# Patient Record
Sex: FEMALE | Race: WHITE | NOT HISPANIC OR LATINO | Employment: OTHER | URBAN - METROPOLITAN AREA
[De-identification: names, ages, dates, MRNs, and addresses within clinical notes are randomized per-mention and may not be internally consistent; named-entity substitution may affect disease eponyms.]

---

## 2017-01-06 DIAGNOSIS — F51.01 PRIMARY INSOMNIA: ICD-10-CM

## 2017-01-06 RX ORDER — ZOLPIDEM TARTRATE 5 MG/1
TABLET ORAL
Qty: 60 TABLET | Refills: 0 | Status: SHIPPED | OUTPATIENT
Start: 2017-01-06 | End: 2017-01-09 | Stop reason: SDUPTHER

## 2017-01-09 DIAGNOSIS — F51.01 PRIMARY INSOMNIA: ICD-10-CM

## 2017-01-09 RX ORDER — ZOLPIDEM TARTRATE 5 MG/1
TABLET ORAL
Qty: 60 TABLET | Refills: 0 | Status: SHIPPED | OUTPATIENT
Start: 2017-01-09 | End: 2017-02-08

## 2017-01-09 NOTE — TELEPHONE ENCOUNTER
----- Message from Ryan Jaimes sent at 1/3/2017  9:36 AM CST -----  Contact: self/[phone]  Refill:  zolpidem (AMBIEN) 10 mg Tab    Thank you.

## 2017-02-08 ENCOUNTER — OFFICE VISIT (OUTPATIENT)
Dept: PSYCHIATRY | Facility: CLINIC | Age: 57
End: 2017-02-08
Payer: COMMERCIAL

## 2017-02-08 VITALS
HEART RATE: 87 BPM | DIASTOLIC BLOOD PRESSURE: 73 MMHG | WEIGHT: 191 LBS | BODY MASS INDEX: 36.06 KG/M2 | HEIGHT: 61 IN | SYSTOLIC BLOOD PRESSURE: 136 MMHG

## 2017-02-08 DIAGNOSIS — M25.552 HIP PAIN, LEFT: ICD-10-CM

## 2017-02-08 DIAGNOSIS — M54.42 CHRONIC LEFT-SIDED LOW BACK PAIN WITH LEFT-SIDED SCIATICA: ICD-10-CM

## 2017-02-08 DIAGNOSIS — F41.9 ANXIETY: ICD-10-CM

## 2017-02-08 DIAGNOSIS — G89.29 CHRONIC LEFT-SIDED LOW BACK PAIN WITH LEFT-SIDED SCIATICA: ICD-10-CM

## 2017-02-08 DIAGNOSIS — F32.89 OTHER DEPRESSION: Primary | ICD-10-CM

## 2017-02-08 PROCEDURE — 99999 PR PBB SHADOW E&M-EST. PATIENT-LVL III: CPT | Mod: PBBFAC,,,

## 2017-02-08 PROCEDURE — 90792 PSYCH DIAG EVAL W/MED SRVCS: CPT | Mod: S$GLB,,,

## 2017-02-08 RX ORDER — LORAZEPAM 1 MG/1
TABLET ORAL
COMMUNITY
Start: 2017-01-20 | End: 2017-02-08 | Stop reason: SDUPTHER

## 2017-02-08 RX ORDER — DULOXETIN HYDROCHLORIDE 30 MG/1
30 CAPSULE, DELAYED RELEASE ORAL NIGHTLY
Qty: 30 CAPSULE | Refills: 6 | Status: SHIPPED | OUTPATIENT
Start: 2017-02-08 | End: 2017-05-01

## 2017-02-08 RX ORDER — NALOXEGOL OXALATE 25 MG/1
25 TABLET, FILM COATED ORAL DAILY
COMMUNITY
Start: 2016-12-29 | End: 2018-04-16 | Stop reason: ALTCHOICE

## 2017-02-08 RX ORDER — DULOXETIN HYDROCHLORIDE 60 MG/1
60 CAPSULE, DELAYED RELEASE ORAL DAILY
Qty: 30 CAPSULE | Refills: 6 | Status: SHIPPED | OUTPATIENT
Start: 2017-02-08 | End: 2017-06-19 | Stop reason: SDUPTHER

## 2017-02-08 RX ORDER — LORAZEPAM 1 MG/1
1 TABLET ORAL 2 TIMES DAILY
Qty: 60 TABLET | Refills: 0 | Status: SHIPPED | OUTPATIENT
Start: 2017-02-20 | End: 2017-10-05

## 2017-02-08 RX ORDER — OXYCODONE AND ACETAMINOPHEN 10; 325 MG/1; MG/1
1 TABLET ORAL EVERY 8 HOURS PRN
COMMUNITY
Start: 2017-01-31 | End: 2022-05-19 | Stop reason: DRUGHIGH

## 2017-02-10 ENCOUNTER — TELEPHONE (OUTPATIENT)
Dept: PSYCHIATRY | Facility: CLINIC | Age: 57
End: 2017-02-10

## 2017-02-10 NOTE — TELEPHONE ENCOUNTER
Patient states that she spoke with her boyfriend about couples counseling and that he was open to it and wanted to schedule an appointment, I explained that I do not provide that service at this clinic and encouraged her to call the main number here and make an appointment with one of our therapists.    ----- Message from Manisha Dawn MA sent at 2/10/2017  2:43 PM CST -----  Contact: pt  [phone]    Pt would like to speak with you says she has some information you wanted from her.

## 2017-02-10 NOTE — TELEPHONE ENCOUNTER
----- Message from Fide Blanchard MA sent at 2/10/2017 10:27 AM CST -----  Contact: pt  Pt stated that she need to speak with you regarding some information that you want to talk to you about.

## 2017-02-13 PROBLEM — G56.02 LEFT CARPAL TUNNEL SYNDROME: Status: ACTIVE | Noted: 2017-02-13

## 2017-02-14 ENCOUNTER — OFFICE VISIT (OUTPATIENT)
Dept: FAMILY MEDICINE | Facility: CLINIC | Age: 57
End: 2017-02-14
Payer: COMMERCIAL

## 2017-02-14 VITALS
SYSTOLIC BLOOD PRESSURE: 118 MMHG | TEMPERATURE: 98 F | BODY MASS INDEX: 36.06 KG/M2 | HEIGHT: 61 IN | DIASTOLIC BLOOD PRESSURE: 74 MMHG | HEART RATE: 72 BPM | OXYGEN SATURATION: 98 % | WEIGHT: 191 LBS

## 2017-02-14 DIAGNOSIS — I10 ESSENTIAL HYPERTENSION: ICD-10-CM

## 2017-02-14 DIAGNOSIS — G89.29 CHRONIC LEFT-SIDED LOW BACK PAIN WITH LEFT-SIDED SCIATICA: Chronic | ICD-10-CM

## 2017-02-14 DIAGNOSIS — E78.2 MIXED HYPERLIPIDEMIA: Chronic | ICD-10-CM

## 2017-02-14 DIAGNOSIS — I10 HYPERTENSION, ESSENTIAL: Chronic | ICD-10-CM

## 2017-02-14 DIAGNOSIS — M47.26 OSTEOARTHRITIS OF SPINE WITH RADICULOPATHY, LUMBAR REGION: ICD-10-CM

## 2017-02-14 DIAGNOSIS — Z23 NEED FOR DIPHTHERIA-TETANUS-PERTUSSIS (TDAP) VACCINE, ADULT/ADOLESCENT: ICD-10-CM

## 2017-02-14 DIAGNOSIS — K21.9 GASTROESOPHAGEAL REFLUX DISEASE WITHOUT ESOPHAGITIS: Primary | Chronic | ICD-10-CM

## 2017-02-14 DIAGNOSIS — R39.11 URINARY HESITANCY: ICD-10-CM

## 2017-02-14 DIAGNOSIS — M54.42 CHRONIC LEFT-SIDED LOW BACK PAIN WITH LEFT-SIDED SCIATICA: Chronic | ICD-10-CM

## 2017-02-14 PROCEDURE — 90715 TDAP VACCINE 7 YRS/> IM: CPT | Mod: S$GLB,,, | Performed by: INTERNAL MEDICINE

## 2017-02-14 PROCEDURE — 99999 PR PBB SHADOW E&M-EST. PATIENT-LVL IV: CPT | Mod: PBBFAC,,, | Performed by: INTERNAL MEDICINE

## 2017-02-14 PROCEDURE — 3074F SYST BP LT 130 MM HG: CPT | Mod: S$GLB,,, | Performed by: INTERNAL MEDICINE

## 2017-02-14 PROCEDURE — 3078F DIAST BP <80 MM HG: CPT | Mod: S$GLB,,, | Performed by: INTERNAL MEDICINE

## 2017-02-14 PROCEDURE — 99214 OFFICE O/P EST MOD 30 MIN: CPT | Mod: 25,S$GLB,, | Performed by: INTERNAL MEDICINE

## 2017-02-14 PROCEDURE — 90471 IMMUNIZATION ADMIN: CPT | Mod: S$GLB,,, | Performed by: INTERNAL MEDICINE

## 2017-02-14 RX ORDER — ESOMEPRAZOLE MAGNESIUM 40 MG/1
40 CAPSULE, DELAYED RELEASE ORAL
Qty: 90 CAPSULE | Refills: 3 | Status: SHIPPED | OUTPATIENT
Start: 2017-02-14 | End: 2017-10-05 | Stop reason: SDUPTHER

## 2017-02-14 RX ORDER — LISINOPRIL AND HYDROCHLOROTHIAZIDE 20; 25 MG/1; MG/1
1 TABLET ORAL DAILY
Qty: 90 TABLET | Refills: 3 | Status: SHIPPED | OUTPATIENT
Start: 2017-02-14 | End: 2018-02-17 | Stop reason: SDUPTHER

## 2017-02-14 RX ORDER — PRAVASTATIN SODIUM 20 MG/1
20 TABLET ORAL DAILY
Qty: 90 TABLET | Refills: 3 | Status: SHIPPED | OUTPATIENT
Start: 2017-02-14 | End: 2017-05-18 | Stop reason: SDUPTHER

## 2017-02-14 NOTE — PROGRESS NOTES
Assessment & Plan  Problem List Items Addressed This Visit        Neuro    Chronic left-sided low back pain with left-sided sciatica (Chronic) - managed by ortho    Overview     Hx L3-4 fusion and L5S1 fusion; currently L2 issues            Cardiac    Hypertension, essential (Chronic) - stable, no change, rx refilled.       Fluids/Electrolytes/Nutrition/GI    Hyperlipidemia (Chronic) - not ideal 10/2016.  Exercising - recumbent bike.  No change for now.  Hx of simva with se myalgias.  Has room to increase.  She'll set up fasting lab visit in 3/2017 thereabouts.    Overview     Simvastatin SE myalgias         Relevant Medications    pravastatin (PRAVACHOL) 20 MG tablet    Other Relevant Orders    Lipid panel    Comprehensive metabolic panel    GERD (gastroesophageal reflux disease) - Primary (Chronic) - uncontrolled on protonix.  Hx of nexium with relief - re-trial.  If not well covered may need to try alternative.  Has tried prevacid w/o relief.  I think tried prilosec as well.    Overview     11/19/2012 EGD normal no bx's            Musculoskeletal and Integument    Lumbar spondylosis      Other Visit Diagnoses     Essential hypertension        Relevant Medications    lisinopril-hydrochlorothiazide (PRINZIDE,ZESTORETIC) 20-25 mg Tab    Need for diphtheria-tetanus-pertussis (Tdap) vaccine, adult/adolescent        Relevant Orders    Tdap Vaccine    Urinary hesitancy     - chronic.  UA 5/2016 was normal.  Referral to urology.    Relevant Orders    Ambulatory referral to Urology          There are no discontinued medications.    Follow-up: Return in about 3 months (around 5/14/2017) for fasting lab work. Plan for OV 1 year.  She's good about self monitoring BP.      =================================================================      Chief Complaint   Patient presents with    [facility]     protonix not working       HPI  Kathryn is a 56 y.o. female, last appointment with this clinic was 10/24/2016, who presents  today for an established pt new problem visit.    Former pt of Dr. Colby who is now retired.    No LMP recorded. Patient has had a hysterectomy.     Is interested in seeing urology.  Sometimes has issues with straining to get the urine out. No incontinence.  Chronic - 3 years by her estimate.  But seems to be progressing, more noticeable.  Hx of stress incontinence but that is quiescent.  No burning; no strange color or odor to it.  Notes BMs are not great b/c of oxycodone.  Last UA on record 5/2016 was normal.  Symptoms are 3 years running.    GERD - of late, not controlled.  Had been better in the past - would be better int he day.  Would feel acid sensation in the chest. Not constant; maybe worse after meals.  Or with exercise.  Notes this is not exclusive to exercise. Not squeezing; more like a burning, acid like pain.  No dyspnea with exercise.     Hx of nexium which worked well until no longer covered by insurance.  Hx of prevacid but developed tolerance to that.     BP good. On longstanding medication. Intermittent outside BP checks.      Exercise - few days a week; hx of L 3 L4 fusion and L5-S1.  Followed by Dr. Moreira for L2 degeneration.  Out of work x 8/2016.    Taking cymbalta combinatinon for depression and nerve pain.  Does find it helpful.  Denies obvious SE of medications.    Health Maintenance       Date Due Completion Date    TETANUS VACCINE 2/20/1979 ---    Mammogram 3/28/2018 3/28/2016    Lipid Panel 10/25/2021 10/25/2016    Colonoscopy 11/19/2022 11/19/2012          Patient Active Problem List    Diagnosis Date Noted    Left carpal tunnel syndrome 02/13/2017    GERD (gastroesophageal reflux disease)      11/19/2012 EGD normal no bx's      Chronic left-sided low back pain with left-sided sciatica 10/04/2016     Hx L3-4 fusion and L5S1 fusion; currently L2 issues      Hip pain, left 10/04/2016    Weakness of trunk musculature 10/04/2016    Obesity 03/04/2015    Lumbar spondylosis  2015    Hyperplastic polyp of intestine 2012 colonoscopy hyperplastic polyp      Hypertension, essential 10/10/2012    Hyperlipidemia 10/10/2012     Simvastatin SE myalgias      Depression 10/10/2012     Fluoxetine x 2013  ativan         PAST MEDICAL HISTORY:  Past Medical History   Diagnosis Date    Depression     Encounter for blood transfusion     GERD (gastroesophageal reflux disease)     High cholesterol     Hypertension     Lumbar spondylosis 2015     L3-4 fusion; L5S1 fusion    Obesity     Restless leg syndrome     Thyroid goiter        PAST SURGICAL HISTORY:  Past Surgical History   Procedure Laterality Date     section      Hysterectomy       partial    Breast surgery       breast reduction    Tubal ligation      Belt abdominoplasty      Back surgery Bilateral      2015    Cosmetic surgery         SOCIAL HISTORY:  Social History     Social History    Marital status: Significant Other     Spouse name: N/A    Number of children: N/A    Years of education: N/A     Occupational History    [employer]    formerly       Social History Main Topics    Smoking status: Former Smoker     Years: 7.00    Smokeless tobacco: Former User     Quit date: 2013    Alcohol use 0.0 oz/week      Comment: a coupe of times a week    Drug use: No    Sexual activity: Not on file     Other Topics Concern    Not on file     Social History Narrative    No narrative on file       ALLERGIES AND MEDICATIONS: updated and reviewed.  Review of patient's allergies indicates:   Allergen Reactions    Iodine and iodide containing products Hives and Itching    Latex, natural rubber Rash     Current Outpatient Prescriptions   Medication Sig Dispense Refill    duloxetine (CYMBALTA) 30 MG capsule Take 1 capsule (30 mg total) by mouth every evening. 30 capsule 6    duloxetine (CYMBALTA) 60 MG capsule Take 1 capsule (60 mg total)  "by mouth once daily. 30 capsule 6    lisinopril-hydrochlorothiazide (PRINZIDE,ZESTORETIC) 20-25 mg Tab Take 1 tablet by mouth once daily. 90 tablet 3    [START ON 2/20/2017] lorazepam (ATIVAN) 1 MG tablet Take 1 tablet (1 mg total) by mouth 2 (two) times daily. 60 tablet 0    MOVANTIK tablet Take 25 mg by mouth once daily.       oxycodone-acetaminophen (PERCOCET)  mg per tablet Take 1 tablet by mouth every 8 (eight) hours as needed.       pantoprazole (PROTONIX) 40 MG tablet Take 1 tablet (40 mg total) by mouth once daily. 90 tablet 3    pravastatin (PRAVACHOL) 20 MG tablet Take 1 tablet (20 mg total) by mouth once daily. 90 tablet 3    hydroquinone 4 % Crea Use hs on face for dark spots 28.35 g 3     No current facility-administered medications for this visit.          Review of Systems   Constitutional: Negative for fever, malaise/fatigue and weight loss.   HENT: Negative for congestion.    Eyes: Negative for blurred vision and pain.   Respiratory: Negative for shortness of breath and wheezing.    Cardiovascular: Negative for chest pain, palpitations and leg swelling.   Gastrointestinal: Positive for heartburn. Negative for abdominal pain, blood in stool, constipation and diarrhea.   Genitourinary:        HPI   Musculoskeletal: Negative for joint pain.   Neurological: Negative for tingling, focal weakness, weakness and headaches.   Psychiatric/Behavioral: Negative for depression. The patient is not nervous/anxious.          Physical Exam   Vitals:    02/14/17 1302   BP: 118/74   Pulse: 72   Temp: 98 °F (36.7 °C)   TempSrc: Oral   SpO2: 98%   Weight: 86.6 kg (191 lb)   Height: 5' 1" (1.549 m)    Body mass index is 36.09 kg/(m^2).  Weight: 86.6 kg (191 lb)   Height: 5' 1" (154.9 cm)     Physical Exam   Constitutional: She is oriented to person, place, and time. She appears well-developed and well-nourished. No distress.   Eyes: EOM are normal.   Cardiovascular: Normal rate, regular rhythm and normal " heart sounds.    No murmur heard.  Pulmonary/Chest: Effort normal and breath sounds normal.   Abdominal: Soft. She exhibits no distension and no mass. There is no tenderness.   Musculoskeletal: Normal range of motion.   Neurological: She is alert and oriented to person, place, and time. Coordination normal.   Skin: Skin is warm and dry.   Psychiatric: She has a normal mood and affect. Her behavior is normal. Thought content normal.
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
## 2017-03-14 DIAGNOSIS — F41.9 ANXIETY: ICD-10-CM

## 2017-03-14 RX ORDER — ZOLPIDEM TARTRATE 5 MG/1
5 TABLET ORAL NIGHTLY PRN
Qty: 30 TABLET | Refills: 2 | Status: SHIPPED | OUTPATIENT
Start: 2017-03-14 | End: 2017-05-09 | Stop reason: SDUPTHER

## 2017-03-14 NOTE — TELEPHONE ENCOUNTER
----- Message from Mary Ventura sent at 3/14/2017 11:27 AM CDT -----  Contact: self  Pt is requesting a refill for zolpidem (AMBIEN) 5 MG Tab . [phone]

## 2017-04-19 ENCOUNTER — TELEPHONE (OUTPATIENT)
Dept: PAIN MEDICINE | Facility: CLINIC | Age: 57
End: 2017-04-19

## 2017-04-26 ENCOUNTER — OFFICE VISIT (OUTPATIENT)
Dept: PAIN MEDICINE | Facility: CLINIC | Age: 57
End: 2017-04-26
Payer: COMMERCIAL

## 2017-04-26 VITALS
RESPIRATION RATE: 20 BRPM | DIASTOLIC BLOOD PRESSURE: 69 MMHG | BODY MASS INDEX: 37.61 KG/M2 | HEART RATE: 90 BPM | HEIGHT: 61 IN | WEIGHT: 199.19 LBS | SYSTOLIC BLOOD PRESSURE: 107 MMHG

## 2017-04-26 DIAGNOSIS — Z98.1 HISTORY OF LUMBAR FUSION: ICD-10-CM

## 2017-04-26 DIAGNOSIS — M51.36 DDD (DEGENERATIVE DISC DISEASE), LUMBAR: ICD-10-CM

## 2017-04-26 DIAGNOSIS — M53.3 SACROILIAC JOINT PAIN: Primary | ICD-10-CM

## 2017-04-26 DIAGNOSIS — M96.1 POSTLAMINECTOMY SYNDROME OF LUMBAR REGION: ICD-10-CM

## 2017-04-26 PROCEDURE — 3078F DIAST BP <80 MM HG: CPT | Mod: S$GLB,,, | Performed by: ANESTHESIOLOGY

## 2017-04-26 PROCEDURE — 1160F RVW MEDS BY RX/DR IN RCRD: CPT | Mod: S$GLB,,, | Performed by: ANESTHESIOLOGY

## 2017-04-26 PROCEDURE — 3074F SYST BP LT 130 MM HG: CPT | Mod: S$GLB,,, | Performed by: ANESTHESIOLOGY

## 2017-04-26 PROCEDURE — 99204 OFFICE O/P NEW MOD 45 MIN: CPT | Mod: S$GLB,,, | Performed by: ANESTHESIOLOGY

## 2017-04-26 RX ORDER — SODIUM CHLORIDE 9 MG/ML
500 INJECTION, SOLUTION INTRAVENOUS CONTINUOUS
Status: CANCELLED | OUTPATIENT
Start: 2017-04-26

## 2017-04-26 RX ORDER — MELOXICAM 15 MG/1
15 TABLET ORAL DAILY
COMMUNITY
Start: 2017-04-25 | End: 2019-04-09

## 2017-04-26 NOTE — PROGRESS NOTES
Chronic Pain - New Consult    Referring Physician: Clarence Quintero, *    Chief Complaint:   Chief Complaint   Patient presents with    Low-back Pain        SUBJECTIVE:    Kathryn Wagner is a 56 y/o female with hx of lumbar fusion who presents to the clinic for the evaluation of low back pain. The pain started 3 years ago and symptoms have been worsening. The pain is located in the bilateral low back and buttock area and will radiate down the left leg in L5 distribution. Back: 80% Le%. The pain is described as aching, burning, numbing, throbbing and tight band and is rated as 6/10. The pain is rated with a score of  3/10 on the BEST day and a score of 10/10 on the WORST day.  Symptoms interfere with daily activity and sleeping. The pain is exacerbated by Sitting, Standing, Walking, Lifting, Getting out of bed/chair and standing for a long time, cold weather.  The pain is mitigated by ice, laying down and stretching. The patient reports 3 hours of uninterrupted sleep per night. She was referred by Dr. Qiuntero for SI joint injection.    Patient denies night fever/night sweats, urinary incontinence, bowel incontinence, significant weight loss, significant motor weakness and loss of sensations.    Physical Therapy/Home Exercise: no      Pain Disability Index Review:  Last 3 PDI Scores 2017   Pain Disability Index (PDI) 42       Pain Medications:  Percocet 10/325mg 1 tablet 2-3 times daily  Cymbalta 60mg, 1 tablet daily  Lorazepam 1mg, 1 tablet twice daily as needed  Mobic 15mg, 1 tablet daily as needed  Ambien 5mg, 1 tablet at bedtime as needed       report:  Reviewed     Pain Procedures: RFA - Dr. Bravo 10 years ago,  [facility]     Imaging: None available    Past Medical History:   Diagnosis Date    Depression     Encounter for blood transfusion     GERD (gastroesophageal reflux disease)     High cholesterol     Hypertension     Lumbar spondylosis 2015    L3-4 fusion; L5S1 fusion     Obesity     Restless leg syndrome     Thyroid goiter      Past Surgical History:   Procedure Laterality Date    BACK SURGERY Bilateral     2015    BELT ABDOMINOPLASTY      BREAST SURGERY      breast reduction     SECTION      COSMETIC SURGERY      HYSTERECTOMY      partial    TUBAL LIGATION       Social History     Social History    Marital status: Significant Other     Spouse name: N/A    Number of children: N/A    Years of education: N/A     Occupational History    vera [employer]    formerly       Social History Main Topics    Smoking status: Former Smoker     Years: 7.00    Smokeless tobacco: Former User     Quit date: 2013    Alcohol use 0.0 oz/week      Comment: a coupe of times a week    Drug use: No    Sexual activity: Not on file     Other Topics Concern    Not on file     Social History Narrative     Family History   Problem Relation Age of Onset    Hypertension Father     No Known Problems Mother     No Known Problems Sister     No Known Problems Brother     No Known Problems Maternal Aunt     No Known Problems Maternal Uncle     No Known Problems Paternal Aunt     No Known Problems Paternal Uncle     No Known Problems Maternal Grandmother     No Known Problems Maternal Grandfather     No Known Problems Paternal Grandmother     No Known Problems Paternal Grandfather     Amblyopia Neg Hx     Blindness Neg Hx     Cancer Neg Hx     Cataracts Neg Hx     Diabetes Neg Hx     Glaucoma Neg Hx     Macular degeneration Neg Hx     Retinal detachment Neg Hx     Strabismus Neg Hx     Stroke Neg Hx     Thyroid disease Neg Hx        Review of patient's allergies indicates:   Allergen Reactions    Iodine and iodide containing products Hives and Itching    Latex, natural rubber Rash       Current Outpatient Prescriptions   Medication Sig    duloxetine (CYMBALTA) 60 MG capsule Take 1 capsule (60 mg total) by mouth once  "daily.    esomeprazole (NEXIUM) 40 MG capsule Take 1 capsule (40 mg total) by mouth before breakfast.    hydroquinone 4 % Crea Use hs on face for dark spots    lisinopril-hydrochlorothiazide (PRINZIDE,ZESTORETIC) 20-25 mg Tab Take 1 tablet by mouth once daily.    lorazepam (ATIVAN) 1 MG tablet Take 1 tablet (1 mg total) by mouth 2 (two) times daily.    meloxicam (MOBIC) 15 MG tablet Take 15 mg by mouth daily as needed.    MOVANTIK tablet Take 25 mg by mouth once daily.     oxycodone-acetaminophen (PERCOCET)  mg per tablet Take 1 tablet by mouth every 8 (eight) hours as needed.     pravastatin (PRAVACHOL) 20 MG tablet Take 1 tablet (20 mg total) by mouth once daily.    zolpidem (AMBIEN) 5 MG Tab Take 1 tablet (5 mg total) by mouth nightly as needed.    duloxetine (CYMBALTA) 30 MG capsule Take 1 capsule (30 mg total) by mouth every evening.     No current facility-administered medications for this visit.        REVIEW OF SYSTEMS:  GENERAL: No weight loss, malaise or fevers.  HEENT: Negative for frequent or significant headaches.  NECK: Negative for lumps or significant neck swelling.  RESPIRATORY: Negative for cough, wheezing or shortness of breath.  CARDIOVASCULAR: Negative for chest pain or palpitations.  GI: No blood in stools or black stools or change in bowel habits.  : Negative for kidney stones, urinary tract infections, or incontinence.  MUSCULOSKELETAL: See HPI  SKIN: Negative for lesions, rash, and itching.  PSYCH: + sleep disturbance   HEMATOLOGY/LYMPHOLOGY Negative for prolonged bleeding, bruising easily or swollen nodes.  NEURO:  No history of syncope, seizures or tremors.    OBJECTIVE:    /69 (BP Location: Left arm, Patient Position: Sitting, BP Method: Automatic)  Pulse 90  Resp 20  Ht 5' 1" (1.549 m)  Wt 90.4 kg (199 lb 3.2 oz)  BMI 37.64 kg/m2    PHYSICAL EXAMINATION:  GENERAL: Well appearing, in no acute distress. Overweight.  PSYCH:  Mood and affect is appropriate.  " Awake, alert, and oriented x 3.  SKIN: Skin color, texture, turgor normal, no rashes or lesions  HEENT: Normocephalic, atraumatic.  EOM intact.  CV: Radial pulses are 2+.  RESP:  Respirations are unlabored.  GI: Abdomen soft and non-tender.  MSK:  No atrophy or tone abnormalities are noted.      Neck: No obvious deformity or signs of trauma.  Normal cervical spine range of motion.    Back: Well healed scar noted over midline. Straight leg raising in the sitting and supine positions is negative for  radicular pain. No pain to palpation over the lumbar spine and paraspinous muscles.  Positive for pain with facet loading and back extension/rotation. Decreased range of motion on flexion and extension.    Buttocks:  Pain to palpation over the PSIS. -FADIR -VIRI    Extremities:  Peripheral joint ROM is full and pain free without obvious instability or laxity in all four extremities. No edema or skin discolorations noted.     Gait:  Gait is normal.    NEUR:  Bilateral lower extremity coordination and muscle stretch reflexes are physiologic and symmetric. Strength testing is 5/5 throughout all muscle groups in the lower extremities. No loss of sensation is noted.     ASSESSMENT:     1. Sacroiliac joint pain    2. Postlaminectomy syndrome of lumbar region    3. DDD (degenerative disc disease), lumbar    4. History of lumbar fusion          PLAN:     - I have stressed the importance of physical activity and a home exercise plan to help with pain and improve health.  - Obtain imaging reports from outside physicians.  - Order Flexion-Extension X-rays of the Lumbar spine to rule out any instability.  - Schedule for a Bilateral Diagnostic/Therapeutic SI joint injection to help with pain and progress with a home exercise program.  - RTC after procedure.    The above plan and management options were discussed at length with patient. Patient is in agreement with the above and verbalized understanding. It will be communicated with  the referring physician via electronic record, fax, or mail.    Jam Stewart III  04/26/2017

## 2017-04-26 NOTE — LETTER
April 26, 2017      Clarence Quintero MD  [address] [place] [address]           [facility]  [address] [place] [id number]  Phone: [phone]  Fax: [phone]          Patient: Kathryn Wagner   MR Number: [id number]   YOB: 1960   Date of Visit: 4/26/2017       Dear Dr. Clarence Quintero:    Thank you for referring Kathryn Wagner to me for evaluation. Attached you will find relevant portions of my assessment and plan of care.    If you have questions, please do not hesitate to call me. I look forward to following Kathryn Wagner along with you.    Sincerely,    Jam Stewart III, MD    Enclosure  CC:  No Recipients    If you would like to receive this communication electronically, please contact [email or web address] or [phone] to request more information on [employer] Link access.    For providers and/or their staff who would like to refer a patient to [facility], please contact us through our one-stop-shop provider referral line, [facility], at [phone].    If you feel you have received this communication in error or would no longer like to receive these types of communications, please e-mail [email or web address]

## 2017-05-02 PROBLEM — M51.369 DDD (DEGENERATIVE DISC DISEASE), LUMBAR: Status: ACTIVE | Noted: 2017-05-02

## 2017-05-02 PROBLEM — M96.1 POSTLAMINECTOMY SYNDROME OF LUMBAR REGION: Status: ACTIVE | Noted: 2017-05-02

## 2017-05-02 PROBLEM — M51.36 DDD (DEGENERATIVE DISC DISEASE), LUMBAR: Status: ACTIVE | Noted: 2017-05-02

## 2017-05-02 PROBLEM — M53.3 SACROILIAC JOINT PAIN: Status: ACTIVE | Noted: 2017-05-02

## 2017-05-09 RX ORDER — ZOLPIDEM TARTRATE 5 MG/1
5 TABLET ORAL NIGHTLY PRN
Qty: 30 TABLET | Refills: 2 | Status: SHIPPED | OUTPATIENT
Start: 2017-05-09 | End: 2017-06-14 | Stop reason: SDUPTHER

## 2017-05-09 NOTE — TELEPHONE ENCOUNTER
----- Message from Miranda Mariscal sent at 5/9/2017 11:01 AM CDT -----  Refill: zolpidem (AMBIEN) 5 MG Tab    Please send to [facility]. Thank you!

## 2017-05-10 ENCOUNTER — LAB VISIT (OUTPATIENT)
Dept: LAB | Facility: HOSPITAL | Age: 57
End: 2017-05-10
Attending: INTERNAL MEDICINE
Payer: MEDICAID

## 2017-05-10 DIAGNOSIS — E78.2 MIXED HYPERLIPIDEMIA: Chronic | ICD-10-CM

## 2017-05-10 LAB
ALBUMIN SERPL BCP-MCNC: 4.2 G/DL
ALP SERPL-CCNC: 60 U/L
ALT SERPL W/O P-5'-P-CCNC: 12 U/L
ANION GAP SERPL CALC-SCNC: 17 MMOL/L
AST SERPL-CCNC: 14 U/L
BILIRUB SERPL-MCNC: 0.5 MG/DL
BUN SERPL-MCNC: 20 MG/DL
CALCIUM SERPL-MCNC: 9.6 MG/DL
CHLORIDE SERPL-SCNC: 101 MMOL/L
CHOLEST/HDLC SERPL: 3.9 {RATIO}
CO2 SERPL-SCNC: 19 MMOL/L
CREAT SERPL-MCNC: 1 MG/DL
EST. GFR  (AFRICAN AMERICAN): >60 ML/MIN/1.73 M^2
EST. GFR  (NON AFRICAN AMERICAN): >60 ML/MIN/1.73 M^2
GLUCOSE SERPL-MCNC: 132 MG/DL
HDL/CHOLESTEROL RATIO: 25.8 %
HDLC SERPL-MCNC: 236 MG/DL
HDLC SERPL-MCNC: 61 MG/DL
LDLC SERPL CALC-MCNC: 138.4 MG/DL
NONHDLC SERPL-MCNC: 175 MG/DL
POTASSIUM SERPL-SCNC: 4 MMOL/L
PROT SERPL-MCNC: 7.9 G/DL
SODIUM SERPL-SCNC: 137 MMOL/L
TRIGL SERPL-MCNC: 183 MG/DL

## 2017-05-10 PROCEDURE — 80053 COMPREHEN METABOLIC PANEL: CPT

## 2017-05-10 PROCEDURE — 36415 COLL VENOUS BLD VENIPUNCTURE: CPT | Mod: PO

## 2017-05-10 PROCEDURE — 80061 LIPID PANEL: CPT

## 2017-05-18 ENCOUNTER — TELEPHONE (OUTPATIENT)
Dept: FAMILY MEDICINE | Facility: CLINIC | Age: 57
End: 2017-05-18

## 2017-05-18 DIAGNOSIS — Z12.31 ENCOUNTER FOR SCREENING MAMMOGRAM FOR MALIGNANT NEOPLASM OF BREAST: ICD-10-CM

## 2017-05-18 DIAGNOSIS — R73.09 ABNORMAL GLUCOSE: Primary | ICD-10-CM

## 2017-05-18 DIAGNOSIS — E78.2 MIXED HYPERLIPIDEMIA: Chronic | ICD-10-CM

## 2017-05-18 RX ORDER — PRAVASTATIN SODIUM 40 MG/1
40 TABLET ORAL DAILY
Qty: 90 TABLET | Refills: 3 | Status: SHIPPED | OUTPATIENT
Start: 2017-05-18 | End: 2018-05-29 | Stop reason: SDUPTHER

## 2017-05-18 NOTE — TELEPHONE ENCOUNTER
Please call pt  1. Cholesterol high - increase the pravastatin from 20 mg to 40 mg - new rx sent to pharmacy    2. Blood sugar was a little high - work on diet - watch out for sweets, starches.    i put in labs for 3 months - CMP, lipid, and a1c on higher dose pravastatin and check for diabetes or pre-diabetes.

## 2017-05-18 NOTE — TELEPHONE ENCOUNTER
Spoke w/patient, informed of lab results, Rx sent to and recomendations. Verbalized understanding. Labs scheduled. Patient request yearly mammogram, scheduled.

## 2017-05-18 NOTE — PROGRESS NOTES
Lipid high on pravastatin 20.  Abnormal glucose.  Need to check A1c with next labs.    Would increase pravastatin to 40 mg.  Repeat labs 3 monthsCMP, lipid, A1c

## 2017-05-24 ENCOUNTER — HOSPITAL ENCOUNTER (OUTPATIENT)
Dept: RADIOLOGY | Facility: HOSPITAL | Age: 57
Discharge: HOME | End: 2017-05-24
Attending: INTERNAL MEDICINE
Payer: COMMERCIAL

## 2017-05-24 DIAGNOSIS — Z12.31 ENCOUNTER FOR SCREENING MAMMOGRAM FOR MALIGNANT NEOPLASM OF BREAST: ICD-10-CM

## 2017-05-24 PROCEDURE — 77067 SCR MAMMO BI INCL CAD: CPT | Mod: 26,,, | Performed by: RADIOLOGY

## 2017-05-24 PROCEDURE — 77063 BREAST TOMOSYNTHESIS BI: CPT | Mod: 26,,, | Performed by: RADIOLOGY

## 2017-05-24 PROCEDURE — 77067 SCR MAMMO BI INCL CAD: CPT | Mod: TC

## 2017-06-14 DIAGNOSIS — F40.240 CLAUSTROPHOBIA: Primary | ICD-10-CM

## 2017-06-14 DIAGNOSIS — F51.01 PRIMARY INSOMNIA: ICD-10-CM

## 2017-06-14 RX ORDER — ZOLPIDEM TARTRATE 5 MG/1
5 TABLET ORAL NIGHTLY PRN
Qty: 30 TABLET | Refills: 2 | Status: SHIPPED | OUTPATIENT
Start: 2017-06-14 | End: 2017-10-05

## 2017-06-14 RX ORDER — ALPRAZOLAM 0.5 MG/1
TABLET ORAL
Qty: 3 TABLET | Refills: 0 | Status: SHIPPED | OUTPATIENT
Start: 2017-06-14 | End: 2017-10-05

## 2017-06-14 NOTE — TELEPHONE ENCOUNTER
----- Message from Cris Burt sent at 6/14/2017  3:51 PM CDT -----  Contact: SELF  Refill request for Ambien .  Requesting Xanax to have MRI done .  Checking on the status of medic clearance for surgery .       [phone]     LL

## 2017-06-14 NOTE — TELEPHONE ENCOUNTER
Has been on ambien longstanding.  Will refill.    Will send in rx for xanax to take prior to MRI.    i was not asked to provide medical clearance for surgery.

## 2017-06-14 NOTE — TELEPHONE ENCOUNTER
I called and spoke with pt notified her that her ambien was refill and that he prescribed 3 xanax for her MRI and made an appointment on Monday 06 /19/17 for her pre-op appointment and Dr. Contreras has the form already pt dropped it off last week.Thanks

## 2017-06-19 ENCOUNTER — OFFICE VISIT (OUTPATIENT)
Dept: FAMILY MEDICINE | Facility: CLINIC | Age: 57
End: 2017-06-19
Payer: COMMERCIAL

## 2017-06-19 VITALS
HEART RATE: 72 BPM | HEIGHT: 61 IN | OXYGEN SATURATION: 99 % | TEMPERATURE: 98 F | SYSTOLIC BLOOD PRESSURE: 118 MMHG | DIASTOLIC BLOOD PRESSURE: 80 MMHG | WEIGHT: 201.75 LBS | BODY MASS INDEX: 38.09 KG/M2

## 2017-06-19 DIAGNOSIS — M54.42 CHRONIC LEFT-SIDED LOW BACK PAIN WITH LEFT-SIDED SCIATICA: ICD-10-CM

## 2017-06-19 DIAGNOSIS — F51.01 PRIMARY INSOMNIA: ICD-10-CM

## 2017-06-19 DIAGNOSIS — F32.89 OTHER DEPRESSION: ICD-10-CM

## 2017-06-19 DIAGNOSIS — Z01.818 PREOP EXAMINATION: Primary | ICD-10-CM

## 2017-06-19 DIAGNOSIS — G89.29 CHRONIC LEFT-SIDED LOW BACK PAIN WITH LEFT-SIDED SCIATICA: ICD-10-CM

## 2017-06-19 DIAGNOSIS — M25.552 HIP PAIN, LEFT: ICD-10-CM

## 2017-06-19 PROCEDURE — 99214 OFFICE O/P EST MOD 30 MIN: CPT | Mod: S$GLB,,, | Performed by: INTERNAL MEDICINE

## 2017-06-19 PROCEDURE — 99999 PR PBB SHADOW E&M-EST. PATIENT-LVL III: CPT | Mod: PBBFAC,,, | Performed by: INTERNAL MEDICINE

## 2017-06-19 PROCEDURE — 93010 ELECTROCARDIOGRAM REPORT: CPT | Mod: S$GLB,,, | Performed by: INTERNAL MEDICINE

## 2017-06-19 PROCEDURE — 93005 ELECTROCARDIOGRAM TRACING: CPT | Mod: S$GLB,,, | Performed by: INTERNAL MEDICINE

## 2017-06-19 RX ORDER — DULOXETIN HYDROCHLORIDE 60 MG/1
60 CAPSULE, DELAYED RELEASE ORAL DAILY
Qty: 30 CAPSULE | Refills: 6 | Status: SHIPPED | OUTPATIENT
Start: 2017-06-19 | End: 2017-08-19 | Stop reason: SDUPTHER

## 2017-06-19 RX ORDER — TRAZODONE HYDROCHLORIDE 50 MG/1
50 TABLET ORAL NIGHTLY
Qty: 30 TABLET | Refills: 11 | Status: SHIPPED | OUTPATIENT
Start: 2017-06-19 | End: 2017-12-22 | Stop reason: SDUPTHER

## 2017-06-19 NOTE — PROGRESS NOTES
Assessment & Plan  Preop examination-avg risk surgery, risk factors - HTN.  EKG WNL.  METS level 3 activity.  Given avg cardiac risk for type of surgery and with her risk factors, would not pursue stress testing further.  -     EKG 12-lead    Other depression  Chronic left-sided low back pain with left-sided sciatica  Hip pain, left - refilled cymbalta  -     duloxetine (CYMBALTA) 60 MG capsule; Take 1 capsule (60 mg total) by mouth once daily.  Dispense: 30 capsule; Refill: 6    Primary insomnia - trying to wean off Ambien, trazodone to use in the interim to bridge  -     trazodone (DESYREL) 50 MG tablet; Take 1 tablet (50 mg total) by mouth every evening. Start with 1/4 to 1/2 tablet PRN insomnia  Dispense: 30 tablet; Refill: 11    Abn glc - work on diet and exercise; exercise limited b/c of lumbar pain, hold on a1c at this time but work on TLC after surgery.    Medications Discontinued During This Encounter   Medication Reason    duloxetine (CYMBALTA) 60 MG capsule Reorder       Follow-up: No Follow-up on file. Plan for f/u 6 months.  Recall entered.      =================================================================      Chief Complaint   Patient presents with    Pre-op Exam       HPI  Kathryn is a 57 y.o. female, last appointment with this clinic was 2/14/2017.    No LMP recorded. Patient has had a hysterectomy.    Low back pain and left sided sciatica.  Followed by ortho.  HTN  Hyperlipidemia, hx of simvastatin with SE of myalgias.  GERD, uncontrolled on protonix, re-trial of Nexium.    Since last visit - increased prava from 20 to 40mg  Fasting glc elevated.  Will need A1c with next labs.    upcoming surgery 6/23.  Sacroiliac surgery. With Dr. Quintero. Last EKG 2/2016.  CMP 5/10/2017 WNL.  No recent CBC or UA.    Notes that she's going to get surgery done 6/30 with Eliazar Herrera.  Dr. Quintero. She will have preop testing done at [facility] as well, pt thinks she's going to have EKG done there.  Pt called  the surgery office - pt to have preop labs, EKG, and CXR with anesthesia.      Pt had seen cardiology in the past for HTN.  Denies chest pain, palpitations, dyspnea, and pedal edema.  Physical activity - not as much of late b/c of the back pain.  Prior - walking the trails. It's about a 3 mile walk. She would complete that in about an hour or less. No chest pain with stairs - but lives in mobile home, so 3 stairs entrance.    She's trying to wean off the Ambien - taking 1/2 tablet and times she is not taking it - not as heavy sleep.    Patient Care Team:  Edward Contreras MD as PCP - General (Internal Medicine)    Patient Active Problem List    Diagnosis Date Noted    Primary insomnia 06/14/2017    Sacroiliac joint pain 05/02/2017    Postlaminectomy syndrome of lumbar region 05/02/2017    DDD (degenerative disc disease), lumbar 05/02/2017    Left carpal tunnel syndrome 02/13/2017    GERD (gastroesophageal reflux disease)      11/19/2012 EGD normal no bx's      Chronic left-sided low back pain with left-sided sciatica 10/04/2016     Hx L3-4 fusion and L5S1 fusion; currently L2 issues      Hip pain, left 10/04/2016    Weakness of trunk musculature 10/04/2016    Obesity 03/04/2015    Lumbar spondylosis 03/04/2015    Hyperplastic polyp of intestine 11/19/2012 11/19/2012 colonoscopy hyperplastic polyp      Hypertension, essential 10/10/2012    Hyperlipidemia 10/10/2012     Simvastatin SE myalgias      Depression 10/10/2012     Fluoxetine x 2013  ativan         PAST MEDICAL HISTORY:  Past Medical History:   Diagnosis Date    Depression     Encounter for blood transfusion     GERD (gastroesophageal reflux disease)     High cholesterol     Hypertension     Lumbar spondylosis 03/04/2015    L3-4 fusion; L5S1 fusion    Obesity     Restless leg syndrome        PAST SURGICAL HISTORY:  Past Surgical History:   Procedure Laterality Date    BACK SURGERY Bilateral     June 2015, dec31 2015 fusion    BELT  ABDOMINOPLASTY      BREAST SURGERY      breast reduction     SECTION      x3    COSMETIC SURGERY      HYSTERECTOMY      partial    TUBAL LIGATION         SOCIAL HISTORY:  Social History     Social History    Marital status: Significant Other     Spouse name: N/A    Number of children: N/A    Years of education: N/A     Occupational History    vera Ash    formerly       Social History Main Topics    Smoking status: Former Smoker    Smokeless tobacco: Former User     Quit date: 2013      Comment: quit 7 yrs ago    Alcohol use 0.0 oz/week      Comment: a coupe of times a week    Drug use: No    Sexual activity: Not on file     Other Topics Concern    Not on file     Social History Narrative    No narrative on file       ALLERGIES AND MEDICATIONS: updated and reviewed.  Review of patient's allergies indicates:   Allergen Reactions    Iodine and iodide containing products Hives and Itching    Latex, natural rubber Rash     Current Outpatient Prescriptions   Medication Sig Dispense Refill    alprazolam (XANAX) 0.5 MG tablet 1-2 tablets 15 min prior to procedure; additional 1 in case 3 tablet 0    duloxetine (CYMBALTA) 60 MG capsule Take 1 capsule (60 mg total) by mouth once daily. 30 capsule 6    esomeprazole (NEXIUM) 40 MG capsule Take 1 capsule (40 mg total) by mouth before breakfast. 90 capsule 3    hydroquinone 4 % Crea Use hs on face for dark spots 28.35 g 3    lisinopril-hydrochlorothiazide (PRINZIDE,ZESTORETIC) 20-25 mg Tab Take 1 tablet by mouth once daily. 90 tablet 3    lorazepam (ATIVAN) 1 MG tablet Take 1 tablet (1 mg total) by mouth 2 (two) times daily. 60 tablet 0    meloxicam (MOBIC) 15 MG tablet Take 15 mg by mouth once daily.       MOVANTIK tablet Take 25 mg by mouth once daily.       oxycodone-acetaminophen (PERCOCET)  mg per tablet Take 1 tablet by mouth every 8 (eight) hours as needed.       pravastatin (PRAVACHOL)  "40 MG tablet Take 1 tablet (40 mg total) by mouth once daily. 90 tablet 3    zolpidem (AMBIEN) 5 MG Tab Take 1 tablet (5 mg total) by mouth nightly as needed. 30 tablet 2     No current facility-administered medications for this visit.        Review of Systems   Constitutional: Negative for chills and fever.   Respiratory: Negative for cough, shortness of breath and wheezing.    Cardiovascular: Negative for chest pain, palpitations, orthopnea, leg swelling and PND.       Physical Exam   Vitals:    06/19/17 1309   BP: 118/80   Pulse: 72   Temp: 98 °F (36.7 °C)   SpO2: 99%   Weight: 91.5 kg (201 lb 11.5 oz)   Height: 5' 1" (1.549 m)    Body mass index is 38.11 kg/m².  Weight: 91.5 kg (201 lb 11.5 oz)   Height: 5' 1" (154.9 cm)     Physical Exam   Constitutional: She is oriented to person, place, and time. She appears well-developed and well-nourished. No distress.   Eyes: EOM are normal.   Cardiovascular: Normal rate, regular rhythm and normal heart sounds.    No murmur heard.  Pulmonary/Chest: Effort normal and breath sounds normal.   Musculoskeletal: Normal range of motion.   Neurological: She is alert and oriented to person, place, and time. Coordination normal.   Skin: Skin is warm and dry.   Psychiatric: She has a normal mood and affect. Her behavior is normal. Thought content normal.     EKG tracing personally reviewed, sinus rhythm, no ST/T-wave abnormalities, normal EKG  "

## 2017-06-28 DIAGNOSIS — E78.2 MIXED HYPERLIPIDEMIA: Chronic | ICD-10-CM

## 2017-06-28 RX ORDER — PRAVASTATIN SODIUM 20 MG/1
20 TABLET ORAL DAILY
Qty: 90 TABLET | Refills: 3 | Status: SHIPPED | OUTPATIENT
Start: 2017-06-28 | End: 2017-10-05

## 2017-08-07 ENCOUNTER — TELEPHONE (OUTPATIENT)
Dept: FAMILY MEDICINE | Facility: CLINIC | Age: 57
End: 2017-08-07

## 2017-08-07 NOTE — TELEPHONE ENCOUNTER
----- Message from Matteo Rowe sent at 8/7/2017  9:48 AM CDT -----  Contact: Pt  X_ 1st Request  _ 2nd Request  _ 3rd Request    Who: VERONICA ESTEVEZ [[id number]]    Why: Patient would like to speak with staff in regards to getting orders for another mammogram after receiving a letter stating her breast are too dense.    What Number to Call Back: [phone]    When to Expect a call back: (Before the end of the day)  -- if call after 3:00 call back will be tomorrow.

## 2017-08-07 NOTE — TELEPHONE ENCOUNTER
I notified the patient, and advised the patient of Dr. Contreras's recommendations. The patient verbalized understanding of the provider's recommendations.

## 2017-08-19 DIAGNOSIS — M25.552 HIP PAIN, LEFT: ICD-10-CM

## 2017-08-19 DIAGNOSIS — M54.42 CHRONIC LEFT-SIDED LOW BACK PAIN WITH LEFT-SIDED SCIATICA: ICD-10-CM

## 2017-08-19 DIAGNOSIS — F32.89 OTHER DEPRESSION: ICD-10-CM

## 2017-08-19 DIAGNOSIS — G89.29 CHRONIC LEFT-SIDED LOW BACK PAIN WITH LEFT-SIDED SCIATICA: ICD-10-CM

## 2017-08-21 RX ORDER — DULOXETIN HYDROCHLORIDE 60 MG/1
CAPSULE, DELAYED RELEASE ORAL
Qty: 30 CAPSULE | Refills: 5 | Status: SHIPPED | OUTPATIENT
Start: 2017-08-21 | End: 2018-04-05 | Stop reason: SDUPTHER

## 2017-09-28 ENCOUNTER — LAB VISIT (OUTPATIENT)
Dept: LAB | Facility: HOSPITAL | Age: 57
End: 2017-09-28
Attending: INTERNAL MEDICINE
Payer: MEDICAID

## 2017-09-28 DIAGNOSIS — E78.2 MIXED HYPERLIPIDEMIA: Chronic | ICD-10-CM

## 2017-09-28 DIAGNOSIS — R73.09 ABNORMAL GLUCOSE: ICD-10-CM

## 2017-09-28 LAB
ALBUMIN SERPL BCP-MCNC: 3.9 G/DL
ALP SERPL-CCNC: 67 U/L
ALT SERPL W/O P-5'-P-CCNC: 19 U/L
ANION GAP SERPL CALC-SCNC: 10 MMOL/L
AST SERPL-CCNC: 22 U/L
BILIRUB SERPL-MCNC: 0.3 MG/DL
BUN SERPL-MCNC: 23 MG/DL
CALCIUM SERPL-MCNC: 9.5 MG/DL
CHLORIDE SERPL-SCNC: 99 MMOL/L
CHOLEST SERPL-MCNC: 203 MG/DL
CHOLEST/HDLC SERPL: 4.5 {RATIO}
CO2 SERPL-SCNC: 29 MMOL/L
CREAT SERPL-MCNC: 1.2 MG/DL
EST. GFR  (AFRICAN AMERICAN): 58.4 ML/MIN/1.73 M^2
EST. GFR  (NON AFRICAN AMERICAN): 50.6 ML/MIN/1.73 M^2
ESTIMATED AVG GLUCOSE: 103 MG/DL
GLUCOSE SERPL-MCNC: 101 MG/DL
HBA1C MFR BLD HPLC: 5.2 %
HDLC SERPL-MCNC: 45 MG/DL
HDLC SERPL: 22.2 %
LDLC SERPL CALC-MCNC: 95.6 MG/DL
NONHDLC SERPL-MCNC: 158 MG/DL
POTASSIUM SERPL-SCNC: 4.2 MMOL/L
PROT SERPL-MCNC: 7.4 G/DL
SODIUM SERPL-SCNC: 138 MMOL/L
TRIGL SERPL-MCNC: 312 MG/DL

## 2017-09-28 PROCEDURE — 80053 COMPREHEN METABOLIC PANEL: CPT

## 2017-09-28 PROCEDURE — 83036 HEMOGLOBIN GLYCOSYLATED A1C: CPT

## 2017-09-28 PROCEDURE — 36415 COLL VENOUS BLD VENIPUNCTURE: CPT | Mod: PO

## 2017-09-28 PROCEDURE — 80061 LIPID PANEL: CPT

## 2017-10-05 ENCOUNTER — OFFICE VISIT (OUTPATIENT)
Dept: FAMILY MEDICINE | Facility: CLINIC | Age: 57
End: 2017-10-05
Payer: MEDICAID

## 2017-10-05 VITALS
OXYGEN SATURATION: 97 % | BODY MASS INDEX: 37 KG/M2 | TEMPERATURE: 99 F | WEIGHT: 196 LBS | HEIGHT: 61 IN | DIASTOLIC BLOOD PRESSURE: 75 MMHG | HEART RATE: 89 BPM | SYSTOLIC BLOOD PRESSURE: 119 MMHG

## 2017-10-05 DIAGNOSIS — F11.90 CHRONIC NARCOTIC USE: ICD-10-CM

## 2017-10-05 DIAGNOSIS — K21.9 GASTROESOPHAGEAL REFLUX DISEASE WITHOUT ESOPHAGITIS: Chronic | ICD-10-CM

## 2017-10-05 DIAGNOSIS — R73.09 ABNORMAL GLUCOSE: ICD-10-CM

## 2017-10-05 DIAGNOSIS — I10 HYPERTENSION, ESSENTIAL: Primary | Chronic | ICD-10-CM

## 2017-10-05 DIAGNOSIS — E78.2 MIXED HYPERLIPIDEMIA: Chronic | ICD-10-CM

## 2017-10-05 DIAGNOSIS — R07.89 MUSCULOSKELETAL CHEST PAIN: ICD-10-CM

## 2017-10-05 DIAGNOSIS — M51.36 DDD (DEGENERATIVE DISC DISEASE), LUMBAR: ICD-10-CM

## 2017-10-05 PROCEDURE — 99214 OFFICE O/P EST MOD 30 MIN: CPT | Mod: PBBFAC,PO | Performed by: INTERNAL MEDICINE

## 2017-10-05 PROCEDURE — 99214 OFFICE O/P EST MOD 30 MIN: CPT | Mod: S$PBB,,, | Performed by: INTERNAL MEDICINE

## 2017-10-05 PROCEDURE — 99999 PR PBB SHADOW E&M-EST. PATIENT-LVL IV: CPT | Mod: PBBFAC,,, | Performed by: INTERNAL MEDICINE

## 2017-10-05 RX ORDER — HYDROGEN PEROXIDE 3 %
20 SOLUTION, NON-ORAL MISCELLANEOUS
Qty: 90 CAPSULE | Refills: 3 | Status: SHIPPED | OUTPATIENT
Start: 2017-10-05 | End: 2017-11-01

## 2017-10-05 NOTE — PATIENT INSTRUCTIONS
DECREASE THE NEXIUM FROM 40 MG TO 20 MG.    STAY ON ALL YOUR OTHER MEDICINES    REPEAT KIDNEY TEST IN 3 MONTHS    OFFICE VISIT IN 6 MONTHS.

## 2017-10-05 NOTE — PROGRESS NOTES
This note was created by combination of typed  and Dragon dictation.  Transcription errors may be present.  If there are any questions, please contact me.    Assessment & Plan  Hypertension, essential-with creeping creatinine.  Could be related to fasting state.  We talked about other possibilities such as NSAID use as well as PPIs.  Would monitor.  Repeated in 3 months.  If still elevated may try switching her to an H2 blocker though she did try Protonix in the past with breakthrough symptoms.  -     Basic metabolic panel; Future; Expected date: 01/05/2018    Mixed hyperlipidemia-try glycerin is not to goal, notes lately she's not been eating well because she was eating out of a vending machine because her daughter-in-law was in the hospital.  For now no change stay on high-dose statin.    Abnormal glucose-normal A1c.    Chronic narcotic use  DDD (degenerative disc disease), lumbar  She's been getting her Percocet through orthopedics but it is expensive for her to see orthopedics every 3 months.  I'm going to submit referral to pain management for evaluation of her narcotic regimen.  -     Ambulatory referral to Pain Clinic    Gastroesophageal reflux disease without esophagitis - lower the dose of PPI.  -     esomeprazole (NEXIUM) 20 MG capsule; Take 1 capsule (20 mg total) by mouth before breakfast.  Dispense: 90 capsule; Refill: 3    Musculoskeletal chest pain - atypical symptoms, somewhat reproducible, reassurance, stretching exercises    Medications Discontinued During This Encounter   Medication Reason    pravastatin (PRAVACHOL) 20 MG tablet Patient no longer taking       Follow-up: No Follow-up on file.      =================================================================      Chief Complaint   Patient presents with    Hypertension     f/u lab work     Hyperlipidemia       KEITH Peralta is a 57 y.o. female, last appointment with this clinic was 6/19/2017.    No LMP recorded. Patient has had a  "hysterectomy.    Low back pain and left sided sciatica.  L3-L4 and L5S1 fusion. Followed by ortho.   HTN  Hyperlipidemia, hx of simvastatin with SE of myalgias. Pravastatin.  GERD, uncontrolled on protonix, re-trial of Nexium.  Insomnia, trying to wean off Ambien.  Now off the ambien. Trazodone.  Working OK.  Abn glc.    preOV labs - TG high.  Creatinine creeping up.  Fasting? A1c WNL.    Was going to get surgery done late June with Dr. Quintero at [facility]. However was denied by insurance, "experimental", plan was to insert a ryan.  Won't be covered until she converts to medicare.  The ortho is expensive and that is who is rx'ing her oxycodone.  Ortho sees her every 3 months. Ortho increased her oxycodone and offered her oxycodone without tylenol.  Pt opted to stay with the percocet.    Continues to have left breast pain off and on throughout the day.  It's a dull pain.  Had mammo negative. Not related to exertion.  Feels like it's more superficial rather than intrathoracic. Hx of breast reduction about 15 years ago with some scar tissue but thinks unrelated. No pain to light touch. Occurs with sitting there watching TV.  Pain may last a few minutes.  No upper back pain.  No arm pain.     Not taking lorazepam. Does not think that it was helpful.  Also high risk in combo with the narcotic.    Maybe less restricted diet of late.  daughter in law with muscular dystrophy and was hospitalized and pt was eating snack foods.  She was finally discharged.     Takes mobic intermittently.    Not stretching the chest wall and sometimes gets cramps in the arm with certain movements.      Patient Care Team:  Edward Contreras MD as PCP - General (Internal Medicine)  Clarence Quintero MD as Consulting Physician (Orthopedic Surgery)    Patient Active Problem List    Diagnosis Date Noted    Primary insomnia 06/14/2017    Sacroiliac joint pain 05/02/2017    Postlaminectomy syndrome of lumbar region 05/02/2017    DDD " (degenerative disc disease), lumbar 2017    Left carpal tunnel syndrome 2017    GERD (gastroesophageal reflux disease)      2012 EGD normal no bx's      Chronic left-sided low back pain with left-sided sciatica 10/04/2016     Hx L3-4 fusion and L5S1 fusion; currently L2 issues      Hip pain, left 10/04/2016    Weakness of trunk musculature 10/04/2016    Obesity 2015    Lumbar spondylosis 2015    Hyperplastic polyp of intestine 2012 colonoscopy hyperplastic polyp      Hypertension, essential 10/10/2012    Hyperlipidemia 10/10/2012     Simvastatin SE myalgias      Depression 10/10/2012     Fluoxetine x 2013  ativan         PAST MEDICAL HISTORY:  Past Medical History:   Diagnosis Date    Depression     Encounter for blood transfusion     GERD (gastroesophageal reflux disease)     High cholesterol     Hypertension     Lumbar spondylosis 2015    L3-4 fusion; L5S1 fusion    Obesity     Restless leg syndrome        PAST SURGICAL HISTORY:  Past Surgical History:   Procedure Laterality Date    BACK SURGERY Bilateral     2015, 2015 fusion    BELT ABDOMINOPLASTY      BREAST SURGERY      breast reduction     SECTION      x3    COSMETIC SURGERY      HYSTERECTOMY      partial    TUBAL LIGATION         SOCIAL HISTORY:  Social History     Social History    Marital status: Significant Other     Spouse name: N/A    Number of children: N/A    Years of education: N/A     Occupational History    vera Ash    formerly       Social History Main Topics    Smoking status: Former Smoker    Smokeless tobacco: Former User     Quit date: 2013      Comment: quit 7 yrs ago    Alcohol use 0.0 oz/week      Comment: a coupe of times a week    Drug use: No    Sexual activity: Not on file     Other Topics Concern    Not on file     Social History Narrative    No narrative on file       ALLERGIES  AND MEDICATIONS: updated and reviewed.  Review of patient's allergies indicates:   Allergen Reactions    Iodine and iodide containing products Hives and Itching    Latex, natural rubber Rash     Current Outpatient Prescriptions   Medication Sig Dispense Refill    alprazolam (XANAX) 0.5 MG tablet 1-2 tablets 15 min prior to procedure; additional 1 in case 3 tablet 0    duloxetine (CYMBALTA) 60 MG capsule TAKE ONE CAPSULE BY MOUTH EVERY DAY 30 capsule 5    esomeprazole (NEXIUM) 40 MG capsule Take 1 capsule (40 mg total) by mouth before breakfast. 90 capsule 3    hydroquinone 4 % Crea Use hs on face for dark spots 28.35 g 3    lisinopril-hydrochlorothiazide (PRINZIDE,ZESTORETIC) 20-25 mg Tab Take 1 tablet by mouth once daily. 90 tablet 3    lorazepam (ATIVAN) 1 MG tablet Take 1 tablet (1 mg total) by mouth 2 (two) times daily. 60 tablet 0    meloxicam (MOBIC) 15 MG tablet Take 15 mg by mouth once daily.       MOVANTIK tablet Take 25 mg by mouth once daily.       oxycodone-acetaminophen (PERCOCET)  mg per tablet Take 1 tablet by mouth every 8 (eight) hours as needed.       pravastatin (PRAVACHOL) 40 MG tablet Take 1 tablet (40 mg total) by mouth once daily. 90 tablet 3    trazodone (DESYREL) 50 MG tablet Take 1 tablet (50 mg total) by mouth every evening. Start with 1/4 to 1/2 tablet PRN insomnia 30 tablet 11    zolpidem (AMBIEN) 5 MG Tab Take 1 tablet (5 mg total) by mouth nightly as needed. 30 tablet 2     No current facility-administered medications for this visit.        Review of Systems   Constitutional: Negative for chills and fever.   Respiratory: Negative for cough, sputum production and shortness of breath.    Cardiovascular: Positive for chest pain. Negative for palpitations.   Skin: Negative for rash.       Physical Exam   Vitals:    10/05/17 1015   BP: 119/75   BP Location: Right arm   Patient Position: Sitting   BP Method: Large (Manual)   Pulse: 89   Temp: 99.1 °F (37.3 °C)   TempSrc:  "Oral   SpO2: 97%   Weight: 88.9 kg (195 lb 15.8 oz)   Height: 5' 0.98" (1.549 m)    Body mass index is 37.05 kg/m².  Weight: 88.9 kg (195 lb 15.8 oz)   Height: 5' 0.98" (154.9 cm)     Physical Exam   Constitutional: She is oriented to person, place, and time. She appears well-developed and well-nourished. No distress.   Eyes: EOM are normal.   Cardiovascular: Normal rate, regular rhythm and normal heart sounds.    No murmur heard.  Chest wall no deformity, no swelling no asymmetry.  She has some discomfort with moderate pressure palpation of the left anterior chest wall around the area of the costosternal junction around the areas of ribs 3 through 5   Pulmonary/Chest: Effort normal and breath sounds normal.   Musculoskeletal: Normal range of motion.   Neurological: She is alert and oriented to person, place, and time. Coordination normal.   Skin: Skin is warm and dry.   Psychiatric: She has a normal mood and affect. Her behavior is normal. Thought content normal.        Component      Latest Ref Rng & Units 9/28/2017   Sodium      136 - 145 mmol/L 138   Potassium      3.5 - 5.1 mmol/L 4.2   Chloride      95 - 110 mmol/L 99   CO2      23 - 29 mmol/L 29   Glucose      70 - 110 mg/dL 101   BUN, Bld      6 - 20 mg/dL 23 (H)   Creatinine      0.5 - 1.4 mg/dL 1.2   Calcium      8.7 - 10.5 mg/dL 9.5   Total Protein      6.0 - 8.4 g/dL 7.4   Albumin      3.5 - 5.2 g/dL 3.9   Total Bilirubin      0.1 - 1.0 mg/dL 0.3   Alkaline Phosphatase      55 - 135 U/L 67   AST      10 - 40 U/L 22   ALT      10 - 44 U/L 19   Anion Gap      8 - 16 mmol/L 10   eGFR if African American      >60 mL/min/1.73 m:2 58.4 (A)   eGFR if non African American      >60 mL/min/1.73 m:2 50.6 (A)   Cholesterol      120 - 199 mg/dL 203 (H)   Triglycerides      30 - 150 mg/dL 312 (H)   HDL      40 - 75 mg/dL 45   LDL Cholesterol      63.0 - 159.0 mg/dL 95.6   HDL/Chol Ratio      20.0 - 50.0 % 22.2   Total Cholesterol/HDL Ratio      2.0 - 5.0 4.5   Non-HDL " Cholesterol      mg/dL 158   Hemoglobin A1C      4.0 - 5.6 % 5.2   Estimated Avg Glucose      68 - 131 mg/dL 103

## 2017-10-12 ENCOUNTER — TELEPHONE (OUTPATIENT)
Dept: FAMILY MEDICINE | Facility: CLINIC | Age: 57
End: 2017-10-12

## 2017-10-12 NOTE — LETTER
October 12, 2017    Kathryn Wagner  [address] [facility] [address]             [address] [address]  Phone: [phone]  Fax: [phone] Dear Ms. Wagner:    Sorry we were unable to contact you to schedule your referral appointment. Please give the referral office a call to schedule. [phone].        If you have any questions or concerns, please don't hesitate to call.    Sincerely,        Sabra Gasca MA

## 2017-10-31 ENCOUNTER — TELEPHONE (OUTPATIENT)
Dept: FAMILY MEDICINE | Facility: CLINIC | Age: 57
End: 2017-10-31

## 2017-10-31 DIAGNOSIS — K21.9 GASTROESOPHAGEAL REFLUX DISEASE WITHOUT ESOPHAGITIS: Primary | Chronic | ICD-10-CM

## 2017-10-31 NOTE — TELEPHONE ENCOUNTER
----- Message from Miranda Mariscal sent at 10/31/2017 12:36 PM CDT -----  Patient is requesting something other than Nexium for acid reflux. Medicaid will not cover it. [facility]. Please call patient at [phone]. Thank you!

## 2017-11-01 RX ORDER — OMEPRAZOLE 20 MG/1
20 CAPSULE, DELAYED RELEASE ORAL DAILY
Qty: 90 CAPSULE | Refills: 3 | Status: SHIPPED | OUTPATIENT
Start: 2017-11-01 | End: 2017-12-21

## 2017-11-09 RX ORDER — ZOLPIDEM TARTRATE 5 MG/1
TABLET ORAL
Qty: 30 TABLET | OUTPATIENT
Start: 2017-11-09

## 2017-11-09 NOTE — TELEPHONE ENCOUNTER
Spoke with the patient, she did not initiate the refill request for Ambien.  She is taking the trazodone.  She does not want a refill on the Ambien.    She reports that she has not been contacted to schedule the consult with pain management.  Asked our referral specialist to assist.

## 2017-11-10 ENCOUNTER — TELEPHONE (OUTPATIENT)
Dept: FAMILY MEDICINE | Facility: CLINIC | Age: 57
End: 2017-11-10

## 2017-11-10 DIAGNOSIS — R07.89 ATYPICAL CHEST PAIN: Primary | ICD-10-CM

## 2017-11-10 NOTE — TELEPHONE ENCOUNTER
I spoke with the patient regarding a referral to Pain Clinic, I explained to the patient that she will be put on the wait list for a appointment.

## 2017-11-21 ENCOUNTER — HOSPITAL ENCOUNTER (OUTPATIENT)
Dept: RADIOLOGY | Facility: HOSPITAL | Age: 57
Discharge: HOME | End: 2017-11-21
Attending: INTERNAL MEDICINE
Payer: MEDICAID

## 2017-11-21 ENCOUNTER — TELEPHONE (OUTPATIENT)
Dept: FAMILY MEDICINE | Facility: CLINIC | Age: 57
End: 2017-11-21

## 2017-11-21 DIAGNOSIS — R07.89 ATYPICAL CHEST PAIN: ICD-10-CM

## 2017-11-21 PROCEDURE — 71020 XR CHEST PA AND LATERAL: CPT | Mod: TC,PO

## 2017-11-21 PROCEDURE — 71020 XR CHEST PA AND LATERAL: CPT | Mod: 26,,, | Performed by: RADIOLOGY

## 2017-11-21 NOTE — TELEPHONE ENCOUNTER
Please call pt - her CXR was normal/negative.  I think this is musculoskeletal.  Sometimes these things can take months to go away.  I don't think she needs further testing.  If she is still very concerned, final option would be a breast ultrasound to make sure nothing going on, though her mammo done 5/2017 was normal.

## 2017-11-21 NOTE — PROGRESS NOTES
CXR negative. Done for atypical CP.  mammo 5/2017 was normal.  Hx of breast reduction.  Not related to exertion.  Would just monitor this.  Could consider breast US but I suspect it's giong to be low yield

## 2017-12-19 ENCOUNTER — TELEPHONE (OUTPATIENT)
Dept: FAMILY MEDICINE | Facility: CLINIC | Age: 57
End: 2017-12-19

## 2017-12-19 NOTE — TELEPHONE ENCOUNTER
----- Message from Kalani Arndt sent at 12/18/2017 10:14 AM CST -----  Contact: self  Patient requesting a call back regarding test result and would like to discuss health. Please contact her at [phone].    Thanks!

## 2017-12-19 NOTE — TELEPHONE ENCOUNTER
Please call pt, see if she can be more specific as to what test and what her specific health concerns are.

## 2017-12-21 DIAGNOSIS — R07.89 ATYPICAL CHEST PAIN: Primary | ICD-10-CM

## 2017-12-21 DIAGNOSIS — F51.01 PRIMARY INSOMNIA: ICD-10-CM

## 2017-12-21 DIAGNOSIS — K21.9 GASTROESOPHAGEAL REFLUX DISEASE WITHOUT ESOPHAGITIS: Chronic | ICD-10-CM

## 2017-12-21 RX ORDER — OMEPRAZOLE 20 MG/1
20 CAPSULE, DELAYED RELEASE ORAL DAILY
Qty: 90 CAPSULE | Refills: 1 | Status: SHIPPED | OUTPATIENT
Start: 2017-12-21 | End: 2018-06-07 | Stop reason: SDUPTHER

## 2017-12-21 NOTE — TELEPHONE ENCOUNTER
----- Message from Aby Merchant sent at 12/21/2017 10:26 AM CST -----  Contact: Self  Pt states she needs Rx for acid reflux medicine. Pt states she needs generic medication, insurance will not cover brand name. Pt can be reached @ [phone].

## 2017-12-21 NOTE — TELEPHONE ENCOUNTER
Spoke w/patient, requesting a referral to see a cardiologist for chest pain.    Patient is also requesting a Rx for anxiety, states she has been waking up with panic attacks due to taking care of her brother.

## 2017-12-21 NOTE — TELEPHONE ENCOUNTER
I will submit referral.    As for the panic attacks - is she taking the trazodone at night?  Supposed to help with sleep and can also help with anxiety.  Is she having any anxiety with trazodone?  Because she can try a higher dose and see if that helps -  1 and a half to 2 tablets at night.

## 2017-12-22 RX ORDER — TRAZODONE HYDROCHLORIDE 50 MG/1
TABLET ORAL
Qty: 60 TABLET | Refills: 11 | Status: SHIPPED | OUTPATIENT
Start: 2017-12-22 | End: 2018-07-09 | Stop reason: SDUPTHER

## 2017-12-22 NOTE — TELEPHONE ENCOUNTER
Spoke w/patient, states yes she has the anxiety attacks while taking Trazodone, informed of recommendation. Requesting refill with new directions.

## 2018-01-09 ENCOUNTER — OFFICE VISIT (OUTPATIENT)
Dept: OPTOMETRY | Facility: CLINIC | Age: 58
End: 2018-01-09
Payer: MEDICAID

## 2018-01-09 DIAGNOSIS — H52.4 MYOPIA WITH ASTIGMATISM AND PRESBYOPIA, BILATERAL: ICD-10-CM

## 2018-01-09 DIAGNOSIS — H52.13 MYOPIA WITH ASTIGMATISM AND PRESBYOPIA, BILATERAL: ICD-10-CM

## 2018-01-09 DIAGNOSIS — H25.13 NUCLEAR SCLEROSIS, BILATERAL: Primary | ICD-10-CM

## 2018-01-09 DIAGNOSIS — H52.203 MYOPIA WITH ASTIGMATISM AND PRESBYOPIA, BILATERAL: ICD-10-CM

## 2018-01-09 PROCEDURE — 92015 DETERMINE REFRACTIVE STATE: CPT | Mod: ,,, | Performed by: OPTOMETRIST

## 2018-01-09 PROCEDURE — 99999 PR PBB SHADOW E&M-EST. PATIENT-LVL II: CPT | Mod: PBBFAC,,, | Performed by: OPTOMETRIST

## 2018-01-09 PROCEDURE — 92014 COMPRE OPH EXAM EST PT 1/>: CPT | Mod: S$PBB,,, | Performed by: OPTOMETRIST

## 2018-01-09 PROCEDURE — 99212 OFFICE O/P EST SF 10 MIN: CPT | Mod: PBBFAC,PO | Performed by: OPTOMETRIST

## 2018-01-09 NOTE — PROGRESS NOTES
HPI     DSL- 4/20/16     Pt states Va has decreased. Pt has eye allergies and dry eyes. Pt denies   floaters and flashes. Glare is a bother at night. Pt wear OTC +1.50. Lost   sRx 6 mos ago    Eyemeds  At's OU PRN     Last edited by Nicola Russell, OD on 1/9/2018  2:27 PM. (History)            Assessment /Plan     For exam results, see Encounter Report.    Nuclear sclerosis, bilateral  -Educated patient on presence of cataracts at today's exam, monitor at annual dilated fundus exam. 5+ years surgical estimate.    Myopia with astigmatism and presbyopia, bilateral  Eyeglass Final Rx     Eyeglass Final Rx       Sphere Cylinder [place] [facility]    Right -1.00 +0.50 010 20/25    Left -1.75 +1.50 180 20/25-    Type:  SVL    Expiration Date:  1/10/2019                  RTC 1 yr

## 2018-02-17 DIAGNOSIS — I10 ESSENTIAL HYPERTENSION: ICD-10-CM

## 2018-02-18 RX ORDER — LISINOPRIL AND HYDROCHLOROTHIAZIDE 20; 25 MG/1; MG/1
TABLET ORAL
Qty: 90 TABLET | Refills: 1 | Status: SHIPPED | OUTPATIENT
Start: 2018-02-18 | End: 2018-10-06 | Stop reason: SDUPTHER

## 2018-03-14 ENCOUNTER — TELEPHONE (OUTPATIENT)
Dept: FAMILY MEDICINE | Facility: CLINIC | Age: 58
End: 2018-03-14

## 2018-03-14 DIAGNOSIS — G89.29 CHRONIC LEFT-SIDED LOW BACK PAIN WITH LEFT-SIDED SCIATICA: Chronic | ICD-10-CM

## 2018-03-14 DIAGNOSIS — M54.42 CHRONIC LEFT-SIDED LOW BACK PAIN WITH LEFT-SIDED SCIATICA: Chronic | ICD-10-CM

## 2018-03-14 DIAGNOSIS — I10 HYPERTENSION, ESSENTIAL: Chronic | ICD-10-CM

## 2018-03-14 DIAGNOSIS — E78.2 MIXED HYPERLIPIDEMIA: Chronic | ICD-10-CM

## 2018-03-14 DIAGNOSIS — M25.552 HIP PAIN, LEFT: Chronic | ICD-10-CM

## 2018-03-14 DIAGNOSIS — F32.A DEPRESSION, UNSPECIFIED DEPRESSION TYPE: Primary | Chronic | ICD-10-CM

## 2018-03-14 RX ORDER — DULOXETIN HYDROCHLORIDE 30 MG/1
30 CAPSULE, DELAYED RELEASE ORAL DAILY
Qty: 30 CAPSULE | Refills: 11 | Status: SHIPPED | OUTPATIENT
Start: 2018-03-14 | End: 2018-03-16 | Stop reason: ALTCHOICE

## 2018-03-14 NOTE — TELEPHONE ENCOUNTER
Spoke w/patient, requesting increase with Cymbalta. States the 60mg is not working and she is experiencing depression. Patient also requesting labs prior to 4/16/18 follow-up OV.

## 2018-03-14 NOTE — TELEPHONE ENCOUNTER
----- Message from Aby Merchant sent at 3/14/2018  9:53 AM CDT -----  Contact: Self  Pt states she needs to have dosage increased on Cymbalta. Pt can be reached @ [phone].

## 2018-03-16 ENCOUNTER — OFFICE VISIT (OUTPATIENT)
Dept: CARDIOLOGY | Facility: CLINIC | Age: 58
End: 2018-03-16
Payer: MEDICAID

## 2018-03-16 VITALS
HEIGHT: 60 IN | WEIGHT: 188.25 LBS | HEART RATE: 97 BPM | OXYGEN SATURATION: 100 % | BODY MASS INDEX: 36.96 KG/M2 | SYSTOLIC BLOOD PRESSURE: 123 MMHG | DIASTOLIC BLOOD PRESSURE: 63 MMHG | RESPIRATION RATE: 15 BRPM

## 2018-03-16 DIAGNOSIS — I10 HYPERTENSION, ESSENTIAL: Chronic | ICD-10-CM

## 2018-03-16 DIAGNOSIS — R94.31 ABNORMAL EKG: ICD-10-CM

## 2018-03-16 DIAGNOSIS — E66.09 CLASS 2 OBESITY DUE TO EXCESS CALORIES WITHOUT SERIOUS COMORBIDITY WITH BODY MASS INDEX (BMI) OF 36.0 TO 36.9 IN ADULT: Chronic | ICD-10-CM

## 2018-03-16 DIAGNOSIS — G47.33 OSA (OBSTRUCTIVE SLEEP APNEA): ICD-10-CM

## 2018-03-16 DIAGNOSIS — E78.2 MIXED HYPERLIPIDEMIA: Chronic | ICD-10-CM

## 2018-03-16 DIAGNOSIS — R07.9 CHEST PAIN, UNSPECIFIED TYPE: Primary | ICD-10-CM

## 2018-03-16 PROCEDURE — 99214 OFFICE O/P EST MOD 30 MIN: CPT | Mod: S$PBB,,, | Performed by: INTERNAL MEDICINE

## 2018-03-16 PROCEDURE — 99214 OFFICE O/P EST MOD 30 MIN: CPT | Mod: PBBFAC | Performed by: INTERNAL MEDICINE

## 2018-03-16 PROCEDURE — 99999 PR PBB SHADOW E&M-EST. PATIENT-LVL IV: CPT | Mod: PBBFAC,,, | Performed by: INTERNAL MEDICINE

## 2018-03-16 PROCEDURE — 93010 ELECTROCARDIOGRAM REPORT: CPT | Mod: ,,, | Performed by: INTERNAL MEDICINE

## 2018-03-16 NOTE — PROGRESS NOTES
CARDIOVASCULAR PROGRESS NOTE    REASON FOR CONSULT:   Kathryn Wagner is a 58 y.o. female who presents for eval of .    PCP: Dair  HISTORY OF PRESENT ILLNESS:   The patient is a very pleasant 58-year-old woman who is referred at the request for primary care physician for evaluation of chest pain.  She was last seen by me in 2016 for evaluation of neuropathy.  The patient still has bilateral, right greater than left, neuropathic type symptoms which do not sound to be vascular in origin.  Her symptoms of her legs actually get better when she walks.  In regards to her chest discomfort, she describes left parasternal chest heaviness.  This tends to occur at rest, and not necessarily with exertion.  There is no associated diaphoresis, or shortness of breath.  She does not describe any palpitations, lightheadedness, dizziness, or syncope.  There is been no PND, orthopnea, or lower extremity edema.  She denies melena, hematuria, or claudicant symptoms.  She is accompanied by her  to today's office visit, and he describes significant snoring by the patient.    CARDIOVASCULAR HISTORY:   none    PAST MEDICAL HISTORY:     Past Medical History:   Diagnosis Date    Depression     Encounter for blood transfusion     GERD (gastroesophageal reflux disease)     High cholesterol     Hypertension     Lumbar spondylosis 2015    L3-4 fusion; L5S1 fusion    Obesity     Restless leg syndrome        PAST SURGICAL HISTORY:     Past Surgical History:   Procedure Laterality Date    BACK SURGERY Bilateral     2015, 2015 fusion    BELT ABDOMINOPLASTY      BREAST SURGERY      breast reduction     SECTION      x3    COSMETIC SURGERY      HYSTERECTOMY      partial    TUBAL LIGATION         ALLERGIES AND MEDICATION:     Review of patient's allergies indicates:   Allergen Reactions    Iodine and iodide containing products Hives and Itching    Latex, natural rubber Rash     Previous  Medications    DULOXETINE (CYMBALTA) 60 MG CAPSULE    TAKE ONE CAPSULE BY MOUTH EVERY DAY    HYDROQUINONE 4 % CREA    Use hs on face for dark spots    LISINOPRIL-HYDROCHLOROTHIAZIDE (PRINZIDE,ZESTORETIC) 20-25 MG TAB    TAKE ONE TABLET BY MOUTH EVERY DAY    MELOXICAM (MOBIC) 15 MG TABLET    Take 15 mg by mouth once daily.     MOVANTIK TABLET    Take 25 mg by mouth once daily.     OMEPRAZOLE (PRILOSEC) 20 MG CAPSULE    Take 1 capsule (20 mg total) by mouth once daily.    OXYCODONE-ACETAMINOPHEN (PERCOCET)  MG PER TABLET    Take 1 tablet by mouth every 8 (eight) hours as needed.     PRAVASTATIN (PRAVACHOL) 40 MG TABLET    Take 1 tablet (40 mg total) by mouth once daily.    TRAZODONE (DESYREL) 50 MG TABLET    1.5 to 2 tablets at night       SOCIAL HISTORY:     Social History     Social History    Marital status: Significant Other     Spouse name: N/A    Number of children: N/A    Years of education: N/A     Occupational History    vera Ash    formerly       Social History Main Topics    Smoking status: Former Smoker    Smokeless tobacco: Former User     Quit date: 8/19/2013      Comment: quit 7 yrs ago    Alcohol use 0.0 oz/week      Comment: a coupe of times a week    Drug use: No    Sexual activity: Not on file     Other Topics Concern    Not on file     Social History Narrative    No narrative on file       FAMILY HISTORY:     Family History   Problem Relation Age of Onset    Hypertension Father     No Known Problems Mother     No Known Problems Sister     No Known Problems Brother     No Known Problems Maternal Aunt     No Known Problems Maternal Uncle     No Known Problems Paternal Aunt     No Known Problems Paternal Uncle     No Known Problems Maternal Grandmother     No Known Problems Maternal Grandfather     No Known Problems Paternal Grandmother     No Known Problems Paternal Grandfather     Amblyopia Neg Hx     Blindness Neg Hx      Cancer Neg Hx     Cataracts Neg Hx     Diabetes Neg Hx     Glaucoma Neg Hx     Macular degeneration Neg Hx     Retinal detachment Neg Hx     Strabismus Neg Hx     Stroke Neg Hx     Thyroid disease Neg Hx        REVIEW OF SYSTEMS:   Review of Systems   Constitutional: Negative for chills, diaphoresis and fever.   HENT: Negative for nosebleeds.    Eyes: Negative for blurred vision, double vision and photophobia.   Respiratory: Negative for hemoptysis, shortness of breath and wheezing.    Cardiovascular: Positive for chest pain. Negative for palpitations, orthopnea, claudication, leg swelling and PND.   Gastrointestinal: Negative for abdominal pain, blood in stool, heartburn, melena, nausea and vomiting.   Genitourinary: Negative for flank pain and hematuria.   Musculoskeletal: Negative for falls, myalgias and neck pain.   Skin: Negative for rash.   Neurological: Positive for tingling (Le R>L neuropathic sxs). Negative for dizziness, seizures, loss of consciousness, weakness and headaches.   Endo/Heme/Allergies: Negative for polydipsia. Does not bruise/bleed easily.   Psychiatric/Behavioral: Negative for depression and memory loss. The patient is not nervous/anxious.        PHYSICAL EXAM:     Vitals:    03/16/18 1343   BP: 123/63   Pulse: 97   Resp: 15    Body mass index is 36.77 kg/m².  Weight: 85.4 kg (188 lb 4.4 oz)   Height: 5' (152.4 cm)     Physical Exam   Constitutional: She is oriented to person, place, and time. She appears well-developed and well-nourished. She is cooperative.  Non-toxic appearance. No distress.   HENT:   Head: Normocephalic and atraumatic.   Eyes: Conjunctivae and EOM are normal. Pupils are equal, round, and reactive to light. No scleral icterus.   Neck: Trachea normal. Neck supple. Normal carotid pulses and no JVD present. Carotid bruit is not present. No neck rigidity. No edema present. No thyromegaly present.   Cardiovascular: Normal rate, regular rhythm, S1 normal and S2 normal.   PMI is not displaced.  Exam reveals no gallop and no friction rub.    No murmur heard.  Pulses:       Carotid pulses are 2+ on the right side, and 2+ on the left side.  Pulmonary/Chest: Effort normal and breath sounds normal. No respiratory distress. She has no wheezes. She has no rales. She exhibits no tenderness.   Abdominal: Soft. Bowel sounds are normal. She exhibits no distension. There is no hepatosplenomegaly.   obese   Musculoskeletal: She exhibits no edema or tenderness.   Feet:   Right Foot:   Skin Integrity: Negative for ulcer.   Left Foot:   Skin Integrity: Negative for ulcer.   Neurological: She is alert and oriented to person, place, and time. No cranial nerve deficit.   Skin: Skin is warm and dry. No rash noted. No erythema.   Psychiatric: She has a normal mood and affect. Her speech is normal and behavior is normal.   Vitals reviewed.      DATA:   EKG: (personally reviewed tracing)  3/16/18 SR 95, low volt, PRWP-new vs 6/19/17    Laboratory:  CBC:    Recent Labs  Lab 05/19/15  1207 11/30/15  1024 05/11/16  0945   WHITE BLOOD CELL COUNT 9.37 7.53 8.10   HEMOGLOBIN 12.2 12.2 12.3   HEMATOCRIT 36.2 L 36.9 L 36.8 L   PLATELETS 278 309 268       CHEMISTRIES:    Recent Labs  Lab 09/10/16  1050 05/10/17  0951 09/28/17  1026   GLUCOSE 91 132 H 101   SODIUM 137 137 138   POTASSIUM 4.4 4.0 4.2   BUN BLD 15 20 23 H   CREATININE 0.8 1.0 1.2   EGFR IF  >60.0 >60.0 58.4 A   EGFR IF NON- >60.0 >60.0 50.6 A   CALCIUM 8.8 9.6 9.5       CARDIAC BIOMARKERS:        COAGS:        LIPIDS/LFTS:    Recent Labs  Lab 05/11/16  0945 10/25/16  1105 05/10/17  0951 09/28/17  1026   CHOLESTEROL 195 219 H 236 H 203 H   TRIGLYCERIDES 123 159 H 183 H 312 H   HDL 52 55 61 45   LDL CHOLESTEROL 118.4 132.2 138.4 95.6   NON-HDL CHOLESTEROL 143 164 175 158   AST 24 19 14 22   ALT 22  --  12 19       Cardiovascular Testing:  Ordered  Ex MPI  Echo    ASSESSMENT:   # CP, atypical  # HTN  # HLP, on prava  40mg  # BMI 37  # ?RYAN  # tob abuse    PLAN:   Cont med rx  Ex MPI  Echo  RYAN eval  Tob cessation program  Diet/exercise/weight loss, Na+ avoidance  RTC 1 month      Tung Napoles MD, FACC

## 2018-03-16 NOTE — LETTER
March 16, 2018      Edward Contreras MD  [address]  [place] [address]           [facility] Cardiology  [address] [facility] [address] [place] [address]  Phone: [phone]          Patient: Kathryn Wagner   MR Number: [id number]   YOB: 1960   Date of Visit: 3/16/2018       Dear Dr. Edward Contreras:    Thank you for referring Kathryn Wagner to me for evaluation. Attached you will find relevant portions of my assessment and plan of care.    If you have questions, please do not hesitate to call me. I look forward to following Kathryn Wagner along with you.    Sincerely,    Tung Napoles MD    Enclosure  CC:  No Recipients    If you would like to receive this communication electronically, please contact [email or web address] or [phone] to request more information on [employer] access.    For providers and/or their staff who would like to refer a patient to [facility], please contact us through our one-stop-shop provider referral line, [facility] , at [phone].    If you feel you have received this communication in error or would no longer like to receive these types of communications, please e-mail [email or web address]

## 2018-03-20 ENCOUNTER — LAB VISIT (OUTPATIENT)
Dept: LAB | Facility: HOSPITAL | Age: 58
End: 2018-03-20
Attending: INTERNAL MEDICINE
Payer: MEDICAID

## 2018-03-20 DIAGNOSIS — I10 HYPERTENSION, ESSENTIAL: Chronic | ICD-10-CM

## 2018-03-20 DIAGNOSIS — E78.2 MIXED HYPERLIPIDEMIA: Chronic | ICD-10-CM

## 2018-03-20 LAB
ALBUMIN SERPL BCP-MCNC: 4 G/DL
ALP SERPL-CCNC: 56 U/L
ALT SERPL W/O P-5'-P-CCNC: 12 U/L
ANION GAP SERPL CALC-SCNC: 8 MMOL/L
AST SERPL-CCNC: 17 U/L
BILIRUB SERPL-MCNC: 0.4 MG/DL
BUN SERPL-MCNC: 14 MG/DL
CALCIUM SERPL-MCNC: 9.4 MG/DL
CHLORIDE SERPL-SCNC: 101 MMOL/L
CO2 SERPL-SCNC: 29 MMOL/L
CREAT SERPL-MCNC: 0.9 MG/DL
EST. GFR  (AFRICAN AMERICAN): >60 ML/MIN/1.73 M^2
EST. GFR  (NON AFRICAN AMERICAN): >60 ML/MIN/1.73 M^2
GLUCOSE SERPL-MCNC: 90 MG/DL
POTASSIUM SERPL-SCNC: 4.7 MMOL/L
PROT SERPL-MCNC: 7 G/DL
SODIUM SERPL-SCNC: 138 MMOL/L

## 2018-03-20 PROCEDURE — 80053 COMPREHEN METABOLIC PANEL: CPT

## 2018-03-20 PROCEDURE — 36415 COLL VENOUS BLD VENIPUNCTURE: CPT | Mod: PO

## 2018-03-22 ENCOUNTER — HOSPITAL ENCOUNTER (OUTPATIENT)
Dept: CARDIOLOGY | Facility: HOSPITAL | Age: 58
Discharge: HOME | End: 2018-03-22
Attending: INTERNAL MEDICINE
Payer: MEDICAID

## 2018-03-22 ENCOUNTER — HOSPITAL ENCOUNTER (OUTPATIENT)
Dept: RADIOLOGY | Facility: HOSPITAL | Age: 58
Discharge: HOME | End: 2018-03-22
Attending: INTERNAL MEDICINE
Payer: MEDICAID

## 2018-03-22 DIAGNOSIS — R94.31 ABNORMAL EKG: ICD-10-CM

## 2018-03-22 DIAGNOSIS — R07.9 ACUTE CHEST PAIN: ICD-10-CM

## 2018-03-22 DIAGNOSIS — R07.9 CHEST PAIN, UNSPECIFIED TYPE: ICD-10-CM

## 2018-03-22 LAB
DIASTOLIC DYSFUNCTION: NO
DIASTOLIC DYSFUNCTION: NO
ESTIMATED PA SYSTOLIC PRESSURE: 22.36
GLOBAL PERICARDIAL EFFUSION: NORMAL
MITRAL VALVE REGURGITATION: NORMAL
RETIRED EF AND QEF - SEE NOTES: 55 (ref 55–65)
TRICUSPID VALVE REGURGITATION: NORMAL

## 2018-03-22 PROCEDURE — A9502 TC99M TETROFOSMIN: HCPCS

## 2018-03-22 PROCEDURE — 63600175 PHARM REV CODE 636 W HCPCS

## 2018-03-22 PROCEDURE — 93306 TTE W/DOPPLER COMPLETE: CPT | Mod: 26,,, | Performed by: INTERNAL MEDICINE

## 2018-03-22 PROCEDURE — 93016 CV STRESS TEST SUPVJ ONLY: CPT | Mod: ,,, | Performed by: INTERNAL MEDICINE

## 2018-03-22 PROCEDURE — 78452 HT MUSCLE IMAGE SPECT MULT: CPT | Mod: 26,,, | Performed by: INTERNAL MEDICINE

## 2018-03-22 PROCEDURE — 93017 CV STRESS TEST TRACING ONLY: CPT

## 2018-03-22 PROCEDURE — 93306 TTE W/DOPPLER COMPLETE: CPT

## 2018-03-22 PROCEDURE — 93018 CV STRESS TEST I&R ONLY: CPT | Mod: ,,, | Performed by: INTERNAL MEDICINE

## 2018-03-22 RX ORDER — REGADENOSON 0.08 MG/ML
INJECTION, SOLUTION INTRAVENOUS
Status: DISPENSED
Start: 2018-03-22 | End: 2018-03-22

## 2018-04-04 ENCOUNTER — TELEPHONE (OUTPATIENT)
Dept: FAMILY MEDICINE | Facility: CLINIC | Age: 58
End: 2018-04-04

## 2018-04-04 NOTE — TELEPHONE ENCOUNTER
----- Message from Jumana Mariscal sent at 4/4/2018  2:17 PM CDT -----  Contact: Self   Patient need lab results. Please call patient at [phone].

## 2018-04-05 DIAGNOSIS — G89.29 CHRONIC LEFT-SIDED LOW BACK PAIN WITH LEFT-SIDED SCIATICA: ICD-10-CM

## 2018-04-05 DIAGNOSIS — F32.89 OTHER DEPRESSION: ICD-10-CM

## 2018-04-05 DIAGNOSIS — M54.42 CHRONIC LEFT-SIDED LOW BACK PAIN WITH LEFT-SIDED SCIATICA: ICD-10-CM

## 2018-04-05 DIAGNOSIS — M25.552 HIP PAIN, LEFT: ICD-10-CM

## 2018-04-05 RX ORDER — DULOXETIN HYDROCHLORIDE 60 MG/1
60 CAPSULE, DELAYED RELEASE ORAL DAILY
Qty: 90 CAPSULE | Refills: 0 | Status: SHIPPED | OUTPATIENT
Start: 2018-04-05 | End: 2019-01-07 | Stop reason: SDUPTHER

## 2018-04-05 NOTE — TELEPHONE ENCOUNTER
I was going to review them at her office visit with me but her tests were normal (liver, kidneys, etc).

## 2018-04-06 ENCOUNTER — OFFICE VISIT (OUTPATIENT)
Dept: CARDIOLOGY | Facility: CLINIC | Age: 58
End: 2018-04-06
Payer: MEDICAID

## 2018-04-06 VITALS
OXYGEN SATURATION: 95 % | HEIGHT: 60 IN | DIASTOLIC BLOOD PRESSURE: 65 MMHG | BODY MASS INDEX: 37.05 KG/M2 | WEIGHT: 188.69 LBS | SYSTOLIC BLOOD PRESSURE: 127 MMHG | HEART RATE: 90 BPM | RESPIRATION RATE: 15 BRPM

## 2018-04-06 DIAGNOSIS — I10 HYPERTENSION, ESSENTIAL: Chronic | ICD-10-CM

## 2018-04-06 DIAGNOSIS — E78.2 MIXED HYPERLIPIDEMIA: Chronic | ICD-10-CM

## 2018-04-06 DIAGNOSIS — R07.9 CHEST PAIN, UNSPECIFIED TYPE: Primary | ICD-10-CM

## 2018-04-06 DIAGNOSIS — E66.09 CLASS 2 OBESITY DUE TO EXCESS CALORIES WITHOUT SERIOUS COMORBIDITY WITH BODY MASS INDEX (BMI) OF 36.0 TO 36.9 IN ADULT: Chronic | ICD-10-CM

## 2018-04-06 PROCEDURE — 99214 OFFICE O/P EST MOD 30 MIN: CPT | Mod: S$PBB,,, | Performed by: INTERNAL MEDICINE

## 2018-04-06 PROCEDURE — 99999 PR PBB SHADOW E&M-EST. PATIENT-LVL III: CPT | Mod: PBBFAC,,, | Performed by: INTERNAL MEDICINE

## 2018-04-06 PROCEDURE — 99213 OFFICE O/P EST LOW 20 MIN: CPT | Mod: PBBFAC | Performed by: INTERNAL MEDICINE

## 2018-04-06 NOTE — PROGRESS NOTES
CARDIOVASCULAR PROGRESS NOTE    REASON FOR CONSULT:   Kathryn Wagner is a 58 y.o. female who presents for f/u CP, testing.    PCP: Dair  HISTORY OF PRESENT ILLNESS:   The patient returns for follow-up.  She continues to complain of episodic nonexertional chest discomfort.  She's had no shortness of breath.  She apparently did not exercise for treadmill stress test because her orthopedist told her that treadmill exercise was not appropriate for her.  Nonetheless she's had no palpitations, lightheadedness, dizziness, or syncope.  There is been no PND, orthopnea, or lower extremity edema.  She denies melena, hematuria, or claudicant symptoms.    I reviewed the results of her nuclear stress test and echocardiogram which were both normal.    CARDIOVASCULAR HISTORY:   none    PAST MEDICAL HISTORY:     Past Medical History:   Diagnosis Date    Depression     Encounter for blood transfusion     GERD (gastroesophageal reflux disease)     High cholesterol     Hypertension     Lumbar spondylosis 2015    L3-4 fusion; L5S1 fusion    Obesity     Restless leg syndrome        PAST SURGICAL HISTORY:     Past Surgical History:   Procedure Laterality Date    BACK SURGERY Bilateral     2015, 2015 fusion    BELT ABDOMINOPLASTY      BREAST SURGERY      breast reduction     SECTION      x3    COSMETIC SURGERY      HYSTERECTOMY      partial    TUBAL LIGATION         ALLERGIES AND MEDICATION:     Review of patient's allergies indicates:   Allergen Reactions    Iodine and iodide containing products Hives and Itching    Latex, natural rubber Rash     Previous Medications    DULOXETINE (CYMBALTA) 60 MG CAPSULE    Take 1 capsule (60 mg total) by mouth once daily.    HYDROQUINONE 4 % CREA    Use hs on face for dark spots    LISINOPRIL-HYDROCHLOROTHIAZIDE (PRINZIDE,ZESTORETIC) 20-25 MG TAB    TAKE ONE TABLET BY MOUTH EVERY DAY    MELOXICAM (MOBIC) 15 MG TABLET    Take 15 mg by mouth once daily.      MOVANTIK TABLET    Take 25 mg by mouth once daily.     OMEPRAZOLE (PRILOSEC) 20 MG CAPSULE    Take 1 capsule (20 mg total) by mouth once daily.    OXYCODONE-ACETAMINOPHEN (PERCOCET)  MG PER TABLET    Take 1 tablet by mouth every 8 (eight) hours as needed.     PRAVASTATIN (PRAVACHOL) 40 MG TABLET    Take 1 tablet (40 mg total) by mouth once daily.    TRAZODONE (DESYREL) 50 MG TABLET    1.5 to 2 tablets at night       SOCIAL HISTORY:     Social History     Social History    Marital status: Significant Other     Spouse name: N/A    Number of children: N/A    Years of education: N/A     Occupational History    [employer]    formerly       Social History Main Topics    Smoking status: Former Smoker    Smokeless tobacco: Former User     Quit date: 8/19/2013      Comment: quit 7 yrs ago    Alcohol use 0.0 oz/week      Comment: a coupe of times a week    Drug use: No    Sexual activity: Not on file     Other Topics Concern    Not on file     Social History Narrative    No narrative on file       FAMILY HISTORY:     Family History   Problem Relation Age of Onset    Hypertension Father     No Known Problems Mother     No Known Problems Sister     No Known Problems Brother     No Known Problems Maternal Aunt     No Known Problems Maternal Uncle     No Known Problems Paternal Aunt     No Known Problems Paternal Uncle     No Known Problems Maternal Grandmother     No Known Problems Maternal Grandfather     No Known Problems Paternal Grandmother     No Known Problems Paternal Grandfather     Amblyopia Neg Hx     Blindness Neg Hx     Cancer Neg Hx     Cataracts Neg Hx     Diabetes Neg Hx     Glaucoma Neg Hx     Macular degeneration Neg Hx     Retinal detachment Neg Hx     Strabismus Neg Hx     Stroke Neg Hx     Thyroid disease Neg Hx        REVIEW OF SYSTEMS:   Review of Systems   Constitutional: Negative for chills, diaphoresis and fever.   HENT:  Negative for nosebleeds.    Eyes: Negative for blurred vision, double vision and photophobia.   Respiratory: Negative for hemoptysis, shortness of breath and wheezing.    Cardiovascular: Positive for chest pain. Negative for palpitations, orthopnea, claudication, leg swelling and PND.   Gastrointestinal: Negative for abdominal pain, blood in stool, heartburn, melena, nausea and vomiting.   Genitourinary: Negative for flank pain and hematuria.   Musculoskeletal: Negative for falls, myalgias and neck pain.   Skin: Negative for rash.   Neurological: Positive for tingling (LE R>L neuropathic sxs). Negative for dizziness, seizures, loss of consciousness, weakness and headaches.   Endo/Heme/Allergies: Negative for polydipsia. Does not bruise/bleed easily.   Psychiatric/Behavioral: Negative for depression and memory loss. The patient is not nervous/anxious.        PHYSICAL EXAM:     Vitals:    04/06/18 1311   BP: 127/65   Pulse: 90   Resp: 15    Body mass index is 36.86 kg/m².  Weight: 85.6 kg (188 lb 11.4 oz)   Height: 5' (152.4 cm)     Physical Exam   Constitutional: She is oriented to person, place, and time. She appears well-developed and well-nourished. She is cooperative.  Non-toxic appearance. No distress.   HENT:   Head: Normocephalic and atraumatic.   Eyes: Conjunctivae and EOM are normal. Pupils are equal, round, and reactive to light. No scleral icterus.   Neck: Trachea normal. Neck supple. Normal carotid pulses and no JVD present. Carotid bruit is not present. No neck rigidity. No edema present. No thyromegaly present.   Cardiovascular: Normal rate, regular rhythm, S1 normal and S2 normal.  PMI is not displaced.  Exam reveals no gallop and no friction rub.    No murmur heard.  Pulses:       Carotid pulses are 2+ on the right side, and 2+ on the left side.  Pulmonary/Chest: Effort normal and breath sounds normal. No respiratory distress. She has no wheezes. She has no rales. She exhibits no tenderness.    Abdominal: Soft. Bowel sounds are normal. She exhibits no distension. There is no hepatosplenomegaly.   obese   Musculoskeletal: She exhibits no edema or tenderness.   Feet:   Right Foot:   Skin Integrity: Negative for ulcer.   Left Foot:   Skin Integrity: Negative for ulcer.   Neurological: She is alert and oriented to person, place, and time. No cranial nerve deficit.   Skin: Skin is warm and dry. No rash noted. No erythema.   Psychiatric: She has a normal mood and affect. Her speech is normal and behavior is normal.   Vitals reviewed.      DATA:   EKG: (personally reviewed tracing)  3/16/18 SR 95, low volt, PRWP-new vs 6/19/17    Laboratory:  CBC:    Recent Labs  Lab 05/19/15  1207 11/30/15  1024 05/11/16  0945   WHITE BLOOD CELL COUNT 9.37 7.53 8.10   HEMOGLOBIN 12.2 12.2 12.3   HEMATOCRIT 36.2 L 36.9 L 36.8 L   PLATELETS 278 309 268       CHEMISTRIES:    Recent Labs  Lab 05/10/17  0951 09/28/17  1026 03/20/18  0905   GLUCOSE 132 H 101 90   SODIUM 137 138 138   POTASSIUM 4.0 4.2 4.7   BUN BLD 20 23 H 14   CREATININE 1.0 1.2 0.9   EGFR IF  >60.0 58.4 A >60.0   EGFR IF NON- >60.0 50.6 A >60.0   CALCIUM 9.6 9.5 9.4       CARDIAC BIOMARKERS:        COAGS:        LIPIDS/LFTS:    Recent Labs  Lab 10/25/16  1105 05/10/17  0951 09/28/17  1026 03/20/18  0905   CHOLESTEROL 219 H 236 H 203 H  --    TRIGLYCERIDES 159 H 183 H 312 H  --    HDL 55 61 45  --    LDL CHOLESTEROL 132.2 138.4 95.6  --    NON-HDL CHOLESTEROL 164 175 158  --    AST 19 14 22 17   ALT  --  12 19 12       Cardiovascular Testing:  Marian MPI 3/22/18 (images pers rev)  Nuclear Quantitative Functional Analysis:   LVEF: >= 70 %  Impression: NORMAL MYOCARDIAL PERFUSION  1. The perfusion scan is free of evidence for myocardial ischemia or injury.   2. Resting wall motion is physiologic.   3. Resting LV function is normal.   4. The ventricular volumes are normal at rest and stress.   5. The extracardiac distribution of  radioactivity is normal.     Echo 3/22/18    1 - Normal left ventricular systolic function (EF 55-60%).     ASSESSMENT:   # CP, atypical.  Echo and MPI 3/2018 normal.  # HTN, controlled  # HLP, on prava 40mg  # BMI 37, stable vs last OV  # ?RYAN  # tob abuse    PLAN:   Cont med rx  RYAN eval suggested at last visit, not yet scheduled  Tob cessation program  Diet/exercise/weight loss, Na+ avoidance  RTC prn      Tung Napoles MD, FACC

## 2018-04-13 NOTE — PROGRESS NOTES
This note was created by combination of typed  and Dragon dictation.  Transcription errors may be present.  If there are any questions, please contact me.    Assessment / Plan:   Hypertension, essential - stable.  No changes.  Pre visit labs cmp WNL (previous with abn creatinine, likely fasting).    Encounter for screening mammogram for malignant neoplasm of breast  Atypical chest pain  -negative cardiac workup.  CXR was normal.  She is concerned about possibility this is breast source.  No lymphadenopathy today. Referral for mammogram submitted.  Suspect this may be due to body habitus. She's working on diet improvements and physical activity.  -     Mammo Digital Screening Bilat with CAD; Future; Expected date: 04/16/2018    Mixed hyperlipidemia - labs 9/2017 good on prava no change.    Medications Discontinued During This Encounter   Medication Reason    MOVANTIK tablet Therapy completed     Modified Medications    No medications on file     New Prescriptions    No medications on file         Follow Up: No Follow-up on file.      Subjective:     Chief Complaint   Patient presents with    3 month checkup       HPI  Kathryn is a 58 y.o. female, last appointment with this clinic was Visit date not found.    No LMP recorded. Patient has had a hysterectomy.    Low back pain and left sided sciatica; postlaminectomy syndrome..  L3-L4 and L5S1 fusion. Followed by ortho.   HTN  3/22/2018 TTE LVEF 55-60%.  Hyperlipidemia, hx of simvastatin with SE of myalgias. Pravastatin.  3/2018 nuclear medicine stress test negative for ischemia.  GERD, uncontrolled on protonix, re-trial of Nexium. Now omeprazole.   11/19/2012 EGD normal  Insomnia, trying to wean off Ambien.  Now off the ambien. Trazodone.  Working OK.  Abn glc.  11/19/2012 colonoscopy hyperplastic polyp     Aware 3/30/18 percocet #150 Dr. Quintero.10/2017 ambien #30 rx'd by me.    9/2017 FLP OK on statin  previsit labs CMP WNL.    Recent cruise.       GERD not really controlled currently on omeprazole 20.  Worse with OJ and red sauces.  GERD daily.  PPI taking on empty stomach.  Encouraged to take with food to improve absorption.   Has been working on diet and physical activity - more vegetables.  More walking.  Also has a recumbent stationary cycle    cymbalta - improved with higher dose. Denies SE.  Trazodone for sleep, so-so.    Left sided chest pain.  Has had workup for this with normal CXR and stress testing.      Patient Care Team:  Edward Contreras MD as PCP - General (Internal Medicine)  Clarence Quintero MD as Consulting Physician (Orthopedic Surgery)    Patient Active Problem List    Diagnosis Date Noted    Chronic narcotic use 10/05/2017    Primary insomnia 06/14/2017    Sacroiliac joint pain 05/02/2017    Postlaminectomy syndrome of lumbar region 05/02/2017    DDD (degenerative disc disease), lumbar 05/02/2017    Left carpal tunnel syndrome 02/13/2017    GERD (gastroesophageal reflux disease)      11/19/2012 EGD normal no bx's      Chronic left-sided low back pain with left-sided sciatica 10/04/2016     Hx L3-4 fusion and L5S1 fusion; currently L2 issues      Hip pain, left 10/04/2016    Weakness of trunk musculature 10/04/2016    Obesity 03/04/2015    Lumbar spondylosis 03/04/2015    Hyperplastic polyp of intestine 11/19/2012 11/19/2012 colonoscopy hyperplastic polyp      Hypertension, essential 10/10/2012     Echo 3/22/18  1 - Normal left ventricular systolic function (EF 55-60%).       Mixed hyperlipidemia 10/10/2012     Simvastatin SE myalgias  Marian MPI 3/22/18 (images pers rev)  Nuclear Quantitative Functional Analysis:   LVEF: >= 70 %  Impression: NORMAL MYOCARDIAL PERFUSION  1. The perfusion scan is free of evidence for myocardial ischemia or injury.   2. Resting wall motion is physiologic.   3. Resting LV function is normal.   4. The ventricular volumes are normal at rest and stress.   5. The extracardiac distribution  of radioactivity is normal.       Depression 10/10/2012     Fluoxetine x 2013  ativan         PAST MEDICAL HISTORY:  Past Medical History:   Diagnosis Date    Depression     Encounter for blood transfusion     GERD (gastroesophageal reflux disease)     High cholesterol     Hypertension     Lumbar spondylosis 2015    L3-4 fusion; L5S1 fusion    Obesity     Restless leg syndrome        PAST SURGICAL HISTORY:  Past Surgical History:   Procedure Laterality Date    BACK SURGERY Bilateral     2015, 2015 fusion    BELT ABDOMINOPLASTY      BREAST SURGERY      breast reduction     SECTION      x3    COSMETIC SURGERY      HYSTERECTOMY      partial    TUBAL LIGATION         SOCIAL HISTORY:  Social History     Social History    Marital status: Significant Other     Spouse name: N/A    Number of children: N/A    Years of education: N/A     Occupational History    [employer]    formerly       Social History Main Topics    Smoking status: Former Smoker     Types: Cigarettes    Smokeless tobacco: Never Used      Comment: quit 7 yrs ago    Alcohol use 0.0 oz/week      Comment: a coupe of times a week    Drug use: No    Sexual activity: Yes     Other Topics Concern    Not on file     Social History Narrative    No narrative on file       ALLERGIES AND MEDICATIONS: updated and reviewed.  Review of patient's allergies indicates:   Allergen Reactions    Iodine and iodide containing products Hives and Itching    Latex, natural rubber Rash     Current Outpatient Prescriptions   Medication Sig Dispense Refill    DULoxetine (CYMBALTA) 60 MG capsule Take 1 capsule (60 mg total) by mouth once daily. 90 capsule 0    hydroquinone 4 % Crea Use hs on face for dark spots 28.35 g 3    lisinopril-hydrochlorothiazide (PRINZIDE,ZESTORETIC) 20-25 mg Tab TAKE ONE TABLET BY MOUTH EVERY DAY 90 tablet 1    meloxicam (MOBIC) 15 MG tablet Take 15 mg by mouth  once daily.       omeprazole (PRILOSEC) 20 MG capsule Take 1 capsule (20 mg total) by mouth once daily. 90 capsule 1    oxycodone-acetaminophen (PERCOCET)  mg per tablet Take 1 tablet by mouth every 8 (eight) hours as needed.       pravastatin (PRAVACHOL) 40 MG tablet Take 1 tablet (40 mg total) by mouth once daily. 90 tablet 3    traZODone (DESYREL) 50 MG tablet 1.5 to 2 tablets at night 60 tablet 11     No current facility-administered medications for this visit.        Review of Systems   Constitutional: Negative for chills and fever.   Respiratory: Negative for shortness of breath.        Objective:   Physical Exam  Vitals:    04/16/18 0908   BP: 124/78   Pulse: 82   Temp: 98 °F (36.7 °C)   SpO2: 96%   Weight: 87.5 kg (192 lb 12.7 oz)   Height: 5' (1.524 m)    Body mass index is 37.65 kg/m².  Weight: 87.5 kg (192 lb 12.7 oz)   Height: 5' (152.4 cm)     Physical Exam   Constitutional: She is oriented to person, place, and time. She appears well-developed and well-nourished. No distress.   Eyes: EOM are normal.   Cardiovascular: Normal rate, regular rhythm and normal heart sounds.    No murmur heard.  Pulmonary/Chest: Effort normal and breath sounds normal.   Abdominal: Soft.   Musculoskeletal: Normal range of motion. She exhibits no edema.   Some mild TTP left lateral chest wall in the mid axillary line without swelling or deformity.  No axillary LAD   Neurological: She is alert and oriented to person, place, and time. Coordination normal.   Skin: Skin is warm and dry.   Psychiatric: She has a normal mood and affect. Her behavior is normal. Thought content normal.

## 2018-04-16 ENCOUNTER — OFFICE VISIT (OUTPATIENT)
Dept: FAMILY MEDICINE | Facility: CLINIC | Age: 58
End: 2018-04-16
Payer: MEDICAID

## 2018-04-16 VITALS
HEART RATE: 82 BPM | DIASTOLIC BLOOD PRESSURE: 78 MMHG | BODY MASS INDEX: 37.85 KG/M2 | WEIGHT: 192.81 LBS | OXYGEN SATURATION: 96 % | SYSTOLIC BLOOD PRESSURE: 124 MMHG | TEMPERATURE: 98 F | HEIGHT: 60 IN

## 2018-04-16 DIAGNOSIS — E78.2 MIXED HYPERLIPIDEMIA: ICD-10-CM

## 2018-04-16 DIAGNOSIS — I10 HYPERTENSION, ESSENTIAL: Primary | Chronic | ICD-10-CM

## 2018-04-16 DIAGNOSIS — R07.89 ATYPICAL CHEST PAIN: ICD-10-CM

## 2018-04-16 DIAGNOSIS — Z12.31 ENCOUNTER FOR SCREENING MAMMOGRAM FOR MALIGNANT NEOPLASM OF BREAST: ICD-10-CM

## 2018-04-16 PROCEDURE — 99999 PR PBB SHADOW E&M-EST. PATIENT-LVL IV: CPT | Mod: PBBFAC,,, | Performed by: INTERNAL MEDICINE

## 2018-04-16 PROCEDURE — 99214 OFFICE O/P EST MOD 30 MIN: CPT | Mod: S$PBB,,, | Performed by: INTERNAL MEDICINE

## 2018-04-16 PROCEDURE — 99214 OFFICE O/P EST MOD 30 MIN: CPT | Mod: PBBFAC,PO | Performed by: INTERNAL MEDICINE

## 2018-05-29 DIAGNOSIS — E78.2 MIXED HYPERLIPIDEMIA: Chronic | ICD-10-CM

## 2018-05-29 RX ORDER — PRAVASTATIN SODIUM 40 MG/1
TABLET ORAL
Qty: 90 TABLET | Refills: 1 | Status: SHIPPED | OUTPATIENT
Start: 2018-05-29 | End: 2019-03-07 | Stop reason: SDUPTHER

## 2018-06-07 DIAGNOSIS — K21.9 GASTROESOPHAGEAL REFLUX DISEASE WITHOUT ESOPHAGITIS: Chronic | ICD-10-CM

## 2018-06-07 RX ORDER — OMEPRAZOLE 20 MG/1
CAPSULE, DELAYED RELEASE ORAL
Qty: 30 CAPSULE | Refills: 5 | Status: SHIPPED | OUTPATIENT
Start: 2018-06-07 | End: 2018-12-15 | Stop reason: SDUPTHER

## 2018-07-09 DIAGNOSIS — F51.01 PRIMARY INSOMNIA: ICD-10-CM

## 2018-07-09 RX ORDER — TRAZODONE HYDROCHLORIDE 50 MG/1
TABLET ORAL
Qty: 30 TABLET | Refills: 0 | Status: SHIPPED | OUTPATIENT
Start: 2018-07-09 | End: 2018-08-07 | Stop reason: SDUPTHER

## 2018-07-13 ENCOUNTER — TELEPHONE (OUTPATIENT)
Dept: FAMILY MEDICINE | Facility: CLINIC | Age: 58
End: 2018-07-13

## 2018-07-13 NOTE — TELEPHONE ENCOUNTER
----- Message from Faizan Hernandez sent at 7/13/2018 12:05 PM CDT -----  Contact: [phone]/PT  Calling TO get refill Trazadone . [facility]

## 2018-08-07 DIAGNOSIS — F51.01 PRIMARY INSOMNIA: ICD-10-CM

## 2018-08-07 RX ORDER — TRAZODONE HYDROCHLORIDE 50 MG/1
TABLET ORAL
Qty: 30 TABLET | Refills: 5 | Status: SHIPPED | OUTPATIENT
Start: 2018-08-07 | End: 2019-02-23 | Stop reason: SDUPTHER

## 2018-08-13 ENCOUNTER — HOSPITAL ENCOUNTER (OUTPATIENT)
Dept: RADIOLOGY | Facility: HOSPITAL | Age: 58
Discharge: HOME | End: 2018-08-13
Attending: INTERNAL MEDICINE
Payer: MEDICAID

## 2018-08-13 VITALS — HEIGHT: 60 IN | WEIGHT: 192 LBS | BODY MASS INDEX: 37.69 KG/M2

## 2018-08-13 DIAGNOSIS — Z12.31 ENCOUNTER FOR SCREENING MAMMOGRAM FOR MALIGNANT NEOPLASM OF BREAST: ICD-10-CM

## 2018-08-13 PROCEDURE — 77067 SCR MAMMO BI INCL CAD: CPT | Mod: TC

## 2018-08-13 PROCEDURE — 77067 SCR MAMMO BI INCL CAD: CPT | Mod: 26,,, | Performed by: RADIOLOGY

## 2018-08-13 PROCEDURE — 77063 BREAST TOMOSYNTHESIS BI: CPT | Mod: 26,,, | Performed by: RADIOLOGY

## 2018-08-22 ENCOUNTER — HOSPITAL ENCOUNTER (OUTPATIENT)
Dept: RADIOLOGY | Facility: HOSPITAL | Age: 58
Discharge: HOME | End: 2018-08-22
Attending: INTERNAL MEDICINE
Payer: MEDICAID

## 2018-08-22 DIAGNOSIS — R92.8 ABNORMAL MAMMOGRAM: ICD-10-CM

## 2018-08-22 PROCEDURE — 76642 ULTRASOUND BREAST LIMITED: CPT | Mod: TC,LT

## 2018-08-22 PROCEDURE — 77065 DX MAMMO INCL CAD UNI: CPT | Mod: TC,LT

## 2018-08-22 PROCEDURE — 76642 ULTRASOUND BREAST LIMITED: CPT | Mod: 26,LT,, | Performed by: RADIOLOGY

## 2018-08-22 PROCEDURE — 77065 DX MAMMO INCL CAD UNI: CPT | Mod: 26,LT,, | Performed by: RADIOLOGY

## 2018-08-22 PROCEDURE — 77061 BREAST TOMOSYNTHESIS UNI: CPT | Mod: TC,LT

## 2018-08-22 PROCEDURE — 77061 BREAST TOMOSYNTHESIS UNI: CPT | Mod: 26,LT,, | Performed by: RADIOLOGY

## 2018-08-24 ENCOUNTER — TELEPHONE (OUTPATIENT)
Dept: FAMILY MEDICINE | Facility: CLINIC | Age: 58
End: 2018-08-24

## 2018-08-24 DIAGNOSIS — E78.2 MIXED HYPERLIPIDEMIA: ICD-10-CM

## 2018-08-24 DIAGNOSIS — Z20.5 EXPOSURE TO HEPATITIS C: ICD-10-CM

## 2018-08-24 DIAGNOSIS — I10 HYPERTENSION, ESSENTIAL: Primary | Chronic | ICD-10-CM

## 2018-08-24 DIAGNOSIS — Z20.5 EXPOSURE TO HEPATITIS B: ICD-10-CM

## 2018-08-24 DIAGNOSIS — R73.09 ABNORMAL GLUCOSE: ICD-10-CM

## 2018-08-24 NOTE — TELEPHONE ENCOUNTER
We screened her for hepatitis C back in 2016.  Is she worried about this or another strain like hepatitis B?

## 2018-08-24 NOTE — TELEPHONE ENCOUNTER
----- Message from Lorene Toribio sent at 8/24/2018 10:58 AM CDT -----  Contact: Self   Pt is requesting blood work orders. She asks that the office gives her a call at earliest convienence to further discuss. Please call at [phone].

## 2018-08-28 ENCOUNTER — TELEPHONE (OUTPATIENT)
Dept: FAMILY MEDICINE | Facility: CLINIC | Age: 58
End: 2018-08-28

## 2018-08-28 NOTE — TELEPHONE ENCOUNTER
----- Message from Jumana Mariscal sent at 8/28/2018 12:44 PM CDT -----  Contact: Self   Patient would like lab order for hepatitis. Please call at [phone]

## 2018-08-30 ENCOUNTER — LAB VISIT (OUTPATIENT)
Dept: LAB | Facility: HOSPITAL | Age: 58
End: 2018-08-30
Attending: INTERNAL MEDICINE
Payer: MEDICAID

## 2018-08-30 DIAGNOSIS — Z20.5 EXPOSURE TO HEPATITIS C: ICD-10-CM

## 2018-08-30 DIAGNOSIS — E78.2 MIXED HYPERLIPIDEMIA: ICD-10-CM

## 2018-08-30 DIAGNOSIS — R73.09 ABNORMAL GLUCOSE: ICD-10-CM

## 2018-08-30 DIAGNOSIS — Z20.5 EXPOSURE TO HEPATITIS B: ICD-10-CM

## 2018-08-30 LAB
ALBUMIN SERPL BCP-MCNC: 4.2 G/DL
ALP SERPL-CCNC: 65 U/L
ALT SERPL W/O P-5'-P-CCNC: 19 U/L
ANION GAP SERPL CALC-SCNC: 10 MMOL/L
AST SERPL-CCNC: 18 U/L
BILIRUB SERPL-MCNC: 0.6 MG/DL
BUN SERPL-MCNC: 25 MG/DL
CALCIUM SERPL-MCNC: 9.6 MG/DL
CHLORIDE SERPL-SCNC: 103 MMOL/L
CHOLEST SERPL-MCNC: 200 MG/DL
CHOLEST/HDLC SERPL: 4.3 {RATIO}
CO2 SERPL-SCNC: 22 MMOL/L
CREAT SERPL-MCNC: 1.3 MG/DL
EST. GFR  (AFRICAN AMERICAN): 52.6 ML/MIN/1.73 M^2
EST. GFR  (NON AFRICAN AMERICAN): 45.6 ML/MIN/1.73 M^2
ESTIMATED AVG GLUCOSE: 100 MG/DL
GLUCOSE SERPL-MCNC: 110 MG/DL
HBA1C MFR BLD HPLC: 5.1 %
HBV SURFACE AB SER-ACNC: NEGATIVE M[IU]/ML
HBV SURFACE AG SERPL QL IA: NEGATIVE
HCV AB SERPL QL IA: NEGATIVE
HDLC SERPL-MCNC: 47 MG/DL
HDLC SERPL: 23.5 %
LDLC SERPL CALC-MCNC: 122.4 MG/DL
NONHDLC SERPL-MCNC: 153 MG/DL
POTASSIUM SERPL-SCNC: 4.3 MMOL/L
PROT SERPL-MCNC: 7.7 G/DL
SODIUM SERPL-SCNC: 135 MMOL/L
TRIGL SERPL-MCNC: 153 MG/DL

## 2018-08-30 PROCEDURE — 80061 LIPID PANEL: CPT

## 2018-08-30 PROCEDURE — 86706 HEP B SURFACE ANTIBODY: CPT

## 2018-08-30 PROCEDURE — 36415 COLL VENOUS BLD VENIPUNCTURE: CPT | Mod: PO

## 2018-08-30 PROCEDURE — 87340 HEPATITIS B SURFACE AG IA: CPT

## 2018-08-30 PROCEDURE — 83036 HEMOGLOBIN GLYCOSYLATED A1C: CPT

## 2018-08-30 PROCEDURE — 80053 COMPREHEN METABOLIC PANEL: CPT

## 2018-08-30 PROCEDURE — 86803 HEPATITIS C AB TEST: CPT

## 2018-09-11 NOTE — PROGRESS NOTES
Hep C, hep B screening negative.  A1c normal.  Lipid not ideal though better compared to previous.  Triglyceride mildly elevated.  Non .  Creatinine elevated, has had variable creatinine previously.  On ACE-inhibitor also on NSAIDs.  Results the patient.  Stay hydrated. F/u 3 months.

## 2018-09-21 ENCOUNTER — TELEPHONE (OUTPATIENT)
Dept: FAMILY MEDICINE | Facility: CLINIC | Age: 58
End: 2018-09-21

## 2018-09-21 DIAGNOSIS — F41.9 ANXIETY: Primary | ICD-10-CM

## 2018-09-21 RX ORDER — HYDROXYZINE HYDROCHLORIDE 10 MG/1
TABLET, FILM COATED ORAL
Qty: 30 TABLET | Refills: 5 | Status: SHIPPED | OUTPATIENT
Start: 2018-09-21 | End: 2018-09-25 | Stop reason: ALTCHOICE

## 2018-09-21 NOTE — TELEPHONE ENCOUNTER
----- Message from Babs Elaine sent at 9/21/2018  9:07 AM CDT -----  Contact: Self   Pt requesting for something to be called into the pharmacy for her anxiety. [phone].    [facility] - [address]

## 2018-09-21 NOTE — TELEPHONE ENCOUNTER
Already on cymbalta - please confirm that she is taking it.  I will call in hydroxyzine to take as needed for anxiety. Can cause drowsiness.  If no improment - needs OV
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
## 2018-09-25 ENCOUNTER — TELEPHONE (OUTPATIENT)
Dept: FAMILY MEDICINE | Facility: CLINIC | Age: 58
End: 2018-09-25

## 2018-09-25 DIAGNOSIS — F32.A DEPRESSION, UNSPECIFIED DEPRESSION TYPE: Primary | Chronic | ICD-10-CM

## 2018-09-25 RX ORDER — BUSPIRONE HYDROCHLORIDE 15 MG/1
15 TABLET ORAL 2 TIMES DAILY
Qty: 30 TABLET | Refills: 2 | Status: SHIPPED | OUTPATIENT
Start: 2018-09-25 | End: 2018-11-04 | Stop reason: SDUPTHER

## 2018-09-25 NOTE — TELEPHONE ENCOUNTER
Will try her with buspar. She's already on cymbalta.  Needs OV to discuss. With her insurance will probably need an override to see me in the next 2 weeks.

## 2018-09-25 NOTE — TELEPHONE ENCOUNTER
Patient states medication is not working, It's just drying mouth and sinus.. Really needs something for anxiety.

## 2018-09-25 NOTE — TELEPHONE ENCOUNTER
----- Message from Ryan Jaimes sent at 9/25/2018 10:26 AM CDT -----  Contact: Self/[phone]  Patient called to inform the staff that the medication:  hydrOXYzine HCl (ATARAX) 10 MG Tab isn't working well. She would like to speak to the staff. Thank you.

## 2018-10-06 DIAGNOSIS — I10 ESSENTIAL HYPERTENSION: ICD-10-CM

## 2018-10-07 RX ORDER — LISINOPRIL AND HYDROCHLOROTHIAZIDE 20; 25 MG/1; MG/1
TABLET ORAL
Qty: 90 TABLET | Refills: 0 | Status: SHIPPED | OUTPATIENT
Start: 2018-10-07 | End: 2019-01-07 | Stop reason: SDUPTHER

## 2018-11-04 DIAGNOSIS — F32.A DEPRESSION, UNSPECIFIED DEPRESSION TYPE: Chronic | ICD-10-CM

## 2018-11-04 RX ORDER — BUSPIRONE HYDROCHLORIDE 15 MG/1
15 TABLET ORAL 2 TIMES DAILY
Qty: 30 TABLET | Refills: 0 | Status: SHIPPED | OUTPATIENT
Start: 2018-11-04 | End: 2019-04-09 | Stop reason: ALTCHOICE

## 2018-11-14 ENCOUNTER — LAB VISIT (OUTPATIENT)
Dept: LAB | Facility: HOSPITAL | Age: 58
End: 2018-11-14
Attending: ORTHOPAEDIC SURGERY
Payer: MEDICAID

## 2018-11-14 DIAGNOSIS — M81.0 SENILE OSTEOPOROSIS: Primary | ICD-10-CM

## 2018-11-14 LAB
25(OH)D3+25(OH)D2 SERPL-MCNC: 21 NG/ML
CALCIUM SERPL-MCNC: 9.9 MG/DL
PTH-INTACT SERPL-MCNC: 67 PG/ML

## 2018-11-14 PROCEDURE — 82310 ASSAY OF CALCIUM: CPT

## 2018-11-14 PROCEDURE — 36415 COLL VENOUS BLD VENIPUNCTURE: CPT | Mod: PO

## 2018-11-14 PROCEDURE — 83970 ASSAY OF PARATHORMONE: CPT

## 2018-11-14 PROCEDURE — 82306 VITAMIN D 25 HYDROXY: CPT

## 2018-11-16 PROBLEM — E55.9 VITAMIN D DEFICIENCY: Status: ACTIVE | Noted: 2018-11-16

## 2018-12-15 DIAGNOSIS — K21.9 GASTROESOPHAGEAL REFLUX DISEASE WITHOUT ESOPHAGITIS: Chronic | ICD-10-CM

## 2018-12-16 RX ORDER — OMEPRAZOLE 20 MG/1
CAPSULE, DELAYED RELEASE ORAL
Qty: 90 CAPSULE | Refills: 1 | Status: SHIPPED | OUTPATIENT
Start: 2018-12-16 | End: 2019-06-27 | Stop reason: SDUPTHER

## 2019-01-07 ENCOUNTER — TELEPHONE (OUTPATIENT)
Dept: FAMILY MEDICINE | Facility: CLINIC | Age: 59
End: 2019-01-07

## 2019-01-07 DIAGNOSIS — F32.89 OTHER DEPRESSION: ICD-10-CM

## 2019-01-07 DIAGNOSIS — M54.42 CHRONIC LEFT-SIDED LOW BACK PAIN WITH LEFT-SIDED SCIATICA: ICD-10-CM

## 2019-01-07 DIAGNOSIS — I10 ESSENTIAL HYPERTENSION: ICD-10-CM

## 2019-01-07 DIAGNOSIS — M25.552 HIP PAIN, LEFT: ICD-10-CM

## 2019-01-07 DIAGNOSIS — G89.29 CHRONIC LEFT-SIDED LOW BACK PAIN WITH LEFT-SIDED SCIATICA: ICD-10-CM

## 2019-01-07 RX ORDER — LISINOPRIL AND HYDROCHLOROTHIAZIDE 20; 25 MG/1; MG/1
TABLET ORAL
Qty: 90 TABLET | Refills: 0 | Status: SHIPPED | OUTPATIENT
Start: 2019-01-07 | End: 2019-04-09 | Stop reason: ALTCHOICE

## 2019-01-07 RX ORDER — DULOXETIN HYDROCHLORIDE 60 MG/1
60 CAPSULE, DELAYED RELEASE ORAL DAILY
Qty: 90 CAPSULE | Refills: 0 | Status: SHIPPED | OUTPATIENT
Start: 2019-01-07 | End: 2019-07-08 | Stop reason: SDUPTHER

## 2019-01-07 NOTE — TELEPHONE ENCOUNTER
----- Message from Silvia Yao sent at 1/7/2019 11:09 AM CST -----  Contact: Self  Pt is calling to schedule a surgery clearance for her surgery is February . Please call pt at [phone]

## 2019-01-10 ENCOUNTER — CLINICAL SUPPORT (OUTPATIENT)
Dept: FAMILY MEDICINE | Facility: CLINIC | Age: 59
End: 2019-01-10
Payer: MEDICAID

## 2019-01-10 DIAGNOSIS — Z23 NEED FOR PROPHYLACTIC VACCINATION AND INOCULATION AGAINST INFLUENZA: Primary | ICD-10-CM

## 2019-01-10 PROCEDURE — 99211 OFF/OP EST MAY X REQ PHY/QHP: CPT | Mod: PBBFAC,PO

## 2019-01-10 PROCEDURE — 90686 IIV4 VACC NO PRSV 0.5 ML IM: CPT | Mod: PBBFAC,PO | Performed by: INTERNAL MEDICINE

## 2019-01-10 PROCEDURE — 99499 UNLISTED E&M SERVICE: CPT | Mod: S$PBB,,, | Performed by: INTERNAL MEDICINE

## 2019-01-10 PROCEDURE — 99499 NO LOS: ICD-10-PCS | Mod: S$PBB,,, | Performed by: INTERNAL MEDICINE

## 2019-01-10 PROCEDURE — 99999 PR PBB SHADOW E&M-EST. PATIENT-LVL I: CPT | Mod: PBBFAC,,,

## 2019-01-10 PROCEDURE — 99999 PR PBB SHADOW E&M-EST. PATIENT-LVL I: ICD-10-PCS | Mod: PBBFAC,,,

## 2019-02-04 ENCOUNTER — OFFICE VISIT (OUTPATIENT)
Dept: FAMILY MEDICINE | Facility: CLINIC | Age: 59
End: 2019-02-04
Payer: MEDICAID

## 2019-02-04 DIAGNOSIS — M47.816 LUMBAR SPONDYLOSIS: ICD-10-CM

## 2019-02-04 DIAGNOSIS — F11.90 CHRONIC NARCOTIC USE: ICD-10-CM

## 2019-02-04 DIAGNOSIS — E55.9 VITAMIN D DEFICIENCY: ICD-10-CM

## 2019-02-04 DIAGNOSIS — I10 HYPERTENSION, ESSENTIAL: Chronic | ICD-10-CM

## 2019-02-04 DIAGNOSIS — E78.2 MIXED HYPERLIPIDEMIA: ICD-10-CM

## 2019-02-04 DIAGNOSIS — M54.42 CHRONIC LEFT-SIDED LOW BACK PAIN WITH LEFT-SIDED SCIATICA: Primary | Chronic | ICD-10-CM

## 2019-02-04 DIAGNOSIS — G89.29 CHRONIC LEFT-SIDED LOW BACK PAIN WITH LEFT-SIDED SCIATICA: Primary | Chronic | ICD-10-CM

## 2019-02-04 PROCEDURE — 99999 PR PBB SHADOW E&M-EST. PATIENT-LVL I: ICD-10-PCS | Mod: PBBFAC,,, | Performed by: INTERNAL MEDICINE

## 2019-02-04 PROCEDURE — 99211 OFF/OP EST MAY X REQ PHY/QHP: CPT | Mod: PBBFAC,PO | Performed by: INTERNAL MEDICINE

## 2019-02-04 PROCEDURE — 99024 PR POST-OP FOLLOW-UP VISIT: ICD-10-PCS | Mod: ,,, | Performed by: INTERNAL MEDICINE

## 2019-02-04 PROCEDURE — 99024 POSTOP FOLLOW-UP VISIT: CPT | Mod: ,,, | Performed by: INTERNAL MEDICINE

## 2019-02-04 PROCEDURE — 99999 PR PBB SHADOW E&M-EST. PATIENT-LVL I: CPT | Mod: PBBFAC,,, | Performed by: INTERNAL MEDICINE

## 2019-02-05 ENCOUNTER — HOSPITAL ENCOUNTER (EMERGENCY)
Facility: HOSPITAL | Age: 59
Discharge: HOME | End: 2019-02-05
Attending: EMERGENCY MEDICINE
Payer: MEDICARE

## 2019-02-05 VITALS
HEIGHT: 60 IN | OXYGEN SATURATION: 98 % | TEMPERATURE: 98 F | RESPIRATION RATE: 18 BRPM | HEART RATE: 91 BPM | WEIGHT: 185 LBS | BODY MASS INDEX: 36.32 KG/M2 | SYSTOLIC BLOOD PRESSURE: 138 MMHG | DIASTOLIC BLOOD PRESSURE: 63 MMHG

## 2019-02-05 DIAGNOSIS — J02.9 PHARYNGITIS, UNSPECIFIED ETIOLOGY: Primary | ICD-10-CM

## 2019-02-05 DIAGNOSIS — R19.7 DIARRHEA, UNSPECIFIED TYPE: ICD-10-CM

## 2019-02-05 LAB
CTP QC/QA: YES
S PYO RRNA THROAT QL PROBE: NEGATIVE

## 2019-02-05 PROCEDURE — 87880 STREP A ASSAY W/OPTIC: CPT | Mod: ER

## 2019-02-05 PROCEDURE — 99284 EMERGENCY DEPT VISIT MOD MDM: CPT | Mod: 25,ER

## 2019-02-05 PROCEDURE — 96372 THER/PROPH/DIAG INJ SC/IM: CPT | Mod: ER

## 2019-02-05 PROCEDURE — 63600175 PHARM REV CODE 636 W HCPCS: Mod: ER | Performed by: EMERGENCY MEDICINE

## 2019-02-05 PROCEDURE — 87081 CULTURE SCREEN ONLY: CPT

## 2019-02-05 RX ORDER — METHYLPREDNISOLONE SOD SUCC 125 MG
125 VIAL (EA) INJECTION
Status: COMPLETED | OUTPATIENT
Start: 2019-02-05 | End: 2019-02-05

## 2019-02-05 RX ORDER — IBUPROFEN 800 MG/1
800 TABLET ORAL EVERY 6 HOURS PRN
Qty: 20 TABLET | Refills: 0 | Status: SHIPPED | OUTPATIENT
Start: 2019-02-05 | End: 2019-07-23

## 2019-02-05 RX ADMIN — METHYLPREDNISOLONE SODIUM SUCCINATE 125 MG: 125 INJECTION, POWDER, FOR SOLUTION INTRAMUSCULAR; INTRAVENOUS at 05:02

## 2019-02-05 NOTE — DISCHARGE INSTRUCTIONS
Rest.  Drink plenty of fluids.  Return here at any time.  Call your doctor for close follow-up.  Alternate your pain medicine with ibuprofen

## 2019-02-05 NOTE — ED PROVIDER NOTES
Encounter Date: 2019    SCRIBE #1 NOTE: I, Anup Torre, am scribing for, and in the presence of,  Dr. Agarwal. I have scribed the following portions of the note - Other sections scribed: HPI, ROS, PE.       History     Chief Complaint   Patient presents with    Fever     pt reports symptoms since last Thursday. denies cough. denies abdominal pain    Sore Throat    Diarrhea     This is a 58 y.o. female who presents to the ED with a complaint of right sided sore throat that began six days ago. Pt states that her symptoms seemed to improve yesterday, but notes her sore throat has radiated from the left to the right side. She rates her pain a six out of ten in severity. Pt endorses intermittent fever, fatigue, post nasal drip, and diarrhea (resolved).  She has taken Tylenol that helped with her fever. She denies appetite change, ear pain, trouble swallowing, nasal congestion, CP, SOB, abdominal pain, coughing, nausea, emesis, dysuria, decreased urine output, rash, HA, or neck pain. Pt's PMHx includes HTN and chronic lower back pain and has taken oxycodone without relief of her current pain.       The history is provided by the patient.     Review of patient's allergies indicates:   Allergen Reactions    Iodine and iodide containing products Hives and Itching    Latex, natural rubber Rash     Past Medical History:   Diagnosis Date    Depression     Encounter for blood transfusion     GERD (gastroesophageal reflux disease)     High cholesterol     Hypertension     Lumbar spondylosis 2015    L3-4 fusion; L5S1 fusion    Obesity     Restless leg syndrome      Past Surgical History:   Procedure Laterality Date    BACK SURGERY Bilateral     2015, 2015 fusion    BELT ABDOMINOPLASTY      BLOCK-JOINT-SACROILIAC Bilateral Bilateral 2017    [facility]    BREAST SURGERY      breast reduction     SECTION      x3    COLONOSCOPY N/A  11/19/2012    [facility]    COSMETIC SURGERY      EGD (ESOPHAGOGASTRODUODENOSCOPY) N/A 11/19/2012    [facility] at [facility]    HYSTERECTOMY      partial    RELEASE-CARPAL TUNNEL left Left 8/22/2016    [facility]    TOTAL REDUCTION MAMMOPLASTY      TUBAL LIGATION       Family History   Problem Relation Age of Onset    Hypertension Father     No Known Problems Mother     No Known Problems Sister     No Known Problems Brother     No Known Problems Maternal Aunt     No Known Problems Maternal Uncle     No Known Problems Paternal Aunt     No Known Problems Paternal Uncle     No Known Problems Maternal Grandmother     No Known Problems Maternal Grandfather     No Known Problems Paternal Grandmother     No Known Problems Paternal Grandfather     Amblyopia Neg Hx     Blindness Neg Hx     Cancer Neg Hx     Cataracts Neg Hx     Diabetes Neg Hx     Glaucoma Neg Hx     Macular degeneration Neg Hx     Retinal detachment Neg Hx     Strabismus Neg Hx     Stroke Neg Hx     Thyroid disease Neg Hx      Social History     Tobacco Use    Smoking status: Former Smoker     Types: Cigarettes    Smokeless tobacco: Never Used    Tobacco comment: quit 7 yrs ago   Substance Use Topics    Alcohol use: Yes     Alcohol/week: 0.0 oz     Comment: a coupe of times a week    Drug use: No     Review of Systems   Constitutional: Positive for fatigue and fever. Negative for appetite change.   HENT: Positive for postnasal drip and sore throat. Negative for congestion, ear pain and trouble swallowing.    Respiratory: Negative for cough and shortness of breath.    Cardiovascular: Negative for chest pain.   Gastrointestinal: Positive for diarrhea (Resolved). Negative for abdominal pain, nausea and vomiting.   Genitourinary: Negative for decreased urine volume and dysuria.   Musculoskeletal: Positive for back pain (Chronic).  Negative for neck pain.   Skin: Negative for rash.   Neurological: Negative for headaches.       Physical Exam     Initial Vitals [02/05/19 1630]   BP Pulse Resp Temp SpO2   127/84 93 18 98.1 °F (36.7 °C) 99 %      MAP       --         Physical Exam    Nursing note and vitals reviewed.  Constitutional: She appears well-developed and well-nourished.   HENT:   Head: Normocephalic and atraumatic.   Right Ear: Tympanic membrane and external ear normal.   Left Ear: Tympanic membrane and external ear normal.   Mouth/Throat: Uvula is midline and oropharynx is clear and moist. No oropharyngeal exudate, posterior oropharyngeal edema or posterior oropharyngeal erythema.   Eyes: Conjunctivae are normal.   Neck: Normal range of motion. Neck supple. No muscular tenderness present. No edema, no erythema and normal range of motion present. No neck rigidity.       Cardiovascular: Normal rate, regular rhythm, normal heart sounds and intact distal pulses. Exam reveals no gallop and no friction rub.    No murmur heard.  Pulmonary/Chest: Effort normal and breath sounds normal. No respiratory distress. She has no wheezes. She has no rhonchi. She has no rales. She exhibits no tenderness.   Abdominal: Soft. Bowel sounds are normal. She exhibits no distension and no mass. There is no tenderness. There is no rebound and no guarding.   Musculoskeletal: Normal range of motion.   Lymphadenopathy:        Head (right side): Submandibular adenopathy present.        Head (left side): Submandibular adenopathy present.     She has cervical adenopathy.   Neurological: She is alert and oriented to person, place, and time.   Skin: Skin is warm and dry.   Psychiatric: She has a normal mood and affect.         ED Course   Procedures  Labs Reviewed   CULTURE, STREP A,  THROAT   POCT RAPID STREP A          Imaging Results    None          Medical Decision Making:   Initial Assessment:   This is a 58 y.o. female who presents to the ED with a complaint of  right sided sore throat, fever, fatigue, diarrhea (resolved), and chronic back pain.    The pt's physical examination was significant for submandibular lymph node adenopathy.   Clinical Tests:   Lab Tests: Ordered  ED Management:  I will order a strep A culture and rapid strep A.   Strep was negative.  Antibiotics not indicated    Patient will be treated with methylprednisolone.  She will be dc 'd ibuprofen.             Scribe Attestation:   Scribe #1: I performed the above scribed service and the documentation accurately describes the services I performed. I attest to the accuracy of the note.       I, Dr. Kareen Agarwal, personally performed the services described in this documentation. All medical record entries made by the scribe were at my direction and in my presence.  I have reviewed the chart and agree that the record reflects my personal performance and is accurate and complete. Kareen Agarwal MD.  11:17 AM 02/07/2019             Clinical Impression:     1. Pharyngitis, unspecified etiology    2. Diarrhea, unspecified type                                 Kareen Agarwal MD  02/07/19 1110

## 2019-02-07 LAB — BACTERIA THROAT CULT: NORMAL

## 2019-02-12 ENCOUNTER — OFFICE VISIT (OUTPATIENT)
Dept: OPTOMETRY | Facility: CLINIC | Age: 59
End: 2019-02-12
Payer: MEDICARE

## 2019-02-12 DIAGNOSIS — H52.203 MYOPIA WITH ASTIGMATISM AND PRESBYOPIA, BILATERAL: ICD-10-CM

## 2019-02-12 DIAGNOSIS — H02.88A MEIBOMIAN GLAND DYSFUNCTION (MGD), BILATERAL, BOTH UPPER AND LOWER LIDS: ICD-10-CM

## 2019-02-12 DIAGNOSIS — H25.13 NUCLEAR SCLEROSIS, BILATERAL: Primary | ICD-10-CM

## 2019-02-12 DIAGNOSIS — H02.88B MEIBOMIAN GLAND DYSFUNCTION (MGD), BILATERAL, BOTH UPPER AND LOWER LIDS: ICD-10-CM

## 2019-02-12 DIAGNOSIS — H52.4 MYOPIA WITH ASTIGMATISM AND PRESBYOPIA, BILATERAL: ICD-10-CM

## 2019-02-12 DIAGNOSIS — H52.13 MYOPIA WITH ASTIGMATISM AND PRESBYOPIA, BILATERAL: ICD-10-CM

## 2019-02-12 PROCEDURE — 99999 PR PBB SHADOW E&M-EST. PATIENT-LVL III: ICD-10-PCS | Mod: PBBFAC,,, | Performed by: OPTOMETRIST

## 2019-02-12 PROCEDURE — 92015 PR REFRACTION: ICD-10-PCS | Mod: S$GLB,,, | Performed by: OPTOMETRIST

## 2019-02-12 PROCEDURE — 92014 PR EYE EXAM, EST PATIENT,COMPREHESV: ICD-10-PCS | Mod: S$GLB,,, | Performed by: OPTOMETRIST

## 2019-02-12 PROCEDURE — 92014 COMPRE OPH EXAM EST PT 1/>: CPT | Mod: S$GLB,,, | Performed by: OPTOMETRIST

## 2019-02-12 PROCEDURE — 99999 PR PBB SHADOW E&M-EST. PATIENT-LVL III: CPT | Mod: PBBFAC,,, | Performed by: OPTOMETRIST

## 2019-02-12 PROCEDURE — 92015 DETERMINE REFRACTIVE STATE: CPT | Mod: S$GLB,,, | Performed by: OPTOMETRIST

## 2019-02-12 NOTE — PROGRESS NOTES
HPI     Pt is in for annual exam . Needs updated prescription for new glasses.   Having increase in dryness OU. Denies flashes/floaters OU.   Pt uses OTC tears PRN. ClearEyes    Last edited by Nicola Russell, OD on 2/12/2019  2:42 PM. (History)            Assessment /Plan     For exam results, see Encounter Report.    Nuclear sclerosis, bilateral  -Educated patient on presence of cataracts at today's exam, monitor at annual dilated fundus exam. 8+ years surgical estimate.    Meibomian gland dysfunction (MGD), bilateral, both upper and lower lids  -Systane Complete    Myopia with astigmatism and presbyopia, bilateral  Eyeglass Final Rx     Eyeglass Final Rx       Sphere Cylinder Axis Dist VA Add    Right -1.00 +0.75 010 20/20 +2.00    Left -1.50 +1.00 180 20/20 +2.00    Type:  SVL    Expiration Date:  2/13/2020                  RTC 1 yr

## 2019-02-23 DIAGNOSIS — F51.01 PRIMARY INSOMNIA: ICD-10-CM

## 2019-02-24 RX ORDER — TRAZODONE HYDROCHLORIDE 50 MG/1
TABLET ORAL
Qty: 30 TABLET | Refills: 5 | Status: SHIPPED | OUTPATIENT
Start: 2019-02-24 | End: 2019-04-09

## 2019-03-07 DIAGNOSIS — E78.2 MIXED HYPERLIPIDEMIA: Chronic | ICD-10-CM

## 2019-03-07 RX ORDER — PRAVASTATIN SODIUM 40 MG/1
TABLET ORAL
Qty: 120 TABLET | Refills: 2 | Status: SHIPPED | OUTPATIENT
Start: 2019-03-07 | End: 2019-04-09 | Stop reason: ALTCHOICE

## 2019-03-14 ENCOUNTER — TELEPHONE (OUTPATIENT)
Dept: FAMILY MEDICINE | Facility: CLINIC | Age: 59
End: 2019-03-14

## 2019-03-14 DIAGNOSIS — R79.9 ABNORMAL FINDING OF BLOOD CHEMISTRY: ICD-10-CM

## 2019-03-14 DIAGNOSIS — M89.9 DISORDER OF BONE: ICD-10-CM

## 2019-03-14 DIAGNOSIS — I10 HYPERTENSION, ESSENTIAL: Chronic | ICD-10-CM

## 2019-03-14 DIAGNOSIS — Z00.00 ROUTINE PHYSICAL EXAMINATION: Primary | ICD-10-CM

## 2019-03-14 NOTE — TELEPHONE ENCOUNTER
----- Message from Lorene Toribio sent at 3/13/2019  2:35 PM CDT -----  Contact: Self   Patient Is requesting blood work orders. Please call at [phone]

## 2019-03-15 NOTE — TELEPHONE ENCOUNTER
----- Message from Ping Cutler sent at 3/15/2019 10:47 AM CDT -----  Contact: lauren  Name of Who is Calling:       What is the request in detail: Patient is requesting a call back she is requesting orders for blood work       Can the clinic reply by [facility]: no    [facility]: [phone]

## 2019-03-30 ENCOUNTER — HOSPITAL ENCOUNTER (EMERGENCY)
Facility: HOSPITAL | Age: 59
Discharge: HOME | End: 2019-03-30
Attending: EMERGENCY MEDICINE
Payer: MEDICARE

## 2019-03-30 VITALS
SYSTOLIC BLOOD PRESSURE: 107 MMHG | OXYGEN SATURATION: 99 % | HEART RATE: 71 BPM | BODY MASS INDEX: 35.34 KG/M2 | RESPIRATION RATE: 17 BRPM | DIASTOLIC BLOOD PRESSURE: 62 MMHG | WEIGHT: 180 LBS | HEIGHT: 60 IN | TEMPERATURE: 97 F

## 2019-03-30 DIAGNOSIS — H66.91 RIGHT OTITIS MEDIA, UNSPECIFIED OTITIS MEDIA TYPE: ICD-10-CM

## 2019-03-30 DIAGNOSIS — H60.501 ACUTE OTITIS EXTERNA OF RIGHT EAR, UNSPECIFIED TYPE: Primary | ICD-10-CM

## 2019-03-30 PROCEDURE — 63600175 PHARM REV CODE 636 W HCPCS: Mod: ER | Performed by: NURSE PRACTITIONER

## 2019-03-30 PROCEDURE — 99284 EMERGENCY DEPT VISIT MOD MDM: CPT | Mod: 25,ER

## 2019-03-30 PROCEDURE — 25000003 PHARM REV CODE 250: Mod: ER | Performed by: NURSE PRACTITIONER

## 2019-03-30 PROCEDURE — 96372 THER/PROPH/DIAG INJ SC/IM: CPT | Mod: ER

## 2019-03-30 RX ORDER — DEXAMETHASONE SODIUM PHOSPHATE 4 MG/ML
8 INJECTION, SOLUTION INTRA-ARTICULAR; INTRALESIONAL; INTRAMUSCULAR; INTRAVENOUS; SOFT TISSUE
Status: COMPLETED | OUTPATIENT
Start: 2019-03-30 | End: 2019-03-30

## 2019-03-30 RX ORDER — IBUPROFEN 800 MG/1
800 TABLET ORAL EVERY 6 HOURS PRN
Qty: 20 TABLET | Refills: 0 | Status: SHIPPED | OUTPATIENT
Start: 2019-03-30 | End: 2019-07-23

## 2019-03-30 RX ORDER — NEOMYCIN SULFATE, POLYMYXIN B SULFATE AND HYDROCORTISONE 10; 3.5; 1 MG/ML; MG/ML; [USP'U]/ML
4 SUSPENSION/ DROPS AURICULAR (OTIC) 3 TIMES DAILY
Qty: 5 ML | Refills: 0 | Status: SHIPPED | OUTPATIENT
Start: 2019-03-30 | End: 2019-04-09

## 2019-03-30 RX ORDER — AMOXICILLIN 500 MG/1
500 CAPSULE ORAL 3 TIMES DAILY
Qty: 21 CAPSULE | Refills: 0 | Status: SHIPPED | OUTPATIENT
Start: 2019-03-30 | End: 2019-04-06

## 2019-03-30 RX ORDER — AMOXICILLIN 250 MG/1
500 CAPSULE ORAL
Status: COMPLETED | OUTPATIENT
Start: 2019-03-30 | End: 2019-03-30

## 2019-03-30 RX ADMIN — DEXAMETHASONE SODIUM PHOSPHATE 8 MG: 4 INJECTION, SOLUTION INTRAMUSCULAR; INTRAVENOUS at 04:03

## 2019-03-30 RX ADMIN — AMOXICILLIN 500 MG: 250 CAPSULE ORAL at 04:03

## 2019-03-30 NOTE — ED PROVIDER NOTES
Encounter Date: 3/30/2019       History     Chief Complaint   Patient presents with    Otalgia     pt c/o right sided earache since yesterday evening. pt states some sinus but denies fever.      Patient presents to ER with pain to right ear that started yesterday.  Patient states she feels like her ear is clogged and stones are muffled.  Patient denies any other symptoms at this time.  Ambulated in the ER.  Patient has been taking oxycodone at home for pain which she has from other health issue with her back        Review of patient's allergies indicates:   Allergen Reactions    Iodine and iodide containing products Hives and Itching    Latex, natural rubber Rash     Past Medical History:   Diagnosis Date    Depression     Encounter for blood transfusion     GERD (gastroesophageal reflux disease)     High cholesterol     Hypertension     Lumbar spondylosis 2015    L3-4 fusion; L5S1 fusion    Obesity     Restless leg syndrome      Past Surgical History:   Procedure Laterality Date    BACK SURGERY Bilateral     2015, 2015 fusion    BELT ABDOMINOPLASTY      BLOCK-JOINT-SACROILIAC Bilateral Bilateral 2017    [facility] OR    BREAST SURGERY      breast reduction     SECTION      x3    COLONOSCOPY N/A 2012    [facility]    COSMETIC SURGERY      EGD (ESOPHAGOGASTRODUODENOSCOPY) N/A 2012    [facility]    HYSTERECTOMY      partial    RELEASE-CARPAL TUNNEL left Left 2016    [facility]    TOTAL REDUCTION MAMMOPLASTY      TUBAL LIGATION       Family History   Problem Relation Age of Onset    Hypertension Father     No Known Problems Mother     No Known Problems Sister     No Known Problems Brother     No Known Problems Maternal Aunt     No Known Problems Maternal Uncle     No Known Problems Paternal Aunt      No Known Problems Paternal Uncle     No Known Problems Maternal Grandmother     No Known Problems Maternal Grandfather     No Known Problems Paternal Grandmother     No Known Problems Paternal Grandfather     Amblyopia Neg Hx     Blindness Neg Hx     Cancer Neg Hx     Cataracts Neg Hx     Diabetes Neg Hx     Glaucoma Neg Hx     Macular degeneration Neg Hx     Retinal detachment Neg Hx     Strabismus Neg Hx     Stroke Neg Hx     Thyroid disease Neg Hx      Social History     Tobacco Use    Smoking status: Former Smoker     Types: Cigarettes    Smokeless tobacco: Never Used    Tobacco comment: quit 7 yrs ago   Substance Use Topics    Alcohol use: Yes     Alcohol/week: 0.0 oz     Comment: a coupe of times a week    Drug use: No     Review of Systems   HENT: Positive for ear pain.    All other systems reviewed and are negative.      Physical Exam     Initial Vitals [03/30/19 1521]   BP Pulse Resp Temp SpO2   107/62 95 18 98.7 °F (37.1 °C) 99 %      MAP       --         Physical Exam    Nursing note and vitals reviewed.  Constitutional: Vital signs are normal. She appears well-developed and well-nourished. She is not diaphoretic. She is cooperative.   HENT:   Head: Normocephalic and atraumatic.   Right Ear: There is swelling and tenderness. Tympanic membrane is erythematous. Decreased hearing is noted.   Left Ear: External ear normal.   Nose: Nose normal.   Mouth/Throat: Oropharynx is clear and moist.   Eyes: Conjunctivae, EOM and lids are normal. Pupils are equal, round, and reactive to light. Right eye exhibits no discharge. No scleral icterus.   Neck: Trachea normal, normal range of motion and full passive range of motion without pain. Neck supple. No thyromegaly present. No tracheal deviation and normal range of motion present. No neck rigidity. No Brudzinski's sign noted. No JVD present.   Cardiovascular: Normal rate, regular rhythm, normal heart sounds, intact distal pulses and normal  pulses. Exam reveals no gallop and no friction rub.    No murmur heard.  Pulmonary/Chest: Effort normal and breath sounds normal. No stridor. No tachypnea. No respiratory distress. She has no wheezes. She has no rhonchi. She has no rales. She exhibits no tenderness.   Abdominal: Soft. Normal appearance and bowel sounds are normal. She exhibits no distension and no mass. There is no tenderness. There is no rebound and no guarding.   Musculoskeletal: Normal range of motion. She exhibits no edema or tenderness.   Lymphadenopathy:     She has no cervical adenopathy.     She has no axillary adenopathy.   Neurological: She is alert and oriented to person, place, and time. She has normal strength and normal reflexes.   Skin: Skin is warm, dry and intact. Capillary refill takes less than 2 seconds. No rash noted. No erythema.   Psychiatric: She has a normal mood and affect. Her speech is normal and behavior is normal. Judgment and thought content normal. Cognition and memory are normal.         ED Course   Procedures  Labs Reviewed - No data to display       Imaging Results    None          Medical Decision Making:   Differential Diagnosis:   Otitis media, otitis externa, sinusitis                      Clinical Impression:       ICD-10-CM ICD-9-CM   1. Acute otitis externa of right ear, unspecified type H60.501 380.10   2. Right otitis media, unspecified otitis media type H66.91 382.9                                Gladys Dozier, [facility]  03/30/19 0792

## 2019-04-05 ENCOUNTER — LAB VISIT (OUTPATIENT)
Dept: LAB | Facility: HOSPITAL | Age: 59
End: 2019-04-05
Attending: FAMILY MEDICINE
Payer: MEDICARE

## 2019-04-05 DIAGNOSIS — I10 HYPERTENSION, ESSENTIAL: Chronic | ICD-10-CM

## 2019-04-05 DIAGNOSIS — Z00.00 ROUTINE PHYSICAL EXAMINATION: ICD-10-CM

## 2019-04-05 DIAGNOSIS — R79.9 ABNORMAL FINDING OF BLOOD CHEMISTRY: ICD-10-CM

## 2019-04-05 DIAGNOSIS — M89.9 DISORDER OF BONE: ICD-10-CM

## 2019-04-05 LAB
25(OH)D3+25(OH)D2 SERPL-MCNC: 28 NG/ML (ref 30–96)
ALBUMIN SERPL BCP-MCNC: 3.9 G/DL (ref 3.5–5.2)
ALP SERPL-CCNC: 80 U/L (ref 55–135)
ALT SERPL W/O P-5'-P-CCNC: 45 U/L (ref 10–44)
ANION GAP SERPL CALC-SCNC: 12 MMOL/L (ref 8–16)
AST SERPL-CCNC: 31 U/L (ref 10–40)
BASOPHILS # BLD AUTO: 0.06 K/UL (ref 0–0.2)
BASOPHILS NFR BLD: 0.7 % (ref 0–1.9)
BILIRUB SERPL-MCNC: 0.4 MG/DL (ref 0.1–1)
BUN SERPL-MCNC: 18 MG/DL (ref 6–20)
CALCIUM SERPL-MCNC: 9.8 MG/DL (ref 8.7–10.5)
CHLORIDE SERPL-SCNC: 98 MMOL/L (ref 95–110)
CHOLEST SERPL-MCNC: 242 MG/DL (ref 120–199)
CHOLEST/HDLC SERPL: 5.9 {RATIO} (ref 2–5)
CO2 SERPL-SCNC: 26 MMOL/L (ref 23–29)
CREAT SERPL-MCNC: 1.1 MG/DL (ref 0.5–1.4)
DIFFERENTIAL METHOD: ABNORMAL
EOSINOPHIL # BLD AUTO: 1.4 K/UL (ref 0–0.5)
EOSINOPHIL NFR BLD: 15.5 % (ref 0–8)
ERYTHROCYTE [DISTWIDTH] IN BLOOD BY AUTOMATED COUNT: 13.4 % (ref 11.5–14.5)
EST. GFR  (AFRICAN AMERICAN): >60 ML/MIN/1.73 M^2
EST. GFR  (NON AFRICAN AMERICAN): 55.5 ML/MIN/1.73 M^2
ESTIMATED AVG GLUCOSE: 105 MG/DL (ref 68–131)
GLUCOSE SERPL-MCNC: 93 MG/DL (ref 70–110)
HBA1C MFR BLD HPLC: 5.3 % (ref 4–5.6)
HCT VFR BLD AUTO: 35.9 % (ref 37–48.5)
HDLC SERPL-MCNC: 41 MG/DL (ref 40–75)
HDLC SERPL: 16.9 % (ref 20–50)
HGB BLD-MCNC: 11.9 G/DL (ref 12–16)
IMM GRANULOCYTES # BLD AUTO: 0.02 K/UL (ref 0–0.04)
IMM GRANULOCYTES NFR BLD AUTO: 0.2 % (ref 0–0.5)
LDLC SERPL CALC-MCNC: 155.2 MG/DL (ref 63–159)
LYMPHOCYTES # BLD AUTO: 2.4 K/UL (ref 1–4.8)
LYMPHOCYTES NFR BLD: 25.8 % (ref 18–48)
MCH RBC QN AUTO: 28.9 PG (ref 27–31)
MCHC RBC AUTO-ENTMCNC: 33.1 G/DL (ref 32–36)
MCV RBC AUTO: 87 FL (ref 82–98)
MONOCYTES # BLD AUTO: 0.6 K/UL (ref 0.3–1)
MONOCYTES NFR BLD: 6.1 % (ref 4–15)
NEUTROPHILS # BLD AUTO: 4.8 K/UL (ref 1.8–7.7)
NEUTROPHILS NFR BLD: 51.7 % (ref 38–73)
NONHDLC SERPL-MCNC: 201 MG/DL
NRBC BLD-RTO: 0 /100 WBC
PLATELET # BLD AUTO: 329 K/UL (ref 150–350)
PMV BLD AUTO: 11.8 FL (ref 9.2–12.9)
POTASSIUM SERPL-SCNC: 4.2 MMOL/L (ref 3.5–5.1)
PROT SERPL-MCNC: 7.3 G/DL (ref 6–8.4)
RBC # BLD AUTO: 4.12 M/UL (ref 4–5.4)
SODIUM SERPL-SCNC: 136 MMOL/L (ref 136–145)
T4 FREE SERPL-MCNC: 1.19 NG/DL (ref 0.71–1.51)
TRIGL SERPL-MCNC: 229 MG/DL (ref 30–150)
TSH SERPL DL<=0.005 MIU/L-ACNC: 0.2 UIU/ML (ref 0.4–4)
WBC # BLD AUTO: 9.23 K/UL (ref 3.9–12.7)

## 2019-04-05 PROCEDURE — 84443 ASSAY THYROID STIM HORMONE: CPT

## 2019-04-05 PROCEDURE — 82306 VITAMIN D 25 HYDROXY: CPT

## 2019-04-05 PROCEDURE — 83036 HEMOGLOBIN GLYCOSYLATED A1C: CPT

## 2019-04-05 PROCEDURE — 84439 ASSAY OF FREE THYROXINE: CPT

## 2019-04-05 PROCEDURE — 36415 COLL VENOUS BLD VENIPUNCTURE: CPT | Mod: PO

## 2019-04-05 PROCEDURE — 85025 COMPLETE CBC W/AUTO DIFF WBC: CPT

## 2019-04-05 PROCEDURE — 80061 LIPID PANEL: CPT

## 2019-04-05 PROCEDURE — 80053 COMPREHEN METABOLIC PANEL: CPT

## 2019-04-08 PROBLEM — R73.09 ABNORMAL GLUCOSE: Status: ACTIVE | Noted: 2019-04-08

## 2019-04-08 NOTE — PROGRESS NOTES
This note was created by combination of typed  and Dragon dictation.  Transcription errors may be present.  If there are any questions, please contact me.    Assessment / Plan:   Hypertension, essential  -with weight loss, her blood pressures have come down and I think that she is getting orthostatic.  She is on lisinopril HCT.  Stop the HCT.  Changed ACE-inhibitor to ARB-some association with Ace inhibitors with increased risk of cancer.  Changed to losartan 50.  She will purchase a home cuff to monitor her pressures.  I would like to see her systolic blood pressure between 101 140.  -     losartan (COZAAR) 50 MG tablet; Take 1 tablet (50 mg total) by mouth once daily. Stop lisinopril hctz  Dispense: 90 tablet; Refill: 3  -     EKG 12-lead    Abnormal glucose  -A1c good.    Mixed hyperlipidemia  -has not been taking the pravastatin, she recently refilled it, she can finish that out but I will change her to atorvastatin  -     atorvastatin (LIPITOR) 40 MG tablet; Take 1 tablet (40 mg total) by mouth once daily.  Dispense: 90 tablet; Refill: 3    She is going to be getting surgery done in June.  She will probably need to return for labs within the 30 day period prior to surgery for clearance.  EKG done today.  Stress test done March 20 18-for ischemia.  No anginal equivalent type symptoms by history.  Would not further pursue testing.  She is still smoking and is aware of the need to quit 30 days prior to surgery.      Medications Discontinued During This Encounter   Medication Reason    meloxicam (MOBIC) 15 MG tablet     traZODone (DESYREL) 50 MG tablet     hydroquinone 4 % Crea     neomycin-polymyxin-hydrocortisone (CORTISPORIN) 3.5-10,000-1 mg/mL-unit/mL-% otic suspension     busPIRone (BUSPAR) 15 MG tablet Therapy completed    lisinopril-hydrochlorothiazide (PRINZIDE,ZESTORETIC) 20-25 mg Tab Therapy completed    pravastatin (PRAVACHOL) 40 MG tablet Therapy completed       meds sent this  "encounter:  Medications Ordered This Encounter   Medications    atorvastatin (LIPITOR) 40 MG tablet     Sig: Take 1 tablet (40 mg total) by mouth once daily.     Dispense:  90 tablet     Refill:  3    losartan (COZAAR) 50 MG tablet     Sig: Take 1 tablet (50 mg total) by mouth once daily. Stop lisinopril hctz     Dispense:  90 tablet     Refill:  3       Follow Up: No follow-ups on file. OV 6 months recall entered.  Plan for pre visit CMp and lipid and tsh      Subjective:     Chief Complaint   Patient presents with    Results       HPI  Kathryn is a 59 y.o. female, last appointment with this clinic was 1/10/2019.    No LMP recorded. Patient has had a hysterectomy.    I previously saw her last year.  Hypertension at the time stable.  Chest pain, negative cardiac workup.  I referred her for mammogram.    Appear she is scheduled for surgery June 18th, for extreme lateral interbody fusion L2-L3.    Seen late March in the emergency room for otitis externa of the right, otitis media the right.  Amoxicillin and Cortisporin otic.    Pre visit labs CBC very mild anemia normocytic.  Dyslipidemia  Vitamin-D mild low  A1c 5.3  TSH slightly low free T4 normal  Creatinine 1.1 previous 1.3, previous 0.9.  Borderline kidney function.  CKD stage 2 versus stage III    Does not appear she is taking statin.  Would consider atorvastatin.    Having "spells" where getting out of bed or positional changes from sitting to standing will see stars.  Sometimes occurs while standing.   Does not check BP outside of office.  BP low on intake. Lost weight and wonders if that affected her BP.     Weight is down compared to last year. More fruits/vegetables, less carbs, reduced meat.     Has not been taking her statin. Apparently there was an issue with getting refill - was in another doctor's name? But recently refilled the pravastatin. I will change her to atorvastatin for improved potency.    Not taking buspar.  cymbalta taking at 90 mg. Is " stable on this dose and does not want to change. Less stress in her life.     Physical activity - moderate by self examination.  Limited by back. Most strenuous activity - gardening - raking, weeding. Sweeping mopping vacuuming.  Has stairs to her mobile home - 5 stairs. No chest pain, no dyspnea, no palpitations, no pedal edema. Stress test 3/2018 no ischemia.    Smoking < 1 PPD.  She will be staying with her aunt a few days and she doesn't smoke.    Patient Care Team:  Edward Contreras MD as PCP - General (Internal Medicine)  Clarence Quintero MD as Consulting Physician (Orthopedic Surgery)    Patient Active Problem List    Diagnosis Date Noted    Abnormal glucose 04/08/2019    Vitamin D deficiency 11/16/2018    Chronic narcotic use 10/05/2017    Primary insomnia weaned off ambien 06/14/2017    Sacroiliac joint pain 05/02/2017    Postlaminectomy syndrome of lumbar region 05/02/2017    DDD (degenerative disc disease), lumbar 05/02/2017    Left carpal tunnel syndrome 02/13/2017    GERD (gastroesophageal reflux disease) 11/19/2012 EGD normal no bx's      11/19/2012 EGD normal no bx's      Chronic left-sided low back pain with left-sided sciatica 10/04/2016     Hx L3-4 fusion and L5S1 fusion; currently L2 issues  10/5/18 MRI L spine: Degenerative change as detailed above.Postoperative change consistent with L3 through S1 fusion.      Hip pain, left 10/04/2016    Weakness of trunk musculature 10/04/2016    Obesity 03/04/2015    Hyperplastic polyp of intestine 11/2012 11/19/2012 11/19/2012 colonoscopy hyperplastic polyp      Hypertension, essential 10/10/2012     Echo 3/22/18  1 - Normal left ventricular systolic function (EF 55-60%).       Mixed hyperlipidemia simva SE myalgia 10/10/2012     Simvastatin SE myalgias  Marian MPI 3/22/18 (images pers rev)  Nuclear Quantitative Functional Analysis:   LVEF: >= 70 %  Impression: NORMAL MYOCARDIAL PERFUSION  1. The perfusion scan is free of evidence for  myocardial ischemia or injury.   2. Resting wall motion is physiologic.   3. Resting LV function is normal.   4. The ventricular volumes are normal at rest and stress.   5. The extracardiac distribution of radioactivity is normal.       Depression 10/10/2012     Fluoxetine x 2013  ativan         PAST MEDICAL HISTORY:  Past Medical History:   Diagnosis Date    Depression     Encounter for blood transfusion     GERD (gastroesophageal reflux disease)     High cholesterol     Hypertension     Lumbar spondylosis 2015    L3-4 fusion; L5S1 fusion    Obesity     Restless leg syndrome        PAST SURGICAL HISTORY:  Past Surgical History:   Procedure Laterality Date    BACK SURGERY Bilateral     2015, 2015 fusion    BELT ABDOMINOPLASTY      BLOCK-JOINT-SACROILIAC Bilateral Bilateral 2017    [facility]    BREAST SURGERY      breast reduction     SECTION      x3    COLONOSCOPY N/A 2012    [facility]    COSMETIC SURGERY      EGD (ESOPHAGOGASTRODUODENOSCOPY) N/A 2012    [facility] at [facility]    HYSTERECTOMY      partial    RELEASE-CARPAL TUNNEL left Left 2016    [facility]    TOTAL REDUCTION MAMMOPLASTY      TUBAL LIGATION         SOCIAL HISTORY:  Social History     Socioeconomic History    Marital status: Significant Other     Spouse name: Not on file    Number of children: Not on file    Years of education: Not on file    Highest education level: Not on file   Occupational History    Occupation: [employer]     Comment: [employer]    Occupation: formerly    Social Needs    Financial resource strain: Not on file    Food insecurity:     Worry: Not on file     Inability: Not on file    Transportation needs:     Medical: Not on file     Non-medical: Not on file   Tobacco Use    Smoking status:  Current Every Day Smoker     Types: Cigarettes    Smokeless tobacco: Never Used   Substance and Sexual Activity    Alcohol use: Yes     Alcohol/week: 0.0 oz     Comment: a coupe of times a week    Drug use: No    Sexual activity: Yes   Lifestyle    Physical activity:     Days per week: Not on file     Minutes per session: Not on file    Stress: Not on file   Relationships    Social connections:     Talks on phone: Not on file     Gets together: Not on file     Attends Church service: Not on file     Active member of club or organization: Not on file     Attends meetings of clubs or organizations: Not on file     Relationship status: Not on file   Other Topics Concern    Are you pregnant or think you may be? Not Asked    Breast-feeding Not Asked   Social History Narrative    Not on file        ALLERGIES AND MEDICATIONS: updated and reviewed.  Review of patient's allergies indicates:   Allergen Reactions    Iodine and iodide containing products Hives and Itching    Latex, natural rubber Rash     Current Outpatient Medications   Medication Sig Dispense Refill    DULoxetine (CYMBALTA) 30 MG capsule       DULoxetine (CYMBALTA) 60 MG capsule Take 1 capsule (60 mg total) by mouth once daily. 90 capsule 0    ibuprofen (ADVIL,MOTRIN) 800 MG tablet Take 1 tablet (800 mg total) by mouth every 6 (six) hours as needed for Pain. 20 tablet 0    ibuprofen (ADVIL,MOTRIN) 800 MG tablet Take 1 tablet (800 mg total) by mouth every 6 (six) hours as needed for Pain. 20 tablet 0    lisinopril-hydrochlorothiazide (PRINZIDE,ZESTORETIC) 20-25 mg Tab TAKE ONE Tablet BY MOUTH EVERY DAY **THANK YOU** 90 tablet 0    omeprazole (PRILOSEC) 20 MG capsule TAKE ONE Capsule BY MOUTH EVERY DAY **THANK YOU** 90 capsule 1    oxycodone-acetaminophen (PERCOCET)  mg per tablet Take 1 tablet by mouth every 8 (eight) hours as needed.       pravastatin (PRAVACHOL) 40 MG tablet TAKE ONE Tablet BY MOUTH EVERY DAY **THANK YOU** 120  tablet 2    busPIRone (BUSPAR) 15 MG tablet Take 1 tablet (15 mg total) by mouth 2 (two) times daily. 30 tablet 0     No current facility-administered medications for this visit.        Review of Systems   Constitutional: Negative for chills and fever.   Respiratory: Negative for cough and shortness of breath.    Cardiovascular: Negative for chest pain, palpitations, orthopnea, claudication and leg swelling.   Gastrointestinal: Negative for abdominal pain.   Genitourinary: Negative for dysuria.   Psychiatric/Behavioral: Negative for depression. The patient is not nervous/anxious.        Objective:   Physical Exam   Vitals:    04/09/19 1031 04/09/19 1106   BP: 106/62 90/72   BP Location:  Left arm   Patient Position:  Sitting   BP Method:  Large (Manual)   Pulse: 68    SpO2: 98%    Weight: 81 kg (178 lb 9.2 oz)    Height: 5' (1.524 m)     Body mass index is 34.88 kg/m².  Weight: 81 kg (178 lb 9.2 oz)   Height: 5' (152.4 cm)     Physical Exam   Constitutional: She is oriented to person, place, and time. She appears well-developed and well-nourished. No distress.   Eyes: EOM are normal.   Cardiovascular: Normal rate, regular rhythm and normal heart sounds.   No murmur heard.  Pulmonary/Chest: Effort normal and breath sounds normal.   Musculoskeletal: Normal range of motion.   Neurological: She is alert and oriented to person, place, and time. Coordination normal.   Skin: Skin is warm and dry.   Psychiatric: She has a normal mood and affect. Her behavior is normal. Thought content normal.        Component      Latest Ref Rng & Units 4/5/2019 8/30/2018   WBC      3.90 - 12.70 K/uL 9.23    RBC      4.00 - 5.40 M/uL 4.12    Hemoglobin      12.0 - 16.0 g/dL 11.9 (L)    Hematocrit      37.0 - 48.5 % 35.9 (L)    MCV      82 - 98 fL 87    MCH      27.0 - 31.0 pg 28.9    MCHC      32.0 - 36.0 g/dL 33.1    RDW      11.5 - 14.5 % 13.4    Platelets      150 - 350 K/uL 329    MPV      9.2 - 12.9 fL 11.8    Immature Granulocytes       0.0 - 0.5 % 0.2    Gran # (ANC)      1.8 - 7.7 K/uL 4.8    Immature Grans (Abs)      0.00 - 0.04 K/uL 0.02    Lymph #      1.0 - 4.8 K/uL 2.4    Mono #      0.3 - 1.0 K/uL 0.6    Eos #      0.0 - 0.5 K/uL 1.4 (H)    Baso #      0.00 - 0.20 K/uL 0.06    nRBC      0 /100 WBC 0    Gran%      38.0 - 73.0 % 51.7    Lymph%      18.0 - 48.0 % 25.8    Mono%      4.0 - 15.0 % 6.1    Eosinophil%      0.0 - 8.0 % 15.5 (H)    Basophil%      0.0 - 1.9 % 0.7    Differential Method       Automated    Sodium      136 - 145 mmol/L 136 135 (L)   Potassium      3.5 - 5.1 mmol/L 4.2 4.3   Chloride      95 - 110 mmol/L 98 103   CO2      23 - 29 mmol/L 26 22 (L)   Glucose      70 - 110 mg/dL 93 110   BUN, Bld      6 - 20 mg/dL 18 25 (H)   Creatinine      0.5 - 1.4 mg/dL 1.1 1.3   Calcium      8.7 - 10.5 mg/dL 9.8 9.6   Total Protein      6.0 - 8.4 g/dL 7.3 7.7   Albumin      3.5 - 5.2 g/dL 3.9 4.2   Total Bilirubin      0.1 - 1.0 mg/dL 0.4 0.6   Alkaline Phosphatase      55 - 135 U/L 80 65   AST      10 - 40 U/L 31 18   ALT      10 - 44 U/L 45 (H) 19   Anion Gap      8 - 16 mmol/L 12 10   eGFR if African American      >60 mL/min/1.73 m:2 >60.0 52.6 (A)   eGFR if non African American      >60 mL/min/1.73 m:2 55.5 (A) 45.6 (A)   Cholesterol      120 - 199 mg/dL 242 (H) 200 (H)   Triglycerides      30 - 150 mg/dL 229 (H) 153 (H)   HDL      40 - 75 mg/dL 41 47   LDL Cholesterol      63.0 - 159.0 mg/dL 155.2 122.4   HDL/Chol Ratio      20.0 - 50.0 % 16.9 (L) 23.5   Total Cholesterol/HDL Ratio      2.0 - 5.0 5.9 (H) 4.3   Non-HDL Cholesterol      mg/dL 201 153   Hemoglobin A1C External      4.0 - 5.6 % 5.3 5.1   Estimated Avg Glucose      68 - 131 mg/dL 105 100   TSH      0.400 - 4.000 uIU/mL 0.203 (L)    Free T4      0.71 - 1.51 ng/dL 1.19    Vit D, 25-Hydroxy      30 - 96 ng/mL 28 (L)        EKG personally reviewed, sinus rhythm, computer interprets this as septal infarct, I think this is due to lead placement, T-wave inversion which is  appropriate for the R-wave axis, I think this is lead placement, I think this is no change from last year.

## 2019-04-09 ENCOUNTER — OFFICE VISIT (OUTPATIENT)
Dept: FAMILY MEDICINE | Facility: CLINIC | Age: 59
End: 2019-04-09
Payer: MEDICARE

## 2019-04-09 VITALS
SYSTOLIC BLOOD PRESSURE: 90 MMHG | HEART RATE: 68 BPM | WEIGHT: 178.56 LBS | BODY MASS INDEX: 35.05 KG/M2 | HEIGHT: 60 IN | OXYGEN SATURATION: 98 % | DIASTOLIC BLOOD PRESSURE: 72 MMHG

## 2019-04-09 DIAGNOSIS — E78.2 MIXED HYPERLIPIDEMIA: ICD-10-CM

## 2019-04-09 DIAGNOSIS — I10 HYPERTENSION, ESSENTIAL: Primary | ICD-10-CM

## 2019-04-09 DIAGNOSIS — R73.09 ABNORMAL GLUCOSE: ICD-10-CM

## 2019-04-09 PROCEDURE — 99214 OFFICE O/P EST MOD 30 MIN: CPT | Mod: S$GLB,,, | Performed by: INTERNAL MEDICINE

## 2019-04-09 PROCEDURE — 93000 ELECTROCARDIOGRAM COMPLETE: CPT | Mod: S$GLB,,, | Performed by: INTERNAL MEDICINE

## 2019-04-09 PROCEDURE — 3074F SYST BP LT 130 MM HG: CPT | Mod: CPTII,S$GLB,, | Performed by: INTERNAL MEDICINE

## 2019-04-09 PROCEDURE — 93000 EKG 12-LEAD: ICD-10-PCS | Mod: S$GLB,,, | Performed by: INTERNAL MEDICINE

## 2019-04-09 PROCEDURE — 99499 RISK ADDL DX/OHS AUDIT: ICD-10-PCS | Mod: S$GLB,,, | Performed by: INTERNAL MEDICINE

## 2019-04-09 PROCEDURE — 99999 PR PBB SHADOW E&M-EST. PATIENT-LVL III: CPT | Mod: PBBFAC,,, | Performed by: INTERNAL MEDICINE

## 2019-04-09 PROCEDURE — 3008F BODY MASS INDEX DOCD: CPT | Mod: CPTII,S$GLB,, | Performed by: INTERNAL MEDICINE

## 2019-04-09 PROCEDURE — 3074F PR MOST RECENT SYSTOLIC BLOOD PRESSURE < 130 MM HG: ICD-10-PCS | Mod: CPTII,S$GLB,, | Performed by: INTERNAL MEDICINE

## 2019-04-09 PROCEDURE — 99499 UNLISTED E&M SERVICE: CPT | Mod: S$GLB,,, | Performed by: INTERNAL MEDICINE

## 2019-04-09 PROCEDURE — 3078F PR MOST RECENT DIASTOLIC BLOOD PRESSURE < 80 MM HG: ICD-10-PCS | Mod: CPTII,S$GLB,, | Performed by: INTERNAL MEDICINE

## 2019-04-09 PROCEDURE — 3008F PR BODY MASS INDEX (BMI) DOCUMENTED: ICD-10-PCS | Mod: CPTII,S$GLB,, | Performed by: INTERNAL MEDICINE

## 2019-04-09 PROCEDURE — 99214 PR OFFICE/OUTPT VISIT, EST, LEVL IV, 30-39 MIN: ICD-10-PCS | Mod: S$GLB,,, | Performed by: INTERNAL MEDICINE

## 2019-04-09 PROCEDURE — 99999 PR PBB SHADOW E&M-EST. PATIENT-LVL III: ICD-10-PCS | Mod: PBBFAC,,, | Performed by: INTERNAL MEDICINE

## 2019-04-09 PROCEDURE — 3078F DIAST BP <80 MM HG: CPT | Mod: CPTII,S$GLB,, | Performed by: INTERNAL MEDICINE

## 2019-04-09 RX ORDER — ATORVASTATIN CALCIUM 40 MG/1
40 TABLET, FILM COATED ORAL DAILY
Qty: 90 TABLET | Refills: 3 | Status: SHIPPED | OUTPATIENT
Start: 2019-04-09 | End: 2019-12-19 | Stop reason: SDUPTHER

## 2019-04-09 RX ORDER — LOSARTAN POTASSIUM 50 MG/1
50 TABLET ORAL DAILY
Qty: 90 TABLET | Refills: 3 | Status: SHIPPED | OUTPATIENT
Start: 2019-04-09 | End: 2019-12-19 | Stop reason: SDUPTHER

## 2019-04-09 RX ORDER — DULOXETIN HYDROCHLORIDE 30 MG/1
CAPSULE, DELAYED RELEASE ORAL
COMMUNITY
Start: 2019-04-01 | End: 2019-04-26 | Stop reason: SDUPTHER

## 2019-04-18 ENCOUNTER — TELEPHONE (OUTPATIENT)
Dept: SURGERY | Facility: CLINIC | Age: 59
End: 2019-04-18

## 2019-04-18 ENCOUNTER — TELEPHONE (OUTPATIENT)
Dept: FAMILY MEDICINE | Facility: CLINIC | Age: 59
End: 2019-04-18

## 2019-04-18 DIAGNOSIS — I10 HYPERTENSION, ESSENTIAL: Primary | Chronic | ICD-10-CM

## 2019-04-18 RX ORDER — HYDROCHLOROTHIAZIDE 12.5 MG/1
12.5 TABLET ORAL DAILY
Qty: 30 TABLET | Refills: 11 | Status: SHIPPED | OUTPATIENT
Start: 2019-04-18 | End: 2019-12-19 | Stop reason: SDUPTHER

## 2019-04-18 NOTE — TELEPHONE ENCOUNTER
----- Message from Kimi Wren MA sent at 4/18/2019 12:58 PM CDT -----  Contact: pt  Pt states the new BP medicine you gave her does not have a dieretic in it and is causing her hands , feet and face to swell. Pt states she does not want to go through the weekend without on BP meds and would like something new sent to her pharmacy before the end of the day.        Thanks.   ----- Message -----  From: Preeti Perdomo  Sent: 4/18/2019  12:07 PM  To: [facility] Staff    Type: Patient Call Back    Who called:pt    What is the request in detail:pt needs to speak to the doctor because she is swelling in face and ankles. Medicine not working. Call pt    Can the clinic reply by [facility]?    Would the patient rather a call back or a response via [facility]? Call back    Best call back number:[phone]  Additional Information:

## 2019-04-18 NOTE — TELEPHONE ENCOUNTER
Spoke with pt , informed her  wants her to restart Hctz with lowered dosage of 12.5 also to discontinue Losartan at this time. Pt verbalized understanding will call back if she has any issues.

## 2019-04-18 NOTE — TELEPHONE ENCOUNTER
Pt states the new BP medicine  gave her does not have a dieretic in it and is causing her hands , feet and face to swell. Pt states she does not want to go through the weekend without on BP meds and would like something new sent to her pharmacy before the end of the day. Informed pt message has been forwarded to  as an urgent request. I will call pt back if I get a direct response or someone from his office will give her a call back. Pt verbalized understanding.

## 2019-04-18 NOTE — TELEPHONE ENCOUNTER
Restarted hctz but at lower dose 12.5 b/c I think she was getting lightheaded before on higher dose.

## 2019-04-18 NOTE — TELEPHONE ENCOUNTER
----- Message from Preeti Perdomo sent at 4/18/2019 12:07 PM CDT -----  Contact: pt  Type: Patient Call Back    Who called:pt    What is the request in detail:pt needs to speak to the doctor because she is swelling in face and ankles. Medicine not working. Call pt    Can the clinic reply by [facility]?    Would the patient rather a call back or a response via [facility]? Call back    Best call back number:[phone]  Additional Information:

## 2019-04-26 RX ORDER — DULOXETIN HYDROCHLORIDE 30 MG/1
CAPSULE, DELAYED RELEASE ORAL
Qty: 90 CAPSULE | Refills: 0 | Status: SHIPPED | OUTPATIENT
Start: 2019-04-26 | End: 2019-07-08 | Stop reason: SDUPTHER

## 2019-06-27 DIAGNOSIS — K21.9 GASTROESOPHAGEAL REFLUX DISEASE WITHOUT ESOPHAGITIS: Chronic | ICD-10-CM

## 2019-06-27 RX ORDER — OMEPRAZOLE 20 MG/1
CAPSULE, DELAYED RELEASE ORAL
Qty: 90 CAPSULE | Refills: 1 | Status: SHIPPED | OUTPATIENT
Start: 2019-06-27 | End: 2019-12-19 | Stop reason: SDUPTHER

## 2019-07-05 ENCOUNTER — TELEPHONE (OUTPATIENT)
Dept: FAMILY MEDICINE | Facility: CLINIC | Age: 59
End: 2019-07-05

## 2019-07-05 DIAGNOSIS — Z01.818 PREOP TESTING: Primary | ICD-10-CM

## 2019-07-05 NOTE — TELEPHONE ENCOUNTER
----- Message from Patricia Goldsmith sent at 7/5/2019 10:27 AM CDT -----  Contact: Self: [phone]  Type: Lab    Caller is requesting to schedule their Lab appointment prior to annual appointment.    Order is not listed in EPIC.  Please enter order and contact patient to schedule.    Name of Caller:Kathryn CASILLAS    Preferred Date and Time of Labs:  Lab for pre-op    Date of [facility] Appointment:    Where would they like the lab performed?07-  Would the patient rather a call back or a response via [facility]?Callback    Best Call Back Number:[phone]  Additional Information: N/A

## 2019-07-05 NOTE — TELEPHONE ENCOUNTER
Spoke with pt states she is having lower back surgery 7/30/19 and 7/31/19 at [facility]

## 2019-07-05 NOTE — TELEPHONE ENCOUNTER
Spoke with pt requesting preop labwork. States she was advised during last office visit to call for orders.     Please advise.

## 2019-07-08 ENCOUNTER — LAB VISIT (OUTPATIENT)
Dept: LAB | Facility: HOSPITAL | Age: 59
End: 2019-07-08
Attending: INTERNAL MEDICINE
Payer: MEDICARE

## 2019-07-08 DIAGNOSIS — G89.29 CHRONIC LEFT-SIDED LOW BACK PAIN WITH LEFT-SIDED SCIATICA: ICD-10-CM

## 2019-07-08 DIAGNOSIS — Z01.818 PREOP TESTING: ICD-10-CM

## 2019-07-08 DIAGNOSIS — M54.42 CHRONIC LEFT-SIDED LOW BACK PAIN WITH LEFT-SIDED SCIATICA: ICD-10-CM

## 2019-07-08 DIAGNOSIS — F32.89 OTHER DEPRESSION: ICD-10-CM

## 2019-07-08 DIAGNOSIS — M25.552 HIP PAIN, LEFT: ICD-10-CM

## 2019-07-08 LAB
ALBUMIN SERPL BCP-MCNC: 3.7 G/DL (ref 3.5–5.2)
ALP SERPL-CCNC: 77 U/L (ref 55–135)
ALT SERPL W/O P-5'-P-CCNC: 28 U/L (ref 10–44)
ANION GAP SERPL CALC-SCNC: 9 MMOL/L (ref 8–16)
AST SERPL-CCNC: 25 U/L (ref 10–40)
BASOPHILS # BLD AUTO: 0.04 K/UL (ref 0–0.2)
BASOPHILS NFR BLD: 0.5 % (ref 0–1.9)
BILIRUB SERPL-MCNC: 0.3 MG/DL (ref 0.1–1)
BUN SERPL-MCNC: 19 MG/DL (ref 6–20)
CALCIUM SERPL-MCNC: 9.5 MG/DL (ref 8.7–10.5)
CHLORIDE SERPL-SCNC: 102 MMOL/L (ref 95–110)
CO2 SERPL-SCNC: 25 MMOL/L (ref 23–29)
CREAT SERPL-MCNC: 1 MG/DL (ref 0.5–1.4)
DIFFERENTIAL METHOD: ABNORMAL
EOSINOPHIL # BLD AUTO: 1.1 K/UL (ref 0–0.5)
EOSINOPHIL NFR BLD: 15 % (ref 0–8)
ERYTHROCYTE [DISTWIDTH] IN BLOOD BY AUTOMATED COUNT: 12.5 % (ref 11.5–14.5)
EST. GFR  (AFRICAN AMERICAN): >60 ML/MIN/1.73 M^2
EST. GFR  (NON AFRICAN AMERICAN): >60 ML/MIN/1.73 M^2
GLUCOSE SERPL-MCNC: 97 MG/DL (ref 70–110)
HCT VFR BLD AUTO: 37.3 % (ref 37–48.5)
HGB BLD-MCNC: 12 G/DL (ref 12–16)
IMM GRANULOCYTES # BLD AUTO: 0.02 K/UL (ref 0–0.04)
IMM GRANULOCYTES NFR BLD AUTO: 0.3 % (ref 0–0.5)
LYMPHOCYTES # BLD AUTO: 2.1 K/UL (ref 1–4.8)
LYMPHOCYTES NFR BLD: 28.4 % (ref 18–48)
MCH RBC QN AUTO: 29.1 PG (ref 27–31)
MCHC RBC AUTO-ENTMCNC: 32.2 G/DL (ref 32–36)
MCV RBC AUTO: 90 FL (ref 82–98)
MONOCYTES # BLD AUTO: 0.5 K/UL (ref 0.3–1)
MONOCYTES NFR BLD: 6.5 % (ref 4–15)
NEUTROPHILS # BLD AUTO: 3.6 K/UL (ref 1.8–7.7)
NEUTROPHILS NFR BLD: 49.3 % (ref 38–73)
NRBC BLD-RTO: 0 /100 WBC
PLATELET # BLD AUTO: 252 K/UL (ref 150–350)
PMV BLD AUTO: 12.4 FL (ref 9.2–12.9)
POTASSIUM SERPL-SCNC: 4.1 MMOL/L (ref 3.5–5.1)
PROT SERPL-MCNC: 7 G/DL (ref 6–8.4)
RBC # BLD AUTO: 4.13 M/UL (ref 4–5.4)
SODIUM SERPL-SCNC: 136 MMOL/L (ref 136–145)
WBC # BLD AUTO: 7.35 K/UL (ref 3.9–12.7)

## 2019-07-08 PROCEDURE — 80053 COMPREHEN METABOLIC PANEL: CPT

## 2019-07-08 PROCEDURE — 85025 COMPLETE CBC W/AUTO DIFF WBC: CPT

## 2019-07-08 PROCEDURE — 36415 COLL VENOUS BLD VENIPUNCTURE: CPT | Mod: PO

## 2019-07-08 RX ORDER — DULOXETIN HYDROCHLORIDE 30 MG/1
CAPSULE, DELAYED RELEASE ORAL
Qty: 90 CAPSULE | Refills: 3 | Status: SHIPPED | OUTPATIENT
Start: 2019-07-08 | End: 2019-07-23 | Stop reason: SDUPTHER

## 2019-07-08 RX ORDER — DULOXETIN HYDROCHLORIDE 60 MG/1
60 CAPSULE, DELAYED RELEASE ORAL DAILY
Qty: 90 CAPSULE | Refills: 3 | Status: SHIPPED | OUTPATIENT
Start: 2019-07-08 | End: 2019-12-19 | Stop reason: SDUPTHER

## 2019-07-10 ENCOUNTER — TELEPHONE (OUTPATIENT)
Dept: FAMILY MEDICINE | Facility: CLINIC | Age: 59
End: 2019-07-10

## 2019-07-10 NOTE — TELEPHONE ENCOUNTER
----- Message from Edward Contreras MD sent at 7/10/2019 11:13 AM CDT -----  CBC CMP WNL. Done for preop for back surgery. To be done at [facility]  Last OV - was planning surgery - I had determined no further cardiac testing required.    May proceed to surgery without further testing. I believe her surgeon is Dr. Quintero.    Please call pt - her labs were normal - I will forward copy to Dr. Quintero

## 2019-07-23 ENCOUNTER — TELEPHONE (OUTPATIENT)
Dept: FAMILY MEDICINE | Facility: CLINIC | Age: 59
End: 2019-07-23

## 2019-07-23 RX ORDER — TRAZODONE HYDROCHLORIDE 50 MG/1
TABLET ORAL
Refills: 5 | COMMUNITY
Start: 2019-06-27 | End: 2019-10-03 | Stop reason: SDUPTHER

## 2019-07-23 RX ORDER — ERGOCALCIFEROL 1.25 MG/1
50000 CAPSULE ORAL
Refills: 3 | COMMUNITY
Start: 2019-06-27 | End: 2019-12-19 | Stop reason: ALTCHOICE

## 2019-07-23 RX ORDER — GABAPENTIN 300 MG/1
CAPSULE ORAL
COMMUNITY
End: 2020-04-21 | Stop reason: ALTCHOICE

## 2019-07-23 RX ORDER — PRAVASTATIN SODIUM 40 MG/1
TABLET ORAL
Refills: 2 | COMMUNITY
Start: 2019-04-26 | End: 2019-07-23

## 2019-07-23 NOTE — TELEPHONE ENCOUNTER
Spoken to patient. Reports that she is no longer on pravastatin and ibuprofen. An current med list printed for  at the  as requested.

## 2019-07-26 ENCOUNTER — TELEPHONE (OUTPATIENT)
Dept: OTOLARYNGOLOGY | Facility: CLINIC | Age: 59
End: 2019-07-26

## 2019-07-26 NOTE — TELEPHONE ENCOUNTER
----- Message from Babs Elaine sent at 7/26/2019 12:50 PM CDT -----  Contact: Self   Type: Patient Call Back    What is the request in detail: Pt calling regarding status on med below.     ibuprofen   pravastatin sodium 40 MG    Can the clinic reply by [facility]? No     Would the patient rather a call back or a response via [facility]? Call back    Best call back number: [phone]

## 2019-07-26 NOTE — TELEPHONE ENCOUNTER
----- Message from Babs Elaine sent at 7/26/2019 12:50 PM CDT -----  Contact: Self   Type: Patient Call Back    What is the request in detail: Pt calling regarding status on med below.     ibuprofen   pravastatin sodium 40 MG    Can the clinic reply by [facility]? No     Would the patient rather a call back or a response via [facility]? Call back    Best call back number: [phone]

## 2019-07-26 NOTE — TELEPHONE ENCOUNTER
Patient will call her Pharmacy she is using now which is [facility] and have them call \[facility]"" [facility] to have all her active meds transferred over to [facility].

## 2019-08-08 ENCOUNTER — TELEPHONE (OUTPATIENT)
Dept: FAMILY MEDICINE | Facility: CLINIC | Age: 59
End: 2019-08-08

## 2019-08-08 NOTE — TELEPHONE ENCOUNTER
"----- Message from Danielle Youngblood sent at 8/8/2019  2:00 PM CDT -----  Contact: So "home health nurse"[phone]  Type: Patient Call Back    Who called: So "home health nurse"    What is the request in detail:  discontinued DULoxetine (CYMBALTA) 60 MG capsule but patient will still continue to take the medication even though the ortho doctor discontinued it and patient has an elevated depression score but it is the norm for her    Can the clinic reply by [facility]? Call back    Would the patient rather a call back or a response via [facility]? Call back    Best call back number:[phone]        "

## 2019-08-27 ENCOUNTER — TELEPHONE (OUTPATIENT)
Dept: FAMILY MEDICINE | Facility: CLINIC | Age: 59
End: 2019-08-27

## 2019-08-27 NOTE — TELEPHONE ENCOUNTER
----- Message from Jumana Mariscal sent at 8/27/2019  1:59 PM CDT -----  Contact: [facility]  Type: Patient Call Back    Who called:[facility]    What is the request in detail:So [facility] states she faxed over patient urine results and would like to check the status and see if  would like to call patient in medication.    Can the clinic reply by [facility]?no    Would the patient rather a call back or a response via [facility]? call    Best call back number [phone]

## 2019-08-28 ENCOUNTER — TELEPHONE (OUTPATIENT)
Dept: HOME HEALTH SERVICES | Facility: HOSPITAL | Age: 59
End: 2019-08-28

## 2019-08-29 ENCOUNTER — TELEPHONE (OUTPATIENT)
Dept: FAMILY MEDICINE | Facility: CLINIC | Age: 59
End: 2019-08-29

## 2019-08-29 DIAGNOSIS — R39.9 UTI SYMPTOMS: Primary | ICD-10-CM

## 2019-08-29 RX ORDER — SULFAMETHOXAZOLE AND TRIMETHOPRIM 800; 160 MG/1; MG/1
1 TABLET ORAL 2 TIMES DAILY
Qty: 6 TABLET | Refills: 0 | Status: SHIPPED | OUTPATIENT
Start: 2019-08-29 | End: 2019-09-01

## 2019-08-29 NOTE — TELEPHONE ENCOUNTER
Message sent to provider staff to check for urine results faxed over for possible treatment of uti

## 2019-08-29 NOTE — TELEPHONE ENCOUNTER
----- Message from Alfred Ulloa sent at 8/29/2019 10:43 AM CDT -----  Contact: So from [employer]  [phone]  Type: Patient Call Back    Who called:So    What is the request in detail: So from [employer] is requesting a call back from the staff. She would like to know if a Rx has been sent in for the patient's UTI    Can the clinic reply by [facility]?no    Would the patient rather a call back or a response via [facility]? Call back    Best call back number:[phone]

## 2019-08-29 NOTE — TELEPHONE ENCOUNTER
Outside UA with nitrite negative, leukocyte esterase 3+, white blood cells present, moderate calcium oxalate crystals.  Urine culture 3 organisms.    Is the patient having UTI type symptoms?  If so I will send in antibiotic.  If not, no treatment.

## 2019-08-29 NOTE — TELEPHONE ENCOUNTER
Staff spoke with home health, patient complaining of pain and frequency.  I will send in Bactrim 3 day course to pharmacy.

## 2019-10-03 RX ORDER — TRAZODONE HYDROCHLORIDE 50 MG/1
TABLET ORAL
Qty: 90 TABLET | Refills: 3 | Status: SHIPPED | OUTPATIENT
Start: 2019-10-03 | End: 2019-12-19 | Stop reason: SDUPTHER

## 2019-11-19 ENCOUNTER — TELEPHONE (OUTPATIENT)
Dept: FAMILY MEDICINE | Facility: CLINIC | Age: 59
End: 2019-11-19

## 2019-11-19 DIAGNOSIS — Z12.31 ENCOUNTER FOR SCREENING MAMMOGRAM FOR BREAST CANCER: Primary | ICD-10-CM

## 2019-11-19 NOTE — TELEPHONE ENCOUNTER
----- Message from Shireen Hurst sent at 11/19/2019  1:42 PM CST -----  Contact: Patient   Type:  Mammogram    Caller is requesting to schedule their annual mammogram appointment.  Order is not listed in EPIC.  Please enter order and contact patient to schedule.  Name of Caller: Patient     Where would they like the mammogram performed? [place]    Would the patient rather a call back or a response via [facility]? Call back     Best Call Back Number: [phone]

## 2019-11-22 ENCOUNTER — PATIENT OUTREACH (OUTPATIENT)
Dept: ADMINISTRATIVE | Facility: OTHER | Age: 59
End: 2019-11-22

## 2019-12-09 ENCOUNTER — TELEPHONE (OUTPATIENT)
Dept: FAMILY MEDICINE | Facility: CLINIC | Age: 59
End: 2019-12-09

## 2019-12-09 DIAGNOSIS — I10 HYPERTENSION, ESSENTIAL: Chronic | ICD-10-CM

## 2019-12-09 DIAGNOSIS — R73.03 PREDIABETES: ICD-10-CM

## 2019-12-09 DIAGNOSIS — R73.09 ABNORMAL GLUCOSE: Primary | ICD-10-CM

## 2019-12-09 DIAGNOSIS — E78.2 MIXED HYPERLIPIDEMIA: ICD-10-CM

## 2019-12-09 DIAGNOSIS — R79.89 ABNORMAL TSH: ICD-10-CM

## 2019-12-09 DIAGNOSIS — E55.9 VITAMIN D DEFICIENCY: ICD-10-CM

## 2019-12-09 NOTE — TELEPHONE ENCOUNTER
----- Message from Preeti Perdomo sent at 12/9/2019  2:55 PM CST -----  Contact: pt  Type: Patient Call Back    Who called:pt    What is the request in detail:pt is requesting blood work orders. Call pt    Can the clinic reply by [facility]?    Would the patient rather a call back or a response via [facility]? call    Best call back number:[phone]    Additional Information:

## 2019-12-13 ENCOUNTER — LAB VISIT (OUTPATIENT)
Dept: LAB | Facility: HOSPITAL | Age: 59
End: 2019-12-13
Attending: INTERNAL MEDICINE
Payer: MEDICARE

## 2019-12-13 DIAGNOSIS — Z01.818 PREOP TESTING: ICD-10-CM

## 2019-12-13 LAB
BACTERIA #/AREA URNS AUTO: ABNORMAL /HPF
BILIRUB UR QL STRIP: NEGATIVE
CAOX CRY UR QL COMP ASSIST: ABNORMAL
CLARITY UR REFRACT.AUTO: ABNORMAL
COLOR UR AUTO: YELLOW
GLUCOSE UR QL STRIP: NEGATIVE
HGB UR QL STRIP: ABNORMAL
KETONES UR QL STRIP: NEGATIVE
LEUKOCYTE ESTERASE UR QL STRIP: ABNORMAL
MICROSCOPIC COMMENT: ABNORMAL
NITRITE UR QL STRIP: NEGATIVE
PH UR STRIP: 5 [PH] (ref 5–8)
PROT UR QL STRIP: NEGATIVE
RBC #/AREA URNS AUTO: 13 /HPF (ref 0–4)
SP GR UR STRIP: 1.02 (ref 1–1.03)
SQUAMOUS #/AREA URNS AUTO: 12 /HPF
URN SPEC COLLECT METH UR: ABNORMAL
WBC #/AREA URNS AUTO: 23 /HPF (ref 0–5)

## 2019-12-13 PROCEDURE — 81001 URINALYSIS AUTO W/SCOPE: CPT

## 2019-12-18 NOTE — PROGRESS NOTES
This note was created by combination of typed  and M-Modal dictation.  Transcription errors may be present.  If there are any questions, please contact me.    Assessment / Plan:   Hypertension, essential  -stable.  On HCTZ and losartan.  Refill to pharmacy.  -     hydroCHLOROthiazide (HYDRODIURIL) 12.5 MG Tab; Take 1 tablet (12.5 mg total) by mouth once daily.  Dispense: 90 tablet; Refill: 3  -     losartan (COZAAR) 50 MG tablet; Take 1 tablet (50 mg total) by mouth once daily. Stop lisinopril hctz  Dispense: 90 tablet; Refill: 3    Vitamin D deficiency  -recommended over-the-counter vitamin-D 2000 units daily.    Abnormal glucose  -A1c was good despite weight gain.  Work on diet physical activity and weight loss.  She normally exercises by using an exercise bike but has been keeping her grandchildren for the past month and so has not been able to do so.  And eating more over the holidays.    Mixed hyperlipidemia simva SE myalgia  -lipid profile was okay except for the triglycerides.  Does not drink alcohol very much.  But she does smoke.  No change in Lipitor, refill to pharmacy  -     atorvastatin (LIPITOR) 40 MG tablet; Take 1 tablet (40 mg total) by mouth once daily.  Dispense: 90 tablet; Refill: 3    Dysuria  -with hematuria on last urinalysis.  No history of stone.  Check urinalysis and urine culture.  If negative for infection, referred to urology.  If positive, treat and then repeat urine after treatment.  -     Urinalysis; Future; Expected date: 12/19/2019  -     Urine culture; Future; Expected date: 12/19/2019    Rash  -she is seen by Dermatology and it is concerning to them for possible autoimmune.  Check MATTIE, anti CCP, rheumatoid factor, ESR and CRP.  If positive referral to Rheumatology.  If negative return back to Dermatology  -     MATTIE Screen w/Reflex; Future; Expected date: 12/19/2019  -     Rheumatoid factor; Future; Expected date: 12/19/2019  -     Cyclic citrul peptide antibody, IgG;  Future; Expected date: 12/19/2019  -     Sedimentation rate; Future; Expected date: 12/19/2019  -     C-reactive protein; Future; Expected date: 12/19/2019    Need for vaccination for Strep pneumoniae  -     (In Office Administered) Pneumococcal Polysaccharide Vaccine (23 Valent) (SQ/IM)    Other depression  Hip pain, left  Chronic left-sided low back pain with left-sided sciatica  -stable on Cymbalta no changes refill to pharmacy.  Also followed by abad  -     DULoxetine (CYMBALTA) 60 MG capsule; Take 1 capsule (60 mg total) by mouth once daily.  Dispense: 90 capsule; Refill: 3    Gastroesophageal reflux disease without esophagitis  -stable on omeprazole, no changes.  -     omeprazole (PRILOSEC) 20 MG capsule; TAKE ONE Capsule BY MOUTH EVERY DAY  Dispense: 90 capsule; Refill: 3    Primary insomnia weaned off ambien  -she does find trazodone helpful for sleep maintenance.  History of Ambien.  She is also trying over-the-counter melatonin to try minimize her intake of trazodone.  I have refilled the trazodone.  -     traZODone (DESYREL) 50 MG tablet; TAKE 1 TABLET BY MOUTH EVERY EVENING FOR INSOMNIA  Dispense: 90 tablet; Refill: 3    Smoking-contemplative stage of quitting.    Obesity-work on diet physical activity and weight loss.    Medications Discontinued During This Encounter   Medication Reason    ergocalciferol (ERGOCALCIFEROL) 50,000 unit Cap Alternate therapy    traZODone (DESYREL) 50 MG tablet Reorder    DULoxetine (CYMBALTA) 60 MG capsule Reorder    omeprazole (PRILOSEC) 20 MG capsule Reorder    hydroCHLOROthiazide (HYDRODIURIL) 12.5 MG Tab Reorder    losartan (COZAAR) 50 MG tablet Reorder    atorvastatin (LIPITOR) 40 MG tablet Reorder       meds sent this encounter:  Medications Ordered This Encounter   Medications    atorvastatin (LIPITOR) 40 MG tablet     Sig: Take 1 tablet (40 mg total) by mouth once daily.     Dispense:  90 tablet     Refill:  3    DULoxetine (CYMBALTA) 60 MG capsule      Sig: Take 1 capsule (60 mg total) by mouth once daily.     Dispense:  90 capsule     Refill:  3    hydroCHLOROthiazide (HYDRODIURIL) 12.5 MG Tab     Sig: Take 1 tablet (12.5 mg total) by mouth once daily.     Dispense:  90 tablet     Refill:  3    losartan (COZAAR) 50 MG tablet     Sig: Take 1 tablet (50 mg total) by mouth once daily. Stop lisinopril hctz     Dispense:  90 tablet     Refill:  3    omeprazole (PRILOSEC) 20 MG capsule     Sig: TAKE ONE Capsule BY MOUTH EVERY DAY     Dispense:  90 capsule     Refill:  3    traZODone (DESYREL) 50 MG tablet     Sig: TAKE 1 TABLET BY MOUTH EVERY EVENING FOR INSOMNIA     Dispense:  90 tablet     Refill:  3     **Patient requests 90 days supply**       Follow Up: No follow-ups on file.  Follow-up lab work.  Follow-up urine.  Needs repeat urinalysis if positive for infection.  Else, referral to Urology.  Plan for follow-up 6 months with pre visit labs.  Work on dietary modification.    Subjective:     Chief Complaint   Patient presents with    Hypertension       HPI  Kathryn is a 59 y.o. female, last appointment with this clinic was Visit date not found.    No LMP recorded. Patient has had a hysterectomy.    Last seen in April  Hypertension, with weight loss her blood pressures were lower so I stopped her HCT and change lisinopril to losartan.  Pravastatin changed to atorvastatin  She was supposed to get lumbar surgery in June.    Previous labs CBC normal.  CMP normal  Triglycerides high non HDL good 116  Vitamin-D mildly low  A1c good 5.3  TSH good    Urinalysis with micro hematuria. Need urology  Needs mammo  Pneumovax    Weight gain.  Eating more over the holidays.  She usually uses an exercise bike but she has been watching her grandchildren and so she has not been able to exercise as regular sleep.  Despite that her A1c is good.    We reviewed her urine which showed microscopic hematuria.  She does note that there were maybe 2 episodes last week of dysuria and  urgency.  She is not experiencing anything at this moment.  No hx of renal stone.    She has been having a rash on her face, her shoulders, but sparing other parts of her body.  She has been seeing Dermatology Dr. Tello.  Who is concerned that this may be autoimmune.  Did not get skin biopsy but is recommending that she get immune workup. Ongoing x 6/2019.    She continues to smoke, maybe a pack a week.  Contemplative stage of quitting.  Declines referral to smoking cessation class.    Patient Care Team:  Edward Contreras MD as PCP - General (Internal Medicine)  Clarence Quintero MD as Consulting Physician (Orthopedic Surgery)  Sunshine Sanders MA as Care Coordinator    Patient Active Problem List    Diagnosis Date Noted    Abnormal glucose 04/08/2019    Vitamin D deficiency 11/16/2018    Chronic narcotic use 10/05/2017    Primary insomnia weaned off ambien 06/14/2017    Sacroiliac joint pain 05/02/2017    Postlaminectomy syndrome of lumbar region 05/02/2017    DDD (degenerative disc disease), lumbar 05/02/2017    Left carpal tunnel syndrome 02/13/2017    GERD (gastroesophageal reflux disease) 11/19/2012 EGD normal no bx's      11/19/2012 EGD normal no bx's      Chronic left-sided low back pain with left-sided sciatica 10/04/2016     Hx L3-4 fusion and L5S1 fusion; currently L2 issues  10/5/18 MRI L spine: Degenerative change as detailed above.Postoperative change consistent with L3 through S1 fusion.      Hip pain, left 10/04/2016    Weakness of trunk musculature 10/04/2016    Obesity 03/04/2015    Hyperplastic polyp of intestine 11/2012 11/19/2012 11/19/2012 colonoscopy hyperplastic polyp      Hypertension, essential 10/10/2012     Echo 3/22/18  1 - Normal left ventricular systolic function (EF 55-60%).       Mixed hyperlipidemia simva SE myalgia 10/10/2012     Simvastatin SE myalgias  Marian MPI 3/22/18 (images pers rev)  Nuclear Quantitative Functional Analysis:   LVEF: >= 70  %  Impression: NORMAL MYOCARDIAL PERFUSION  1. The perfusion scan is free of evidence for myocardial ischemia or injury.   2. Resting wall motion is physiologic.   3. Resting LV function is normal.   4. The ventricular volumes are normal at rest and stress.   5. The extracardiac distribution of radioactivity is normal.       Depression 10/10/2012     Fluoxetine x 2013  ativan         PAST MEDICAL HISTORY:  Past Medical History:   Diagnosis Date    Depression     Encounter for blood transfusion     GERD (gastroesophageal reflux disease)     High cholesterol     Hypertension     Lumbar spondylosis 2015    L3-4 fusion; L5S1 fusion    Obesity     Restless leg syndrome        PAST SURGICAL HISTORY:  Past Surgical History:   Procedure Laterality Date    BACK SURGERY Bilateral     2015, 2015 fusion    BELT ABDOMINOPLASTY      BREAST SURGERY      breast reduction     SECTION      x3    COSMETIC SURGERY      HYSTERECTOMY      partial    TOTAL REDUCTION MAMMOPLASTY      TUBAL LIGATION         SOCIAL HISTORY:  Social History     Socioeconomic History    Marital status: Significant Other     Spouse name: Not on file    Number of children: Not on file    Years of education: Not on file    Highest education level: Not on file   Occupational History    Occupation: [employer]     Comment: [employer]    Occupation: formerly    Social Needs    Financial resource strain: Not on file    Food insecurity:     Worry: Not on file     Inability: Not on file    Transportation needs:     Medical: Not on file     Non-medical: Not on file   Tobacco Use    Smoking status: Current Every Day Smoker     Types: Cigarettes    Smokeless tobacco: Never Used   Substance and Sexual Activity    Alcohol use: Yes     Alcohol/week: 0.0 standard drinks     Comment: a coupe of times a week    Drug use: No    Sexual activity: Yes   Lifestyle    Physical activity:     Days per week:  Not on file     Minutes per session: Not on file    Stress: Not on file   Relationships    Social connections:     Talks on phone: Not on file     Gets together: Not on file     Attends Yazidi service: Not on file     Active member of club or organization: Not on file     Attends meetings of clubs or organizations: Not on file     Relationship status: Not on file   Other Topics Concern    Are you pregnant or think you may be? Not Asked    Breast-feeding Not Asked   Social History Narrative    Not on file        ALLERGIES AND MEDICATIONS: updated and reviewed.  Review of patient's allergies indicates:   Allergen Reactions    Iodine and iodide containing products Hives and Itching    Latex, natural rubber Rash     Current Outpatient Medications   Medication Sig Dispense Refill    atorvastatin (LIPITOR) 40 MG tablet Take 1 tablet (40 mg total) by mouth once daily. 90 tablet 3    DULoxetine (CYMBALTA) 60 MG capsule Take 1 capsule (60 mg total) by mouth once daily. 90 capsule 3    ergocalciferol (ERGOCALCIFEROL) 50,000 unit Cap Take 50,000 Units by mouth every 7 days.  3    gabapentin (NEURONTIN) 300 MG capsule gabapentin 300 mg capsule   Take 1 capsule twice a day by oral route.      hydroCHLOROthiazide (HYDRODIURIL) 12.5 MG Tab Take 1 tablet (12.5 mg total) by mouth once daily. 30 tablet 11    losartan (COZAAR) 50 MG tablet Take 1 tablet (50 mg total) by mouth once daily. Stop lisinopril hctz 90 tablet 3    naloxone (NARCAN) 4 mg/actuation Spry Narcan 4 mg/actuation nasal spray      omeprazole (PRILOSEC) 20 MG capsule TAKE ONE Capsule BY MOUTH EVERY DAY **THANK YOU** 90 capsule 1    orphenadrine (NORFLEX) 100 mg tablet orphenadrine citrate  mg tablet,extended release      oxycodone-acetaminophen (PERCOCET)  mg per tablet Take 1 tablet by mouth every 8 (eight) hours as needed.       traZODone (DESYREL) 50 MG tablet TAKE 1 TABLET BY MOUTH EVERY EVENING FOR INSOMNIA 90 tablet 3     No  current facility-administered medications for this visit.        Review of Systems   All other systems reviewed and are negative.      Objective:   Physical Exam   Vitals:    12/19/19 1114   BP: 138/80   BP Location: Right arm   Patient Position: Sitting   BP Method: Large (Manual)   Pulse: 91   Temp: 97.8 °F (36.6 °C)   TempSrc: Oral   SpO2: 96%   Weight: 88 kg (194 lb)   Height: 5' (1.524 m)    Body mass index is 37.89 kg/m².  Weight: 88 kg (194 lb)   Height: 5' (152.4 cm)     Physical Exam   Constitutional: She is oriented to person, place, and time. She appears well-developed and well-nourished. No distress.   Eyes: EOM are normal.   Cardiovascular: Normal rate, regular rhythm and normal heart sounds.   No murmur heard.  Pulmonary/Chest: Effort normal and breath sounds normal.   Abdominal: Soft. She exhibits no distension and no mass. There is no tenderness. There is no guarding.   Musculoskeletal: Normal range of motion.   Neurological: She is alert and oriented to person, place, and time. Coordination normal.   Skin: Skin is warm and dry.   On her face, nondermatomal, looks like old macules that are healing, each maybe 3-4 mm in diameter   Psychiatric: She has a normal mood and affect. Her behavior is normal. Thought content normal.        Component      Latest Ref Rng & Units 12/13/2019   WBC      3.90 - 12.70 K/uL 8.60   RBC      4.00 - 5.40 M/uL 4.35   Hemoglobin      12.0 - 16.0 g/dL 12.5   Hematocrit      37.0 - 48.5 % 40.2   MCV      82 - 98 fL 92   MCH      27.0 - 31.0 pg 28.7   MCHC      32.0 - 36.0 g/dL 31.1 (L)   RDW      11.5 - 14.5 % 13.2   Platelets      150 - 350 K/uL 304   MPV      9.2 - 12.9 fL 12.4   Immature Granulocytes      0.0 - 0.5 % 0.2   Gran # (ANC)      1.8 - 7.7 K/uL 4.5   Immature Grans (Abs)      0.00 - 0.04 K/uL 0.02   Lymph #      1.0 - 4.8 K/uL 2.3   Mono #      0.3 - 1.0 K/uL 0.5   Eos #      0.0 - 0.5 K/uL 1.1 (H)   Baso #      0.00 - 0.20 K/uL 0.06   nRBC      0 /100 WBC 0    Gran%      38.0 - 73.0 % 52.9   Lymph%      18.0 - 48.0 % 27.1   Mono%      4.0 - 15.0 % 6.0   Eosinophil%      0.0 - 8.0 % 13.1 (H)   Basophil%      0.0 - 1.9 % 0.7   Differential Method       Automated   Sodium      136 - 145 mmol/L 139   Potassium      3.5 - 5.1 mmol/L 4.3   Chloride      95 - 110 mmol/L 101   CO2      23 - 29 mmol/L 29   Glucose      70 - 110 mg/dL 93   BUN, Bld      6 - 20 mg/dL 15   Creatinine      0.5 - 1.4 mg/dL 0.9   Calcium      8.7 - 10.5 mg/dL 9.8   PROTEIN TOTAL      6.0 - 8.4 g/dL 7.7   Albumin      3.5 - 5.2 g/dL 4.1   BILIRUBIN TOTAL      0.1 - 1.0 mg/dL 0.5   Alkaline Phosphatase      55 - 135 U/L 88   AST      10 - 40 U/L 18   ALT      10 - 44 U/L 15   Anion Gap      8 - 16 mmol/L 9   eGFR if African American      >60 mL/min/1.73 m:2 >60.0   eGFR if non African American      >60 mL/min/1.73 m:2 >60.0   Specimen UA       Urine, Clean Catch   Color, UA      Yellow, Straw, Paula Yellow   Appearance, UA      Clear Cloudy (A)   pH, UA      5.0 - 8.0 5.0   Specific Gravity, UA      1.005 - 1.030 1.025   Protein, UA      Negative Negative   Glucose, UA      Negative Negative   Ketones, UA      Negative Negative   Bilirubin (UA)      Negative Negative   Occult Blood UA      Negative 1+ (A)   NITRITE UA      Negative Negative   Leukocytes, UA      Negative 1+ (A)   Cholesterol      120 - 199 mg/dL 168   Triglycerides      30 - 150 mg/dL 204 (H)   HDL      40 - 75 mg/dL 52   LDL Cholesterol External      63.0 - 159.0 mg/dL 75.2   Hdl/Cholesterol Ratio      20.0 - 50.0 % 31.0   Total Cholesterol/HDL Ratio      2.0 - 5.0 3.2   Non-HDL Cholesterol      mg/dL 116   RBC, UA      0 - 4 /hpf 13 (H)   WBC, UA      0 - 5 /hpf 23 (H)   Bacteria, UA      None-Occ /hpf Occasional   Squam Epithel, UA      /hpf 12   Ca Oxalate Leia, UA      None-Moderate Few   Microscopic Comment       SEE COMMENT   Hemoglobin A1C External      4.0 - 5.6 % 5.3   Estimated Avg Glucose      68 - 131 mg/dL 105   Vit D,  25-Hydroxy      30 - 96 ng/mL 20 (L)   TSH      0.400 - 4.000 uIU/mL 0.400

## 2019-12-19 ENCOUNTER — LAB VISIT (OUTPATIENT)
Dept: LAB | Facility: HOSPITAL | Age: 59
End: 2019-12-19
Attending: INTERNAL MEDICINE
Payer: MEDICARE

## 2019-12-19 ENCOUNTER — OFFICE VISIT (OUTPATIENT)
Dept: FAMILY MEDICINE | Facility: CLINIC | Age: 59
End: 2019-12-19
Payer: MEDICARE

## 2019-12-19 VITALS
WEIGHT: 194 LBS | BODY MASS INDEX: 38.09 KG/M2 | SYSTOLIC BLOOD PRESSURE: 138 MMHG | HEIGHT: 60 IN | OXYGEN SATURATION: 96 % | TEMPERATURE: 98 F | HEART RATE: 91 BPM | DIASTOLIC BLOOD PRESSURE: 80 MMHG

## 2019-12-19 DIAGNOSIS — K21.9 GASTROESOPHAGEAL REFLUX DISEASE WITHOUT ESOPHAGITIS: Chronic | ICD-10-CM

## 2019-12-19 DIAGNOSIS — E55.9 VITAMIN D DEFICIENCY: ICD-10-CM

## 2019-12-19 DIAGNOSIS — R30.0 DYSURIA: ICD-10-CM

## 2019-12-19 DIAGNOSIS — I10 HYPERTENSION, ESSENTIAL: Primary | Chronic | ICD-10-CM

## 2019-12-19 DIAGNOSIS — F32.89 OTHER DEPRESSION: ICD-10-CM

## 2019-12-19 DIAGNOSIS — E78.2 MIXED HYPERLIPIDEMIA: ICD-10-CM

## 2019-12-19 DIAGNOSIS — Z23 NEED FOR VACCINATION FOR STREP PNEUMONIAE: ICD-10-CM

## 2019-12-19 DIAGNOSIS — M54.42 CHRONIC LEFT-SIDED LOW BACK PAIN WITH LEFT-SIDED SCIATICA: ICD-10-CM

## 2019-12-19 DIAGNOSIS — M25.552 HIP PAIN, LEFT: ICD-10-CM

## 2019-12-19 DIAGNOSIS — G89.29 CHRONIC LEFT-SIDED LOW BACK PAIN WITH LEFT-SIDED SCIATICA: ICD-10-CM

## 2019-12-19 DIAGNOSIS — R73.09 ABNORMAL GLUCOSE: ICD-10-CM

## 2019-12-19 DIAGNOSIS — R21 RASH: ICD-10-CM

## 2019-12-19 DIAGNOSIS — F51.01 PRIMARY INSOMNIA: ICD-10-CM

## 2019-12-19 LAB
BILIRUB UR QL STRIP: NEGATIVE
CLARITY UR REFRACT.AUTO: CLEAR
COLOR UR AUTO: YELLOW
GLUCOSE UR QL STRIP: NEGATIVE
HGB UR QL STRIP: NEGATIVE
KETONES UR QL STRIP: NEGATIVE
LEUKOCYTE ESTERASE UR QL STRIP: NEGATIVE
NITRITE UR QL STRIP: NEGATIVE
PH UR STRIP: 6 [PH] (ref 5–8)
PROT UR QL STRIP: NEGATIVE
SP GR UR STRIP: 1.01 (ref 1–1.03)
URN SPEC COLLECT METH UR: NORMAL

## 2019-12-19 PROCEDURE — 99499 RISK ADDL DX/OHS AUDIT: ICD-10-PCS | Mod: S$GLB,,, | Performed by: INTERNAL MEDICINE

## 2019-12-19 PROCEDURE — 90732 PPSV23 VACC 2 YRS+ SUBQ/IM: CPT | Mod: S$GLB,,, | Performed by: INTERNAL MEDICINE

## 2019-12-19 PROCEDURE — 99499 UNLISTED E&M SERVICE: CPT | Mod: S$GLB,,, | Performed by: INTERNAL MEDICINE

## 2019-12-19 PROCEDURE — 99214 PR OFFICE/OUTPT VISIT, EST, LEVL IV, 30-39 MIN: ICD-10-PCS | Mod: 25,S$GLB,, | Performed by: INTERNAL MEDICINE

## 2019-12-19 PROCEDURE — G0009 PNEUMOCOCCAL POLYSACCHARIDE VACCINE 23-VALENT =>2YO SQ IM: ICD-10-PCS | Mod: S$GLB,,, | Performed by: INTERNAL MEDICINE

## 2019-12-19 PROCEDURE — 99214 OFFICE O/P EST MOD 30 MIN: CPT | Mod: 25,S$GLB,, | Performed by: INTERNAL MEDICINE

## 2019-12-19 PROCEDURE — 3079F PR MOST RECENT DIASTOLIC BLOOD PRESSURE 80-89 MM HG: ICD-10-PCS | Mod: CPTII,S$GLB,, | Performed by: INTERNAL MEDICINE

## 2019-12-19 PROCEDURE — 3075F PR MOST RECENT SYSTOLIC BLOOD PRESS GE 130-139MM HG: ICD-10-PCS | Mod: CPTII,S$GLB,, | Performed by: INTERNAL MEDICINE

## 2019-12-19 PROCEDURE — 87086 URINE CULTURE/COLONY COUNT: CPT

## 2019-12-19 PROCEDURE — 90732 PNEUMOCOCCAL POLYSACCHARIDE VACCINE 23-VALENT =>2YO SQ IM: ICD-10-PCS | Mod: S$GLB,,, | Performed by: INTERNAL MEDICINE

## 2019-12-19 PROCEDURE — 3075F SYST BP GE 130 - 139MM HG: CPT | Mod: CPTII,S$GLB,, | Performed by: INTERNAL MEDICINE

## 2019-12-19 PROCEDURE — G0009 ADMIN PNEUMOCOCCAL VACCINE: HCPCS | Mod: S$GLB,,, | Performed by: INTERNAL MEDICINE

## 2019-12-19 PROCEDURE — 3008F PR BODY MASS INDEX (BMI) DOCUMENTED: ICD-10-PCS | Mod: CPTII,S$GLB,, | Performed by: INTERNAL MEDICINE

## 2019-12-19 PROCEDURE — 3079F DIAST BP 80-89 MM HG: CPT | Mod: CPTII,S$GLB,, | Performed by: INTERNAL MEDICINE

## 2019-12-19 PROCEDURE — 3008F BODY MASS INDEX DOCD: CPT | Mod: CPTII,S$GLB,, | Performed by: INTERNAL MEDICINE

## 2019-12-19 PROCEDURE — 99999 PR PBB SHADOW E&M-EST. PATIENT-LVL IV: ICD-10-PCS | Mod: PBBFAC,,, | Performed by: INTERNAL MEDICINE

## 2019-12-19 PROCEDURE — 99999 PR PBB SHADOW E&M-EST. PATIENT-LVL IV: CPT | Mod: PBBFAC,,, | Performed by: INTERNAL MEDICINE

## 2019-12-19 PROCEDURE — 81003 URINALYSIS AUTO W/O SCOPE: CPT

## 2019-12-19 RX ORDER — LOSARTAN POTASSIUM 50 MG/1
50 TABLET ORAL DAILY
Qty: 90 TABLET | Refills: 3 | Status: SHIPPED | OUTPATIENT
Start: 2019-12-19 | End: 2021-03-11

## 2019-12-19 RX ORDER — ATORVASTATIN CALCIUM 40 MG/1
40 TABLET, FILM COATED ORAL DAILY
Qty: 90 TABLET | Refills: 3 | Status: SHIPPED | OUTPATIENT
Start: 2019-12-19 | End: 2020-08-14 | Stop reason: CLARIF

## 2019-12-19 RX ORDER — NALOXONE HYDROCHLORIDE 4 MG/.1ML
SPRAY NASAL
COMMUNITY

## 2019-12-19 RX ORDER — ORPHENADRINE CITRATE 100 MG/1
TABLET, EXTENDED RELEASE ORAL
COMMUNITY
End: 2020-01-28

## 2019-12-19 RX ORDER — HYDROCHLOROTHIAZIDE 12.5 MG/1
12.5 TABLET ORAL DAILY
Qty: 90 TABLET | Refills: 3 | Status: SHIPPED | OUTPATIENT
Start: 2019-12-19 | End: 2020-08-12

## 2019-12-19 RX ORDER — OMEPRAZOLE 20 MG/1
CAPSULE, DELAYED RELEASE ORAL
Qty: 90 CAPSULE | Refills: 3 | Status: SHIPPED | OUTPATIENT
Start: 2019-12-19 | End: 2020-08-15

## 2019-12-19 RX ORDER — TRAZODONE HYDROCHLORIDE 50 MG/1
TABLET ORAL
Qty: 90 TABLET | Refills: 3 | Status: SHIPPED | OUTPATIENT
Start: 2019-12-19 | End: 2020-01-28

## 2019-12-19 RX ORDER — DULOXETIN HYDROCHLORIDE 60 MG/1
60 CAPSULE, DELAYED RELEASE ORAL DAILY
Qty: 90 CAPSULE | Refills: 3 | Status: SHIPPED | OUTPATIENT
Start: 2019-12-19 | End: 2020-10-22

## 2019-12-19 NOTE — PROGRESS NOTES
Patient, Kathryn Wagner (MRN #[id number]), presented with a recorded BMI of 37.89 kg/m^2 and a documented comorbidity(s):  - Hypertension  - Hyperlipidemia  to which the severe obesity is a contributing factor. This is consistent with the definition of severe obesity (BMI 35.0-39.9) with comorbidity (ICD-10 E66.01, Z68.35). The patient's severe obesity was monitored, evaluated, addressed and/or treated. This addendum to the medical record is made on 12/19/2019.

## 2019-12-20 LAB — BACTERIA UR CULT: NO GROWTH

## 2019-12-22 ENCOUNTER — TELEPHONE (OUTPATIENT)
Dept: FAMILY MEDICINE | Facility: CLINIC | Age: 59
End: 2019-12-22

## 2019-12-22 DIAGNOSIS — R31.29 MICROHEMATURIA: Primary | ICD-10-CM

## 2019-12-23 NOTE — TELEPHONE ENCOUNTER
Patient was advised of lab results. Verbally understands.    Reports when she had iodine placed in her kidney before she had an allergic reaction.

## 2019-12-23 NOTE — TELEPHONE ENCOUNTER
Please call pt  Autoimmune labs negative. I will send copy to derm  Urine was normal on repeat but I cannot ignore the previous abnormal urine.  I need her to see urology.  As part of the workup I will order CT urogram.     She has listed allergy to iodine.  Please have her clarify - is this allergy to shellfish? Or has she had CT scan with contrast before and had a problem?     If shellfish, should not be a problem with IV contrast.

## 2020-01-03 ENCOUNTER — HOSPITAL ENCOUNTER (OUTPATIENT)
Dept: RADIOLOGY | Facility: HOSPITAL | Age: 60
Discharge: HOME | End: 2020-01-03
Attending: INTERNAL MEDICINE
Payer: MEDICARE

## 2020-01-03 DIAGNOSIS — Z12.31 ENCOUNTER FOR SCREENING MAMMOGRAM FOR BREAST CANCER: ICD-10-CM

## 2020-01-03 PROCEDURE — 77067 SCR MAMMO BI INCL CAD: CPT | Mod: TC,PO

## 2020-01-03 PROCEDURE — 77067 SCR MAMMO BI INCL CAD: CPT | Mod: 26,,, | Performed by: RADIOLOGY

## 2020-01-03 PROCEDURE — 77063 BREAST TOMOSYNTHESIS BI: CPT | Mod: 26,,, | Performed by: RADIOLOGY

## 2020-01-03 PROCEDURE — 77067 MAMMO DIGITAL SCREENING BILAT WITH TOMOSYNTHESIS_CAD: ICD-10-PCS | Mod: 26,,, | Performed by: RADIOLOGY

## 2020-01-03 PROCEDURE — 77063 MAMMO DIGITAL SCREENING BILAT WITH TOMOSYNTHESIS_CAD: ICD-10-PCS | Mod: 26,,, | Performed by: RADIOLOGY

## 2020-01-22 ENCOUNTER — TELEPHONE (OUTPATIENT)
Dept: BARIATRICS | Facility: CLINIC | Age: 60
End: 2020-01-22

## 2020-01-22 NOTE — TELEPHONE ENCOUNTER
----- Message from Nu Vidales sent at 1/22/2020  9:19 AM CST -----  Contact: pt @[phone]  Pt called in to r/s appt 1/22. Pt states she will need someone to come wit her and no one is available. Please call back to r/s for any day but next Wednesday. Pt would also like to have this appt scheduled with Gaviota Zavala on same day preferably in the morning.

## 2020-01-28 ENCOUNTER — CLINICAL SUPPORT (OUTPATIENT)
Dept: BARIATRICS | Facility: CLINIC | Age: 60
End: 2020-01-28
Payer: MEDICARE

## 2020-01-28 ENCOUNTER — INITIAL CONSULT (OUTPATIENT)
Dept: BARIATRICS | Facility: CLINIC | Age: 60
End: 2020-01-28
Payer: MEDICARE

## 2020-01-28 VITALS
BODY MASS INDEX: 36.92 KG/M2 | HEART RATE: 90 BPM | DIASTOLIC BLOOD PRESSURE: 73 MMHG | HEIGHT: 60 IN | SYSTOLIC BLOOD PRESSURE: 142 MMHG | WEIGHT: 188.06 LBS

## 2020-01-28 DIAGNOSIS — K21.9 GASTROESOPHAGEAL REFLUX DISEASE WITHOUT ESOPHAGITIS: Chronic | ICD-10-CM

## 2020-01-28 DIAGNOSIS — F11.90 CHRONIC NARCOTIC USE: ICD-10-CM

## 2020-01-28 DIAGNOSIS — F32.A DEPRESSION, UNSPECIFIED DEPRESSION TYPE: Chronic | ICD-10-CM

## 2020-01-28 DIAGNOSIS — E66.09 CLASS 2 OBESITY DUE TO EXCESS CALORIES WITHOUT SERIOUS COMORBIDITY WITH BODY MASS INDEX (BMI) OF 36.0 TO 36.9 IN ADULT: Primary | Chronic | ICD-10-CM

## 2020-01-28 DIAGNOSIS — E78.2 MIXED HYPERLIPIDEMIA: ICD-10-CM

## 2020-01-28 DIAGNOSIS — R06.09 DOE (DYSPNEA ON EXERTION): ICD-10-CM

## 2020-01-28 DIAGNOSIS — F17.200 SMOKER: ICD-10-CM

## 2020-01-28 DIAGNOSIS — I10 HYPERTENSION, ESSENTIAL: Chronic | ICD-10-CM

## 2020-01-28 DIAGNOSIS — Z79.899 OTHER LONG TERM (CURRENT) DRUG THERAPY: ICD-10-CM

## 2020-01-28 PROCEDURE — 99999 PR PBB SHADOW E&M-EST. PATIENT-LVL V: CPT | Mod: PBBFAC,,, | Performed by: NURSE PRACTITIONER

## 2020-01-28 PROCEDURE — 99499 UNLISTED E&M SERVICE: CPT | Mod: S$GLB,,, | Performed by: DIETITIAN, REGISTERED

## 2020-01-28 PROCEDURE — 3008F PR BODY MASS INDEX (BMI) DOCUMENTED: ICD-10-PCS | Mod: CPTII,S$GLB,, | Performed by: NURSE PRACTITIONER

## 2020-01-28 PROCEDURE — 99205 OFFICE O/P NEW HI 60 MIN: CPT | Mod: S$GLB,,, | Performed by: NURSE PRACTITIONER

## 2020-01-28 PROCEDURE — 3078F PR MOST RECENT DIASTOLIC BLOOD PRESSURE < 80 MM HG: ICD-10-PCS | Mod: CPTII,S$GLB,, | Performed by: NURSE PRACTITIONER

## 2020-01-28 PROCEDURE — 99205 PR OFFICE/OUTPT VISIT, NEW, LEVL V, 60-74 MIN: ICD-10-PCS | Mod: S$GLB,,, | Performed by: NURSE PRACTITIONER

## 2020-01-28 PROCEDURE — 3077F PR MOST RECENT SYSTOLIC BLOOD PRESSURE >= 140 MM HG: ICD-10-PCS | Mod: CPTII,S$GLB,, | Performed by: NURSE PRACTITIONER

## 2020-01-28 PROCEDURE — 3078F DIAST BP <80 MM HG: CPT | Mod: CPTII,S$GLB,, | Performed by: NURSE PRACTITIONER

## 2020-01-28 PROCEDURE — 99499 NO LOS: ICD-10-PCS | Mod: S$GLB,,, | Performed by: DIETITIAN, REGISTERED

## 2020-01-28 PROCEDURE — 99499 RISK ADDL DX/OHS AUDIT: ICD-10-PCS | Mod: S$GLB,,, | Performed by: NURSE PRACTITIONER

## 2020-01-28 PROCEDURE — 3077F SYST BP >= 140 MM HG: CPT | Mod: CPTII,S$GLB,, | Performed by: NURSE PRACTITIONER

## 2020-01-28 PROCEDURE — 99999 PR PBB SHADOW E&M-EST. PATIENT-LVL V: ICD-10-PCS | Mod: PBBFAC,,, | Performed by: NURSE PRACTITIONER

## 2020-01-28 PROCEDURE — 99499 UNLISTED E&M SERVICE: CPT | Mod: S$GLB,,, | Performed by: NURSE PRACTITIONER

## 2020-01-28 PROCEDURE — 3008F BODY MASS INDEX DOCD: CPT | Mod: CPTII,S$GLB,, | Performed by: NURSE PRACTITIONER

## 2020-01-28 PROCEDURE — 99999 PR PBB SHADOW E&M-EST. PATIENT-LVL II: ICD-10-PCS | Mod: PBBFAC,,, | Performed by: DIETITIAN, REGISTERED

## 2020-01-28 PROCEDURE — 99999 PR PBB SHADOW E&M-EST. PATIENT-LVL II: CPT | Mod: PBBFAC,,, | Performed by: DIETITIAN, REGISTERED

## 2020-01-28 NOTE — PATIENT INSTRUCTIONS
Bariatric Diet Grocery List      High in Protein:   ? Canned tuna or chicken (packed in water)  ? Lean ground turkey breast or ground round  ? Turkey or chicken (no skin)  ? Lean pork or beef   ? Scrambled, poached, or boiled eggs  ? Baked or broiled fish and seafood (not fried!)  ? Beans, edamame and lentils  ? Low fat deli meats: turkey, chicken, ham, roast beef  ? 1% or Skim Milk, Lactaid, or Soymilk  ? Low-fat cheese, cottage cheese, mozzarella string cheese, ricotta  ? Light yogurt, FF/SF frozen yogurt, custard, SF pudding  ? Protein drinks and protein bars with 0-4 grams sugar     Fruits, Vegetables and Snacks   - Green beans, broccoli, cauliflower, spinach, asparagus, carrots, lima beans, yellow squash, zucchini, etc.  - Apple, pineapple, peach, grapes, banana, watermelon, oranges, etc.  - Fruit canned in its own juice or in water (not in syrup)  - Raw veggies  - Lettuce: dark greens like spinach and Adal  - Unsalted Nuts  - Maurilio Links beef jerky     Fluids:   Skim/1% milk, Lactaid, Soymilk  Sugar-free beverages  (decaf and non-carbonated)  Decaf coffee & decaf tea   Water         1200- 1500 calorie Sample meal plan  80-120g protein per day.   Protein drinks and bars: 0-4 grams sugar  Drink lots of water throughout the day and exercise!  MENU # 1  Breakfast: 2 eggs, 1 turkey sausage brandon, 1 apple  Snack: Atkins bar  Lunch: 2 roll-ups (sliced turkey, low-fat sliced cheese, mustard), 12 baby carrots dipped in 1 Tbsp natural peanut butter  Mid-Day Snack: ¼ cup unsalted almonds, ½ cup fruit  Dinner: 1 chicken thigh simmered in tomato sauce + 2 Tbsp mozzarella cheese, ½ cup black beans, 1/2 cup steamed carrots  Evening Snack: 1/2 cup grapes with 4 cubes low-fat cheddar cheese   ___________________________________________________  MENU # 2  Breakfast: 200 calorie protein drink  Mid-morning snack : ¼ cup unsalted nuts, medium banana  Lunch: 3oz tuna or chicken salad made with 2 tbsp light doty, over salad with  tomatoes and cucumbers.   Snack: low-fat cheese stick  Dinner: 3oz hamburger brandon, slice of low-fat cheese, 1 cup boiled yellow squash and zucchini.   Snack: 6oz light yogurt  _______________________________________________________  Menu #3  Breakfast: 6oz plain Greek yogurt + fruit (½ banana, ½ cup fruit - pineapple, blueberries, strawberries, peach), may add Splenda to janis.  Lunch: Grilled  chicken breast w/ slice low-fat pepper betty cheese, 1/2 cup grilled/baked asparagus and small salad with Salad Spritzer.    Mid-Day snack: 200 calorie protein drink   Dinner: 4oz Grilled fish, ½ cup white beans, ½ cup cooked spinach  Evening Snack: fudgsicle no-sugar-added    Menu # 4  Breakfast: 1 scoop vanilla protein powder + 4oz skim milk + 4oz coffee   Snack: Pure protein bar  Lunch: 2 Lettuce tacos: 3oz seasoned ground turkey wrapped in a Adal lettuce leaves with 1 Tbsp shredded cheese and dollop low-fat sour cream  Snack: ½ cup cottage cheese, ½ cup pineapple chunks  Dinner: Shrimp omelet: 2 eggs, ½ cup shrimp, green onions, and shredded cheese  ______________________________________________________  Menu #5  Breakfast: 1 cup low-fat cottage cheese, ½ cup peaches (no sugar added)  Snack: 1 apple, 4 cubes cheese  Lunch: 3oz baked pork chop, 1 cup okra and tomato stew  Snack: 1/2 cup black beans + salsa + dollop light sour cream  Dinner: Caprese chicken salad: 3 oz chicken breast, 1oz fresh mozzarella, sliced tomato, 1 Tbsp olive oil, basil  Snack: sugar-free popsicle    Menu #6  Breakfast: ½ cup part-skim ricotta cheese, 2 Tbsp sugar-free strawberry preserves, sprinkle of slivered almonds  Snack: 1 orange  Lunch: 3 oz canned chicken, 1oz shredded cheddar cheese, ½  cup black beans, 2 Tbsp salsa  Snack: 200 calorie Protein drink  Dinner: 4 oz grilled salmon steak, over mixed green salad with 2 tbsp light dressing

## 2020-01-28 NOTE — PATIENT INSTRUCTIONS
Prior to surgery you will need to complete:  1. Stress Test, Chest X-Ray, EKG and EGD  2. Psychological Consult and Bariatric Dietician Consult  3. Bariatric Labs  4. Quit smoking.  Please call the office when you have smoked your last cigarette and you will be scheduled 1 month later to check your nicotine levels.    5.  Pain management plan  - Psychological evaluation, Please call psychiatry [phone] to schedule    In preparation for bariatric surgery, please complete the following:   · Discuss your current medications with your primary care provider, remember your medications will need to be crushed, chewable, or in liquid form for the first 2-4 weeks after a gastric bypass or sleeve.  For a gastric band, your medications will need to be crushed indefinitely.    · If you take a blood thinner such as: Coumadin (warfarin), Pradaxa (dabigatran), or Plavix (clopidogrel), you will need to speak with your prescribing provider on how or if this medication can be stopped before surgery.   · If you take a medication for depression or anxiety, you will need to begin crushing or opening the capsule 1-3 months prior to surgery.  Remember to discuss this with the psychologist or psychiatrist that you see.   · If you take medication for arthritis on a daily basis that is considered a non-steroidal anti-inflammatory (NSAID), please discuss with your prescribing physician an alternative medication.  After having gastric bypass or gastric sleeve, this group of medications is not appropriate to take due to increased risk of bleeding stomach ulcers.      DEFINITIONS  Appointments: Pre-scheduled meetings or consultations with any physician, advanced practice provider, dietitian, or psychologist, and labs, imaging studies, sleep studies, etc.   Late cancellation: Cancelling an appointment 24-48 hours prior to scheduled time.  No-Show appointment:  is when    You do NOT arrive to your appointment at the time its  scheduled.   You call to cancel or cancel via [employer] less than 24 hours in advance of your scheduled appointment   You show up 15 minutes AFTER your scheduled appointment time without any notification of being late.     POLICY  1. You are allowed up to 3 cancellations for appointments.    After 3 cancellations your case will be placed on hold for 2 months and after that time you can resume the program.   2. You are allowed only 1 no-show for an appointment.    You will be re-scheduled one time and if there is a 2nd no-show at any point, your case will be placed on hold for 3 months.  After 3 months you can resume the program.     3. Upon resuming the program after being placed on hold for either above mentioned reasons, if you have a late cancel or no show for any appointment, the bariatric team will review if youre an appropriate candidate for surgery at the monthly meeting.

## 2020-01-28 NOTE — PROGRESS NOTES
"NUTRITIONAL CONSULT    Referring Physician: Dr. Chaudhari  Reason for MNT Referral: Initial assessment for sleeve gastrectomy work-up    PAST MEDICAL HISTORY:   59 y.o. female     Weight and Dieting History:  The patient has tried healthier eating "balanced diet"; no pills or diet. She usually loses 10 pounds then feels like she is starving, eats more, regains. Wt difficulty started with pregnancy at at 18-19. Her highest wt has been 235 pounds.     Past Medical History:   Diagnosis Date    Depression     Encounter for blood transfusion     GERD (gastroesophageal reflux disease)     High cholesterol     Hypertension     Lumbar spondylosis 2015    L3-4 fusion; L5S1 fusion    Obesity     Restless leg syndrome        CLINICAL DATA:  59 y.o.-year-old White female.  Height: 5'  Weight: 188 lbs  IBW: 126 lbs  BMI: 36.73  The patient's goal weight (50% EBW): 157 lbs  Personal goal weight: 140 lbs    Goal for Bariatric Surgery: joint pain, needs hip replacement    NUTRITIONAL NEEDS:  ( For Bariatric Sx/ weight loss)  5219-5577 calories    Grams Protein    NUTRITION & HEALTH HISTORY:  Greatest challenge: sweets, starchy CHO    Current diet recall:   Breakfast:  Hard boiled egg or oatmeal, coffee with sugar and cream  Snack: yogurt ( not light)  Lunch: Soup- vegetable or italian  Wedding  Dinner:  [place] or grilled chicken  Veggies, brown rice  Snack: ice cream  or something sweet    Current Diet:  Meal pattern: 3  Protein supplements:  Has tried in past   Snackin-2 / day  Vegetables: Likes a variety. Eats daily.  Fruits: Likes a variety. Eats almost daily.  Beverages: coffee with sugar, water, soda once or twice a week   Dining out: Weekly. Reduced lately. Mostly fast food, restaurants and take-out.  Cooking at home: Daily. Mostly baked and grilled meat, fish, starchy CHO and vegetables.    Exercise:  Past exercise: None    Current exercise: None - plans on riding bike may go back to gym  Restrictions " to exercise: hip pain     Vitamins / Minerals / Herbs:   Vit D, melatonin      Labs:    no recent, none available at this time    Food Allergies:   Sometimes milk upsets stomach    Social:  Disabled.  Lives with  Son .  Grocery shopping and food prep: self and son  Patient believes the household will be supportive after surgery.  Alcohol: Socially.  Smoking:  reports  quit smoking yesterday     ASSESSMENT:  · Patient reports attempts at weight loss, only to regain lost weight.  · Patient demonstrated knowledge of healthy eating behaviors and exercise patterns; admits to not eating healthy and not exercising at this point.  · Patient states willingness and demonstrates willingness to change lifestyle and make behavior modifications as evidenced by reduced dining out.    Insurance  Does not require medically supervised diet prior to consideration for bariatric surgery.    Barriers to Education: none    Stage of change: contemplation    NUTRITION DIAGNOSIS:    Obesity related to Excessive carbohydrate intake as evidence by BMI.    BARIATRIC DIET DISCUSSION/PLAN:  Discussed diet after surgery and related to patient's food record.  Reviewed nutrition guidelines for before and after surgery.  Answered all questions.  Resume work-up for surgery.  Continue to review Bariatric Nutrition Guidebook at home and call with any questions.  Work on Bariatric Nutrition Checklist.  Work on gradually cutting back on starchy CHO in the diet.  Begin trying various protein supplements to determine preference.  1200-calorie diet.  5-6 meals per day.  Start including protein supplements in the diet plan daily.  Reduce frequency of dining out.  More grocery shopping and meal preparation at home.  Increase exercise.  Start shopping for bariatric vitamins & minerals.  Return to clinic.  Sugar sub in coffee  Continue to be smoke free at least 1 month before surgery  Eliminate sodas  Make sure yogurts have < 10 grams of sugar  List of lactose  free shakes given    RECOMMENDATIONS:  Patient is a potential candidate for bariatric surgery.    Needs additional visit(s) with RD to work on dietary and lifestyle changes for preporation of bariatric sx.     Patient  and daughter in law verbalized understanding.    Expect fair  compliance after surgery at this time.    Communicated nutrition plan with bariatric team.    SESSION TIME:  60 minutes

## 2020-01-28 NOTE — LETTER
[facility] - Bariatric Surgery  [address]  [facility] [address]  Phone: [phone]  Fax: [phone] February 3, 2020      Edward Contreras MD  [address]  [facility] [address]    Patient: Kathryn Wagner   MR Number: [id number]   YOB: 1960   Date of Visit: 1/28/2020     Dear Dr. Edward Contreras:    Thank you for referring Kathryn Wagner to me for evaluation. Attached you will find relevant portions of my assessment and plan of care.    DIAGNOSIS:  1. Morbid Obesity with Body mass index is 36.73 kg/m². and difficulty with weight loss.  2. Co-morbidities: HTN, HLD, depression, chronic pain, chronic narcotic use, GERD, smoker     PLAN:  The patient is a potential candidate for Bariatric Surgery. She is interested in sleeve gastrectomy with Dr. Chaudhari. Discussed that sleeve can make reflux worse. The surgery and post-op care were discussed in detail with the patient. All questions were answered.     She understands that bariatric surgery is a tool to aid in weight loss and that she needs to be committed to the diet and exercise post-operatively for successful weight loss.  We discussed expected weight loss outcomes after surgery which is 50% of the excess weight on her frame. It was discussed with the patient that bariatric surgery is not the easy way out and that it will take plenty of dedication on the patient's part to be successful. We also discussed the possibility of weight regain if the patient strays from the diet guidelines or exercise requirements. The patient verbalized understanding and wishes to proceed with the work-up.     ORDERS:   1. Stress Test, Chest X-Ray, EKG and EGD  2. Psychological Consult and Bariatric Dietician Consult  3. Bariatric Labs  4. Quit smoking.  Please call the office when you have smoked your last cigarette and you will be scheduled 1 month later to check your nicotine levels.    5. Pain Management plan     60 minute visit, over 50% of time spent  counseling patient face to face on surgical options, risks, benefits, expected diet, recommended exercise regimen, and expected weight loss.    If you have questions, please do not hesitate to call me. I look forward to following Kathryn Wagner along with you.    Sincerely,    BEKA Saxena  Section of Bariatric Surgery  Department of Surgery  [facility]    NF/afw

## 2020-01-28 NOTE — PROGRESS NOTES
"BARIATRIC NEW PATIENT EVALUATION    CHIEF COMPLAINT:   Morbid Obesity Body mass index is 36.73 kg/m². and difficulty with weight loss.     HISTORY OF PRESENT ILLNESS:  Kathryn Wagner  is a 59 y.o. female presenting for morbid obesity Body mass index is 36.73 kg/m². and difficulty with weight loss. The patient has tried healthier eating "balanced diet"; no pills or diet. She usually loses 10 pounds then feels like she is starving, eats more, regains. Wt difficulty started with pregnancy at at 19-19. Her highest wt has been 235 pounds.     GERD 10-15 years with PPI daily 10-15 years. EGD 2012: normal.    Daily percocet. Daily narcotic use for years. Follows with Dr. Quintero, Spine Surgery. Believes she is under pain contract them.      Psych - depression, reports controlled. Worse in the winter. Reports med adherence. Denies SI. H/o admission 13-14 years ago for depression. Took 3 Xanax to help her sleep. Denies additional diagnosis. Denies h/o hallucinations or eating d/o or substance abuse.    Smoker    PAST MEDICAL HISTORY:  Past Medical History:   Diagnosis Date    Depression     Encounter for blood transfusion     GERD (gastroesophageal reflux disease)     High cholesterol     Hypertension     Lumbar spondylosis 2015    L3-4 fusion; L5S1 fusion    Obesity     Restless leg syndrome          PAST SURGICAL HISTORY:  Past Surgical History:   Procedure Laterality Date    BACK SURGERY Bilateral     2015, 2015 fusion    BELT ABDOMINOPLASTY      BREAST SURGERY      breast reduction    CARPAL TUNNEL RELEASE Left      SECTION      x3    COSMETIC SURGERY      HYSTERECTOMY      partial    TOTAL REDUCTION MAMMOPLASTY      TUBAL LIGATION         FAMILY HISTORY:  Family History   Problem Relation Age of Onset    Hypertension Father     No Known Problems Mother     No Known Problems Sister     No Known Problems Brother     No Known Problems Maternal Aunt     No Known Problems " Maternal Uncle     No Known Problems Paternal Aunt     No Known Problems Paternal Uncle     No Known Problems Maternal Grandmother     No Known Problems Maternal Grandfather     No Known Problems Paternal Grandmother     No Known Problems Paternal Grandfather     Amblyopia Neg Hx     Blindness Neg Hx     Cancer Neg Hx     Cataracts Neg Hx     Diabetes Neg Hx     Glaucoma Neg Hx     Macular degeneration Neg Hx     Retinal detachment Neg Hx     Strabismus Neg Hx     Stroke Neg Hx     Thyroid disease Neg Hx        SOCIAL HISTORY:  Social History     Socioeconomic History    Marital status: Significant Other     Spouse name: Not on file    Number of children: Not on file    Years of education: Not on file    Highest education level: Not on file   Occupational History    Occupation: [employer]     Comment: [employer]    Occupation: formerly    Social Needs    Financial resource strain: Not on file    Food insecurity:     Worry: Not on file     Inability: Not on file    Transportation needs:     Medical: Not on file     Non-medical: Not on file   Tobacco Use    Smoking status: Current Some Day Smoker     Types: Cigarettes    Smokeless tobacco: Never Used   Substance and Sexual Activity    Alcohol use: Yes     Alcohol/week: 0.0 standard drinks     Comment: a coupe of times a week    Drug use: No    Sexual activity: Yes   Lifestyle    Physical activity:     Days per week: Not on file     Minutes per session: Not on file    Stress: Not on file   Relationships    Social connections:     Talks on phone: Not on file     Gets together: Not on file     Attends [employer] service: Not on file     Active member of club or organization: Not on file     Attends meetings of clubs or organizations: Not on file     Relationship status: Not on file   Other Topics Concern    Are you pregnant or think you may be? Not Asked    Breast-feeding Not Asked   Social History Narrative     Disability for chronic back pain       MEDICATIONS:  Current Outpatient Medications   Medication Sig Dispense Refill    atorvastatin (LIPITOR) 40 MG tablet Take 1 tablet (40 mg total) by mouth once daily. 90 tablet 3    DULoxetine (CYMBALTA) 60 MG capsule Take 1 capsule (60 mg total) by mouth once daily. 90 capsule 3    gabapentin (NEURONTIN) 300 MG capsule gabapentin 300 mg capsule   Take 1 capsule twice a day by oral route.      hydroCHLOROthiazide (HYDRODIURIL) 12.5 MG Tab Take 1 tablet (12.5 mg total) by mouth once daily. 90 tablet 3    losartan (COZAAR) 50 MG tablet Take 1 tablet (50 mg total) by mouth once daily. Stop lisinopril hctz 90 tablet 3    naloxone (NARCAN) 4 mg/actuation Spry Narcan 4 mg/actuation nasal spray      omeprazole (PRILOSEC) 20 MG capsule TAKE ONE Capsule BY MOUTH EVERY DAY 90 capsule 3    oxycodone-acetaminophen (PERCOCET)  mg per tablet Take 1 tablet by mouth every 8 (eight) hours as needed.        No current facility-administered medications for this visit.        ALLERGIES:  Review of patient's allergies indicates:   Allergen Reactions    Contrast media Hives    Iodine and iodide containing products Hives and Itching    Latex, natural rubber Rash       ROS:  Review of Systems   Constitutional: Negative for chills, fever and malaise/fatigue.   Eyes: Negative for blurred vision and double vision.   Respiratory: Positive for shortness of breath (PRADO with 1-2 flights of stairs). Negative for cough and wheezing.    Cardiovascular: Negative for chest pain, palpitations and leg swelling.   Gastrointestinal: Positive for constipation (2/2 narcotic use) and heartburn (with spicy foods). Negative for abdominal pain, blood in stool, diarrhea, melena, nausea and vomiting.   Genitourinary: Negative for dysuria, frequency and urgency.   Musculoskeletal: Positive for back pain, joint pain and myalgias. Negative for neck pain.   Neurological: Positive for tingling (hands and thighs,  baseline). Negative for dizziness and headaches.   Psychiatric/Behavioral: Positive for depression. Negative for memory loss, substance abuse and suicidal ideas. The patient is not nervous/anxious.      PE:  Vitals:    01/28/20 0948   BP: (!) 142/73   Pulse: 90   Weight: 85.3 kg (188 lb 0.8 oz)   Height: 5' (1.524 m)       Physical Exam   Constitutional: She is oriented to person, place, and time. She appears well-developed and well-nourished.   HENT:   Head: Normocephalic and atraumatic.   Eyes: Conjunctivae and EOM are normal.   Neck: Neck supple. No tracheal deviation present.   Cardiovascular: Normal rate, regular rhythm, normal heart sounds and intact distal pulses. Exam reveals no gallop and no friction rub.   No murmur heard.  Pulmonary/Chest: Effort normal and breath sounds normal. No respiratory distress. She has no wheezes. She has no rales.   Abdominal: Soft. Bowel sounds are normal. She exhibits no distension and no mass. There is no tenderness. There is no rebound and no guarding. No hernia.   Musculoskeletal: Normal range of motion. She exhibits no edema.   Neurological: She is alert and oriented to person, place, and time.   Skin: Skin is warm and dry. Capillary refill takes less than 2 seconds.   Psychiatric: She has a normal mood and affect. Her behavior is normal. Judgment and thought content normal.   Vitals reviewed.       DIAGNOSIS:  1. Morbid Obesity with Body mass index is 36.73 kg/m². and difficulty with weight loss.  2. Co-morbidities: HTN, HLD, depression, chronic pain, chronic narcotic use, GERD, smoker    PLAN:  The patient is a potential candidate for Bariatric Surgery. she is interested in sleeve gastrectomy with Dr. Chaudhari. Discussed that sleeve can make reflux worse. The surgery and post-op care were discussed in detail with the patient. All questions were answered.    she understands that bariatric surgery is a tool to aid in weight loss and that she needs to be committed to the diet  and exercise post-operatively for successful weight loss.  Discussed expected weight loss outcomes after surgery which is 50% of the excess weight on her frame. Discussed with patient that bariatric surgery is not the easy way out and that it will take plenty of dedication on the patient's part to be successful. Also discussed the possibility of weight regain if the patient strays from the diet guidelines or exercise requirements. Patient verbalized understanding and wishes to proceed with the work-up.    ORDERS:   1. Stress Test, Chest X-Ray, EKG and EGD  2. Psychological Consult and Bariatric Dietician Consult  3. Bariatric Labs  4. Quit smoking.  Please call the office when you have smoked your last cigarette and you will be scheduled 1 month later to check your nicotine levels.    5. Pain Management plan    Primary Physician: Edward Contreras MD  RTC: As scheduled.    60 minute visit, over 50% of time spent counseling patient face to face on surgical options, risks, benefits, expected diet, recommended exercise regimen, and expected weight loss.

## 2020-02-03 PROBLEM — F17.200 SMOKER: Status: ACTIVE | Noted: 2020-02-03

## 2020-02-03 NOTE — PROGRESS NOTES
Patient, Kathryn Wagner (MRN #[id number]), presented with a recorded BMI of 36.73 kg/m^2 and a documented comorbidity(s):  - Hypertension  - Hyperlipidemia  to which the severe obesity is a contributing factor. This is consistent with the definition of severe obesity (BMI 35.0-39.9) with comorbidity (ICD-10 E66.01, Z68.35). The patient's severe obesity was monitored, evaluated, addressed and/or treated. This addendum to the medical record is made on 02/03/2020.

## 2020-02-17 ENCOUNTER — OFFICE VISIT (OUTPATIENT)
Dept: PSYCHIATRY | Facility: CLINIC | Age: 60
End: 2020-02-17
Payer: COMMERCIAL

## 2020-02-17 DIAGNOSIS — Z86.59 HISTORY OF ANXIETY: ICD-10-CM

## 2020-02-17 DIAGNOSIS — E78.2 MIXED HYPERLIPIDEMIA: ICD-10-CM

## 2020-02-17 DIAGNOSIS — F33.41 MAJOR DEPRESSIVE DISORDER, RECURRENT, IN PARTIAL REMISSION: ICD-10-CM

## 2020-02-17 DIAGNOSIS — E66.01 MORBID OBESITY DUE TO EXCESS CALORIES: ICD-10-CM

## 2020-02-17 DIAGNOSIS — F17.201 TOBACCO USE DISORDER, MODERATE, IN EARLY REMISSION: ICD-10-CM

## 2020-02-17 DIAGNOSIS — I10 HYPERTENSION, ESSENTIAL: Chronic | ICD-10-CM

## 2020-02-17 DIAGNOSIS — M51.36 DDD (DEGENERATIVE DISC DISEASE), LUMBAR: ICD-10-CM

## 2020-02-17 DIAGNOSIS — K21.9 GASTROESOPHAGEAL REFLUX DISEASE WITHOUT ESOPHAGITIS: Chronic | ICD-10-CM

## 2020-02-17 DIAGNOSIS — Z01.818 PREOP EXAMINATION: Primary | ICD-10-CM

## 2020-02-17 PROCEDURE — 96130 PSYCL TST EVAL PHYS/QHP 1ST: CPT | Mod: S$GLB,,, | Performed by: PSYCHOLOGIST

## 2020-02-17 PROCEDURE — 96138 PSYCL/NRPSYC TECH 1ST: CPT | Mod: S$GLB,,, | Performed by: PSYCHOLOGIST

## 2020-02-17 PROCEDURE — 90791 PSYCH DIAGNOSTIC EVALUATION: CPT | Mod: S$GLB,,, | Performed by: PSYCHOLOGIST

## 2020-02-17 PROCEDURE — 96131 PR PSYCHOLOGIC TEST EVAL SVCS, EA ADDTL HR: ICD-10-PCS | Mod: S$GLB,,, | Performed by: PSYCHOLOGIST

## 2020-02-17 PROCEDURE — 96139 PR PSYCH/NEUROPSYCH TEST ADMIN/SCORING, BY TECH, 2+ TESTS, EA ADDTL 30 MIN: ICD-10-PCS | Mod: S$GLB,,, | Performed by: PSYCHOLOGIST

## 2020-02-17 PROCEDURE — 90791 PR PSYCHIATRIC DIAGNOSTIC EVALUATION: ICD-10-PCS | Mod: S$GLB,,, | Performed by: PSYCHOLOGIST

## 2020-02-17 PROCEDURE — 96131 PSYCL TST EVAL PHYS/QHP EA: CPT | Mod: S$GLB,,, | Performed by: PSYCHOLOGIST

## 2020-02-17 PROCEDURE — 96139 PSYCL/NRPSYC TST TECH EA: CPT | Mod: S$GLB,,, | Performed by: PSYCHOLOGIST

## 2020-02-17 PROCEDURE — 96138 PR PSYCH/NEUROPSYCH TEST ADMIN/SCORING, BY TECH, 2+ TESTS, 1ST 30 MIN: ICD-10-PCS | Mod: S$GLB,,, | Performed by: PSYCHOLOGIST

## 2020-02-17 PROCEDURE — 96130 PR PSYCHOLOGIC TEST EVAL SVCS, 1ST HR: ICD-10-PCS | Mod: S$GLB,,, | Performed by: PSYCHOLOGIST
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
## 2020-02-17 NOTE — LETTER
February 17, 2020        STEVEN CONNOR STAFF             [facility]  [address]  [facility] [address]  Phone: [phone]   Patient: Kathryn Wagner   MR Number: [id number]   YOB: 1960   Date of Visit: 2/17/2020       Dear  Staff:    Thank you for referring Kathryn Wagner to me for evaluation. Below are the relevant portions of my assessment and plan of care.    Test results show NO overt psychological contraindications for surgery. Test results indicate that no current psychiatric symptoms or adjustment problems.  Overall, her test results were not associated with significant psychological risk factors.  In the clinical interview, Ms. Wagner acknowledged a history of childhood trauma, depression, anxiety, and insomnia in full or partial remission.  Her symptoms are well-managed with psychotropic medication prescribed by her PCP.  Regarding bariatric surgery, she reports good social support, demonstrates several characteristics that make her a good candidate for surgery, and testing suggests that she will benefit in multiple ways from bariatric surgery.  There are no recommendations for psychological intervention at this time.         If you have questions, please do not hesitate to call me. I look forward to following Kathryn along with you.    Sincerely,      Nubia Collier, PhD           CC  No Recipients

## 2020-02-17 NOTE — PSYCH TESTING
[facility] [address] [place]  [address]  [phone]    REPORT OF PSYCHOLOGICAL TESTING    NAME: Kathryn Wagner  MRN: [id number]  : 1960    REFERRED BY: Yuni Chaudhari MD    EVALUATED BY:  Nubia Collier, Ph.D., Clinical Psychologist  CHARLIE Vázquez Psychometrician    DATES OF EVALUATION: 2020, 2020    EVALUATION PROCEDURES AND TIMES:  Conducted by Psychologist (2 hours):  Integration of patient data, interpretation of standardized test results and clinical data, clinical decision-making, treatment planning and report, and interactive feedback to the patient  CPT Codes:  82613 - 1 hour; 45074 - 1 hour  Conducted by Technician (1 hour, 42 minutes):  Psychological test administration and scoring by technician, two or more tests, any method:  [place] Multiphasic Personality Inventory - 2 - Restructured Form (MMPI-2-RF); [facility] Medicine Diagnostic (MBMD)  CPT Codes:  96955 - 30 minutes; 48871 - 30 minutes, 22585 - 30 minutes, 42354 - 30 minutes (3 additional units)    EVALUATION FINDINGS:  Before this evaluation was initiated, the purposes and process of the assessment and the limits of confidentiality were discussed with the patient who expressed understanding of these issues and orally consented to proceed with the evaluation.    Ms. Kathryn Wagner is a 59-year-old White female who is pursuing bariatric surgery to improve her health and quality of life.  She reports a history of inpatient and outpatient psychiatric treatment for childhood trauma, depression, anxiety, and insomnia.  Based on her reports in the clinical interview, her symptoms appear to be in partial or full remission.  She has not attended psychotherapy in over one year due to symptom improvement, and her symptoms are well-controlled with psychotropic medication prescribed by her PCP.    Ms. Wagner has struggled with weight since she became pregnant at age 18-20 and acknowledges  that unhealthy food, late-night eating, and emotional eating contributed to her weight gain.  She denied problems with grazing behaviors.  Although she has had limited success with diet modification, she is currently motivated to seek bariatric surgery to improve health and quality of life.   At her initial consultation with BEKA Saxena, on 01/28/2020, her BMI was 36.73.  Her medical comorbidities include: Mixed hyperlipidemia simva SE myalgia; Hypertension, essential; Chronic narcotic use; Gastroesophageal reflux disease without esophagitis; Other long term (current) drug therapy; PRADO (dyspnea on exertion); Smoker; DDD.  She completed a nutritional consultation with Sammi Taylor RD, bariatric nutritionist, and reports that she has made many changes to her diet since beginning the program.  She has a good understanding regarding the risks and benefits of the procedure and appears motivated for change.  She was fully cooperative and engaged in the assessment process.     Ms. Wagner produced an interpretable MMPI-2-RF profile. Of note, validity scale results suggest Ms. Wagner tended to portray herself in an overly positive and well-adjusted presentation, which may indicate an underestimation of problems assessed by this test.  This profile should be interpreted with these issues in mind.  Although there are no indications of emotional problems, disordered thinking, or behavioral issues, these problems cannot be ruled-out due to indications of under-reporting described earlier.     The MBMD indicated that Ms. Wagner had a response style suggesting a need for social approval or psychological naivete.  Although scoring adjustments were made to correct for these tendencies, the profile should be interpreted with these issues in mind.  Ms. Wagner is not reporting any significant psychiatric distress at this time.  Although she may be overly concerned about personal propriety and deny disturbing feelings or unpleasant  responsibilities, overall she will be willing to conform to medical regimens.  She will benefit from explicit directions and guidance that are forthcoming.  She is likely to be cooperative and responsive to healthcare recommendations.  According to test data, psychological factors are unlikely to contribute to excessive medical complications for Ms. Wagner.  Her responses suggest that, compared to other patients seeking bariatric surgery, she is likely to experience an improved quality of life and functioning after surgery, and she is likely to follow through on making behavioral changes that will lead to optimal surgery results.  A physical rehabilitation program may benefit Ms. Wagner post-surgery, but psychological intervention is not necessary at this time.    DIAGNOSTIC IMPRESSIONS:    ICD-10-CM ICD-9-CM   1. Preop examination Z01.818 V72.84   2. Morbid obesity due to excess calories E66.01 278.01   3. Mixed hyperlipidemia simva SE myalgia E78.2 272.2   4. Hypertension, essential I10 401.9   5. Gastroesophageal reflux disease without esophagitis K21.9 530.81   6. DDD (degenerative disc disease), lumbar M51.36 722.52   7. BMI 36.0-36.9,adult Z68.36 V85.36   8. Tobacco use disorder, moderate, in early remission F17.201 305.1   9. Major depressive disorder, recurrent, in partial remission F33.41 296.35   10. History of anxiety Z86.59 V11.8       SUMMARY AND RECOMMENDATIONS:  Ms. Wagner has a long history of weight problems and is pursuing bariatric surgery at this time in an effort to improve her health and quality of life.  Test results should be considered interpretable, and indicate that she reports no current psychiatric symptoms or adjustment problems.  Overall, her test results were not associated with significant psychological risk factors.  In the clinical interview, Ms. Wagner acknowledged a history of childhood trauma, depression, anxiety, and insomnia in full or partial remission.  Her symptoms are well-managed  with psychotropic medication prescribed by her PCP.  Regarding bariatric surgery, she reports good social support, demonstrates several characteristics that make her a good candidate for surgery, and testing suggests that she will benefit in multiple ways from bariatric surgery.  There are no recommendations for psychological intervention at this time.  Test results show NO overt psychological contraindications for surgery.

## 2020-02-27 ENCOUNTER — TELEPHONE (OUTPATIENT)
Dept: BARIATRICS | Facility: CLINIC | Age: 60
End: 2020-02-27

## 2020-02-27 DIAGNOSIS — Z01.818 PRE-OP EVALUATION: ICD-10-CM

## 2020-02-27 DIAGNOSIS — F17.200 SMOKER: Primary | ICD-10-CM

## 2020-02-27 NOTE — TELEPHONE ENCOUNTER
Phoned patient per Consuelo per MESERET Chapa to discuss smoking. Patient reports quitting smoking 2/3. Will schedule nicotine lab on 3/2 with other scheduled lab work. Also patient reports missing seminar class due to transportation problems. Rescheduled patient to March seminar class.

## 2020-03-02 ENCOUNTER — HOSPITAL ENCOUNTER (OUTPATIENT)
Dept: RADIOLOGY | Facility: HOSPITAL | Age: 60
Discharge: HOME | End: 2020-03-02
Attending: NURSE PRACTITIONER
Payer: MEDICARE

## 2020-03-02 ENCOUNTER — HOSPITAL ENCOUNTER (OUTPATIENT)
Dept: CARDIOLOGY | Facility: CLINIC | Age: 60
Discharge: HOME | End: 2020-03-02
Attending: NURSE PRACTITIONER
Payer: MEDICARE

## 2020-03-02 ENCOUNTER — CLINICAL SUPPORT (OUTPATIENT)
Dept: BARIATRICS | Facility: CLINIC | Age: 60
End: 2020-03-02
Payer: MEDICARE

## 2020-03-02 ENCOUNTER — TELEPHONE (OUTPATIENT)
Dept: BARIATRICS | Facility: CLINIC | Age: 60
End: 2020-03-02

## 2020-03-02 VITALS
WEIGHT: 188 LBS | SYSTOLIC BLOOD PRESSURE: 120 MMHG | WEIGHT: 191.81 LBS | BODY MASS INDEX: 36.91 KG/M2 | DIASTOLIC BLOOD PRESSURE: 90 MMHG | HEART RATE: 77 BPM | RESPIRATION RATE: 18 BRPM | HEIGHT: 60 IN | BODY MASS INDEX: 37.66 KG/M2 | HEIGHT: 60 IN

## 2020-03-02 DIAGNOSIS — F11.90 CHRONIC NARCOTIC USE: ICD-10-CM

## 2020-03-02 DIAGNOSIS — E66.09 CLASS 2 OBESITY DUE TO EXCESS CALORIES WITHOUT SERIOUS COMORBIDITY WITH BODY MASS INDEX (BMI) OF 36.0 TO 36.9 IN ADULT: ICD-10-CM

## 2020-03-02 DIAGNOSIS — E78.2 MIXED HYPERLIPIDEMIA: ICD-10-CM

## 2020-03-02 DIAGNOSIS — I10 HYPERTENSION, ESSENTIAL: Chronic | ICD-10-CM

## 2020-03-02 DIAGNOSIS — Z79.899 OTHER LONG TERM (CURRENT) DRUG THERAPY: ICD-10-CM

## 2020-03-02 DIAGNOSIS — R73.09 ABNORMAL GLUCOSE: Primary | ICD-10-CM

## 2020-03-02 DIAGNOSIS — R79.89 ELEVATED LFTS: ICD-10-CM

## 2020-03-02 DIAGNOSIS — K21.9 GASTROESOPHAGEAL REFLUX DISEASE WITHOUT ESOPHAGITIS: Chronic | ICD-10-CM

## 2020-03-02 DIAGNOSIS — E66.09 CLASS 2 OBESITY DUE TO EXCESS CALORIES WITHOUT SERIOUS COMORBIDITY WITH BODY MASS INDEX (BMI) OF 36.0 TO 36.9 IN ADULT: Chronic | ICD-10-CM

## 2020-03-02 DIAGNOSIS — K21.9 GASTROESOPHAGEAL REFLUX DISEASE WITHOUT ESOPHAGITIS: ICD-10-CM

## 2020-03-02 DIAGNOSIS — E66.01 CLASS 2 SEVERE OBESITY DUE TO EXCESS CALORIES WITH SERIOUS COMORBIDITY IN ADULT, UNSPECIFIED BMI: Chronic | ICD-10-CM

## 2020-03-02 DIAGNOSIS — I10 HYPERTENSION, ESSENTIAL: ICD-10-CM

## 2020-03-02 DIAGNOSIS — R06.09 DOE (DYSPNEA ON EXERTION): ICD-10-CM

## 2020-03-02 DIAGNOSIS — R73.09 ABNORMAL GLUCOSE: ICD-10-CM

## 2020-03-02 DIAGNOSIS — R74.8 ABNORMAL LEVELS OF OTHER SERUM ENZYMES: ICD-10-CM

## 2020-03-02 DIAGNOSIS — Z71.3 DIETARY COUNSELING: ICD-10-CM

## 2020-03-02 LAB
ASCENDING AORTA: 3.08 CM
BSA FOR ECHO PROCEDURE: 1.9 M2
CV ECHO LV RWT: 0.31 CM
CV STRESS BASE HR: 77 BPM
DIASTOLIC BLOOD PRESSURE: 58 MMHG
DOP CALC LVOT AREA: 3.5 CM2
DOP CALC LVOT DIAMETER: 2.11 CM
DOP CALC LVOT PEAK VEL: 0.98 M/S
DOP CALC LVOT STROKE VOLUME: 69.9 CM3
DOP CALCLVOT PEAK VEL VTI: 20 CM
E WAVE DECELERATION TIME: 259.49 MSEC
E/A RATIO: 0.91
E/E' RATIO: 7.26 M/S
ECHO LV POSTERIOR WALL: 0.69 CM (ref 0.6–1.1)
FRACTIONAL SHORTENING: 37 % (ref 28–44)
INTERVENTRICULAR SEPTUM: 0.8 CM (ref 0.6–1.1)
IVRT: 0.08 MSEC
LA MAJOR: 4.16 CM
LA MINOR: 4.89 CM
LA WIDTH: 2.95 CM
LEFT ATRIUM SIZE: 3.64 CM
LEFT ATRIUM VOLUME INDEX: 22.6 ML/M2
LEFT ATRIUM VOLUME: 41.03 CM3
LEFT INTERNAL DIMENSION IN SYSTOLE: 2.78 CM (ref 2.1–4)
LEFT VENTRICLE DIASTOLIC VOLUME INDEX: 48.32 ML/M2
LEFT VENTRICLE DIASTOLIC VOLUME: 87.86 ML
LEFT VENTRICLE MASS INDEX: 55 G/M2
LEFT VENTRICLE SYSTOLIC VOLUME INDEX: 16 ML/M2
LEFT VENTRICLE SYSTOLIC VOLUME: 29.15 ML
LEFT VENTRICULAR INTERNAL DIMENSION IN DIASTOLE: 4.4 CM (ref 3.5–6)
LEFT VENTRICULAR MASS: 99.73 G
LV LATERAL E/E' RATIO: 6.27 M/S
LV SEPTAL E/E' RATIO: 8.63 M/S
MV PEAK A VEL: 0.76 M/S
MV PEAK E VEL: 0.69 M/S
OHS CV CPX 1 MINUTE RECOVERY HEART RATE: 146 BPM
OHS CV CPX 85 PERCENT MAX PREDICTED HEART RATE MALE: 131
OHS CV CPX MAX PREDICTED HEART RATE: 154
OHS CV CPX PATIENT IS FEMALE: 1
OHS CV CPX PATIENT IS MALE: 0
OHS CV CPX PEAK DIASTOLIC BLOOD PRESSURE: 92 MMHG
OHS CV CPX PEAK HEAR RATE: 148 BPM
OHS CV CPX PEAK RATE PRESSURE PRODUCT: NORMAL
OHS CV CPX PEAK SYSTOLIC BLOOD PRESSURE: 179 MMHG
OHS CV CPX PERCENT MAX PREDICTED HEART RATE ACHIEVED: 96
OHS CV CPX RATE PRESSURE PRODUCT PRESENTING: 9933
PISA TR MAX VEL: 2.03 M/S
PULM VEIN S/D RATIO: 1.76
PV PEAK D VEL: 0.29 M/S
PV PEAK S VEL: 0.51 M/S
RA MAJOR: 3.89 CM
RA PRESSURE: 3 MMHG
RA WIDTH: 2.96 CM
RIGHT VENTRICULAR END-DIASTOLIC DIMENSION: 3.08 CM
RV TISSUE DOPPLER FREE WALL SYSTOLIC VELOCITY 1 (APICAL 4 CHAMBER VIEW): 11.85 CM/S
SINUS: 2.98 CM
STJ: 2.49 CM
SYSTOLIC BLOOD PRESSURE: 129 MMHG
TDI LATERAL: 0.11 M/S
TDI SEPTAL: 0.08 M/S
TDI: 0.1 M/S
TR MAX PG: 16 MMHG
TRICUSPID ANNULAR PLANE SYSTOLIC EXCURSION: 2.25 CM
TV REST PULMONARY ARTERY PRESSURE: 19 MMHG

## 2020-03-02 PROCEDURE — 97803 MED NUTRITION INDIV SUBSEQ: CPT | Mod: S$GLB,,, | Performed by: DIETITIAN, REGISTERED

## 2020-03-02 PROCEDURE — 71046 XR CHEST PA AND LATERAL: ICD-10-PCS | Mod: 26,,, | Performed by: RADIOLOGY

## 2020-03-02 PROCEDURE — 93351 STRESS ECHO (CUPID ONLY): ICD-10-PCS | Mod: S$GLB,,, | Performed by: INTERNAL MEDICINE

## 2020-03-02 PROCEDURE — 71046 X-RAY EXAM CHEST 2 VIEWS: CPT | Mod: 26,,, | Performed by: RADIOLOGY

## 2020-03-02 PROCEDURE — 71046 X-RAY EXAM CHEST 2 VIEWS: CPT | Mod: TC,FY

## 2020-03-02 PROCEDURE — 93010 ELECTROCARDIOGRAM REPORT: CPT | Mod: S$GLB,,, | Performed by: INTERNAL MEDICINE

## 2020-03-02 PROCEDURE — 93351 STRESS TTE COMPLETE: CPT | Mod: S$GLB,,, | Performed by: INTERNAL MEDICINE

## 2020-03-02 PROCEDURE — 93010 EKG 12-LEAD: ICD-10-PCS | Mod: S$GLB,,, | Performed by: INTERNAL MEDICINE

## 2020-03-02 PROCEDURE — 93005 EKG 12-LEAD: ICD-10-PCS | Mod: S$GLB,,, | Performed by: NURSE PRACTITIONER

## 2020-03-02 PROCEDURE — 99999 PR PBB SHADOW E&M-EST. PATIENT-LVL II: ICD-10-PCS | Mod: PBBFAC,,, | Performed by: DIETITIAN, REGISTERED

## 2020-03-02 PROCEDURE — 99999 PR PBB SHADOW E&M-EST. PATIENT-LVL II: CPT | Mod: PBBFAC,,, | Performed by: DIETITIAN, REGISTERED

## 2020-03-02 PROCEDURE — 93352 ADMIN ECG CONTRAST AGENT: CPT | Mod: S$GLB,,, | Performed by: INTERNAL MEDICINE

## 2020-03-02 PROCEDURE — 93005 ELECTROCARDIOGRAM TRACING: CPT | Mod: S$GLB,,, | Performed by: NURSE PRACTITIONER

## 2020-03-02 PROCEDURE — 97803 PR MED NUTR THER, SUBSQ, INDIV, EA 15 MIN: ICD-10-PCS | Mod: S$GLB,,, | Performed by: DIETITIAN, REGISTERED

## 2020-03-02 PROCEDURE — 93352 STRESS ECHO (CUPID ONLY): ICD-10-PCS | Mod: S$GLB,,, | Performed by: INTERNAL MEDICINE

## 2020-03-02 RX ORDER — ATROPINE SULFATE 0.1 MG/ML
1 INJECTION INTRAVENOUS
Status: COMPLETED | OUTPATIENT
Start: 2020-03-02 | End: 2020-03-02

## 2020-03-02 RX ORDER — DOBUTAMINE HYDROCHLORIDE 200 MG/100ML
10 INJECTION INTRAVENOUS
Status: COMPLETED | OUTPATIENT
Start: 2020-03-02 | End: 2020-03-02

## 2020-03-02 RX ADMIN — ATROPINE SULFATE 1 MG: 0.1 INJECTION INTRAVENOUS at 10:03

## 2020-03-02 RX ADMIN — DOBUTAMINE HYDROCHLORIDE 10 MCG/KG/MIN: 200 INJECTION INTRAVENOUS at 10:03

## 2020-03-02 NOTE — PROGRESS NOTES
NUTRITION NOTE    Referring Physician: Dr. Chaudhari  Reason for MNT Referral: Established patient f/u pending Gastric Sleeve    Patient presents for 2nd visit  with RD with weight gain  of 3 lbs over the past month; total weight gain of 3 lbs.  Patient reports the weight gain  Is due to going on vacation for 1 week, 2 weeks ago.  Patient reports eating whatever  During vacation.  by making numerous dietary and lifestyle changes. Has tried protein shakes.   Still having sugar in coffee but plans on switching to sugar sub.  Reports has quit smoking and its been 1 month since she has smoked.  Cut out  ALL soda. Switched to light yogurt. Working to cut out carbs.  Cutting back on sweets  and started walking and  lifting light weights.      CLINICAL DATA:    60 y.o. female.    Current Weight: 191 lbs  Initial/ Last months weight: 188 lbs  Weight Change Since Initial Visit: + 3 lbs  Ideal Body Weight: 126 lbs  BMI: 37.46    NUTRITIONAL NEEDS:  ( For Bariatric Sx/ weight loss)  1386-4400 calories    Grams Protein    CURRENT DIET:  Reduced-calorie diet.     Greatest challenge: sweets, starchy CHO     Current diet recall:    Meal pattern- 3 meals:improved   Breakfast:  Hard boiled egg or oatmeal ( cut down on  eating oatmeal), coffee with sugar and cream  Snack:  Light yogurt   Lunch: Tuna salad - no bread  Dinner:  [place] or grilled chicken  Veggies  Snack: ice cream  every once in a while  or protein  bar     Current Diet:  Meal pattern: 3 - Improved   Protein supplements:   Drinking slim fast  High protein   Snackin-2 / day   Vegetables: Likes a variety. Eats daily.  Fruits: Likes a variety. Eats almost daily.  Beverages: coffee with sugar, water, soda once or twice a week   Dining out: Weekly. Reduced lately. Mostly fast food, restaurants and take-out.  Cooking at home: Daily. Mostly baked and grilled meat, fish,  and vegetables.  Diet Recall: Food records are not present.      Exercise:  Current exercise:   started walking, biking and  riding bike  Restrictions to exercise: hip pain      Vitamins / Minerals / Herbs:   Vit D, melatonin      Labs:    no recent, none available at this time     Food Allergies:   Sometimes milk upsets stomach     Social:  Disabled.  Lives with  Son .  Grocery shopping and food prep: self and son  Patient believes the household will be supportive after surgery.  Alcohol: Socially.  Smoking:  reports  quit smoking  a month ago - testing today    ASSESSMENT:  Patient demonstrates  willingness to change lifestyle habits as evidenced by good exercise, dietary changes and including protein drinks, quitting smoking and cutting out sugary drinks and foods and started exercising.    Doing fairly well with working on greatest challenges (sweets and starchy CHO).    Barriers to Education:  none  Stage of Change:  action    NUTRITION DIAGNOSIS:  Morbid Obesity related to Excessive carbohydrate intake as evidence by BMI.  Status: Same    PLAN:  Pt is good candidate for surgery. Plan to call patient  Next month to make sure she  has gotten back on track 100 %    Diet: Adjust diet plan.  Resume work-up for surgery.  Continue to review Bariatric Nutrition Guidebook at home and call with any questions.  Work on Bariatric Nutrition Checklist.  1500-calorie diet.  5-6 meals per day.  Start including protein supplements in the diet plan daily.  Start shopping for bariatric vitamins & minerals.  Return to clinic.    Exercise: Maintain.    Behavior Modification: Begin to document food & activity logs daily.      Communicated nutrition plan with bariatric team.    SESSION TIME:  30 minutes

## 2020-03-02 NOTE — TELEPHONE ENCOUNTER
Mild elevated LFTs. Previously normal.  Had one lab reading with elevated in the past.     Hx of MATTIE testing negative.   HCV negative 2016    Plan last time was 6 mo follow up with previsit labs. So due in June.    Please call the patient-follow-up in June.  I will order pre visit labs.  Liver tests were very mildly elevated.  I would just monitor.

## 2020-03-02 NOTE — TELEPHONE ENCOUNTER
Phoned patient and notified her of mild transaminase elevation. Will route to PCP for monitoring. Patient verbalized understanding.

## 2020-03-02 NOTE — TELEPHONE ENCOUNTER
----- Message from BEKA Saxena sent at 3/2/2020  1:40 PM CST -----  PCP for monitoring of mild transaminase elevation

## 2020-03-03 ENCOUNTER — DOCUMENTATION ONLY (OUTPATIENT)
Dept: BARIATRICS | Facility: CLINIC | Age: 60
End: 2020-03-03

## 2020-03-11 ENCOUNTER — TELEPHONE (OUTPATIENT)
Dept: FAMILY MEDICINE | Facility: CLINIC | Age: 60
End: 2020-03-11

## 2020-03-11 NOTE — TELEPHONE ENCOUNTER
----- Message from Rebecca Arndt sent at 3/11/2020  9:45 AM CDT -----  Pt missed a call and would like the nurse to return their call at [phone].          Thank you!

## 2020-03-16 ENCOUNTER — TELEPHONE (OUTPATIENT)
Dept: BARIATRICS | Facility: CLINIC | Age: 60
End: 2020-03-16

## 2020-03-18 ENCOUNTER — TELEPHONE (OUTPATIENT)
Dept: BARIATRICS | Facility: CLINIC | Age: 60
End: 2020-03-18

## 2020-03-18 NOTE — TELEPHONE ENCOUNTER
Returned call to patient and received a recording, the person you are trying to reach has a VM that has not been set up yet, please try your call again later.

## 2020-03-18 NOTE — TELEPHONE ENCOUNTER
----- Message from Sammi Taylor RD sent at 3/18/2020 10:40 AM CDT -----  Contact: pt @[phone]   Please advise  ----- Message -----  From: Nu Vidales  Sent: 3/17/2020  11:29 AM CDT  To: Sammi Taylor RD    Pt would like to speak with you regarding surgery. Please call back

## 2020-04-03 ENCOUNTER — TELEPHONE (OUTPATIENT)
Dept: BARIATRICS | Facility: CLINIC | Age: 60
End: 2020-04-03

## 2020-04-03 NOTE — TELEPHONE ENCOUNTER
Called patient to  see how she is doing with diet since surgery not scheduled yet.  She is  doing well with diet and had some questions. Would like to try to set up a video appointment. Advised to set up the portal so we could make a follow up apt. Pt reports  she will have daughter  Help her set up the portal and then reach out to RD to set up appointment.

## 2020-04-17 ENCOUNTER — PATIENT MESSAGE (OUTPATIENT)
Dept: BARIATRICS | Facility: CLINIC | Age: 60
End: 2020-04-17

## 2020-04-21 ENCOUNTER — PATIENT MESSAGE (OUTPATIENT)
Dept: BARIATRICS | Facility: CLINIC | Age: 60
End: 2020-04-21

## 2020-04-21 ENCOUNTER — OFFICE VISIT (OUTPATIENT)
Dept: FAMILY MEDICINE | Facility: CLINIC | Age: 60
End: 2020-04-21
Payer: MEDICARE

## 2020-04-21 DIAGNOSIS — S46.312A TRICEPS STRAIN, LEFT, INITIAL ENCOUNTER: Primary | ICD-10-CM

## 2020-04-21 PROCEDURE — 99499 RISK ADDL DX/OHS AUDIT: ICD-10-PCS | Mod: 95,,, | Performed by: INTERNAL MEDICINE

## 2020-04-21 PROCEDURE — 99213 PR OFFICE/OUTPT VISIT, EST, LEVL III, 20-29 MIN: ICD-10-PCS | Mod: 95,,, | Performed by: INTERNAL MEDICINE

## 2020-04-21 PROCEDURE — 99499 UNLISTED E&M SERVICE: CPT | Mod: 95,,, | Performed by: INTERNAL MEDICINE

## 2020-04-21 PROCEDURE — 99213 OFFICE O/P EST LOW 20 MIN: CPT | Mod: 95,,, | Performed by: INTERNAL MEDICINE

## 2020-04-21 RX ORDER — TRAZODONE HYDROCHLORIDE 50 MG/1
50 TABLET ORAL NIGHTLY
COMMUNITY
Start: 2020-03-25 | End: 2020-10-23

## 2020-04-21 RX ORDER — MELOXICAM 15 MG/1
15 TABLET ORAL DAILY
COMMUNITY
Start: 2020-03-11 | End: 2020-08-12

## 2020-04-21 NOTE — PROGRESS NOTES
This note was created by combination of typed  and M-Modal dictation.  Transcription errors may be present.  If there are any questions, please contact me.    Assessment / Plan:   Triceps strain, left, initial encounter  -triceps area. Denies pain in shoulder or elbow or wrist. No weakness of   Home self directed PT.  If persisting, refer to ortho for evaluation but current covid19 pandemic/shelter in place.      Medications Discontinued During This Encounter   Medication Reason    gabapentin (NEURONTIN) 300 MG capsule Therapy completed       meds sent this encounter:       Follow Up: No follow-ups on file.      Subjective:     Chief Complaint   Patient presents with    Arm Pain       HPI  Kathryn is a 60 y.o. female, last appointment with this clinic was 12/19/2019.    No LMP recorded. Patient has had a hysterectomy.    The patient location is: [place]  The chief complaint leading to consultation is: arm pain  Visit type: Virtual visit with synchronous audio and video  Total time spent with patient: 10 min  Each patient to whom he or she provides medical services by telemedicine is:  (1) informed of the relationship between the physician and patient and the respective role of any other health care provider with respect to management of the patient; and (2) notified that he or she may decline to receive medical services by telemedicine and may withdraw from such care at any time.    Notes:  Thinks that it started maybe even at the time of her last visit but she is having some pain in her left arm.  Initially thought it might be a tendinitis of the elbow but she is pointing towards the middle of her tricep area.  No inciting event, no bruising no swelling no deformity.  Notes that whenever she reaches for and lifts up something she may get pain there.  She has been trying to massage it and doing some physical therapy on it on her own but really not getting much better.    No longer using  gabapentin.    Patient Care Team:  Edward Contreras MD as PCP - General (Internal Medicine)  Clarence Quintero MD as Consulting Physician (Orthopedic Surgery)  Sunshine Sanders MA as Care Coordinator  Delano Tello IV, MD (Dermatology)    Patient Active Problem List    Diagnosis Date Noted    History of anxiety 02/17/2020    Tobacco use disorder, moderate, in early remission 02/03/2020    Abnormal glucose 04/08/2019    Vitamin D deficiency 11/16/2018    Chronic narcotic use 10/05/2017    Primary insomnia weaned off ambien 06/14/2017    Sacroiliac joint pain 05/02/2017    Postlaminectomy syndrome of lumbar region 05/02/2017    DDD (degenerative disc disease), lumbar 05/02/2017    Left carpal tunnel syndrome 02/13/2017    GERD (gastroesophageal reflux disease) 11/19/2012 EGD normal no bx's      11/19/2012 EGD normal no bx's      Chronic left-sided low back pain with left-sided sciatica 10/04/2016     Hx L3-4 fusion and L5S1 fusion; currently L2 issues  10/5/18 MRI L spine: Degenerative change as detailed above.Postoperative change consistent with L3 through S1 fusion.      Hip pain, left 10/04/2016    Weakness of trunk musculature 10/04/2016    Class 2 obesity due to excess calories in adult 03/04/2015    Hyperplastic polyp of intestine 11/2012 11/19/2012 11/19/2012 colonoscopy hyperplastic polyp      Hypertension, essential 10/10/2012     Echo 3/22/18  1 - Normal left ventricular systolic function (EF 55-60%).       Mixed hyperlipidemia simva SE myalgia 10/10/2012     Simvastatin SE myalgias  [facility] MPI 3/22/18 (images pers rev)  Nuclear Quantitative Functional Analysis:   LVEF: >= 70 %  Impression: NORMAL MYOCARDIAL PERFUSION  1. The perfusion scan is free of evidence for myocardial ischemia or injury.   2. Resting wall motion is physiologic.   3. Resting LV function is normal.   4. The ventricular volumes are normal at rest and stress.   5. The extracardiac distribution of  radioactivity is normal.       Major depressive disorder, recurrent, in partial remission 10/10/2012     Fluoxetine x   ativan         PAST MEDICAL HISTORY:  Past Medical History:   Diagnosis Date    Depression     Encounter for blood transfusion     GERD (gastroesophageal reflux disease)     High cholesterol     Hypertension     Lumbar spondylosis 2015    L3-4 fusion; L5S1 fusion    Obesity     Restless leg syndrome        PAST SURGICAL HISTORY:  Past Surgical History:   Procedure Laterality Date    BACK SURGERY Bilateral     2015, 2015 fusion    BELT ABDOMINOPLASTY      BREAST SURGERY      breast reduction    CARPAL TUNNEL RELEASE Left      SECTION      x3    COSMETIC SURGERY      HYSTERECTOMY      partial    TOTAL REDUCTION MAMMOPLASTY      TUBAL LIGATION         SOCIAL HISTORY:  Social History     Socioeconomic History    Marital status:      Spouse name: Not on file    Number of children: Not on file    Years of education: Not on file    Highest education level: Not on file   Occupational History    Occupation: [employer]     Comment: [employer]    Occupation: formerly    Social Needs    Financial resource strain: Not on file    Food insecurity:     Worry: Not on file     Inability: Not on file    Transportation needs:     Medical: Not on file     Non-medical: Not on file   Tobacco Use    Smoking status: Current Some Day Smoker     Types: Cigarettes    Smokeless tobacco: Never Used   Substance and Sexual Activity    Alcohol use: Yes     Alcohol/week: 0.0 standard drinks     Comment: a coupe of times a week    Drug use: No    Sexual activity: Yes   Lifestyle    Physical activity:     Days per week: Not on file     Minutes per session: Not on file    Stress: Not on file   Relationships    Social connections:     Talks on phone: Not on file     Gets together: Not on file     Attends [employer] service: Not on file      Active member of club or organization: Not on file     Attends meetings of clubs or organizations: Not on file     Relationship status: Not on file   Other Topics Concern    Are you pregnant or think you may be? Not Asked    Breast-feeding Not Asked   Social History Narrative    Disability for chronic back pain        ALLERGIES AND MEDICATIONS: updated and reviewed.  Review of patient's allergies indicates:   Allergen Reactions    Contrast media Hives    Iodine and iodide containing products Hives and Itching    Latex, natural rubber Rash     Current Outpatient Medications   Medication Sig Dispense Refill    atorvastatin (LIPITOR) 40 MG tablet Take 1 tablet (40 mg total) by mouth once daily. 90 tablet 3    DULoxetine (CYMBALTA) 60 MG capsule Take 1 capsule (60 mg total) by mouth once daily. 90 capsule 3    gabapentin (NEURONTIN) 300 MG capsule gabapentin 300 mg capsule   Take 1 capsule twice a day by oral route.      hydroCHLOROthiazide (HYDRODIURIL) 12.5 MG Tab Take 1 tablet (12.5 mg total) by mouth once daily. 90 tablet 3    losartan (COZAAR) 50 MG tablet Take 1 tablet (50 mg total) by mouth once daily. Stop lisinopril hctz 90 tablet 3    meloxicam (MOBIC) 15 MG tablet Take 15 mg by mouth once daily.      naloxone (NARCAN) 4 mg/actuation Spry Narcan 4 mg/actuation nasal spray      omeprazole (PRILOSEC) 20 MG capsule TAKE ONE Capsule BY MOUTH EVERY DAY 90 capsule 3    oxycodone-acetaminophen (PERCOCET)  mg per tablet Take 1 tablet by mouth every 8 (eight) hours as needed.       traZODone (DESYREL) 50 MG tablet Take 50 mg by mouth every evening.       No current facility-administered medications for this visit.        Review of Systems   Constitutional: Negative for chills and fever.   Cardiovascular: Negative for chest pain and palpitations.       Objective:   Physical Exam   There were no vitals filed for this visit. There is no height or weight on file to calculate BMI.            Physical  Exam   Constitutional: She is oriented to person, place, and time. She appears well-developed and well-nourished. No distress.   HENT:   Head: Normocephalic and atraumatic.   Pulmonary/Chest: Effort normal.   Neurological: She is alert and oriented to person, place, and time.   Psychiatric: She has a normal mood and affect. Her behavior is normal. Thought content normal.

## 2020-04-24 ENCOUNTER — PATIENT MESSAGE (OUTPATIENT)
Dept: BARIATRICS | Facility: CLINIC | Age: 60
End: 2020-04-24

## 2020-05-12 ENCOUNTER — TELEPHONE (OUTPATIENT)
Dept: BARIATRICS | Facility: CLINIC | Age: 60
End: 2020-05-12

## 2020-05-12 NOTE — TELEPHONE ENCOUNTER
Called and scheduled pt for virtual visit with me due to Sammi being redeployed currently.  Pt v/u.---- Message from Carrie Turk sent at 5/1/2020 12:42 PM CDT -----  Contact: Pt  Reason: Pt calling to schedule appt she stated she sent a message but haven't heard back from you.    Communication: [phone]

## 2020-05-19 ENCOUNTER — TELEPHONE (OUTPATIENT)
Dept: BARIATRICS | Facility: CLINIC | Age: 60
End: 2020-05-19

## 2020-05-20 ENCOUNTER — CLINICAL SUPPORT (OUTPATIENT)
Dept: BARIATRICS | Facility: CLINIC | Age: 60
End: 2020-05-20
Payer: MEDICARE

## 2020-05-20 DIAGNOSIS — I10 HYPERTENSION, ESSENTIAL: Chronic | ICD-10-CM

## 2020-05-20 DIAGNOSIS — E66.01 SEVERE OBESITY (BMI 35.0-39.9) WITH COMORBIDITY: ICD-10-CM

## 2020-05-20 DIAGNOSIS — Z71.3 NUTRITIONAL COUNSELING: ICD-10-CM

## 2020-05-20 PROCEDURE — 97803 PR MED NUTR THER, SUBSQ, INDIV, EA 15 MIN: ICD-10-PCS | Mod: 95,,, | Performed by: DIETITIAN, REGISTERED

## 2020-05-20 PROCEDURE — 97803 MED NUTRITION INDIV SUBSEQ: CPT | Mod: 95,,, | Performed by: DIETITIAN, REGISTERED

## 2020-05-20 PROCEDURE — 99499 UNLISTED E&M SERVICE: CPT | Mod: 95,,, | Performed by: DIETITIAN, REGISTERED

## 2020-05-20 PROCEDURE — 99499 NO LOS: ICD-10-PCS | Mod: 95,,, | Performed by: DIETITIAN, REGISTERED

## 2020-05-25 ENCOUNTER — TELEPHONE (OUTPATIENT)
Dept: BARIATRICS | Facility: CLINIC | Age: 60
End: 2020-05-25

## 2020-05-25 NOTE — TELEPHONE ENCOUNTER
----- Message from Gaviota Zavala RD sent at 5/20/2020 11:27 AM CDT -----  Regarding: Completed nutrition and psych work up needs EGD scheduled  Can someone assist this pt with scheduling her EGD or call her to give the number to call to schedule it?  Thanks  Gaviota

## 2020-06-16 ENCOUNTER — LAB VISIT (OUTPATIENT)
Dept: FAMILY MEDICINE | Facility: CLINIC | Age: 60
End: 2020-06-16
Payer: MEDICARE

## 2020-06-16 PROCEDURE — U0003 INFECTIOUS AGENT DETECTION BY NUCLEIC ACID (DNA OR RNA); SEVERE ACUTE RESPIRATORY SYNDROME CORONAVIRUS 2 (SARS-COV-2) (CORONAVIRUS DISEASE [COVID-19]), AMPLIFIED PROBE TECHNIQUE, MAKING USE OF HIGH THROUGHPUT TECHNOLOGIES AS DESCRIBED BY CMS-2020-01-R: HCPCS

## 2020-06-17 LAB — SARS-COV-2 RNA RESP QL NAA+PROBE: NOT DETECTED

## 2020-06-18 ENCOUNTER — ANESTHESIA (OUTPATIENT)
Dept: ENDOSCOPY | Facility: HOSPITAL | Age: 60
End: 2020-06-18
Payer: MEDICARE

## 2020-06-18 ENCOUNTER — HOSPITAL ENCOUNTER (OUTPATIENT)
Facility: HOSPITAL | Age: 60
Discharge: HOME | End: 2020-06-18
Attending: INTERNAL MEDICINE | Admitting: INTERNAL MEDICINE
Payer: MEDICARE

## 2020-06-18 ENCOUNTER — ANESTHESIA EVENT (OUTPATIENT)
Dept: ENDOSCOPY | Facility: HOSPITAL | Age: 60
End: 2020-06-18
Payer: MEDICARE

## 2020-06-18 VITALS
BODY MASS INDEX: 37.11 KG/M2 | TEMPERATURE: 99 F | DIASTOLIC BLOOD PRESSURE: 69 MMHG | OXYGEN SATURATION: 95 % | HEART RATE: 82 BPM | WEIGHT: 190 LBS | RESPIRATION RATE: 16 BRPM | SYSTOLIC BLOOD PRESSURE: 121 MMHG

## 2020-06-18 DIAGNOSIS — K21.9 GASTROESOPHAGEAL REFLUX DISEASE WITHOUT ESOPHAGITIS: Primary | Chronic | ICD-10-CM

## 2020-06-18 PROCEDURE — 63600175 PHARM REV CODE 636 W HCPCS: Performed by: NURSE ANESTHETIST, CERTIFIED REGISTERED

## 2020-06-18 PROCEDURE — 37000008 HC ANESTHESIA 1ST 15 MINUTES: Performed by: INTERNAL MEDICINE

## 2020-06-18 PROCEDURE — 43239 PR EGD, FLEX, W/BIOPSY, SGL/MULTI: ICD-10-PCS | Mod: ,,, | Performed by: INTERNAL MEDICINE

## 2020-06-18 PROCEDURE — 88305 TISSUE EXAM BY PATHOLOGIST: CPT | Performed by: PATHOLOGY

## 2020-06-18 PROCEDURE — 43239 EGD BIOPSY SINGLE/MULTIPLE: CPT | Performed by: INTERNAL MEDICINE

## 2020-06-18 PROCEDURE — 25000003 PHARM REV CODE 250: Performed by: NURSE ANESTHETIST, CERTIFIED REGISTERED

## 2020-06-18 PROCEDURE — E9220 PRA ENDO ANESTHESIA: HCPCS | Mod: ,,, | Performed by: NURSE ANESTHETIST, CERTIFIED REGISTERED

## 2020-06-18 PROCEDURE — 27201012 HC FORCEPS, HOT/COLD, DISP: Performed by: INTERNAL MEDICINE

## 2020-06-18 PROCEDURE — 37000009 HC ANESTHESIA EA ADD 15 MINS: Performed by: INTERNAL MEDICINE

## 2020-06-18 PROCEDURE — E9220 PRA ENDO ANESTHESIA: ICD-10-PCS | Mod: ,,, | Performed by: NURSE ANESTHETIST, CERTIFIED REGISTERED

## 2020-06-18 PROCEDURE — 43239 EGD BIOPSY SINGLE/MULTIPLE: CPT | Mod: ,,, | Performed by: INTERNAL MEDICINE

## 2020-06-18 PROCEDURE — 88305 TISSUE EXAM BY PATHOLOGIST: CPT | Mod: 26,,, | Performed by: PATHOLOGY

## 2020-06-18 PROCEDURE — 88305 TISSUE EXAM BY PATHOLOGIST: ICD-10-PCS | Mod: 26,,, | Performed by: PATHOLOGY

## 2020-06-18 RX ORDER — SODIUM CHLORIDE 9 MG/ML
INJECTION, SOLUTION INTRAVENOUS CONTINUOUS
Status: DISCONTINUED | OUTPATIENT
Start: 2020-06-18 | End: 2020-06-18 | Stop reason: HOSPADM

## 2020-06-18 RX ORDER — PROPOFOL 10 MG/ML
VIAL (ML) INTRAVENOUS CONTINUOUS PRN
Status: DISCONTINUED | OUTPATIENT
Start: 2020-06-18 | End: 2020-06-18

## 2020-06-18 RX ORDER — PROPOFOL 10 MG/ML
VIAL (ML) INTRAVENOUS
Status: DISCONTINUED | OUTPATIENT
Start: 2020-06-18 | End: 2020-06-18

## 2020-06-18 RX ORDER — SODIUM CHLORIDE 9 MG/ML
INJECTION, SOLUTION INTRAVENOUS CONTINUOUS PRN
Status: DISCONTINUED | OUTPATIENT
Start: 2020-06-18 | End: 2020-06-18

## 2020-06-18 RX ORDER — GLYCOPYRROLATE 0.2 MG/ML
INJECTION INTRAMUSCULAR; INTRAVENOUS
Status: DISCONTINUED | OUTPATIENT
Start: 2020-06-18 | End: 2020-06-18

## 2020-06-18 RX ORDER — LIDOCAINE HCL/PF 100 MG/5ML
SYRINGE (ML) INTRAVENOUS
Status: DISCONTINUED | OUTPATIENT
Start: 2020-06-18 | End: 2020-06-18

## 2020-06-18 RX ADMIN — Medication 80 MG: at 11:06

## 2020-06-18 RX ADMIN — GLYCOPYRROLATE 0.2 MG: 0.2 INJECTION, SOLUTION INTRAMUSCULAR; INTRAVENOUS at 11:06

## 2020-06-18 RX ADMIN — PROPOFOL 200 MCG/KG/MIN: 10 INJECTION, EMULSION INTRAVENOUS at 11:06

## 2020-06-18 RX ADMIN — PROPOFOL 20 MG: 10 INJECTION, EMULSION INTRAVENOUS at 11:06

## 2020-06-18 RX ADMIN — PROPOFOL 80 MG: 10 INJECTION, EMULSION INTRAVENOUS at 11:06

## 2020-06-18 RX ADMIN — SODIUM CHLORIDE: 0.9 INJECTION, SOLUTION INTRAVENOUS at 11:06

## 2020-06-18 NOTE — TRANSFER OF CARE
Anesthesia Transfer of Care Note    Patient: Kathryn Wagner    Procedure(s) Performed: Procedure(s) (LRB):  ESOPHAGOGASTRODUODENOSCOPY (EGD) (N/A)    Patient location: PACU    Anesthesia Type: general    Transport from OR: Transported from OR on room air with adequate spontaneous ventilation    Post pain: adequate analgesia    Post assessment: no apparent anesthetic complications and tolerated procedure well    Post vital signs: stable    Level of consciousness: sedated    Nausea/Vomiting: no nausea/vomiting    Complications: none    Transfer of care protocol was followed      Last vitals:   Visit Vitals  BP (!) 98/57 (BP Location: Left arm, Patient Position: Lying)   Pulse 73   Temp 37 °C (98.6 °F) (Oral)   Resp 14   Wt 86.2 kg (190 lb)   SpO2 95%   Breastfeeding No   BMI 37.11 kg/m²

## 2020-06-18 NOTE — ANESTHESIA POSTPROCEDURE EVALUATION
Anesthesia Post Evaluation    Patient: Kathryn Wagner    Procedure(s) Performed: Procedure(s) (LRB):  ESOPHAGOGASTRODUODENOSCOPY (EGD) (N/A)    Final Anesthesia Type: general    Patient location during evaluation: PACU  Patient participation: Yes- Able to Participate  Level of consciousness: awake and alert and oriented  Post-procedure vital signs: reviewed and stable  Pain management: adequate  Airway patency: patent    PONV status at discharge: No PONV  Anesthetic complications: no      Cardiovascular status: blood pressure returned to baseline  Respiratory status: unassisted  Hydration status: euvolemic  Follow-up not needed.          Vitals Value Taken Time   /69 06/18/20 1219   Temp 37 °C (98.6 °F) 06/18/20 1149   Pulse 82 06/18/20 1219   Resp 16 06/18/20 1219   SpO2 95 % 06/18/20 1219         Event Time   Out of Recovery 12:24:47         Pain/Morales Score: Morales Score: 9 (6/18/2020 11:49 AM)

## 2020-06-18 NOTE — ANESTHESIA PREPROCEDURE EVALUATION
06/18/2020  Kathryn Wagner is a 60 y.o., female   [facility]  Anesthesia Pre-Operative Evaluation       Patient Name: Kathryn Wagner  YOB: 1960  MRN: [id number]  [facility]: [id number]      Code Status: No Order   Date of Procedure: 6/18/2020  Procedure: Procedure(s) (LRB):  ESOPHAGOGASTRODUODENOSCOPY (EGD) (N/A)  Anesthesia: General  Pre-Operative Diagnosis: Final diagnoses:  None  Proceduralist/Surgeon(s) and Role:     * Gianni Mariscal MD - Primary    SUBJECTIVE:   Kathryn Wagner is a 60 y.o. female w/ a significant PMHx listed below.    Patient now presents for the above procedure(s). Pt appropriately NPO.     LDA:       Anesthesia Evaluation      Airway   Mallampati: II  TM distance: Normal, at least 6 cm  Dental    (+) In tact    Pulmonary    Cardiovascular   Exercise tolerance: good  (+) hypertension,     ECG reviewed  Rate: Normal    Neuro/Psych    (+) neuromuscular disease, psychiatric history    GI/Hepatic/Renal    (+) GERD,     Endo/Other    (+) arthritis  Abdominal                   ALLERGIES:     Review of patient's allergies indicates:   Allergen Reactions    Contrast media Hives    Iodine and iodide containing products Hives and Itching    Latex, natural rubber Rash     MEDICATIONS:     Current Outpatient Medications on File Prior to Encounter   Medication Sig Dispense Refill Last Dose    atorvastatin (LIPITOR) 40 MG tablet Take 1 tablet (40 mg total) by mouth once daily. 90 tablet 3     DULoxetine (CYMBALTA) 60 MG capsule Take 1 capsule (60 mg total) by mouth once daily. 90 capsule 3     hydroCHLOROthiazide (HYDRODIURIL) 12.5 MG Tab Take 1 tablet (12.5 mg total) by mouth once daily. 90 tablet 3     losartan (COZAAR) 50 MG tablet Take 1 tablet (50 mg total) by mouth once daily. Stop lisinopril hctz 90 tablet 3     naloxone (NARCAN) 4 mg/actuation Spry Narcan 4  mg/actuation nasal spray       omeprazole (PRILOSEC) 20 MG capsule TAKE ONE Capsule BY MOUTH EVERY DAY 90 capsule 3     oxycodone-acetaminophen (PERCOCET)  mg per tablet Take 1 tablet by mouth every 8 (eight) hours as needed.         Current Facility-Administered Medications   Medication Dose Route Frequency Provider Last Rate Last Dose    0.9%  NaCl infusion   Intravenous Continuous Gianni Mariscal MD              History:   There are no hospital problems to display for this patient.    Patient Active Problem List   Diagnosis    Hypertension, essential    Mixed hyperlipidemia simva SE myalgia    Major depressive disorder, recurrent, in partial remission    Class 2 obesity due to excess calories in adult    Chronic left-sided low back pain with left-sided sciatica    Hip pain, left    Weakness of trunk musculature    Left carpal tunnel syndrome    Hyperplastic polyp of intestine 2012    GERD (gastroesophageal reflux disease) 2012 EGD normal no bx's    Sacroiliac joint pain    Postlaminectomy syndrome of lumbar region    DDD (degenerative disc disease), lumbar    Primary insomnia weaned off ambien    Chronic narcotic use    Vitamin D deficiency    Abnormal glucose    Tobacco use disorder, moderate, in early remission    History of anxiety      Past Medical History:   Diagnosis Date    Depression     Encounter for blood transfusion     GERD (gastroesophageal reflux disease)     High cholesterol     Hypertension     Lumbar spondylosis 2015    L3-4 fusion; L5S1 fusion    Obesity     Restless leg syndrome      Past Surgical History:   Procedure Laterality Date    BACK SURGERY Bilateral     2015, 2015 fusion    BELT ABDOMINOPLASTY      BREAST SURGERY      breast reduction    CARPAL TUNNEL RELEASE Left      SECTION      x3    COSMETIC SURGERY      HYSTERECTOMY      partial    TOTAL REDUCTION MAMMOPLASTY      TUBAL LIGATION       Alcohol use:   "  Social History     Substance and Sexual Activity   Alcohol Use Yes    Alcohol/week: 0.0 standard drinks    Comment: a coupe of times a week          OBJECTIVE:   Last 3 sets of Vitals    Vitals - 1 value per visit 1/28/2020 3/2/2020 3/2/2020   SYSTOLIC 142 - 120   DIASTOLIC 73 - 90   PULSE 90 - 77   TEMPERATURE - - -   RESPIRATIONS - - 18   SPO2 - - -   Weight (lb) 188.05 191.8 188   Weight (kg) 85.3 87 85.276   HEIGHT 5' 0" 5' 0" 5' 0"   BODY MASS INDEX 36.73 37.46 36.72   VISIT REPORT - - -   Pain Score  7 - -   Some encounter information is confidential and restricted. Go to Review Flowsheets activity to see all data.   Some recent data might be hidden       Vital Signs (Most Recent):    Vital Signs Range (Last 24H):        No intake or output data in the 24 hours ending 06/18/20 1114    There is no height or weight on file to calculate BMI.     Significant Labs:  Lab Results   Component Value Date    WBC 8.60 12/13/2019    HGB 12.5 12/13/2019    HCT 40.2 12/13/2019     12/13/2019    CHOL 168 12/13/2019    TRIG 204 (H) 12/13/2019    HDL 52 12/13/2019    ALT 61 (H) 03/02/2020    AST 44 (H) 03/02/2020     12/13/2019    K 4.3 12/13/2019     12/13/2019    CREATININE 0.9 12/13/2019    BUN 15 12/13/2019    CO2 29 12/13/2019    TSH 0.400 12/13/2019    INR 1.0 11/05/2012    HGBA1C 5.3 12/13/2019     No results found for: PREGTESTUR, PREGSERUM, HCG, HCGQUANT   No LMP recorded. Patient has had a hysterectomy.    EKG:   Results for orders placed or performed during the hospital encounter of 03/02/20   EKG    Collection Time: 03/02/20 11:59 AM    Narrative    Test Reason : E66.09,Z68.36,E78.2,I10,F11.90,K21.9,Z79.899,    Vent. Rate : 074 BPM     Atrial Rate : 074 BPM     P-R Int : 144 ms          QRS Dur : 076 ms      QT Int : 388 ms       P-R-T Axes : 047 065 062 degrees     QTc Int : 430 ms    Normal sinus rhythm  Normal ECG  When compared with ECG of 02-MAR-2020 09:23,  No significant change was " found  Confirmed by ILA DELACRUZ MD ([id number]) on 3/2/2020 2:08:04 PM    Referred By: GEETA HARRIS           Confirmed By:ILA DELACRUZ MD       TTE:  No results found for this or any previous visit.  No results found for: EF  Results for orders placed or performed during the hospital encounter of 03/22/18   2D Echo w/ Color Flow Doppler   Result Value Ref Range    QEF 55 55 - 65    Mitral Valve Regurgitation TRIVIAL     Diastolic Dysfunction No     Est. PA Systolic Pressure 22.36     Pericardial Effusion NONE     Tricuspid Valve Regurgitation TRIVIAL      ROSINA:  No results found for this or any previous visit.  Stress Test:  No results found for this or any previous visit.   LHC:  No results found for this or any previous visit.   PFT:  No results found for: FEV1, FVC, IHV6MLD, TLC, DLCO     ASSESSMENT/PLAN:   .    Anesthesia Evaluation    I have reviewed the Patient Summary Reports.      I have reviewed the Medications.     Review of Systems  Anesthesia Hx:  No problems with previous Anesthesia    Social:  Non-Smoker, Social Alcohol Use    Hematology/Oncology:  Hematology Normal   Oncology Normal     EENT/Dental:EENT/Dental Normal   Cardiovascular:   Exercise tolerance: good Hypertension ECG has been reviewed.    Pulmonary:  Pulmonary Normal    Renal/:  Renal/ Normal     Hepatic/GI:   GERD    Musculoskeletal:   Arthritis     Neurological:   Neuromuscular Disease,    Endocrine:  Endocrine Normal    Dermatological:  Skin Normal    Psych:   Psychiatric History          Physical Exam  General:  Well nourished    Airway/Jaw/Neck:  Airway Findings: Mouth Opening: Normal Tongue: Normal  General Airway Assessment: Adult  Mallampati: II  Improves to I with phonation.  TM Distance: Normal, at least 6 cm        Eyes/Ears/Nose:  EYES/EARS/NOSE FINDINGS: Normal   Dental:  Dental Findings: In tact   Chest/Lungs:  Chest/Lungs Findings: Clear to auscultation, Normal Respiratory Rate     Heart/Vascular:  Heart Findings: Rate: Normal   Rhythm: Regular Rhythm  Sounds: Normal  Heart murmur: negative Vascular Findings: Normal    Abdomen:  Abdomen Findings: Normal    Musculoskeletal:  Musculoskeletal Findings: Normal   Skin:  Skin Findings: Normal    Mental Status:  Mental Status Findings: Normal        Anesthesia Plan  Type of Anesthesia, risks & benefits discussed:  Anesthesia Type:  general  Patient's Preference: General  Intra-op Monitoring Plan: standard ASA monitors  Intra-op Monitoring Plan Comments:   Post Op Pain Control Plan:   Post Op Pain Control Plan Comments:   Induction:   IV  Beta Blocker:  Patient is not currently on a Beta-Blocker (No further documentation required).       Informed Consent: Patient understands risks and agrees with Anesthesia plan.  Questions answered. Anesthesia consent signed with patient.  ASA Score: 2     Day of Surgery Review of History & Physical: I have interviewed and examined the patient. I have reviewed the patient's H&P dated:  There are no significant changes.  H&P update referred to the surgeon.         Ready For Surgery From Anesthesia Perspective.

## 2020-06-18 NOTE — H&P
[facility]  Gastroenterology  H&P    Patient Name: Kathryn Wagner  MRN: [id number]  Admission Date: 2020  Code Status: No Order    Attending Provider: jon bowen  Primary Care Physician: Edward Contreras MD  Principal Problem:<principal problem not specified>    Subjective:     History of Present Illness: anita/pre-op    Past Medical History:   Diagnosis Date    Depression     Encounter for blood transfusion     GERD (gastroesophageal reflux disease)     High cholesterol     Hypertension     Lumbar spondylosis 2015    L3-4 fusion; L5S1 fusion    Obesity     Restless leg syndrome        Past Surgical History:   Procedure Laterality Date    BACK SURGERY Bilateral     2015, 2015 fusion    BELT ABDOMINOPLASTY      BREAST SURGERY      breast reduction    CARPAL TUNNEL RELEASE Left      SECTION      x3    COSMETIC SURGERY      HYSTERECTOMY      partial    TOTAL REDUCTION MAMMOPLASTY      TUBAL LIGATION         Review of patient's allergies indicates:   Allergen Reactions    Contrast media Hives    Iodine and iodide containing products Hives and Itching    Latex, natural rubber Rash     Family History     Problem Relation (Age of Onset)    Hypertension Father    No Known Problems Mother, Sister, Brother, Maternal Aunt, Maternal Uncle, Paternal Aunt, Paternal Uncle, Maternal Grandmother, Maternal Grandfather, Paternal Grandmother, Paternal Grandfather        Tobacco Use    Smoking status: Former Smoker     Types: Cigarettes     Quit date: 3/18/2020     Years since quittin.2    Smokeless tobacco: Never Used   Substance and Sexual Activity    Alcohol use: Yes     Alcohol/week: 0.0 standard drinks     Comment: a coupe of times a week    Drug use: No    Sexual activity: Yes     Review of Systems   Respiratory: Negative.    Cardiovascular: Negative.      Objective:     Vital Signs (Most Recent):  Temp: 98.1 °F (36.7 °C) (20 1123)  Pulse: 76 (20  1123)  Resp: 16 (06/18/20 1123)  BP: 120/71 (06/18/20 1123)  SpO2: 96 % (06/18/20 1123) Vital Signs (24h Range):  Temp:  [98.1 °F (36.7 °C)] 98.1 °F (36.7 °C)  Pulse:  [76] 76  Resp:  [16] 16  SpO2:  [96 %] 96 %  BP: (120)/(71) 120/71     Weight: 86.2 kg (190 lb) (06/18/20 1123)  Body mass index is 37.11 kg/m².    No intake or output data in the 24 hours ending 06/18/20 1131    Lines/Drains/Airways     Peripheral Intravenous Line                 Peripheral IV - Single Lumen 06/18/20 1124 20 G Right Hand less than 1 day                  Significant Labs:      Significant Imaging:      Assessment/Plan:     There are no hospital problems to display for this patient.      Indication for procedure:    ASA:II  Airway normal  Malampati class:per anes    Personal and family history negative for anesthesia problems    Plan:egd  Anesthesia plan: general      Gianni Mariscal MD  Gastroenterology  [facility]

## 2020-06-18 NOTE — [employer]ATION PATIENT INSTRUCTIONS
Discharge Summary/Instructions after an Endoscopic Procedure  Patient Name: Kathryn Wagner  Patient MRN: [id number]  Patient YOB: 1960 Thursday, June 18, 2020  Gianni Mariscal MD  RESTRICTIONS:  During your procedure today, you received medications for sedation.  These   medications may affect your judgment, balance and coordination.  Therefore,   for 24 hours, you have the following restrictions:   - DO NOT drive a car, operate machinery, make legal/financial decisions,   sign important papers or drink alcohol.    ACTIVITY:  Today: no heavy lifting, straining or running due to procedural   sedation/anesthesia.  The following day: return to full activity including work.  DIET:  Eat and drink normally unless instructed otherwise.     TREATMENT FOR COMMON SIDE EFFECTS:  - Mild abdominal pain, nausea, belching, bloating or excessive gas:  rest,   eat lightly and use a heating pad.  - Sore Throat: treat with throat lozenges and/or gargle with warm salt   water.  - Because air was used during the procedure, expelling large amounts of air   from your rectum or belching is normal.  - If a bowel prep was taken, you may not have a bowel movement for 1-3 days.    This is normal.  SYMPTOMS TO WATCH FOR AND REPORT TO YOUR PHYSICIAN:  1. Abdominal pain or bloating, other than gas cramps.  2. Chest pain.  3. Back pain.  4. Signs of infection such as: chills or fever occurring within 24 hours   after the procedure.  5. Rectal bleeding, which would show as bright red, maroon, or black stools.   (A tablespoon of blood from the rectum is not serious, especially if   hemorrhoids are present.)  6. Vomiting.  7. Weakness or dizziness.  GO DIRECTLY TO THE NEAREST EMERGENCY ROOM IF YOU HAVE ANY OF THE FOLLOWING:      Difficulty breathing              Chills and/or fever over 101 F   Persistent vomiting and/or vomiting blood   Severe abdominal pain   Severe chest pain   Black, tarry stools   Bleeding- more than one tablespoon   Any  other symptom or condition that you feel may need urgent attention  Your doctor recommends these additional instructions:  If any biopsies were taken, your doctors clinic will contact you in 1 to 2   weeks with any results.  - Patient has a contact number available for emergencies.  The signs and   symptoms of potential delayed complications were discussed with the   patient.  Return to normal activities tomorrow.  Written discharge   instructions were provided to the patient.   - Discharge patient to home (ambulatory).   - Resume previous diet.   - Continue present medications.   - Await pathology results.   - Return to referring physician after studies are complete.  For questions, problems or results please call your physician - Gianni Mariscal MD at Work:  [phone].  [facility], EMERGENCY ROOM PHONE NUMBER: [phone]  IF A COMPLICATION OR EMERGENCY SITUATION ARISES AND YOU ARE UNABLE TO REACH   YOUR PHYSICIAN - GO DIRECTLY TO THE EMERGENCY ROOM.  Gianni Mariscal MD  6/18/2020 11:46:43 AM  This report has been verified and signed electronically.  [employer]

## 2020-06-19 ENCOUNTER — TELEPHONE (OUTPATIENT)
Dept: BARIATRICS | Facility: CLINIC | Age: 60
End: 2020-06-19

## 2020-06-19 DIAGNOSIS — E66.01 SEVERE OBESITY: Primary | ICD-10-CM

## 2020-06-19 DIAGNOSIS — I10 HYPERTENSION, ESSENTIAL: ICD-10-CM

## 2020-06-19 DIAGNOSIS — K21.9 GASTROESOPHAGEAL REFLUX DISEASE WITHOUT ESOPHAGITIS: ICD-10-CM

## 2020-06-19 DIAGNOSIS — Z71.3 DIETARY COUNSELING: ICD-10-CM

## 2020-06-19 DIAGNOSIS — K91.2 POSTSURGICAL MALABSORPTION, NOT ELSEWHERE CLASSIFIED: ICD-10-CM

## 2020-06-19 DIAGNOSIS — Z01.818 PRE-OP EVALUATION: ICD-10-CM

## 2020-06-19 DIAGNOSIS — Z79.899 OTHER LONG TERM (CURRENT) DRUG THERAPY: ICD-10-CM

## 2020-06-19 DIAGNOSIS — R06.09 DOE (DYSPNEA ON EXERTION): ICD-10-CM

## 2020-06-19 DIAGNOSIS — E78.2 MIXED HYPERLIPIDEMIA: ICD-10-CM

## 2020-06-19 DIAGNOSIS — R73.09 ABNORMAL GLUCOSE: ICD-10-CM

## 2020-06-19 NOTE — LETTER
June 19, 2020        Dr Johanna Olguin - Bariatric Surgery  [address]  [facility] [address]  Phone: [phone]  Fax: [phone]   Patient: Kathryn Wagner   MR Number: [id number]   YOB: 1960   Date of Visit: 6/19/2020     Dear Dr. Quintero,    .This patient is scheduled for a Bariatric procedure with Dr. Yuni Chaudhari on 7/21/2020.  It is our understanding that this patient is under your care for pain management.  With your approval, we will write for 1 script of Hycet Solution 7.5-325mg/15ml (take 15mls by mouth qid prn pain) disp- 118ml post surgery.  Then, resume with your office with restriction of all pills must be smaller than a pencil eraser or cut for 2 weeks following surgery.  Before we can proceed with surgery, we must have a written agreement.  Please reply or fax back an agreement to our office at [phone] or call [phone] with any questions.     Bariatrtic Department  [facility]    Sincerely,          Yuni Young

## 2020-06-19 NOTE — TELEPHONE ENCOUNTER
Spoke with patient and confirmed the surgical procedure of sleeve with Dr Dr Chaudhari.  Scheduled preop appts/ surgery date/ post op appts. All dates and times agreed upon.  All medications have been reviewed regarding the necessity to be crushed or broken into pieces smaller that the tip of a pencil eraser for the required period of time following surgical procedures.  Pt aware that protein liquid diet start date is 7/14/2020.  Screened patient for history of UTI per protocol.   Discussed with patient to avoid antibiotics and elective procedures involving sedation/anesthesia within 30 days of surgery.  Patient instructed to call the bariatric clinic post op for any s/s of UTI.  Patient's mailing address confirmed with patient.  Pt aware that all appts can be seen in [facility] patient portal at this time and appt reminders mailed to patient's mailing address today by RN.  Office phone and fax number given to patient for any future questions/concerns.  Also informed patient that they will be enrolled in the Patient Reported Outcomes program to track her progress and successes.  Confirmed email address.

## 2020-06-22 LAB
FINAL PATHOLOGIC DIAGNOSIS: NORMAL
GROSS: NORMAL

## 2020-06-23 ENCOUNTER — PATIENT OUTREACH (OUTPATIENT)
Dept: ADMINISTRATIVE | Facility: OTHER | Age: 60
End: 2020-06-23

## 2020-06-24 ENCOUNTER — NURSE TRIAGE (OUTPATIENT)
Dept: ADMINISTRATIVE | Facility: CLINIC | Age: 60
End: 2020-06-24

## 2020-06-24 NOTE — TELEPHONE ENCOUNTER
Patient populated in triage que  Spoke with patient and patient stated she replied yes to text message because she was going to do virtual visit as scheduled tomorrow.  Denied any complaints

## 2020-06-25 ENCOUNTER — CLINICAL SUPPORT (OUTPATIENT)
Dept: BARIATRICS | Facility: CLINIC | Age: 60
End: 2020-06-25
Payer: MEDICARE

## 2020-06-25 DIAGNOSIS — I10 HYPERTENSION, ESSENTIAL: Chronic | ICD-10-CM

## 2020-06-25 DIAGNOSIS — Z71.3 DIETARY COUNSELING: ICD-10-CM

## 2020-06-25 DIAGNOSIS — E66.01 CLASS 2 SEVERE OBESITY DUE TO EXCESS CALORIES WITH SERIOUS COMORBIDITY IN ADULT, UNSPECIFIED BMI: Chronic | ICD-10-CM

## 2020-06-25 DIAGNOSIS — E78.2 MIXED HYPERLIPIDEMIA: ICD-10-CM

## 2020-06-25 PROCEDURE — 97803 PR MED NUTR THER, SUBSQ, INDIV, EA 15 MIN: ICD-10-PCS | Mod: 95,,, | Performed by: DIETITIAN, REGISTERED

## 2020-06-25 PROCEDURE — 97803 MED NUTRITION INDIV SUBSEQ: CPT | Mod: 95,,, | Performed by: DIETITIAN, REGISTERED

## 2020-06-25 NOTE — PROGRESS NOTES
Progress Notes       The patient location is:  Patient Home in [place]  The chief complaint leading to consultation is: morbid obesity  Visit type: Virtual visit with synchronous audio and video  Total time spent with patient: 15 minutes  Each patient to whom he or she provides medical services by telemedicine is:  (1) informed of the relationship between the physician and patient and the respective role of any other health care provider with respect to management of the patient; and (2) notified that he or she may decline to receive medical services by telemedicine and may withdraw from such care at any time.  Nutrition Handout located in AVS section of pt's [facility] and/or sent as a message via [facility] portal.  Pt informed of handout and how to access this information in [employer] [facility].  NUTRITION NOTE     Referring Physician: Yuni Chaudhari MD  Reason for MNT Referral: 3 months Medically Supervised Diet pending Gastric Sleeve     Patient presents for  follow up with weight unknown due to pt not having a scale available over the past month; total weight loss by making numerous dietary and lifestyle changes. Bariatric surgery has been scheduled     CLINICAL DATA:  60 y.o. female.     Current Weight:  Unknown no scale  Weight Change Since Initial Visit: Unknown no scale available  Ideal Body Weight: 126 lbs  Last Weight: 191 lbs  Initial/ Last months weight: 188 lbs        NUTRITIONAL NEEDS:  ( For Bariatric Sx/ weight loss)  4543-3142 calories    Grams Protein     CURRENT DIET:  Reduced-calorie diet.      Current diet recall:  Diet Recall: Food records are not present.  Meal pattern- 3 meals:improved   Breakfast:  Hard boiled egg or raisin bran on occasion reports it keeps her regular coffee with sugar and cream; has tried truvia and does not like aftertaste  Snack:  Light yogurt or fruit  Lunch: Tuna salad - no bread  Dinner:  [place] or grilled chicken  Veggies  Snack:fruit  Tried vanilla protein  shake powder recently and like it     Current Diet:  Meal pattern: 3 - Remains appropriate  Protein supplements:   Drinking slim fast  High protein or vanilla protein powder with fruit  Snackin-2 / day   Vegetables: Likes a variety. Eats daily.  Fruits: Likes a variety. Eats almost daily.2 servings per day banana or apple  Beverages: water,   Dining out: Weekly. Reduced lately. Mostly fast food, and take-out. salad  Cooking at home: Daily. Mostly baked and grilled meat, fish,  and vegetables.  NO frying           Exercise:  Current exercise:  walking with grandchildren picking berries,  Restrictions to exercise: hip pain      Vitamins / Minerals / Herbs:   Vit D, melatonin     Labs:    no recent     Food Allergies:   Sometimes milk upsets stomach     Social:  Disabled.     Alcohol: Socially.  Wine on occasion  Smoking:  reports  quit smoking 3/2/2020 nicotine level came back as <3- smoke free.  Remains smoke free        ASSESSMENT:  Patient demonstrates all willingness to change lifestyle habits as evidenced by good exercise, dietary changes, including protein drinks, increased fruits, increased vegetables, reduced dining out, better choices when dining out, more food preparation at [employer], healthier cooking at home and healthier snacking at home.     Doing well with working on greatest challenges (sweets and starchy CHO).     Barriers to Education:  none  Stage of Change:  action     NUTRITION DIAGNOSIS:  Morbid Obesity related to Inadequate protein intake as evidence by BMI.  Status: Same     PLAN:  Pt remains a good candidate for surgery.  Pt was cleared on 3/2/2020 and surgery has been scheduled.  Answered all questions.     Diet: Maintain diet plan.  Start including protein supplements in the diet plan daily.  Return to clinic.     Exercise: Increase as tolerated     Behavior Modification: Begin to document food & activity logs daily.        Communicated nutrition plan with bariatric team.     SESSION  TIME:  15 minutes

## 2020-06-29 ENCOUNTER — TELEPHONE (OUTPATIENT)
Dept: BARIATRICS | Facility: CLINIC | Age: 60
End: 2020-06-29

## 2020-06-29 NOTE — TELEPHONE ENCOUNTER
----- Message from Carrie Turk sent at 6/29/2020  2:14 PM CDT -----  Regarding: Spencer Maurice peoples health  Reason: Returning Gaby call regarding a peer to peer. Please call     Communication: [phone]

## 2020-06-30 NOTE — TELEPHONE ENCOUNTER
Phoned Spencer Richards with [employer] to set up a peer to peer.  Dr Tung Ruiz will call Dr Chaudhari at 10 AM on Wednesday, July 1, 202 to conduct the peer to peer.

## 2020-07-01 ENCOUNTER — TELEPHONE (OUTPATIENT)
Dept: BARIATRICS | Facility: CLINIC | Age: 60
End: 2020-07-01

## 2020-07-01 NOTE — TELEPHONE ENCOUNTER
"Phoned patient to discuss the denial from her insurance company and the results of the peer to peer and the need for an appeal.  Need to discuss her calling [phone] to inform [employer] that she wants Dr Chaudhari to act on her behalf and that a signed statement was needed with both her and Dr Chaudhari's signature to expedite the appeal. Received recording stating that "The person you are trying to reach has a VM that has not been set up yet.  Please try your call again later".  "

## 2020-07-06 ENCOUNTER — TELEPHONE (OUTPATIENT)
Dept: BARIATRICS | Facility: CLINIC | Age: 60
End: 2020-07-06

## 2020-07-06 NOTE — TELEPHONE ENCOUNTER
----- Message from Nu Vidales sent at 7/2/2020 10:08 AM CDT -----  Contact: [phone]  Pt returning your call from yesterday.

## 2020-07-06 NOTE — LETTER
July 6, 2020        [employer] [facility] [address], [address]  [place] [address]             [facility] - Bariatric Surgery  [address]  [facility] [address]  Phone: [phone]  Fax: [phone]   Patient: Kathryn Wagner   MR Number: [id number]   YOB: 1960   Date of Visit: 7/6/2020     Dear Pre-determination Departmemt,     I am sending this letter on behalf of Kathryn Wagner, Member Number [id number].  She has asked that I, Dr Yuni Chaudhari, act as her representative in filing an appeal for her to undergo a laparoscopic sleeve gastrectomy.      Sincerely,                                                                        _________________________  Yuni Chaudhari MD        _________________________  Kathryn Wagner

## 2020-07-06 NOTE — TELEPHONE ENCOUNTER
Phoned patient and discussed the peer to peer results and the need to conduct an appeal.  She will call N and learn how to name Dr Chaudhari as her representative.  She will also come sign the document stating that Dr Chaudhari can act as her representative with the appeal process on Tuesday, 7/7/2020.

## 2020-07-07 ENCOUNTER — TELEPHONE (OUTPATIENT)
Dept: FAMILY MEDICINE | Facility: CLINIC | Age: 60
End: 2020-07-07

## 2020-07-07 ENCOUNTER — TELEPHONE (OUTPATIENT)
Dept: BARIATRICS | Facility: CLINIC | Age: 60
End: 2020-07-07

## 2020-07-07 NOTE — TELEPHONE ENCOUNTER
----- Message from Ambrose Mackenzie, Patient Care Assistant sent at 7/7/2020  9:14 AM CDT -----  Name of Who is Calling: Allison With      What is the request in detail: Will be faxing over paper work. Please contact to further discuss and advise      Can the clinic reply by [facility]: No    What Number to Call Back [facility]:   [phone]

## 2020-07-07 NOTE — TELEPHONE ENCOUNTER
----- Message from Carrie Turk sent at 7/7/2020  9:03 AM CDT -----  Regarding: Pt  Reason: Calling to see if Gaby can fax over docs [facility] needs to [facility] Contreras [facility]. Pt stated she'll go there sign and have them to fax it back over. Pt doesn't have the fax #. Please call     Communication: [phone]

## 2020-07-07 NOTE — TELEPHONE ENCOUNTER
Patient will be coming in about an hour to sign paperwork that Dr. Chaudhari faxed over to have her laparoscopic sleeve done. Paperwork will be under my keyboard.

## 2020-07-09 ENCOUNTER — TELEPHONE (OUTPATIENT)
Dept: BARIATRICS | Facility: CLINIC | Age: 60
End: 2020-07-09

## 2020-07-09 NOTE — TELEPHONE ENCOUNTER
----- Message from Franklin Leblanc sent at 7/9/2020 12:37 PM CDT -----  Regarding: if fax was received        The Pt would like to know if you received her fax that was needed to be able to talk about her case.    The Pt would like for Sheryl to call her back.    Phone # [phone]

## 2020-07-09 NOTE — TELEPHONE ENCOUNTER
Returned call and discussed the letter and faxing off the appeal first thing yesterday morning.  Reminded her that they have 30 days to render a decision.  Informed her that we have not yet postponed anything yet and will give it to Monday to hear from [facility].  Gave her other possible dates of 8/10, 8/13, 8/17, 8/20.  She stated that she has nothing planned for August and those dates would be fine.  Will let her know something on Monday.

## 2020-07-13 ENCOUNTER — TELEPHONE (OUTPATIENT)
Dept: BARIATRICS | Facility: CLINIC | Age: 60
End: 2020-07-13

## 2020-07-13 NOTE — TELEPHONE ENCOUNTER
Phoned patient and updated her that we will keep appointment for now and call her tomorrow regarding if we need to cancel appointments.

## 2020-07-13 NOTE — TELEPHONE ENCOUNTER
Attempted to contact patient regarding lab appointment scheduled prior to pre op. No answer. Unable to leave voicemail.

## 2020-07-14 ENCOUNTER — TELEPHONE (OUTPATIENT)
Dept: BARIATRICS | Facility: CLINIC | Age: 60
End: 2020-07-14

## 2020-07-14 NOTE — TELEPHONE ENCOUNTER
Bariatric nutrition  Called to discuss pre op liquid diet starting today. Pt states her surgery is being postponed d/t insurance denial. Her case is in appeals. She wants to postpone surgery and cancel pre op appts for tomorrow. Discussed with nurse joya.

## 2020-07-14 NOTE — TELEPHONE ENCOUNTER
----- Message from Carmen Varma RN sent at 6/19/2020  3:05 PM CDT -----  Regarding: LD  1 week liquid diet starts 7/14/2020  sleeve surgery scheduled 7/21/2020  preop appt scheduled 7/15/2020

## 2020-07-23 RX ORDER — DULOXETIN HYDROCHLORIDE 30 MG/1
CAPSULE, DELAYED RELEASE ORAL
Qty: 90 CAPSULE | Refills: 1 | Status: SHIPPED | OUTPATIENT
Start: 2020-07-23 | End: 2020-08-12

## 2020-07-27 ENCOUNTER — TELEPHONE (OUTPATIENT)
Dept: BARIATRICS | Facility: CLINIC | Age: 60
End: 2020-07-27

## 2020-07-27 NOTE — TELEPHONE ENCOUNTER
Phoned patient and discussed a new surgery date.  She would like to go for Monday, August 17, 2020.  Messages sent to [facility] schedulers, leandra Damico and FINESSE Medrano on the new date.  Patient will come in Wednesday for pre op and weight, informed that we will start the liquid protein diet on Monday, August 10 or Monday, August 3 if her BMI has increased.  Will mail updated appointment slips to her home address in [place].

## 2020-07-27 NOTE — TELEPHONE ENCOUNTER
Phoned and LVM to [facility] regarding new surgery date on Monday, August 17 for her gastric sleeve surgery and to  if she had any questions.

## 2020-07-27 NOTE — TELEPHONE ENCOUNTER
----- Message from Francisco Reyna sent at 7/27/2020  9:26 AM CDT -----  Genesis from [employer] called to speak w/ someone regarding he scheduled date of service for the patient's gastric sleeve procedure in reference to authorization, requesting callback     [phone]

## 2020-07-28 ENCOUNTER — TELEPHONE (OUTPATIENT)
Dept: BARIATRICS | Facility: CLINIC | Age: 60
End: 2020-07-28

## 2020-07-28 NOTE — TELEPHONE ENCOUNTER
----- Message from Alejandra Cronin sent at 7/28/2020 11:26 AM CDT -----  Regarding: Reschedule Pre Op  Contact: PT  PT called in requesting to reschedule her pre op appointments tomorrow  PT is feeling nasal congestion and not feeling well    Callback: [phone]

## 2020-07-28 NOTE — TELEPHONE ENCOUNTER
Spoke with pt, she explained she was not feeling well due to allergies, she moved this past weekend and was around a lot of dust.  Rescheduled preop appointment and labs for 8/12/2020 with Dr. Chaudhari, confirmed liq diet to start 8/10/2020, no NSAIDS after 8/10/2020.   Pt verbalized understanding of the above.

## 2020-08-03 ENCOUNTER — TELEPHONE (OUTPATIENT)
Dept: BARIATRICS | Facility: CLINIC | Age: 60
End: 2020-08-03

## 2020-08-03 DIAGNOSIS — Z87.891 HISTORY OF SMOKING: Primary | ICD-10-CM

## 2020-08-03 DIAGNOSIS — E66.01 SEVERE OBESITY: ICD-10-CM

## 2020-08-03 DIAGNOSIS — E78.2 MIXED HYPERLIPIDEMIA: ICD-10-CM

## 2020-08-03 DIAGNOSIS — K21.9 GASTROESOPHAGEAL REFLUX DISEASE WITHOUT ESOPHAGITIS: ICD-10-CM

## 2020-08-03 DIAGNOSIS — I10 HYPERTENSION, ESSENTIAL: ICD-10-CM

## 2020-08-03 DIAGNOSIS — R73.09 ABNORMAL GLUCOSE: ICD-10-CM

## 2020-08-03 DIAGNOSIS — Z01.818 PRE-OP EVALUATION: ICD-10-CM

## 2020-08-03 NOTE — TELEPHONE ENCOUNTER
Phoned patient and discussed her issues over the weekend.  She confessed that she smoked this past weekend due to her son having issues going through a divorce and having to be admitted to [facility] in [facility] for treatment.  She stated that she is in a better place now and does not understand how it happened.  Asked her how much she smoked and she was not able to say and did not know.  Informed her that Dr Chaudhari is out of town and I would have to get back to her next Monday after I discuss it with Dr Chaudhari.  Informed patient to go ahead and keep her pre op appointment for Wednesday, August 12 until she hears something from us to cancel it.  She verbalized understanding.

## 2020-08-03 NOTE — TELEPHONE ENCOUNTER
Spoke with Dr Saeed regarding the patient smoking over the weekend due to her son stating that he wanted to kill himself.  He stated that we could proceed with her surgery as long as she did not smoke again and we would have to do a nicotine test on her at her pre op appointment.  Phoned patient and informed her of this.  She verbalized understanding.

## 2020-08-03 NOTE — TELEPHONE ENCOUNTER
----- Message from Nu Vidales sent at 8/3/2020  1:29 PM CDT -----  Contact: [phone]  Pt called in to speak with nurse regarding surgery. Please call

## 2020-08-11 ENCOUNTER — PATIENT OUTREACH (OUTPATIENT)
Dept: ADMINISTRATIVE | Facility: OTHER | Age: 60
End: 2020-08-11

## 2020-08-11 NOTE — PROGRESS NOTES
LINKS immunization registry updated  Care Everywhere updated  Health Maintenance updated  Chart reviewed for overdue [employer] [facility] Encounters health maintenance testing  Chart/Media searched: for outside colon cancer testig esults   Orders entered:N/A

## 2020-08-12 ENCOUNTER — OFFICE VISIT (OUTPATIENT)
Dept: BARIATRICS | Facility: CLINIC | Age: 60
End: 2020-08-12
Payer: MEDICARE

## 2020-08-12 ENCOUNTER — CLINICAL SUPPORT (OUTPATIENT)
Dept: BARIATRICS | Facility: CLINIC | Age: 60
End: 2020-08-12
Payer: MEDICARE

## 2020-08-12 ENCOUNTER — LAB VISIT (OUTPATIENT)
Dept: LAB | Facility: HOSPITAL | Age: 60
End: 2020-08-12
Attending: SURGERY
Payer: MEDICARE

## 2020-08-12 VITALS
WEIGHT: 186.5 LBS | HEART RATE: 83 BPM | DIASTOLIC BLOOD PRESSURE: 78 MMHG | RESPIRATION RATE: 18 BRPM | HEIGHT: 60 IN | SYSTOLIC BLOOD PRESSURE: 124 MMHG | BODY MASS INDEX: 36.61 KG/M2

## 2020-08-12 DIAGNOSIS — E78.2 MIXED HYPERLIPIDEMIA: ICD-10-CM

## 2020-08-12 DIAGNOSIS — E66.01 SEVERE OBESITY: ICD-10-CM

## 2020-08-12 DIAGNOSIS — Z79.899 OTHER LONG TERM (CURRENT) DRUG THERAPY: ICD-10-CM

## 2020-08-12 DIAGNOSIS — Z87.891 HISTORY OF SMOKING: ICD-10-CM

## 2020-08-12 DIAGNOSIS — R06.09 DOE (DYSPNEA ON EXERTION): ICD-10-CM

## 2020-08-12 DIAGNOSIS — E66.01 OBESITY, MORBID: ICD-10-CM

## 2020-08-12 DIAGNOSIS — F17.200 SMOKER: ICD-10-CM

## 2020-08-12 DIAGNOSIS — K21.9 GASTROESOPHAGEAL REFLUX DISEASE WITHOUT ESOPHAGITIS: ICD-10-CM

## 2020-08-12 DIAGNOSIS — F32.A DEPRESSION, UNSPECIFIED DEPRESSION TYPE: ICD-10-CM

## 2020-08-12 DIAGNOSIS — E66.01 CLASS 2 SEVERE OBESITY DUE TO EXCESS CALORIES WITH SERIOUS COMORBIDITY IN ADULT, UNSPECIFIED BMI: Primary | ICD-10-CM

## 2020-08-12 DIAGNOSIS — Z01.818 PRE-OP EVALUATION: ICD-10-CM

## 2020-08-12 DIAGNOSIS — I10 HYPERTENSION, ESSENTIAL: ICD-10-CM

## 2020-08-12 DIAGNOSIS — R73.09 ABNORMAL GLUCOSE: ICD-10-CM

## 2020-08-12 DIAGNOSIS — Z98.84 S/P LAPAROSCOPIC SLEEVE GASTRECTOMY: ICD-10-CM

## 2020-08-12 DIAGNOSIS — Z71.3 DIETARY COUNSELING: ICD-10-CM

## 2020-08-12 LAB
ALBUMIN SERPL BCP-MCNC: 4.1 G/DL (ref 3.5–5.2)
ALP SERPL-CCNC: 70 U/L (ref 55–135)
ALT SERPL W/O P-5'-P-CCNC: 13 U/L (ref 10–44)
ANION GAP SERPL CALC-SCNC: 8 MMOL/L (ref 8–16)
AST SERPL-CCNC: 16 U/L (ref 10–40)
BASOPHILS # BLD AUTO: 0.05 K/UL (ref 0–0.2)
BASOPHILS NFR BLD: 0.7 % (ref 0–1.9)
BILIRUB SERPL-MCNC: 0.5 MG/DL (ref 0.1–1)
BUN SERPL-MCNC: 25 MG/DL (ref 6–20)
CALCIUM SERPL-MCNC: 9.3 MG/DL (ref 8.7–10.5)
CHLORIDE SERPL-SCNC: 104 MMOL/L (ref 95–110)
CO2 SERPL-SCNC: 27 MMOL/L (ref 23–29)
CREAT SERPL-MCNC: 0.9 MG/DL (ref 0.5–1.4)
DIFFERENTIAL METHOD: ABNORMAL
EOSINOPHIL # BLD AUTO: 0.4 K/UL (ref 0–0.5)
EOSINOPHIL NFR BLD: 5.4 % (ref 0–8)
ERYTHROCYTE [DISTWIDTH] IN BLOOD BY AUTOMATED COUNT: 12.8 % (ref 11.5–14.5)
EST. GFR  (AFRICAN AMERICAN): >60 ML/MIN/1.73 M^2
EST. GFR  (NON AFRICAN AMERICAN): >60 ML/MIN/1.73 M^2
GLUCOSE SERPL-MCNC: 85 MG/DL (ref 70–110)
HCT VFR BLD AUTO: 39.3 % (ref 37–48.5)
HGB BLD-MCNC: 12.3 G/DL (ref 12–16)
IMM GRANULOCYTES # BLD AUTO: 0.02 K/UL (ref 0–0.04)
IMM GRANULOCYTES NFR BLD AUTO: 0.3 % (ref 0–0.5)
LYMPHOCYTES # BLD AUTO: 2 K/UL (ref 1–4.8)
LYMPHOCYTES NFR BLD: 27.7 % (ref 18–48)
MCH RBC QN AUTO: 28.7 PG (ref 27–31)
MCHC RBC AUTO-ENTMCNC: 31.3 G/DL (ref 32–36)
MCV RBC AUTO: 92 FL (ref 82–98)
MONOCYTES # BLD AUTO: 0.6 K/UL (ref 0.3–1)
MONOCYTES NFR BLD: 7.8 % (ref 4–15)
NEUTROPHILS # BLD AUTO: 4.2 K/UL (ref 1.8–7.7)
NEUTROPHILS NFR BLD: 58.1 % (ref 38–73)
NRBC BLD-RTO: 0 /100 WBC
PLATELET # BLD AUTO: 267 K/UL (ref 150–350)
PMV BLD AUTO: 11.9 FL (ref 9.2–12.9)
POTASSIUM SERPL-SCNC: 4.3 MMOL/L (ref 3.5–5.1)
PROT SERPL-MCNC: 7.5 G/DL (ref 6–8.4)
RBC # BLD AUTO: 4.29 M/UL (ref 4–5.4)
SODIUM SERPL-SCNC: 139 MMOL/L (ref 136–145)
WBC # BLD AUTO: 7.22 K/UL (ref 3.9–12.7)

## 2020-08-12 PROCEDURE — 3074F PR MOST RECENT SYSTOLIC BLOOD PRESSURE < 130 MM HG: ICD-10-PCS | Mod: CPTII,S$GLB,, | Performed by: SURGERY

## 2020-08-12 PROCEDURE — 99999 PR PBB SHADOW E&M-EST. PATIENT-LVL III: CPT | Mod: PBBFAC,,, | Performed by: SURGERY

## 2020-08-12 PROCEDURE — 80053 COMPREHEN METABOLIC PANEL: CPT

## 2020-08-12 PROCEDURE — 99999 PR PBB SHADOW E&M-EST. PATIENT-LVL III: ICD-10-PCS | Mod: PBBFAC,,, | Performed by: SURGERY

## 2020-08-12 PROCEDURE — 36415 COLL VENOUS BLD VENIPUNCTURE: CPT

## 2020-08-12 PROCEDURE — 99204 OFFICE O/P NEW MOD 45 MIN: CPT | Mod: S$GLB,,, | Performed by: SURGERY

## 2020-08-12 PROCEDURE — 3078F PR MOST RECENT DIASTOLIC BLOOD PRESSURE < 80 MM HG: ICD-10-PCS | Mod: CPTII,S$GLB,, | Performed by: SURGERY

## 2020-08-12 PROCEDURE — 99999 PR PBB SHADOW E&M-EST. PATIENT-LVL I: ICD-10-PCS | Mod: PBBFAC,,, | Performed by: DIETITIAN, REGISTERED

## 2020-08-12 PROCEDURE — 99499 UNLISTED E&M SERVICE: CPT | Mod: S$GLB,,, | Performed by: DIETITIAN, REGISTERED

## 2020-08-12 PROCEDURE — 3008F BODY MASS INDEX DOCD: CPT | Mod: CPTII,S$GLB,, | Performed by: SURGERY

## 2020-08-12 PROCEDURE — 3008F PR BODY MASS INDEX (BMI) DOCUMENTED: ICD-10-PCS | Mod: CPTII,S$GLB,, | Performed by: SURGERY

## 2020-08-12 PROCEDURE — 99204 PR OFFICE/OUTPT VISIT, NEW, LEVL IV, 45-59 MIN: ICD-10-PCS | Mod: S$GLB,,, | Performed by: SURGERY

## 2020-08-12 PROCEDURE — 80323 ALKALOIDS NOS: CPT

## 2020-08-12 PROCEDURE — 99499 NO LOS: ICD-10-PCS | Mod: S$GLB,,, | Performed by: DIETITIAN, REGISTERED

## 2020-08-12 PROCEDURE — 85025 COMPLETE CBC W/AUTO DIFF WBC: CPT

## 2020-08-12 PROCEDURE — 99999 PR PBB SHADOW E&M-EST. PATIENT-LVL I: CPT | Mod: PBBFAC,,, | Performed by: DIETITIAN, REGISTERED

## 2020-08-12 PROCEDURE — 3078F DIAST BP <80 MM HG: CPT | Mod: CPTII,S$GLB,, | Performed by: SURGERY

## 2020-08-12 PROCEDURE — 3074F SYST BP LT 130 MM HG: CPT | Mod: CPTII,S$GLB,, | Performed by: SURGERY

## 2020-08-12 NOTE — PROGRESS NOTES
Pt will use premier and Loffles protein shakes.  If using protein powder, reminded pt of mixing with water for days 1 and 2, by day 3 may try using skim, 1% milk or unsweetened almond/soy milk.  By Day 4, may use RTD protein shakes as tolerated  Pt has the following vitamins and minerals to start taking once discharged from hospital.  Pt did not bring with her vitamins and minerals but has ordered them through [facility] program  Multivitamin with 18 mg iron take one tablet or chewable twice a day  B-complex with 50 mg thiamin taken once daily  Calcium citrate 500 mg with vitamin D three times per day  Vitamin B-12 monthly injections  Reviewed dosage and timing of vitamin/mineral guidelines.  Reviewed nutritional guidelines for protein and fluid requirements for week 1 and week 2 post-surgery.  Handout provided to log protein and fluid daily.  1 ounce medicine cups provided for patient to measure fluid intake after surgery.  Reviewed common nutritional concerns and prevention tips after bariatric surgery  Pt verbalized understanding of information provided with appropriate questions and comments.

## 2020-08-14 ENCOUNTER — TELEPHONE (OUTPATIENT)
Dept: BARIATRICS | Facility: CLINIC | Age: 60
End: 2020-08-14

## 2020-08-14 LAB
COTININE SERPL-MCNC: <3 NG/ML
NICOTINE SERPL-MCNC: <3 NG/ML

## 2020-08-14 NOTE — PRE-PROCEDURE INSTRUCTIONS
CB FROM PT AND PREOP INSTRUCTIONS GIVEN:    NPO GUIDELINES GIVEN PER BARIATRIC SX DEPT - REVIEWED W/PT AND PT R/B/C AND VERBALIZED A COMPLETE UNDERSTANDING.  Shower instructions as well as directions to the Day of Surgery Center were given.Patient encouraged to wear loose fitting,comfortable clothing.Medication instructions for pm prior to and am of procedure reviewed.Instructed patient to avoid taking vitamins,supplements,aspirin and ibuprofen the morning of surgery. Patient stated an understanding.Patient instructed to take temperature the night before surgery as well as the morning of surgery and to notify [facility] at [phone] if it is 100.4 or above.Patient also informed of the current visitor policy and advised patient that one visitor may accompany them into the hospital and wait (socially distanced) .When they enter the hospital both patient and visitor will have their temperature checked,provided a mask and provided assistance to their destination.     Inpatients are allowed 1 visitor/day from 8 am until 6 pm.     Covid screening completed 8/14/2020 and results are pending.     Patient denies any side effects or issues with anesthesia or sedation.    RAFAT ESTEVEZ will be providing transportation to the hospital and home upon discharge.

## 2020-08-15 RX ORDER — FAMOTIDINE 10 MG/ML
20 INJECTION INTRAVENOUS
Status: CANCELLED | OUTPATIENT
Start: 2020-08-15 | End: 2020-08-15

## 2020-08-15 RX ORDER — SODIUM CHLORIDE 9 MG/ML
INJECTION, SOLUTION INTRAVENOUS CONTINUOUS
Status: CANCELLED | OUTPATIENT
Start: 2020-08-15

## 2020-08-15 RX ORDER — OMEPRAZOLE 40 MG/1
40 CAPSULE, DELAYED RELEASE ORAL EVERY MORNING
Qty: 30 CAPSULE | Refills: 2 | Status: SHIPPED | OUTPATIENT
Start: 2020-08-15 | End: 2021-05-21

## 2020-08-15 RX ORDER — URSODIOL 500 MG/1
500 TABLET, FILM COATED ORAL DAILY
Qty: 30 TABLET | Refills: 5 | Status: SHIPPED | OUTPATIENT
Start: 2020-08-15 | End: 2020-10-22

## 2020-08-15 RX ORDER — ONDANSETRON 8 MG/1
8 TABLET, ORALLY DISINTEGRATING ORAL EVERY 6 HOURS PRN
Qty: 30 TABLET | Refills: 0 | Status: SHIPPED | OUTPATIENT
Start: 2020-08-15 | End: 2021-03-11

## 2020-08-15 RX ORDER — HYDROCODONE BITARTRATE AND ACETAMINOPHEN 7.5; 325 MG/15ML; MG/15ML
15 SOLUTION ORAL 4 TIMES DAILY PRN
Qty: 118 ML | Refills: 0 | Status: SHIPPED | OUTPATIENT
Start: 2020-08-15 | End: 2020-10-22

## 2020-08-15 RX ORDER — HEPARIN SODIUM 5000 [USP'U]/ML
5000 INJECTION, SOLUTION INTRAVENOUS; SUBCUTANEOUS
Status: CANCELLED | OUTPATIENT
Start: 2020-08-15 | End: 2020-08-16

## 2020-08-15 RX ORDER — METOCLOPRAMIDE HYDROCHLORIDE 5 MG/ML
10 INJECTION INTRAMUSCULAR; INTRAVENOUS
Status: CANCELLED | OUTPATIENT
Start: 2020-08-15 | End: 2020-08-16

## 2020-08-16 ENCOUNTER — ANESTHESIA EVENT (OUTPATIENT)
Dept: SURGERY | Facility: HOSPITAL | Age: 60
DRG: 621 | End: 2020-08-16
Payer: MEDICARE

## 2020-08-16 NOTE — H&P (VIEW-ONLY)
History & Physical    SUBJECTIVE:     History of Present Illness:  Kathryn Wagner is a 60 year-old morbidly obese female with current BMI 36.43 kg/m² and multiple associated comorbidities including HTN, hyperlipidemia, heartburn, DDD, and MDD/anxiety who presents for pre-operative exam for weight loss surgery. She has failed multiple attempts at non-surgical methods of weight loss and has completed the [facility] program which she started on 2/3/20. She meets NIH consensus criteria for bariatric surgery and is interested in undergoing laparoscopic sleeve gastrectomy.      Labs 3/2/20: AST 44, ALT 61  Labs 12/13/19: HbA1c 5.3%  CXR 3/2/20: No acute cardiopulmonary abnormality  EKG 4/9/19: NSR, indeterminate septal infarct  Echo 3/2/20: Negative for MI, EF 60%  EGD 6/18/0: Small hiatal hernia, no esophagitis or Blankenship's, pre-pyloric erythema, normal duo     Psych clearance: Amira 2/7/20    Screening:  Mammogram 1/3/20: Bi-Rads 1 - negative for malignancy    Chief Complaint   Patient presents with    Pre-op Exam     LSG 8/17/20       Review of patient's allergies indicates:   Allergen Reactions    Contrast media Hives    Iodine and iodide containing products Hives and Itching    Latex, natural rubber Rash       Current Outpatient Medications   Medication Sig    DULoxetine (CYMBALTA) 60 MG capsule Take 1 capsule (60 mg total) by mouth once daily.    losartan (COZAAR) 50 MG tablet Take 1 tablet (50 mg total) by mouth once daily. Stop lisinopril hctz    omeprazole (PRILOSEC) 20 MG capsule TAKE ONE Capsule BY MOUTH EVERY DAY    oxycodone-acetaminophen (PERCOCET)  mg per tablet Take 1 tablet by mouth every 8 (eight) hours as needed.     traZODone (DESYREL) 50 MG tablet Take 50 mg by mouth every evening.    naloxone (NARCAN) 4 mg/actuation Spry Narcan 4 mg/actuation nasal spray     No current facility-administered medications for this visit.        Past Medical History:   Diagnosis Date    Colon  polyp     Depression     Encounter for blood transfusion     GERD (gastroesophageal reflux disease)     High cholesterol     Hypertension     Lumbar spondylosis 2015    L3-4 fusion; L5S1 fusion    Obesity     Restless leg syndrome        Past Surgical History:   Procedure Laterality Date    BACK SURGERY Bilateral     2015, 2015 fusion    BELT ABDOMINOPLASTY      BREAST SURGERY      breast reduction    CARPAL TUNNEL RELEASE Left      SECTION      x3    COSMETIC SURGERY      ESOPHAGOGASTRODUODENOSCOPY N/A 2020    Procedure: ESOPHAGOGASTRODUODENOSCOPY (EGD);  Surgeon: Gianni Mariscal MD;  [facility] [place] [address]);  Service: Endoscopy;  Laterality: N/A;  hard IV stick per pt.  covid test -Lapalco    HYSTERECTOMY      partial    TOTAL REDUCTION MAMMOPLASTY      TUBAL LIGATION         Review of Systems   Constitutional: Negative for chills, diaphoresis, fever and malaise/fatigue.   HENT: Negative for congestion, hearing loss and sore throat.    Eyes: Negative for blurred vision and double vision.   Respiratory: Negative for cough and shortness of breath.    Cardiovascular: Negative for chest pain, palpitations, orthopnea, claudication, leg swelling and PND.   Gastrointestinal: Positive for constipation. Negative for abdominal pain, blood in stool, diarrhea, heartburn, nausea and vomiting.   Genitourinary: Negative for dysuria and urgency.   Musculoskeletal: Positive for back pain (chronic) and joint pain (L hip). Negative for falls, myalgias and neck pain.   Skin: Negative for itching and rash.   Neurological: Negative for weakness and headaches.   Endo/Heme/Allergies: Does not bruise/bleed easily.   Psychiatric/Behavioral: Negative for depression and substance abuse.          OBJECTIVE:     Vitals:    20 1035   BP: 124/78   Pulse: 83   Resp: 18   Weight: 84.6 kg (186 lb 8.2 oz)   Height: 5' (1.524 m)       Physical Exam   Constitutional: She is oriented to  person, place, and time and well-developed, well-nourished, and in no distress. No distress.   HENT:   Head: Normocephalic and atraumatic.   Eyes: Pupils are equal, round, and reactive to light. Conjunctivae and EOM are normal. No scleral icterus.   Neck: Normal range of motion. Neck supple. No thyromegaly present.   Cardiovascular: Normal rate, regular rhythm, normal heart sounds and intact distal pulses.   No murmur heard.  Pulmonary/Chest: Effort normal and breath sounds normal. No respiratory distress. She has no wheezes.   Abdominal: Soft. She exhibits no distension and no mass. There is no abdominal tenderness. There is no rebound and no guarding.   Musculoskeletal: Normal range of motion.         General: No deformity or edema.   Lymphadenopathy:     She has no cervical adenopathy.   Neurological: She is alert and oriented to person, place, and time. No cranial nerve deficit. Gait normal. GCS score is 15.   Skin: Skin is warm and dry. No rash noted. She is not diaphoretic. No erythema.   Psychiatric: Mood, memory, affect and judgment normal.   Vitals reviewed.       Laboratory  CBC: Reviewed  CMP: Reviewed  Bariatric labs: Reviewed    Diagnostic Results:  Labs: Reviewed  ECG: Reviewed  X-Ray: Reviewed  Echo: Reviewed  EGD: Reviewed     Dietitian: Patient has participated in pre-operative nutritional program with understanding of necessary lifelong dietary changes required with surgery.    Psych: No overt contraindications to bariatric surgery, patient has completed psychological evaluation and is able to give informed consent.    PCP: Medically cleared for surgery.     ASSESSMENT/PLAN:     Morbid obesity with failure of medical conservative therapy.    Co Morbid Conditions:   Patient Active Problem List   Diagnosis    Hypertension, essential    Mixed hyperlipidemia simva SE myalgia    Major depressive disorder, recurrent, in partial remission    Class 2 obesity due to excess calories in adult    Chronic  left-sided low back pain with left-sided sciatica    Hip pain, left    Weakness of trunk musculature    Left carpal tunnel syndrome    Hyperplastic polyp of intestine 11/2012    GERD (gastroesophageal reflux disease) 11/19/2012 EGD normal no bx's    Sacroiliac joint pain    Postlaminectomy syndrome of lumbar region    DDD (degenerative disc disease), lumbar    Primary insomnia weaned off ambien    Chronic narcotic use    Vitamin D deficiency    Abnormal glucose    Tobacco use disorder, moderate, in early remission    History of anxiety     Patient wishes to undergo laparoscopic sleeve gastrectomy. She is scheduled for 8/17/20. She started the pre-op liquid diet on Monday 8/10/20.      The patient was informed that risks of LSG include, but are not limited to: bleeding, infection, injury to intraabdominal structures such as bowel, stomach, esophagus, liver, and spleen; DVT/PE, cardiopulmonary complications such as MI, stroke or PNA; stricture requiring dilations, incisional hernia, gallstones, exacerbation of mood disorders, vitamin/mineral deficiencies, GERD, failure of weight loss or weight regain, possible conversion to an open operation, and staple-line leak which may require surgical or endoscopic interventions, drains, antibiotics and NPO status with IV nutrition.     We discussed that our goal is to ameliorate her medical problems and not to obtain a specific body mass index. All questions were answered to her satisfaction and she has elected to proceed with surgery. Consent for surgery and blood transfusion were signed. Prescriptions for hycet, ondansetron, omeprazole and ursodiol were sent to the pharmacy for bedside delivery. Patient met with the dietitian and RN for perioperative instructions.      Yuni Chaudhari  8/12/20

## 2020-08-16 NOTE — ANESTHESIA PREPROCEDURE EVALUATION
[facility]  Anesthesia Pre-Operative Evaluation         Patient Name: Kathryn Wagner  YOB: 1960  MRN: [id number]    SUBJECTIVE:     Pre-operative evaluation for Procedure(s) (LRB):  GASTRECTOMY, SLEEVE, LAPAROSCOPIC, with intra op EGD, needs to be first case in the AM (N/A)     08/16/2020    Kathryn Wagner is a 60 y.o. female w/ a significant PMHx of morbid obesity, htn, gerd, DDD, and depression/anxiety.     Patient now presents for the above procedure(s).      LDA: None documented.       Prev airway: None documented.    Drips: None documented.      Patient Active Problem List   Diagnosis    Hypertension, essential    Mixed hyperlipidemia simva SE myalgia    Major depressive disorder, recurrent, in partial remission    Class 2 obesity due to excess calories in adult    Chronic left-sided low back pain with left-sided sciatica    Hip pain, left    Weakness of trunk musculature    Left carpal tunnel syndrome    Hyperplastic polyp of intestine 11/2012    GERD (gastroesophageal reflux disease) 11/19/2012 EGD normal no bx's    Sacroiliac joint pain    Postlaminectomy syndrome of lumbar region    DDD (degenerative disc disease), lumbar    Primary insomnia weaned off ambien    Chronic narcotic use    Vitamin D deficiency    Abnormal glucose    Tobacco use disorder, moderate, in early remission    History of anxiety       Review of patient's allergies indicates:   Allergen Reactions    Contrast media Hives    Iodine and iodide containing products Hives and Itching    Latex, natural rubber Rash       Current Inpatient Medications:      No current facility-administered medications on file prior to encounter.      Current Outpatient Medications on File Prior to Encounter   Medication Sig Dispense Refill    DULoxetine (CYMBALTA) 60 MG capsule Take 1 capsule (60 mg total)  by mouth once daily. 90 capsule 3    losartan (COZAAR) 50 MG tablet Take 1 tablet (50 mg total) by mouth once daily. Stop lisinopril hctz 90 tablet 3    oxycodone-acetaminophen (PERCOCET)  mg per tablet Take 1 tablet by mouth every 8 (eight) hours as needed.       traZODone (DESYREL) 50 MG tablet Take 50 mg by mouth every evening.      naloxone (NARCAN) 4 mg/actuation Spry Narcan 4 mg/actuation nasal spray         Past Surgical History:   Procedure Laterality Date    BACK SURGERY Bilateral     2015, 2015 fusion    BELT ABDOMINOPLASTY      BREAST SURGERY      breast reduction    CARPAL TUNNEL RELEASE Left      SECTION      x3    COSMETIC SURGERY      ESOPHAGOGASTRODUODENOSCOPY N/A 2020    Procedure: ESOPHAGOGASTRODUODENOSCOPY (EGD);  Surgeon: Gianni Mariscal MD;  Location: [address]);  Service: Endoscopy;  Laterality: N/A;  hard IV stick per pt.  covid test -Lapalco    HYSTERECTOMY      partial    TOTAL REDUCTION MAMMOPLASTY      TUBAL LIGATION         Social History     Socioeconomic History    Marital status:      Spouse name: Not on file    Number of children: Not on file    Years of education: Not on file    Highest education level: Not on file   Occupational History    Occupation:      Comment: [employer]    Occupation: formerly    Social Needs    Financial resource strain: Not on file    Food insecurity     Worry: Not on file     Inability: Not on file    Transportation needs     Medical: Not on file     Non-medical: Not on file   Tobacco Use    Smoking status: Former Smoker     Types: Cigarettes     Quit date: 3/18/2020     Years since quittin.4    Smokeless tobacco: Never Used   Substance and Sexual Activity    Alcohol use: Yes     Alcohol/week: 0.0 standard drinks     Comment: a coupe of times a week    Drug use: No    Sexual activity: Yes   Lifestyle    Physical activity     Days per week: Not  on file     Minutes per session: Not on file    Stress: Not on file   Relationships    Social connections     Talks on phone: Not on file     Gets together: Not on file     Attends Sabianist service: Not on file     Active member of club or organization: Not on file     Attends meetings of clubs or organizations: Not on file     Relationship status: Not on file   Other Topics Concern    Are you pregnant or think you may be? Not Asked    Breast-feeding Not Asked   Social History Narrative    Disability for chronic back pain       OBJECTIVE:     Vital Signs Range (Last 24H):         Significant Labs:  Lab Results   Component Value Date    WBC 7.22 08/12/2020    HGB 12.3 08/12/2020    HCT 39.3 08/12/2020     08/12/2020    CHOL 168 12/13/2019    TRIG 204 (H) 12/13/2019    HDL 52 12/13/2019    ALT 13 08/12/2020    AST 16 08/12/2020     08/12/2020    K 4.3 08/12/2020     08/12/2020    CREATININE 0.9 08/12/2020    BUN 25 (H) 08/12/2020    CO2 27 08/12/2020    TSH 0.400 12/13/2019    INR 1.0 11/05/2012    HGBA1C 5.3 12/13/2019       Diagnostic Studies: No relevant studies.    EKG:   Results for orders placed or performed during the hospital encounter of 03/02/20   EKG    Collection Time: 03/02/20 11:59 AM    Narrative    Test Reason : E66.09,Z68.36,E78.2,I10,F11.90,K21.9,Z79.899,    Vent. Rate : 074 BPM     Atrial Rate : 074 BPM     P-R Int : 144 ms          QRS Dur : 076 ms      QT Int : 388 ms       P-R-T Axes : 047 065 062 degrees     QTc Int : 430 ms    Normal sinus rhythm  Normal ECG  When compared with ECG of 02-MAR-2020 09:23,  No significant change was found  Confirmed by ILA DELACRUZ MD ([id number]) on 3/2/2020 2:08:04 PM    Referred By: GEETA HARRIS           Confirmed By:ILA DELACRUZ MD       2D ECHO:  TTE:  No results found for this or any previous visit.    ROSINA:  No results found for this or any previous visit.    ASSESSMENT/PLAN:       Anesthesia Evaluation    I have reviewed the Patient Summary  Reports.    I have reviewed the Nursing Notes. I have reviewed the NPO Status.   I have reviewed the Medications.     Review of Systems  Anesthesia Hx:  No problems with previous Anesthesia  History of prior surgery of interest to airway management or planning: Denies Family Hx of Anesthesia complications.   Denies Personal Hx of Anesthesia complications.   Social:  Non-Smoker, Social Alcohol Use    Hematology/Oncology:  Hematology Normal   Oncology Normal     EENT/Dental:EENT/Dental Normal   Cardiovascular:   Exercise tolerance: good Hypertension Denies CAD.     Denies Angina.     ECG has been reviewed.    Pulmonary:   Denies COPD.  Denies Asthma.    Renal/:  Renal/ Normal     Hepatic/GI:   GERD    Musculoskeletal:   Arthritis   Spine Disorders:    Neurological:   Neuromuscular Disease,    Endocrine:  Endocrine Normal    Dermatological:  Skin Normal    Psych:   Psychiatric History anxiety depression          Physical Exam  General:  Well nourished    Airway/Jaw/Neck:  Airway Findings: Mouth Opening: Normal Tongue: Normal  General Airway Assessment: Adult  Mallampati: II  Improves to I with phonation.  TM Distance: Normal, at least 6 cm        Eyes/Ears/Nose:  EYES/EARS/NOSE FINDINGS: Normal   Dental:  Dental Findings: In tact   Chest/Lungs:  Chest/Lungs Findings: Clear to auscultation, Normal Respiratory Rate     Heart/Vascular:  Heart Findings: Rate: Normal  Rhythm: Regular Rhythm  Sounds: Normal  Heart murmur: negative Vascular Findings: Normal    Abdomen:  Abdomen Findings: Normal    Musculoskeletal:  Musculoskeletal Findings: Normal   Skin:  Skin Findings: Normal    Mental Status:  Mental Status Findings: Normal        Anesthesia Plan  Type of Anesthesia, risks & benefits discussed:  Anesthesia Type:  general  Patient's Preference:   Intra-op Monitoring Plan: standard ASA monitors  Intra-op Monitoring Plan Comments:   Post Op Pain Control Plan: multimodal analgesia, IV/PO Opioids PRN and per primary service  following discharge from PACU  Post Op Pain Control Plan Comments:   Induction:   IV  Beta Blocker:  Patient is not currently on a Beta-Blocker (No further documentation required).       Informed Consent: Patient understands risks and agrees with Anesthesia plan.  Questions answered. Anesthesia consent signed with patient.  ASA Score: 3     Day of Surgery Review of History & Physical: I have interviewed and examined the patient. I have reviewed the patient's H&P dated:    H&P update referred to the surgeon.         Ready For Surgery From Anesthesia Perspective.

## 2020-08-16 NOTE — PROGRESS NOTES
History & Physical    SUBJECTIVE:     History of Present Illness:  Kathryn Wagner is a 60 year-old morbidly obese female with current BMI 36.43 kg/m² and multiple associated comorbidities including HTN, hyperlipidemia, heartburn, DDD, and MDD/anxiety who presents for pre-operative exam for weight loss surgery. She has failed multiple attempts at non-surgical methods of weight loss and [place] [facility] [place] which she started on 2/3/20. She meets NIH consensus criteria for bariatric surgery and is interested in undergoing laparoscopic sleeve gastrectomy.      Labs 3/2/20: AST 44, ALT 61  Labs 12/13/19: HbA1c 5.3%  CXR 3/2/20: No acute cardiopulmonary abnormality  EKG 4/9/19: NSR, indeterminate septal infarct  Echo 3/2/20: Negative for MI, EF 60%  EGD 6/18/0: Small hiatal hernia, no esophagitis or Blankenship's, pre-pyloric erythema, normal duo     Psych clearance: Amira 2/7/20    Screening:  Mammogram 1/3/20: Bi-Rads 1 - negative for malignancy    Chief Complaint   Patient presents with    Pre-op Exam     LSG 8/17/20       Review of patient's allergies indicates:   Allergen Reactions    Contrast media Hives    Iodine and iodide containing products Hives and Itching    Latex, natural rubber Rash       Current Outpatient Medications   Medication Sig    DULoxetine (CYMBALTA) 60 MG capsule Take 1 capsule (60 mg total) by mouth once daily.    losartan (COZAAR) 50 MG tablet Take 1 tablet (50 mg total) by mouth once daily. Stop lisinopril hctz    omeprazole (PRILOSEC) 20 MG capsule TAKE ONE Capsule BY MOUTH EVERY DAY    oxycodone-acetaminophen (PERCOCET)  mg per tablet Take 1 tablet by mouth every 8 (eight) hours as needed.     traZODone (DESYREL) 50 MG tablet Take 50 mg by mouth every evening.    naloxone (NARCAN) 4 mg/actuation Spry Narcan 4 mg/actuation nasal spray     No current facility-administered medications for this visit.        Past Medical History:   Diagnosis Date    Colon  polyp     Depression     Encounter for blood transfusion     GERD (gastroesophageal reflux disease)     High cholesterol     Hypertension     Lumbar spondylosis 2015    L3-4 fusion; L5S1 fusion    Obesity     Restless leg syndrome        Past Surgical History:   Procedure Laterality Date    BACK SURGERY Bilateral     2015, 2015 fusion    BELT ABDOMINOPLASTY      BREAST SURGERY      breast reduction    CARPAL TUNNEL RELEASE Left      SECTION      x3    COSMETIC SURGERY      ESOPHAGOGASTRODUODENOSCOPY N/A 2020    Procedure: ESOPHAGOGASTRODUODENOSCOPY (EGD);  Surgeon: Gianni Mariscal MD;  Location: [facility] ([address] [place] [address]);  Service: Endoscopy;  Laterality: N/A;  hard IV stick per pt.  covid test -Lapalco    HYSTERECTOMY      partial    TOTAL REDUCTION MAMMOPLASTY      TUBAL LIGATION         Review of Systems   Constitutional: Negative for chills, diaphoresis, fever and malaise/fatigue.   HENT: Negative for congestion, hearing loss and sore throat.    Eyes: Negative for blurred vision and double vision.   Respiratory: Negative for cough and shortness of breath.    Cardiovascular: Negative for chest pain, palpitations, orthopnea, claudication, leg swelling and PND.   Gastrointestinal: Positive for constipation. Negative for abdominal pain, blood in stool, diarrhea, heartburn, nausea and vomiting.   Genitourinary: Negative for dysuria and urgency.   Musculoskeletal: Positive for back pain (chronic) and joint pain (L hip). Negative for falls, myalgias and neck pain.   Skin: Negative for itching and rash.   Neurological: Negative for weakness and headaches.   Endo/Heme/Allergies: Does not bruise/bleed easily.   Psychiatric/Behavioral: Negative for depression and substance abuse.          OBJECTIVE:     Vitals:    20 1035   BP: 124/78   Pulse: 83   Resp: 18   Weight: 84.6 kg (186 lb 8.2 oz)   Height: 5' (1.524 m)       Physical Exam   Constitutional: She is oriented to  person, place, and time and well-developed, well-nourished, and in no distress. No distress.   HENT:   Head: Normocephalic and atraumatic.   Eyes: Pupils are equal, round, and reactive to light. Conjunctivae and EOM are normal. No scleral icterus.   Neck: Normal range of motion. Neck supple. No thyromegaly present.   Cardiovascular: Normal rate, regular rhythm, normal heart sounds and intact distal pulses.   No murmur heard.  Pulmonary/Chest: Effort normal and breath sounds normal. No respiratory distress. She has no wheezes.   Abdominal: Soft. She exhibits no distension and no mass. There is no abdominal tenderness. There is no rebound and no guarding.   Musculoskeletal: Normal range of motion.         General: No deformity or edema.   Lymphadenopathy:     She has no cervical adenopathy.   Neurological: She is alert and oriented to person, place, and time. No cranial nerve deficit. Gait normal. GCS score is 15.   Skin: Skin is warm and dry. No rash noted. She is not diaphoretic. No erythema.   Psychiatric: Mood, memory, affect and judgment normal.   Vitals reviewed.       Laboratory  CBC: Reviewed  CMP: Reviewed  Bariatric labs: Reviewed    Diagnostic Results:  Labs: Reviewed  ECG: Reviewed  X-Ray: Reviewed  Echo: Reviewed  EGD: Reviewed     Dietitian: Patient has participated in pre-operative nutritional program with understanding of necessary lifelong dietary changes required with surgery.    Psych: No overt contraindications to bariatric surgery, patient has completed psychological evaluation and is able to give informed consent.    PCP: Medically cleared for surgery.     ASSESSMENT/PLAN:     Morbid obesity with failure of medical conservative therapy.    Co Morbid Conditions:   Patient Active Problem List   Diagnosis    Hypertension, essential    Mixed hyperlipidemia simva SE myalgia    Major depressive disorder, recurrent, in partial remission    Class 2 obesity due to excess calories in adult    Chronic  left-sided low back pain with left-sided sciatica    Hip pain, left    Weakness of trunk musculature    Left carpal tunnel syndrome    Hyperplastic polyp of intestine 11/2012    GERD (gastroesophageal reflux disease) 11/19/2012 EGD normal no bx's    Sacroiliac joint pain    Postlaminectomy syndrome of lumbar region    DDD (degenerative disc disease), lumbar    Primary insomnia weaned off ambien    Chronic narcotic use    Vitamin D deficiency    Abnormal glucose    Tobacco use disorder, moderate, in early remission    History of anxiety     Patient wishes to undergo laparoscopic sleeve gastrectomy. She is scheduled for 8/17/20. She started the pre-op liquid diet on Monday 8/10/20.      The patient was informed that risks of LSG include, but are not limited to: bleeding, infection, injury to intraabdominal structures such as bowel, stomach, esophagus, liver, and spleen; DVT/PE, cardiopulmonary complications such as MI, stroke or PNA; stricture requiring dilations, incisional hernia, gallstones, exacerbation of mood disorders, vitamin/mineral deficiencies, GERD, failure of weight loss or weight regain, possible conversion to an open operation, and staple-line leak which may require surgical or endoscopic interventions, drains, antibiotics and NPO status with IV nutrition.     We discussed that our goal is to ameliorate her medical problems and not to obtain a specific body mass index. All questions were answered to her satisfaction and she has elected to proceed with surgery. Consent for surgery and blood transfusion were signed. Prescriptions for hycet, ondansetron, omeprazole and ursodiol were sent to the pharmacy for bedside delivery. Patient met with the dietitian and RN for perioperative instructions.      Yuni Chaudhari  8/12/20

## 2020-08-17 ENCOUNTER — HOSPITAL ENCOUNTER (INPATIENT)
Facility: HOSPITAL | Age: 60
LOS: 2 days | Discharge: HOME | DRG: 621 | End: 2020-08-19
Attending: SURGERY | Admitting: SURGERY
Payer: MEDICARE

## 2020-08-17 ENCOUNTER — ANESTHESIA (OUTPATIENT)
Dept: SURGERY | Facility: HOSPITAL | Age: 60
DRG: 621 | End: 2020-08-17
Payer: MEDICARE

## 2020-08-17 DIAGNOSIS — F17.200 SMOKER: ICD-10-CM

## 2020-08-17 DIAGNOSIS — E66.01 OBESITY, MORBID: Primary | ICD-10-CM

## 2020-08-17 DIAGNOSIS — E66.01 CLASS 2 SEVERE OBESITY DUE TO EXCESS CALORIES WITH SERIOUS COMORBIDITY IN ADULT, UNSPECIFIED BMI: ICD-10-CM

## 2020-08-17 DIAGNOSIS — E78.2 MIXED HYPERLIPIDEMIA: ICD-10-CM

## 2020-08-17 DIAGNOSIS — I10 HYPERTENSION, ESSENTIAL: ICD-10-CM

## 2020-08-17 DIAGNOSIS — F32.A DEPRESSION, UNSPECIFIED DEPRESSION TYPE: ICD-10-CM

## 2020-08-17 DIAGNOSIS — K21.9 GASTROESOPHAGEAL REFLUX DISEASE WITHOUT ESOPHAGITIS: ICD-10-CM

## 2020-08-17 LAB
ABO + RH BLD: NORMAL
BLD GP AB SCN CELLS X3 SERPL QL: NORMAL

## 2020-08-17 PROCEDURE — 36000710: Performed by: SURGERY

## 2020-08-17 PROCEDURE — S0028 INJECTION, FAMOTIDINE, 20 MG: HCPCS | Performed by: SURGERY

## 2020-08-17 PROCEDURE — 63600175 PHARM REV CODE 636 W HCPCS: Performed by: STUDENT IN AN ORGANIZED HEALTH CARE EDUCATION/TRAINING PROGRAM

## 2020-08-17 PROCEDURE — 43775 PR LAP, GAST RESTRICT PROC, LONGITUDINAL GASTRECTOMY: ICD-10-PCS | Mod: ,,, | Performed by: SURGERY

## 2020-08-17 PROCEDURE — D9220A PRA ANESTHESIA: Mod: ,,, | Performed by: ANESTHESIOLOGY

## 2020-08-17 PROCEDURE — C9113 INJ PANTOPRAZOLE SODIUM, VIA: HCPCS | Performed by: STUDENT IN AN ORGANIZED HEALTH CARE EDUCATION/TRAINING PROGRAM

## 2020-08-17 PROCEDURE — 88307 TISSUE EXAM BY PATHOLOGIST: CPT | Performed by: PATHOLOGY

## 2020-08-17 PROCEDURE — 86901 BLOOD TYPING SEROLOGIC RH(D): CPT

## 2020-08-17 PROCEDURE — 63600175 PHARM REV CODE 636 W HCPCS: Performed by: SURGERY

## 2020-08-17 PROCEDURE — 37000009 HC ANESTHESIA EA ADD 15 MINS: Performed by: SURGERY

## 2020-08-17 PROCEDURE — C1768 GRAFT, VASCULAR: HCPCS | Performed by: SURGERY

## 2020-08-17 PROCEDURE — 63600175 PHARM REV CODE 636 W HCPCS

## 2020-08-17 PROCEDURE — 37000008 HC ANESTHESIA 1ST 15 MINUTES: Performed by: SURGERY

## 2020-08-17 PROCEDURE — 71000015 HC POSTOP RECOV 1ST HR: Performed by: SURGERY

## 2020-08-17 PROCEDURE — 25000003 PHARM REV CODE 250: Performed by: SURGERY

## 2020-08-17 PROCEDURE — 88307 PR  SURG PATH,LEVEL V: ICD-10-PCS | Mod: 26,,, | Performed by: PATHOLOGY

## 2020-08-17 PROCEDURE — 71000039 HC RECOVERY, EACH ADD'L HOUR: Performed by: SURGERY

## 2020-08-17 PROCEDURE — 71000033 HC RECOVERY, INTIAL HOUR: Performed by: SURGERY

## 2020-08-17 PROCEDURE — D9220A PRA ANESTHESIA: ICD-10-PCS | Mod: ,,, | Performed by: ANESTHESIOLOGY

## 2020-08-17 PROCEDURE — 25000003 PHARM REV CODE 250: Performed by: STUDENT IN AN ORGANIZED HEALTH CARE EDUCATION/TRAINING PROGRAM

## 2020-08-17 PROCEDURE — 71000016 HC POSTOP RECOV ADDL HR: Performed by: SURGERY

## 2020-08-17 PROCEDURE — 27201423 OPTIME MED/SURG SUP & DEVICES STERILE SUPPLY: Performed by: SURGERY

## 2020-08-17 PROCEDURE — 88307 TISSUE EXAM BY PATHOLOGIST: CPT | Mod: 26,,, | Performed by: PATHOLOGY

## 2020-08-17 PROCEDURE — 43775 LAP SLEEVE GASTRECTOMY: CPT | Mod: ,,, | Performed by: SURGERY

## 2020-08-17 PROCEDURE — 36000711: Performed by: SURGERY

## 2020-08-17 PROCEDURE — 27800903 OPTIME MED/SURG SUP & DEVICES OTHER IMPLANTS: Performed by: SURGERY

## 2020-08-17 PROCEDURE — 94761 N-INVAS EAR/PLS OXIMETRY MLT: CPT

## 2020-08-17 PROCEDURE — S0020 INJECTION, BUPIVICAINE HYDRO: HCPCS | Performed by: SURGERY

## 2020-08-17 PROCEDURE — 11000001 HC ACUTE MED/SURG PRIVATE ROOM

## 2020-08-17 PROCEDURE — 27201037 HC PRESSURE MONITORING SET UP

## 2020-08-17 DEVICE — SEAMGUARD ESCHELON 60 MM.: Type: IMPLANTABLE DEVICE | Site: ABDOMEN | Status: FUNCTIONAL

## 2020-08-17 DEVICE — FELT THIN 1INX1IN: Type: IMPLANTABLE DEVICE | Site: ABDOMEN | Status: FUNCTIONAL

## 2020-08-17 RX ORDER — SUCCINYLCHOLINE CHLORIDE 20 MG/ML
INJECTION INTRAMUSCULAR; INTRAVENOUS
Status: DISCONTINUED | OUTPATIENT
Start: 2020-08-17 | End: 2020-08-17

## 2020-08-17 RX ORDER — HYDROCODONE BITARTRATE AND ACETAMINOPHEN 7.5; 325 MG/15ML; MG/15ML
15 SOLUTION ORAL EVERY 4 HOURS PRN
Status: DISCONTINUED | OUTPATIENT
Start: 2020-08-18 | End: 2020-08-18

## 2020-08-17 RX ORDER — ACETAMINOPHEN 10 MG/ML
INJECTION, SOLUTION INTRAVENOUS
Status: DISCONTINUED | OUTPATIENT
Start: 2020-08-17 | End: 2020-08-17

## 2020-08-17 RX ORDER — HYDROMORPHONE HCL IN 0.9% NACL 6 MG/30 ML
PATIENT CONTROLLED ANALGESIA SYRINGE INTRAVENOUS CONTINUOUS
Status: DISCONTINUED | OUTPATIENT
Start: 2020-08-17 | End: 2020-08-18

## 2020-08-17 RX ORDER — SODIUM CHLORIDE 9 MG/ML
INJECTION, SOLUTION INTRAVENOUS CONTINUOUS
Status: DISCONTINUED | OUTPATIENT
Start: 2020-08-17 | End: 2020-08-17

## 2020-08-17 RX ORDER — PANTOPRAZOLE SODIUM 40 MG/10ML
40 INJECTION, POWDER, LYOPHILIZED, FOR SOLUTION INTRAVENOUS 2 TIMES DAILY
Status: DISCONTINUED | OUTPATIENT
Start: 2020-08-17 | End: 2020-08-19 | Stop reason: HOSPADM

## 2020-08-17 RX ORDER — DEXAMETHASONE SODIUM PHOSPHATE 4 MG/ML
INJECTION, SOLUTION INTRA-ARTICULAR; INTRALESIONAL; INTRAMUSCULAR; INTRAVENOUS; SOFT TISSUE
Status: DISCONTINUED | OUTPATIENT
Start: 2020-08-17 | End: 2020-08-17

## 2020-08-17 RX ORDER — ENOXAPARIN SODIUM 100 MG/ML
40 INJECTION SUBCUTANEOUS EVERY 24 HOURS
Status: DISCONTINUED | OUTPATIENT
Start: 2020-08-17 | End: 2020-08-19 | Stop reason: HOSPADM

## 2020-08-17 RX ORDER — BUPIVACAINE HYDROCHLORIDE 5 MG/ML
INJECTION, SOLUTION EPIDURAL; INTRACAUDAL
Status: DISCONTINUED | OUTPATIENT
Start: 2020-08-17 | End: 2020-08-17 | Stop reason: HOSPADM

## 2020-08-17 RX ORDER — PHENYLEPHRINE HYDROCHLORIDE 10 MG/ML
INJECTION INTRAVENOUS
Status: DISCONTINUED | OUTPATIENT
Start: 2020-08-17 | End: 2020-08-17

## 2020-08-17 RX ORDER — SODIUM CHLORIDE 9 MG/ML
INJECTION, SOLUTION INTRAVENOUS CONTINUOUS
Status: DISCONTINUED | OUTPATIENT
Start: 2020-08-17 | End: 2020-08-19 | Stop reason: HOSPADM

## 2020-08-17 RX ORDER — DEXMEDETOMIDINE HYDROCHLORIDE 100 UG/ML
INJECTION, SOLUTION INTRAVENOUS
Status: DISCONTINUED | OUTPATIENT
Start: 2020-08-17 | End: 2020-08-17

## 2020-08-17 RX ORDER — DIPHENHYDRAMINE HYDROCHLORIDE 50 MG/ML
12.5 INJECTION INTRAMUSCULAR; INTRAVENOUS EVERY 4 HOURS PRN
Status: DISCONTINUED | OUTPATIENT
Start: 2020-08-17 | End: 2020-08-18

## 2020-08-17 RX ORDER — NALOXONE HCL 0.4 MG/ML
0.02 VIAL (ML) INJECTION
Status: DISCONTINUED | OUTPATIENT
Start: 2020-08-17 | End: 2020-08-18

## 2020-08-17 RX ORDER — SODIUM CHLORIDE 0.9 % (FLUSH) 0.9 %
10 SYRINGE (ML) INJECTION
Status: DISCONTINUED | OUTPATIENT
Start: 2020-08-17 | End: 2020-08-17 | Stop reason: HOSPADM

## 2020-08-17 RX ORDER — LIDOCAINE HYDROCHLORIDE 20 MG/ML
INJECTION INTRAVENOUS
Status: DISCONTINUED | OUTPATIENT
Start: 2020-08-17 | End: 2020-08-17

## 2020-08-17 RX ORDER — CEFAZOLIN SODIUM 1 G/3ML
2 INJECTION, POWDER, FOR SOLUTION INTRAMUSCULAR; INTRAVENOUS
Status: DISCONTINUED | OUTPATIENT
Start: 2020-08-17 | End: 2020-08-17

## 2020-08-17 RX ORDER — FAMOTIDINE 10 MG/ML
20 INJECTION INTRAVENOUS
Status: COMPLETED | OUTPATIENT
Start: 2020-08-17 | End: 2020-08-17

## 2020-08-17 RX ORDER — ONDANSETRON 2 MG/ML
8 INJECTION INTRAMUSCULAR; INTRAVENOUS EVERY 6 HOURS
Status: DISCONTINUED | OUTPATIENT
Start: 2020-08-17 | End: 2020-08-18

## 2020-08-17 RX ORDER — HEPARIN SODIUM 5000 [USP'U]/ML
5000 INJECTION, SOLUTION INTRAVENOUS; SUBCUTANEOUS
Status: COMPLETED | OUTPATIENT
Start: 2020-08-17 | End: 2020-08-17

## 2020-08-17 RX ORDER — HYDROMORPHONE HCL IN 0.9% NACL 6 MG/30 ML
PATIENT CONTROLLED ANALGESIA SYRINGE INTRAVENOUS
Status: COMPLETED
Start: 2020-08-17 | End: 2020-08-17

## 2020-08-17 RX ORDER — FENTANYL CITRATE 50 UG/ML
INJECTION, SOLUTION INTRAMUSCULAR; INTRAVENOUS
Status: DISCONTINUED | OUTPATIENT
Start: 2020-08-17 | End: 2020-08-17

## 2020-08-17 RX ORDER — PROPOFOL 10 MG/ML
VIAL (ML) INTRAVENOUS
Status: DISCONTINUED | OUTPATIENT
Start: 2020-08-17 | End: 2020-08-17

## 2020-08-17 RX ORDER — ACETAMINOPHEN 10 MG/ML
1000 INJECTION, SOLUTION INTRAVENOUS ONCE
Status: COMPLETED | OUTPATIENT
Start: 2020-08-17 | End: 2020-08-17

## 2020-08-17 RX ORDER — ONDANSETRON 2 MG/ML
4 INJECTION INTRAMUSCULAR; INTRAVENOUS DAILY PRN
Status: DISCONTINUED | OUTPATIENT
Start: 2020-08-17 | End: 2020-08-17 | Stop reason: HOSPADM

## 2020-08-17 RX ORDER — ROCURONIUM BROMIDE 10 MG/ML
INJECTION, SOLUTION INTRAVENOUS
Status: DISCONTINUED | OUTPATIENT
Start: 2020-08-17 | End: 2020-08-17

## 2020-08-17 RX ORDER — FENTANYL CITRATE 50 UG/ML
25 INJECTION, SOLUTION INTRAMUSCULAR; INTRAVENOUS EVERY 5 MIN PRN
Status: DISCONTINUED | OUTPATIENT
Start: 2020-08-17 | End: 2020-08-17 | Stop reason: HOSPADM

## 2020-08-17 RX ORDER — METOCLOPRAMIDE HYDROCHLORIDE 5 MG/ML
10 INJECTION INTRAMUSCULAR; INTRAVENOUS
Status: COMPLETED | OUTPATIENT
Start: 2020-08-17 | End: 2020-08-17

## 2020-08-17 RX ORDER — ONDANSETRON 2 MG/ML
INJECTION INTRAMUSCULAR; INTRAVENOUS
Status: DISCONTINUED | OUTPATIENT
Start: 2020-08-17 | End: 2020-08-17

## 2020-08-17 RX ADMIN — Medication: at 12:08

## 2020-08-17 RX ADMIN — PROPOFOL 150 MG: 10 INJECTION, EMULSION INTRAVENOUS at 09:08

## 2020-08-17 RX ADMIN — DIPHENHYDRAMINE HYDROCHLORIDE 12.5 MG: 50 INJECTION, SOLUTION INTRAMUSCULAR; INTRAVENOUS at 07:08

## 2020-08-17 RX ADMIN — SUGAMMADEX 200 MG: 100 INJECTION, SOLUTION INTRAVENOUS at 12:08

## 2020-08-17 RX ADMIN — HEPARIN SODIUM 5000 UNITS: 5000 INJECTION INTRAVENOUS; SUBCUTANEOUS at 09:08

## 2020-08-17 RX ADMIN — FENTANYL CITRATE 100 MCG: 50 INJECTION, SOLUTION INTRAMUSCULAR; INTRAVENOUS at 09:08

## 2020-08-17 RX ADMIN — ONDANSETRON 4 MG: 2 INJECTION, SOLUTION INTRAMUSCULAR; INTRAVENOUS at 09:08

## 2020-08-17 RX ADMIN — SODIUM CHLORIDE: 0.9 INJECTION, SOLUTION INTRAVENOUS at 12:08

## 2020-08-17 RX ADMIN — PANTOPRAZOLE SODIUM 40 MG: 40 INJECTION, POWDER, LYOPHILIZED, FOR SOLUTION INTRAVENOUS at 05:08

## 2020-08-17 RX ADMIN — PROPOFOL 50 MG: 10 INJECTION, EMULSION INTRAVENOUS at 09:08

## 2020-08-17 RX ADMIN — SODIUM CHLORIDE: 0.9 INJECTION, SOLUTION INTRAVENOUS at 09:08

## 2020-08-17 RX ADMIN — SODIUM CHLORIDE, SODIUM GLUCONATE, SODIUM ACETATE, POTASSIUM CHLORIDE, MAGNESIUM CHLORIDE, SODIUM PHOSPHATE, DIBASIC, AND POTASSIUM PHOSPHATE: .53; .5; .37; .037; .03; .012; .00082 INJECTION, SOLUTION INTRAVENOUS at 10:08

## 2020-08-17 RX ADMIN — DEXAMETHASONE SODIUM PHOSPHATE 4 MG: 4 INJECTION, SOLUTION INTRAMUSCULAR; INTRAVENOUS at 09:08

## 2020-08-17 RX ADMIN — METOCLOPRAMIDE 10 MG: 5 INJECTION, SOLUTION INTRAMUSCULAR; INTRAVENOUS at 09:08

## 2020-08-17 RX ADMIN — PHENYLEPHRINE HYDROCHLORIDE 200 MCG: 10 INJECTION INTRAVENOUS at 10:08

## 2020-08-17 RX ADMIN — ENOXAPARIN SODIUM 40 MG: 40 INJECTION SUBCUTANEOUS at 05:08

## 2020-08-17 RX ADMIN — PHENYLEPHRINE HYDROCHLORIDE 200 MCG: 10 INJECTION INTRAVENOUS at 09:08

## 2020-08-17 RX ADMIN — ONDANSETRON 8 MG: 2 INJECTION INTRAMUSCULAR; INTRAVENOUS at 05:08

## 2020-08-17 RX ADMIN — ROCURONIUM BROMIDE 40 MG: 10 INJECTION, SOLUTION INTRAVENOUS at 11:08

## 2020-08-17 RX ADMIN — ACETAMINOPHEN 1000 MG: 10 INJECTION, SOLUTION INTRAVENOUS at 05:08

## 2020-08-17 RX ADMIN — ROCURONIUM BROMIDE 5 MG: 10 INJECTION, SOLUTION INTRAVENOUS at 09:08

## 2020-08-17 RX ADMIN — ACETAMINOPHEN 1000 MG: 10 INJECTION, SOLUTION INTRAVENOUS at 11:08

## 2020-08-17 RX ADMIN — SODIUM CHLORIDE, SODIUM GLUCONATE, SODIUM ACETATE, POTASSIUM CHLORIDE, MAGNESIUM CHLORIDE, SODIUM PHOSPHATE, DIBASIC, AND POTASSIUM PHOSPHATE: .53; .5; .37; .037; .03; .012; .00082 INJECTION, SOLUTION INTRAVENOUS at 11:08

## 2020-08-17 RX ADMIN — FAMOTIDINE 20 MG: 10 INJECTION INTRAVENOUS at 09:08

## 2020-08-17 RX ADMIN — ROCURONIUM BROMIDE 30 MG: 10 INJECTION, SOLUTION INTRAVENOUS at 09:08

## 2020-08-17 RX ADMIN — SUCCINYLCHOLINE CHLORIDE 160 MG: 20 INJECTION, SOLUTION INTRAMUSCULAR; INTRAVENOUS at 09:08

## 2020-08-17 RX ADMIN — LIDOCAINE HYDROCHLORIDE 100 MG: 20 INJECTION, SOLUTION INTRAVENOUS at 09:08

## 2020-08-17 RX ADMIN — Medication: at 03:08

## 2020-08-17 RX ADMIN — DEXMEDETOMIDINE HYDROCHLORIDE 8 MCG: 100 INJECTION, SOLUTION, CONCENTRATE INTRAVENOUS at 12:08

## 2020-08-17 NOTE — OP NOTE
DATE OF PROCEDURE: 8/17/2020    PRE OP DIAGNOSIS: Hypertension, essential [I10]  Mixed hyperlipidemia [E78.2]  Severe obesity [E66.01]  BMI 36.0-36.9, adult [Z68.36]  Gastroesophageal reflux disease without esophagitis [K21.9]  PRADO (dyspnea on exertion) [R06.09]  Degenerative joint disease  MDD  Anxiety    POST OP DIAGNOSIS: Hypertension, essential [I10]  Mixed hyperlipidemia [E78.2]  Severe obesity [E66.01]  BMI 36.0-36.9, adult [Z68.36]  Gastroesophageal reflux disease without esophagitis [K21.9]  PRADO (dyspnea on exertion) [R06.09]  Degenerative joint disease  MDD  Anxiety    PROCEDURE: Procedure(s) (LRB):  GASTRECTOMY, SLEEVE, LAPAROSCOPIC, with intra op EGD (N/A)   REPAIR, HERNIA, HIATAL, LAPAROSCOPIC (N/A)    Surgeon(s) and Role:     * Yuni Chaudhari MD - Primary     * Delano Rowe MD - Resident - Assisting    ANESTHESIA: General    INDICATION: Patient is a 60 year-old morbidly obese female with current BMI 36.43 kg/m² and multiple associated comorbidities including HTN, hyperlipidemia, GERD with hiatal hernia, DDD, and MDD/anxiety. She meets NIH consensus criteria for bariatric surgery and [place] [facility] After discussing the risks, benefits and alternatives to bariatric surgery, she elected to proceed with laparoscopic sleeve gastrectomy with concomitant hiatal hernia repair. Informed consent was obtained.     FINDINGS:  1. Sliding-type hiatal hernia with moderate amount of herniated perigastric fat, reduced.  2. Sleeve gastrectomy performed over 40Fr Bougie.     DESCRIPTION OF OPERATION: The patient was met in the pre-op area and her identity and consent confirmed. She was then brought back to the Operating Room and placed in the supine position. General anesthesia was induced and she was intubated without complication. SCDs and a warming blanket were placed and pre-operative antibiotics were infused within 30 minutes of the incision. Her abdomen was prepped and draped in  sterile fashion and a pre-procedural pause was performed by all members of the surgical and anesthesia teams. Access to peritoneum was gained 15 cm below the xiphoid to the left of midline using an 11-mm Optiview trocar under direct vision. Pneumoperitoneum to 15 mmHg with CO2 gas was obtained. Three 5 mm trocars were placed, laterally subcostal on each side and one midclavicular subcostal on the right, and a 12-mm trocar placed 1 handbreadth to the right of the camera port. The liver retractor was placed via the right lateral trocar. Inspection of the crura noted a moderately sized hiatal hernia. The pars flaccida was divided. Using judicial use of the Harmonic scalpel and blunt dissection the hernia sac was divided from the right crura and brought into the abdomen. The greater curve was taken down starting 6 cm from the pylorus going all the way to the base of the left heather taking all posterior gastric attachments with the Harmonic scalpel. The hernia sac was then divided from the left crura and cleared anteriorly. A fair amount of perigastric fat pad has also herniated which was reduced. The mediastinal attachments to the esophagus were bluntly divided. The anterior and posterior vagus nerves were identified and preserved with the esophagus throughout. After dissection about 4 cm of esophagus laid within the abdominal cavity without tension. The crural opening was relatively small in size and was reapproximated with one 0-Ticron plegeted suture posteriorly and one 0-Ticron suture anterioly. A grasper could just be placed between the fundoplication and esophagus. A 40-Azeri bougie was passed towards the pylorus and the stomach was resected along the bougie starting 6 cm from the pylorus and coming out just a little bit at the angle of His. This was done using one green staple cartridge at the antrum and the remainder blue staples, all reinforced with Seam Guard. The bougie was removed. Endoscopy was performed.  The sleeve appeared appropriate size and configuration and there were no air leaks seen. The air was aspirated from the sleeve and the endoscope was withdrawn. The gastrectomy was removed through the 12-mm trocar site.  That incision was then closed with 0 Vicryl using a Agus Troy and the wound irrigated with saline. The liver retractor was removed. The abdomen was inspected and hemostasis ensured. The trocars were removed under direct vision. Prior to removing the last trocar, the pneumoperitoneum was allowed to escape. The skin incisions were infiltrated with Marcaine solution, closed with 4-0 Monocryl, and reinforced with Dermabond. The patient was awoken from anesthesia and extubated. She was brought to the PACU in good condition having tolerated the operation well. Surgical sponge and needle counts were correct at the end of the case.     EBL: 10 mL  Specimen: Portion of stomach  Drains: None  Complications: None apparent  Dispo: Surgical floor     I was present and scrubbed for the entirety of this operation.      Yuni Chaudhari  8/17/2020

## 2020-08-17 NOTE — TRANSFER OF CARE
Anesthesia Transfer of Care Note    Patient: Kathryn Wagner    Procedure(s) Performed: Procedure(s) (LRB):  GASTRECTOMY, SLEEVE, LAPAROSCOPIC, with intra op EGD, needs to be first case in the AM (N/A)  REPAIR, HERNIA, HIATAL, LAPAROSCOPIC (N/A)    Patient location: PACU    Anesthesia Type: general    Transport from OR: Transported from OR on 6-10 L/min O2 by face mask with adequate spontaneous ventilation    Post pain: adequate analgesia    Post assessment: no apparent anesthetic complications    Post vital signs: stable    Level of consciousness: awake and alert    Nausea/Vomiting: no nausea/vomiting    Complications: none    Transfer of care protocol was followed      Last vitals:   Visit Vitals  BP (!) 102/59 (BP Location: Right arm, Patient Position: Lying)   Pulse 84   Temp 36.5 °C (97.7 °F) (Temporal)   Resp 16   Wt 84.4 kg (186 lb 1.1 oz)   SpO2 96%   Breastfeeding No   BMI 36.34 kg/m²

## 2020-08-17 NOTE — BRIEF OP NOTE
[facility]  Brief Operative Note    SUMMARY     Surgery Date: 8/17/2020     Surgeon(s) and Role:     * Yuni Chaudhari MD - Primary     * Delano Rowe MD - Resident - Assisting        Pre-op Diagnosis:  Hypertension, essential [I10]  Mixed hyperlipidemia [E78.2]  Pre-op evaluation [Z01.818]  Severe obesity [E66.01]  BMI 36.0-36.9,adult [Z68.36]  Dietary counseling [Z71.3]  Abnormal glucose [R73.09]  Gastroesophageal reflux disease without esophagitis [K21.9]  Other long term (current) drug therapy [Z79.899]  PRADO (dyspnea on exertion) [R06.09]    Post-op Diagnosis:  Post-Op Diagnosis Codes:     * Hypertension, essential [I10]     * Mixed hyperlipidemia [E78.2]     * Pre-op evaluation [Z01.818]     * Severe obesity [E66.01]     * BMI 36.0-36.9,adult [Z68.36]     * Dietary counseling [Z71.3]     * Abnormal glucose [R73.09]     * Gastroesophageal reflux disease without esophagitis [K21.9]     * Other long term (current) drug therapy [Z79.899]     * PRADO (dyspnea on exertion) [R06.09]    Procedure(s) (LRB):  GASTRECTOMY, SLEEVE, LAPAROSCOPIC, with intra op EGD, needs to be first case in the AM (N/A)  REPAIR, HERNIA, HIATAL, LAPAROSCOPIC (N/A)    Anesthesia: General    Description of Procedure: Laparoscopic hiatal hernia repair, laparoscopic sleeve gastrectomy    Description of the findings of the procedure: hiatal hernia with stomach and gastroesophageal fat pad, patent hemostatic sleeve gastrectomy    Estimated Blood Loss: 5 cc         Specimens: Greater curvature of stomach

## 2020-08-17 NOTE — NURSING TRANSFER
Nursing Transfer Note      8/17/2020     Transfer To: [address]    Transfer via bed    Transfer with na    Transported by pct x 2    Medicines sent: none    Chart send with patient: Yes    Notified: daughter    Patient reassessed at: [id number] 8/17/2020

## 2020-08-17 NOTE — ANESTHESIA POSTPROCEDURE EVALUATION
Anesthesia Post Evaluation    Patient: Kathryn Wagner    Procedure(s) Performed: Procedure(s) (LRB):  GASTRECTOMY, SLEEVE, LAPAROSCOPIC, with intra op EGD, needs to be first case in the AM (N/A)  REPAIR, HERNIA, HIATAL, LAPAROSCOPIC (N/A)    Final Anesthesia Type: general    Patient location during evaluation: PACU  Patient participation: Yes- Able to Participate  Level of consciousness: awake and alert  Post-procedure vital signs: reviewed and stable  Pain management: adequate  Airway patency: patent    PONV status at discharge: No PONV  Anesthetic complications: no      Cardiovascular status: hemodynamically stable  Respiratory status: spontaneous ventilation  Follow-up not needed.          Vitals Value Taken Time   /65 08/17/20 1446   Temp 36.5 °C (97.7 °F) 08/17/20 1330   Pulse 101 08/17/20 1456   Resp 15 08/17/20 1456   SpO2 93 % 08/17/20 1456   Vitals shown include unvalidated device data.      Event Time   Out of Recovery 13:45:00         Pain/Morales Score: Pain Rating Prior to Med Admin: 8 (8/17/2020 12:42 PM)  Pain Rating Post Med Admin: 8 (8/17/2020  1:30 PM)  Morales Score: 9 (8/17/2020  1:45 PM)

## 2020-08-17 NOTE — ANESTHESIA PROCEDURE NOTES
Intubation  Performed by: Ananth Branham MD  Authorized by: Whit Garcia MD     Intubation:     Induction:  Intravenous    Intubated:  Postinduction    Mask Ventilation:  Easy with oral airway    Attempts:  2    Attempted By:  Resident anesthesiologist    Blade:  Jade 3    Laryngeal View Grade: Grade IIb - only the arytenoids and epiglottis seen      Attempted By (2nd Attempt):  Resident anesthesiologist    Blade (2nd Attempt):  Jade 3    Laryngeal View Grade (2nd Attempt): Grade IIb - only the arytenoids and epiglottis seen      Difficult Airway Encountered?: No      Complications:  None    Airway Device:  Oral endotracheal tube    Airway Device Size:  7.0    Style/Cuff Inflation:  Cuffed    Inflation Amount (mL):  7    Tube secured:  22    Secured at:  The lips    Placement Verified By:  Capnometry    Complicating Factors:  Small mouth and anterior larynx    Findings Post-Intubation:  BS equal bilateral and atraumatic/condition of teeth unchanged  Notes:      Used a bougie on the second attempt.

## 2020-08-17 NOTE — INTERVAL H&P NOTE
The patient has been examined and the H&P has been reviewed:    I concur with the findings and no changes have occurred since H&P was written.    Surgery risks, benefits and alternative options discussed and understood by patient/family.          Active Hospital Problems    Diagnosis  POA    Obesity, morbid [E66.01]  Yes      Resolved Hospital Problems   No resolved problems to display.

## 2020-08-18 LAB
ANION GAP SERPL CALC-SCNC: 9 MMOL/L (ref 8–16)
BASOPHILS # BLD AUTO: 0.02 K/UL (ref 0–0.2)
BASOPHILS NFR BLD: 0.2 % (ref 0–1.9)
BUN SERPL-MCNC: 16 MG/DL (ref 6–20)
CALCIUM SERPL-MCNC: 8.5 MG/DL (ref 8.7–10.5)
CHLORIDE SERPL-SCNC: 110 MMOL/L (ref 95–110)
CO2 SERPL-SCNC: 24 MMOL/L (ref 23–29)
CREAT SERPL-MCNC: 0.8 MG/DL (ref 0.5–1.4)
DIFFERENTIAL METHOD: ABNORMAL
EOSINOPHIL # BLD AUTO: 0.1 K/UL (ref 0–0.5)
EOSINOPHIL NFR BLD: 0.7 % (ref 0–8)
ERYTHROCYTE [DISTWIDTH] IN BLOOD BY AUTOMATED COUNT: 13.1 % (ref 11.5–14.5)
EST. GFR  (AFRICAN AMERICAN): >60 ML/MIN/1.73 M^2
EST. GFR  (NON AFRICAN AMERICAN): >60 ML/MIN/1.73 M^2
GLUCOSE SERPL-MCNC: 79 MG/DL (ref 70–110)
HCT VFR BLD AUTO: 31.8 % (ref 37–48.5)
HGB BLD-MCNC: 10 G/DL (ref 12–16)
IMM GRANULOCYTES # BLD AUTO: 0.03 K/UL (ref 0–0.04)
IMM GRANULOCYTES NFR BLD AUTO: 0.3 % (ref 0–0.5)
LYMPHOCYTES # BLD AUTO: 1.6 K/UL (ref 1–4.8)
LYMPHOCYTES NFR BLD: 16 % (ref 18–48)
MAGNESIUM SERPL-MCNC: 2.1 MG/DL (ref 1.6–2.6)
MCH RBC QN AUTO: 28.9 PG (ref 27–31)
MCHC RBC AUTO-ENTMCNC: 31.4 G/DL (ref 32–36)
MCV RBC AUTO: 92 FL (ref 82–98)
MONOCYTES # BLD AUTO: 0.8 K/UL (ref 0.3–1)
MONOCYTES NFR BLD: 7.7 % (ref 4–15)
NEUTROPHILS # BLD AUTO: 7.4 K/UL (ref 1.8–7.7)
NEUTROPHILS NFR BLD: 75.1 % (ref 38–73)
NRBC BLD-RTO: 0 /100 WBC
PHOSPHATE SERPL-MCNC: 2.8 MG/DL (ref 2.7–4.5)
PLATELET # BLD AUTO: 226 K/UL (ref 150–350)
PMV BLD AUTO: 12.6 FL (ref 9.2–12.9)
POTASSIUM SERPL-SCNC: 4.6 MMOL/L (ref 3.5–5.1)
RBC # BLD AUTO: 3.46 M/UL (ref 4–5.4)
SODIUM SERPL-SCNC: 143 MMOL/L (ref 136–145)
WBC # BLD AUTO: 9.84 K/UL (ref 3.9–12.7)

## 2020-08-18 PROCEDURE — 84100 ASSAY OF PHOSPHORUS: CPT

## 2020-08-18 PROCEDURE — 63600175 PHARM REV CODE 636 W HCPCS: Performed by: STUDENT IN AN ORGANIZED HEALTH CARE EDUCATION/TRAINING PROGRAM

## 2020-08-18 PROCEDURE — 97116 GAIT TRAINING THERAPY: CPT

## 2020-08-18 PROCEDURE — 99900035 HC TECH TIME PER 15 MIN (STAT)

## 2020-08-18 PROCEDURE — 85025 COMPLETE CBC W/AUTO DIFF WBC: CPT

## 2020-08-18 PROCEDURE — 27000221 HC OXYGEN, UP TO 24 HOURS

## 2020-08-18 PROCEDURE — 36415 COLL VENOUS BLD VENIPUNCTURE: CPT

## 2020-08-18 PROCEDURE — 83735 ASSAY OF MAGNESIUM: CPT

## 2020-08-18 PROCEDURE — 11000001 HC ACUTE MED/SURG PRIVATE ROOM

## 2020-08-18 PROCEDURE — 94761 N-INVAS EAR/PLS OXIMETRY MLT: CPT

## 2020-08-18 PROCEDURE — 97165 OT EVAL LOW COMPLEX 30 MIN: CPT

## 2020-08-18 PROCEDURE — 80048 BASIC METABOLIC PNL TOTAL CA: CPT

## 2020-08-18 PROCEDURE — 94770 HC EXHALED C02 TEST: CPT

## 2020-08-18 PROCEDURE — 25000003 PHARM REV CODE 250: Performed by: STUDENT IN AN ORGANIZED HEALTH CARE EDUCATION/TRAINING PROGRAM

## 2020-08-18 PROCEDURE — 97161 PT EVAL LOW COMPLEX 20 MIN: CPT

## 2020-08-18 PROCEDURE — C9113 INJ PANTOPRAZOLE SODIUM, VIA: HCPCS | Performed by: STUDENT IN AN ORGANIZED HEALTH CARE EDUCATION/TRAINING PROGRAM

## 2020-08-18 RX ORDER — ONDANSETRON HYDROCHLORIDE 4 MG/5ML
8 SOLUTION ORAL EVERY 6 HOURS
Status: DISCONTINUED | OUTPATIENT
Start: 2020-08-18 | End: 2020-08-19 | Stop reason: HOSPADM

## 2020-08-18 RX ORDER — HYDROCODONE BITARTRATE AND ACETAMINOPHEN 7.5; 325 MG/15ML; MG/15ML
10 SOLUTION ORAL EVERY 4 HOURS PRN
Status: DISCONTINUED | OUTPATIENT
Start: 2020-08-18 | End: 2020-08-19 | Stop reason: HOSPADM

## 2020-08-18 RX ORDER — DIPHENHYDRAMINE HCL 12.5MG/5ML
12.5 ELIXIR ORAL EVERY 6 HOURS PRN
Status: DISCONTINUED | OUTPATIENT
Start: 2020-08-18 | End: 2020-08-19 | Stop reason: HOSPADM

## 2020-08-18 RX ORDER — OXYCODONE HYDROCHLORIDE 5 MG/1
5 TABLET ORAL ONCE
Status: COMPLETED | OUTPATIENT
Start: 2020-08-18 | End: 2020-08-18

## 2020-08-18 RX ORDER — PROMETHAZINE HYDROCHLORIDE 6.25 MG/5ML
12.5 SYRUP ORAL EVERY 6 HOURS PRN
Status: DISCONTINUED | OUTPATIENT
Start: 2020-08-18 | End: 2020-08-19 | Stop reason: HOSPADM

## 2020-08-18 RX ORDER — HYDROCODONE BITARTRATE AND ACETAMINOPHEN 7.5; 325 MG/15ML; MG/15ML
15 SOLUTION ORAL EVERY 4 HOURS PRN
Status: DISCONTINUED | OUTPATIENT
Start: 2020-08-18 | End: 2020-08-19 | Stop reason: HOSPADM

## 2020-08-18 RX ADMIN — HYDROCODONE BITARTRATE AND ACETAMINOPHEN 15 ML: 7.5; 325 SOLUTION ORAL at 07:08

## 2020-08-18 RX ADMIN — PANTOPRAZOLE SODIUM 40 MG: 40 INJECTION, POWDER, LYOPHILIZED, FOR SOLUTION INTRAVENOUS at 08:08

## 2020-08-18 RX ADMIN — ONDANSETRON HYDROCHLORIDE 8 MG: 4 SOLUTION ORAL at 11:08

## 2020-08-18 RX ADMIN — ONDANSETRON 8 MG: 2 INJECTION INTRAMUSCULAR; INTRAVENOUS at 05:08

## 2020-08-18 RX ADMIN — DIPHENHYDRAMINE HYDROCHLORIDE 12.5 MG: 50 INJECTION, SOLUTION INTRAMUSCULAR; INTRAVENOUS at 07:08

## 2020-08-18 RX ADMIN — ONDANSETRON 8 MG: 2 INJECTION INTRAMUSCULAR; INTRAVENOUS at 12:08

## 2020-08-18 RX ADMIN — HYDROCODONE BITARTRATE AND ACETAMINOPHEN 15 ML: 7.5; 325 SOLUTION ORAL at 03:08

## 2020-08-18 RX ADMIN — Medication: at 06:08

## 2020-08-18 RX ADMIN — HYDROCODONE BITARTRATE AND ACETAMINOPHEN 15 ML: 7.5; 325 SOLUTION ORAL at 11:08

## 2020-08-18 RX ADMIN — Medication: at 01:08

## 2020-08-18 RX ADMIN — DIPHENHYDRAMINE HYDROCHLORIDE 12.5 MG: 25 SOLUTION ORAL at 11:08

## 2020-08-18 RX ADMIN — ONDANSETRON HYDROCHLORIDE 8 MG: 4 SOLUTION ORAL at 05:08

## 2020-08-18 RX ADMIN — DIPHENHYDRAMINE HYDROCHLORIDE 12.5 MG: 25 SOLUTION ORAL at 03:08

## 2020-08-18 RX ADMIN — OXYCODONE 5 MG: 5 TABLET ORAL at 08:08

## 2020-08-18 RX ADMIN — DIPHENHYDRAMINE HYDROCHLORIDE 12.5 MG: 50 INJECTION, SOLUTION INTRAMUSCULAR; INTRAVENOUS at 05:08

## 2020-08-18 RX ADMIN — ENOXAPARIN SODIUM 40 MG: 40 INJECTION SUBCUTANEOUS at 05:08

## 2020-08-18 NOTE — PT/OT/SLP EVAL
Physical Therapy Evaluation and Discharge Note    Patient Name:  Kathryn Wagner   MRN:  [id number]    Recommendations:     Discharge Recommendations:  home   Discharge Equipment Recommendations: none   Barriers to discharge: None    Assessment:     Kathryn Wagner is a 60 y.o. female admitted with a medical diagnosis of Obesity, morbid. Patient demonstrates good safety awareness with mildly decreased balance secondary to abdominal pain.  Patient demonstrated overall good functional mobility and able to perform bed mobility, transfers and ambulation with supervision.  Patient is without further skilled PT needs at this time.      Recent Surgery: Procedure(s) (LRB):  GASTRECTOMY, SLEEVE, LAPAROSCOPIC, with intra op EGD, needs to be first case in the AM (N/A)  REPAIR, HERNIA, HIATAL, LAPAROSCOPIC (N/A) 1 Day Post-Op    Plan:     During this hospitalization, patient does not require further acute PT services.  Please re-consult if situation changes.      Subjective     Chief Complaint: None  Patient/Family Comments/goals: To go home.  Pain/Comfort:  · Pain Rating 1: 0/10  · Location 1: abdomen    Patients cultural, spiritual, Baptism conflicts given the current situation: no    Living Environment:  Prior level of function: independent  Residence: lives alone but planning on staying with daughter 1-story house/ trailer, number of outside stairs: 4 steps with B hand rails (too far to reach at same time)   Support available upon discharge: family  Equipment owned: none  Objective:     Communicated with RN prior to session.  Patient found supine with peripheral IV  upon PT entry to room.    General Precautions: Standard, fall   Orthopedic Precautions:N/A   Braces: N/A     Exams:  · RLE ROM: WFL  · RLE Strength: WFL  · LLE ROM: WFL  · LLE Strength: WFL    Functional Mobility:  · Bed Mobility:     · Supine to Sit: stand by assistance with log rolling technique  · Transfers:     · Sit to Stand:  stand by assistance with no  AD  · Gait: Patient ambulated 140 ft with no AD and stand by assistance.      Therapeutic Activities and Exercises:   patient ambulated increased distance to ensure good overall activity for home safety.      Patient Education:    Patient educated on assistive device use, bed mobility training, Fall risk, home safety, Home exercise program and plan of care by explanation.  Patient was receptive to education and verbalizes understanding.   AM-PAC 6 CLICK MOBILITY  Total Score:23     Patient left supine with all lines intact and call button in reach.    GOALS:   Multidisciplinary Problems     Physical Therapy Goals     Not on file          Multidisciplinary Problems (Resolved)        Problem: Physical Therapy Goal    Goal Priority Disciplines Outcome Goal Variances Interventions   Physical Therapy Goal   (Resolved)     PT, PT/OT Met                     History:     Past Medical History:   Diagnosis Date    Colon polyp     Depression     Encounter for blood transfusion     GERD (gastroesophageal reflux disease)     High cholesterol     Hypertension     Lumbar spondylosis 2015    L3-4 fusion; L5S1 fusion    Obesity     Restless leg syndrome        Past Surgical History:   Procedure Laterality Date    BACK SURGERY Bilateral     2015, 2015 fusion    BELT ABDOMINOPLASTY      BREAST SURGERY      breast reduction    CARPAL TUNNEL RELEASE Left      SECTION      x3    COSMETIC SURGERY      ESOPHAGOGASTRODUODENOSCOPY N/A 2020    Procedure: ESOPHAGOGASTRODUODENOSCOPY (EGD);  Surgeon: Gianni Mariscal MD;  Location: [facility] (4TH FLR);  Service: Endoscopy;  Laterality: N/A;  hard IV stick per pt.  covid test -Lapalco    HYSTERECTOMY      partial    LAPAROSCOPIC SLEEVE GASTRECTOMY N/A 2020    Procedure: GASTRECTOMY, SLEEVE, LAPAROSCOPIC, with intra op EGD, needs to be first case in the AM;  Surgeon: Yuni Chaudhari MD;  Location: [facility] OR 2ND [facility];  Service: General;   Laterality: N/A;    TOTAL REDUCTION MAMMOPLASTY      TUBAL LIGATION         Time Tracking:     PT Received On: 08/18/20  PT Start Time: 0901     PT Stop Time: 0918  PT Total Time (min): 17 min     Billable Minutes: Evaluation 9 min Gait Training 7 min      Micha Bauer, PT  08/18/2020

## 2020-08-18 NOTE — ASSESSMENT & PLAN NOTE
61 yo F s/p laparoscopic gastric sleeve 8/18    CLD once passes water protocol  Convert meds to PO (PCA, nausea meds)  D/c mIVF once transitioned to CLD  DVT ppx  PPI BID  Ambulate    Dispo: D/c this afternoon if able to tolerate CLD and nausea/pain controlled.

## 2020-08-18 NOTE — PT/OT/SLP EVAL
Occupational Therapy   Evaluation and Discharge Note    Name: Kathryn Wagner  MRN: [id number]  Admitting Diagnosis:  Obesity, morbid 1 Day Post-Op    Recommendations:     Discharge Recommendations: home  Discharge Equipment Recommendations:  none  Barriers to discharge:       Assessment:     Kathryn Wagner is a 60 y.o. female with a medical diagnosis of Obesity, morbid. At this time, patient is functioning at their prior level of function and does not require further acute OT services.     OT Acute eval complete. Pt is Mod I in adls and functional mobility. Pt ambulated ~50ft SBA with no AD with no LOB or SOB. Pt is safe to d/c home with no needs from an OT standpoint when medically stable.     Plan:     During this hospitalization, patient does not require further acute OT services.  Please re-consult if situation changes.    · Plan of Care Reviewed with: patient    Subjective     Chief Complaint: fatigue  Patient/Family Comments/goals: to go home    Occupational Profile:  Living Environment: Pt lives with daughter in [facility] with 4STE. B railings.  Previous level of function: Independent  Equipment Used at home:  walker, rolling  Assistance upon Discharge: Pt will have family assistance upon d/c.    Pain/Comfort:  · Pain Rating 1: 0/10  · Location 1: abdomen  · Pain Addressed 1: Pre-medicate for activity    Patients cultural, spiritual, [employer] conflicts given the current situation: no    Objective:     Communicated with: RN prior to session.  Patient found supine with peripheral IV upon OT entry to room.    General Precautions: Standard, fall   Orthopedic Precautions:N/A   Braces: N/A     Occupational Performance:    Bed Mobility:    · Patient completed Scooting/Bridging with stand by assistance  · Patient completed Supine to Sit with stand by assistance  · Patient completed Sit to Supine with stand by assistance    Functional Mobility/Transfers:  · Patient completed Sit <> Stand Transfer with stand by assistance  with   no assistive device   · Functional Mobility: OT Acute eval complete. Pt is Mod I in adls and functional mobility. Pt ambulated ~50ft SBA with no AD with no LOB or SOB. Pt is safe to d/c home with no needs from an OT standpoint when medically stable.     Activities of Daily Living:  · Grooming: stand by assistance wash face sitting EOB  · Upper Body Dressing: minimum assistance brook gown    Cognitive/Visual Perceptual:  Cognitive/Psychosocial Skills:     -       Oriented to: Person, Place, Time and Situation   -       Safety awareness/insight to disability: intact     Physical Exam:  Upper Extremity Range of Motion:     -       Right Upper Extremity: WFL  -       Left Upper Extremity: WFL  Upper Extremity Strength:    -       Right Upper Extremity: WFL  -       Left Upper Extremity: WFL   Strength:    -       Right Upper Extremity: WFL  -       Left Upper Extremity: WFL    AMPAC 6 Click ADL:  AMPAC Total Score: 24    Treatment & Education:  - OT/POC-  - Importance of mobility to maximize recovery.  - Educated pt. On log rolling technique for bed mobility- pt acknowledged understanding  - safety w/ functional mobility; hand placement to ensure safe transfers to various surfaces in prep for ADLs  - Reviewed gait sequence and RW management via verbalization and demonstration   - encouraged to ambulate within household environment at least every hour to hour 1/2    Education:    Patient left supine with all lines intact, call button in reach and RN notified    GOALS:   Multidisciplinary Problems     Occupational Therapy Goals     Not on file                History:     Past Medical History:   Diagnosis Date    Colon polyp     Depression     Encounter for blood transfusion     GERD (gastroesophageal reflux disease)     High cholesterol     Hypertension     Lumbar spondylosis 03/04/2015    L3-4 fusion; L5S1 fusion    Obesity     Restless leg syndrome        Past Surgical History:   Procedure Laterality Date     BACK SURGERY Bilateral     2015, 2015 fusion    BELT ABDOMINOPLASTY      BREAST SURGERY      breast reduction    CARPAL TUNNEL RELEASE Left      SECTION      x3    COSMETIC SURGERY      ESOPHAGOGASTRODUODENOSCOPY N/A 2020    Procedure: ESOPHAGOGASTRODUODENOSCOPY (EGD);  Surgeon: Gianni Mariscal MD;  Location: [facility] (4TH FLR);  Service: Endoscopy;  Laterality: N/A;  hard IV stick per pt.  covid test -Lapalco    HYSTERECTOMY      partial    LAPAROSCOPIC SLEEVE GASTRECTOMY N/A 2020    Procedure: GASTRECTOMY, SLEEVE, LAPAROSCOPIC, with intra op EGD, needs to be first case in the AM;  Surgeon: Yuni Chaudhari MD;  Location: [facility] OR 2ND FLR;  Service: General;  Laterality: N/A;    TOTAL REDUCTION MAMMOPLASTY      TUBAL LIGATION         Time Tracking:     OT Date of Treatment: 20  OT Start Time: 903  OT Stop Time: 918  OT Total Time (min): 15 min    Billable Minutes:Evaluation 15    Anna Atkins, OT  2020

## 2020-08-18 NOTE — PLAN OF CARE
Problem: Physical Therapy Goal  Goal: Physical Therapy Goal  Outcome: Met     PT evaluation completed and patient demonstrates safe mobility.  Patient demonstrates good safety awareness with mildly decreased balance secondary to abdominal pain.  Patient demonstrated overall good functional mobility and able to perform bed mobility, transfers and ambulation with supervision.  Patient is without further skilled PT needs at this time.      Micha Bauer, PT, DPT  8/18/2020  Pager #: [phone]

## 2020-08-18 NOTE — PROGRESS NOTES
[facility]  General Surgery  Progress Note    Subjective:     History of Present Illness:  No notes on file    Post-Op Info:  Procedure(s) (LRB):  GASTRECTOMY, SLEEVE, LAPAROSCOPIC, with intra op EGD, needs to be first case in the AM (N/A)  REPAIR, HERNIA, HIATAL, LAPAROSCOPIC (N/A)   1 Day Post-Op     Interval History:     Epigastric pain overnight but controlled.  Minimal nausea overnight.  Water protocol going well.    Medications:  Continuous Infusions:   sodium chloride 0.9% 125 mL/hr at 08/17/20 1242    hydromorphone in 0.9 % NaCl 6 mg/30 ml       Scheduled Meds:   enoxaparin  40 mg Subcutaneous Q24H    ondansetron  8 mg Intravenous Q6H    pantoprazole  40 mg Intravenous BID     PRN Meds:diphenhydrAMINE, hydrocodone-apap 7.5-325 MG/15 ML, naloxone, promethazine (PHENERGAN) IVPB     Review of patient's allergies indicates:   Allergen Reactions    Contrast media Hives    Iodine and iodide containing products Hives and Itching    Latex, natural rubber Rash     Objective:     Vital Signs (Most Recent):  Temp: 98.2 °F (36.8 °C) (08/18/20 0523)  Pulse: 96 (08/18/20 0523)  Resp: 18 (08/18/20 0650)  BP: 135/65 (08/18/20 0523)  SpO2: (!) 92 % (08/18/20 0523) Vital Signs (24h Range):  Temp:  [97.5 °F (36.4 °C)-98.8 °F (37.1 °C)] 98.2 °F (36.8 °C)  Pulse:  [] 96  Resp:  [12-18] 18  SpO2:  [91 %-100 %] 92 %  BP: (102-141)/(55-72) 135/65     Weight: 84.4 kg (186 lb 1.1 oz)  Body mass index is 36.34 kg/m².    Intake/Output - Last 3 Shifts       08/16 0700 - 08/17 0659 08/17 0700 - 08/18 0659 08/18 0700 - 08/19 0659    I.V. (mL/kg)  2000 (23.7)     Total Intake(mL/kg)  2000 (23.7)     Net  +2000            Urine Occurrence  3 x           Physical Exam  Vitals signs and nursing note reviewed.   Constitutional:       Appearance: Normal appearance.   Cardiovascular:      Rate and Rhythm: Normal rate and regular rhythm.   Pulmonary:      Effort: Pulmonary effort is normal. No respiratory  distress.   Abdominal:      General: There is no distension.      Palpations: Abdomen is soft.      Tenderness: There is no abdominal tenderness.      Comments: Incisions c/d/i, erythema around central incisions likely reactive to dermabond   Skin:     General: Skin is warm and dry.   Neurological:      General: No focal deficit present.      Mental Status: She is alert and oriented to person, place, and time.         Significant Labs:  CBC:   Recent Labs   Lab 08/18/20  0454   WBC 9.84   RBC 3.46*   HGB 10.0*   HCT 31.8*      MCV 92   MCH 28.9   MCHC 31.4*     BMP:   Recent Labs   Lab 08/12/20  1017   GLU 85      K 4.3      CO2 27   BUN 25*   CREATININE 0.9   CALCIUM 9.3       Significant Diagnostics:  None    Assessment/Plan:     * Obesity, morbid  61 yo F s/p laparoscopic gastric sleeve 8/18    CLD once passes water protocol  Convert meds to PO (PCA, nausea meds)  D/c mIVF once transitioned to CLD  DVT ppx  PPI BID  Ambulate    Dispo: D/c this afternoon if able to tolerate CLD and nausea/pain controlled.        Delano Rowe MD  General Surgery  [facility]

## 2020-08-18 NOTE — PLAN OF CARE
Patient lives with adult daughter and her son Ryan will provide transportation home post hospital dc.       08/18/20 1129   Discharge Assessment   Assessment Type Discharge Planning Assessment   Confirmed/corrected address and phone number on facesheet? Yes   Assessment information obtained from? Patient   Expected Length of Stay (days) 3   Communicated expected length of stay with patient/caregiver yes   Prior to hospitilization cognitive status: Alert/Oriented   Prior to hospitalization functional status: Independent   Current cognitive status: Alert/Oriented   Current Functional Status: Independent   Lives With child(tess), adult  (adult daughter)   Able to Return to Prior Arrangements yes   Is patient able to care for self after discharge? Yes   Who are your caregiver(s) and their phone number(s)? Ryan Wagner son [phone]   Patient's perception of discharge disposition home or selfcare   Readmission Within the Last 30 Days no previous admission in last 30 days   Patient currently being followed by outpatient case management? No   Patient currently receives any other outside agency services? No   Equipment Currently Used at Home none   Do you have any problems affording any of your prescribed medications? No   Is the patient taking medications as prescribed? yes   Does the patient have transportation home? Yes  (son Ryan)   Transportation Anticipated family or friend will provide   Dialysis Name and Scheduled days n/a   Does the patient receive services at the [facility]? No   Discharge Plan A Home with family   Discharge Plan B Home Health;Home with family   DME Needed Upon Discharge  none   Patient/Family in Agreement with Plan yes

## 2020-08-18 NOTE — NURSING
Tolerated 1.5 L of water. Took a sip of broth and felt nauseous. Will continue to offer clear liquids.

## 2020-08-18 NOTE — PLAN OF CARE
OT Acute eval complete. Pt is Mod I in adls and functional mobility. Pt ambulated ~50ft SBA with no AD with no LOB or SOB. Pt is safe to d/c home with no needs from an OT standpoint when medically stable.

## 2020-08-18 NOTE — SUBJECTIVE & OBJECTIVE
Interval History:     Epigastric pain overnight but controlled.  Minimal nausea overnight.  Water protocol going well.    Medications:  Continuous Infusions:   sodium chloride 0.9% 125 mL/hr at 08/17/20 1242    hydromorphone in 0.9 % NaCl 6 mg/30 ml       Scheduled Meds:   enoxaparin  40 mg Subcutaneous Q24H    ondansetron  8 mg Intravenous Q6H    pantoprazole  40 mg Intravenous BID     PRN Meds:diphenhydrAMINE, hydrocodone-apap 7.5-325 MG/15 ML, naloxone, promethazine (PHENERGAN) IVPB     Review of patient's allergies indicates:   Allergen Reactions    Contrast media Hives    Iodine and iodide containing products Hives and Itching    Latex, natural rubber Rash     Objective:     Vital Signs (Most Recent):  Temp: 98.2 °F (36.8 °C) (08/18/20 0523)  Pulse: 96 (08/18/20 0523)  Resp: 18 (08/18/20 0650)  BP: 135/65 (08/18/20 0523)  SpO2: (!) 92 % (08/18/20 0523) Vital Signs (24h Range):  Temp:  [97.5 °F (36.4 °C)-98.8 °F (37.1 °C)] 98.2 °F (36.8 °C)  Pulse:  [] 96  Resp:  [12-18] 18  SpO2:  [91 %-100 %] 92 %  BP: (102-141)/(55-72) 135/65     Weight: 84.4 kg (186 lb 1.1 oz)  Body mass index is 36.34 kg/m².    Intake/Output - Last 3 Shifts       08/16 0700 - 08/17 0659 08/17 0700 - 08/18 0659 08/18 0700 - 08/19 0659    I.V. (mL/kg)  2000 (23.7)     Total Intake(mL/kg)  2000 (23.7)     Net  +2000            Urine Occurrence  3 x           Physical Exam  Vitals signs and nursing note reviewed.   Constitutional:       Appearance: Normal appearance.   Cardiovascular:      Rate and Rhythm: Normal rate and regular rhythm.   Pulmonary:      Effort: Pulmonary effort is normal. No respiratory distress.   Abdominal:      General: There is no distension.      Palpations: Abdomen is soft.      Tenderness: There is no abdominal tenderness.      Comments: Incisions c/d/i, erythema around central incisions likely reactive to dermabond   Skin:     General: Skin is warm and dry.   Neurological:      General: No focal deficit  present.      Mental Status: She is alert and oriented to person, place, and time.         Significant Labs:  CBC:   Recent Labs   Lab 08/18/20  0454   WBC 9.84   RBC 3.46*   HGB 10.0*   HCT 31.8*      MCV 92   MCH 28.9   MCHC 31.4*     BMP:   Recent Labs   Lab 08/12/20  1017   GLU 85      K 4.3      CO2 27   BUN 25*   CREATININE 0.9   CALCIUM 9.3       Significant Diagnostics:  None

## 2020-08-19 VITALS
SYSTOLIC BLOOD PRESSURE: 137 MMHG | TEMPERATURE: 99 F | OXYGEN SATURATION: 92 % | HEIGHT: 60 IN | BODY MASS INDEX: 36.53 KG/M2 | RESPIRATION RATE: 18 BRPM | WEIGHT: 186.06 LBS | DIASTOLIC BLOOD PRESSURE: 81 MMHG | HEART RATE: 77 BPM

## 2020-08-19 PROBLEM — Z98.890 HISTORY OF REPAIR OF HIATAL HERNIA: Status: ACTIVE | Noted: 2020-08-19

## 2020-08-19 PROBLEM — Z87.19 HISTORY OF REPAIR OF HIATAL HERNIA: Status: ACTIVE | Noted: 2020-08-19

## 2020-08-19 PROBLEM — Z98.84 S/P LAPAROSCOPIC SLEEVE GASTRECTOMY: Status: ACTIVE | Noted: 2020-08-19

## 2020-08-19 LAB
ANION GAP SERPL CALC-SCNC: 10 MMOL/L (ref 8–16)
BASOPHILS # BLD AUTO: 0.04 K/UL (ref 0–0.2)
BASOPHILS NFR BLD: 0.4 % (ref 0–1.9)
BUN SERPL-MCNC: 7 MG/DL (ref 6–20)
CALCIUM SERPL-MCNC: 8.6 MG/DL (ref 8.7–10.5)
CHLORIDE SERPL-SCNC: 105 MMOL/L (ref 95–110)
CO2 SERPL-SCNC: 25 MMOL/L (ref 23–29)
CREAT SERPL-MCNC: 0.8 MG/DL (ref 0.5–1.4)
DIFFERENTIAL METHOD: ABNORMAL
EOSINOPHIL # BLD AUTO: 0.8 K/UL (ref 0–0.5)
EOSINOPHIL NFR BLD: 7.9 % (ref 0–8)
ERYTHROCYTE [DISTWIDTH] IN BLOOD BY AUTOMATED COUNT: 13.1 % (ref 11.5–14.5)
EST. GFR  (AFRICAN AMERICAN): >60 ML/MIN/1.73 M^2
EST. GFR  (NON AFRICAN AMERICAN): >60 ML/MIN/1.73 M^2
GLUCOSE SERPL-MCNC: 85 MG/DL (ref 70–110)
HCT VFR BLD AUTO: 30.8 % (ref 37–48.5)
HGB BLD-MCNC: 9.7 G/DL (ref 12–16)
IMM GRANULOCYTES # BLD AUTO: 0.03 K/UL (ref 0–0.04)
IMM GRANULOCYTES NFR BLD AUTO: 0.3 % (ref 0–0.5)
LYMPHOCYTES # BLD AUTO: 1.9 K/UL (ref 1–4.8)
LYMPHOCYTES NFR BLD: 19.3 % (ref 18–48)
MCH RBC QN AUTO: 28.6 PG (ref 27–31)
MCHC RBC AUTO-ENTMCNC: 31.5 G/DL (ref 32–36)
MCV RBC AUTO: 91 FL (ref 82–98)
MONOCYTES # BLD AUTO: 0.8 K/UL (ref 0.3–1)
MONOCYTES NFR BLD: 8.5 % (ref 4–15)
NEUTROPHILS # BLD AUTO: 6.3 K/UL (ref 1.8–7.7)
NEUTROPHILS NFR BLD: 63.6 % (ref 38–73)
NRBC BLD-RTO: 0 /100 WBC
PLATELET # BLD AUTO: 205 K/UL (ref 150–350)
PMV BLD AUTO: 12.4 FL (ref 9.2–12.9)
POTASSIUM SERPL-SCNC: 3.2 MMOL/L (ref 3.5–5.1)
RBC # BLD AUTO: 3.39 M/UL (ref 4–5.4)
SODIUM SERPL-SCNC: 140 MMOL/L (ref 136–145)
WBC # BLD AUTO: 9.89 K/UL (ref 3.9–12.7)

## 2020-08-19 PROCEDURE — 80048 BASIC METABOLIC PNL TOTAL CA: CPT

## 2020-08-19 PROCEDURE — 25000003 PHARM REV CODE 250: Performed by: STUDENT IN AN ORGANIZED HEALTH CARE EDUCATION/TRAINING PROGRAM

## 2020-08-19 PROCEDURE — 85025 COMPLETE CBC W/AUTO DIFF WBC: CPT

## 2020-08-19 PROCEDURE — C9113 INJ PANTOPRAZOLE SODIUM, VIA: HCPCS | Performed by: STUDENT IN AN ORGANIZED HEALTH CARE EDUCATION/TRAINING PROGRAM

## 2020-08-19 PROCEDURE — 63600175 PHARM REV CODE 636 W HCPCS: Performed by: STUDENT IN AN ORGANIZED HEALTH CARE EDUCATION/TRAINING PROGRAM

## 2020-08-19 PROCEDURE — 36415 COLL VENOUS BLD VENIPUNCTURE: CPT

## 2020-08-19 RX ORDER — POTASSIUM CHLORIDE 1.5 G/1.58G
40 POWDER, FOR SOLUTION ORAL ONCE
Status: COMPLETED | OUTPATIENT
Start: 2020-08-19 | End: 2020-08-19

## 2020-08-19 RX ORDER — ACETAMINOPHEN 650 MG/20.3ML
650 LIQUID ORAL EVERY 6 HOURS PRN
Status: DISCONTINUED | OUTPATIENT
Start: 2020-08-19 | End: 2020-08-19 | Stop reason: HOSPADM

## 2020-08-19 RX ADMIN — ONDANSETRON HYDROCHLORIDE 8 MG: 4 SOLUTION ORAL at 06:08

## 2020-08-19 RX ADMIN — ONDANSETRON HYDROCHLORIDE 8 MG: 4 SOLUTION ORAL at 11:08

## 2020-08-19 RX ADMIN — PANTOPRAZOLE SODIUM 40 MG: 40 INJECTION, POWDER, LYOPHILIZED, FOR SOLUTION INTRAVENOUS at 08:08

## 2020-08-19 RX ADMIN — ACETAMINOPHEN 650 MG: 160 SOLUTION ORAL at 01:08

## 2020-08-19 RX ADMIN — HYDROCODONE BITARTRATE AND ACETAMINOPHEN 15 ML: 7.5; 325 SOLUTION ORAL at 06:08

## 2020-08-19 RX ADMIN — POTASSIUM CHLORIDE 40 MEQ: 1.5 POWDER, FOR SOLUTION ORAL at 11:08

## 2020-08-19 RX ADMIN — HYDROCODONE BITARTRATE AND ACETAMINOPHEN 15 ML: 7.5; 325 SOLUTION ORAL at 10:08

## 2020-08-19 RX ADMIN — DIPHENHYDRAMINE HYDROCHLORIDE 12.5 MG: 25 SOLUTION ORAL at 06:08

## 2020-08-19 NOTE — PROGRESS NOTES
[facility]  General Surgery  Progress Note    Subjective:     History of Present Illness:  No notes on file    Post-Op Info:  Procedure(s) (LRB):  GASTRECTOMY, SLEEVE, LAPAROSCOPIC, with intra op EGD, needs to be first case in the AM (N/A)  REPAIR, HERNIA, HIATAL, LAPAROSCOPIC (N/A)   2 Days Post-Op     Interval History:   No acute events overnight.   Tolerating some clears overnight. Minimal nausea.  Pain well controlled.        Medications:  Continuous Infusions:   sodium chloride 0.9% 50 mL/hr at 08/18/20 0819     Scheduled Meds:   enoxaparin  40 mg Subcutaneous Q24H    ondansetron  8 mg Oral Q6H    pantoprazole  40 mg Intravenous BID     PRN Meds:acetaminophen, diphenhydrAMINE, hydrocodone-apap 7.5-325 MG/15 ML, hydrocodone-apap 7.5-325 MG/15 ML, promethazine     Review of patient's allergies indicates:   Allergen Reactions    Contrast media Hives    Iodine and iodide containing products Hives and Itching    Latex, natural rubber Rash     Objective:     Vital Signs (Most Recent):  Temp: 98.8 °F (37.1 °C) (08/19/20 0807)  Pulse: 89 (08/19/20 0807)  Resp: 16 (08/19/20 0807)  BP: 139/67 (08/19/20 0807)  SpO2: (!) 93 % (08/19/20 0807) Vital Signs (24h Range):  Temp:  [98 °F (36.7 °C)-100.1 °F (37.8 °C)] 98.8 °F (37.1 °C)  Pulse:  [70-95] 89  Resp:  [16-18] 16  SpO2:  [88 %-98 %] 93 %  BP: (103-170)/(49-79) 139/67     Weight: 84.4 kg (186 lb 1.1 oz)  Body mass index is 36.34 kg/m².    Intake/Output - Last 3 Shifts       08/17 0700 - 08/18 0659 08/18 0700 - 08/19 0659 08/19 0700 - 08/20 0659    I.V. (mL/kg) 2000 (23.7)      Total Intake(mL/kg) 2000 (23.7)      Net +2000             Urine Occurrence 3 x            Physical Exam  Vitals signs and nursing note reviewed.   Constitutional:       Appearance: Normal appearance.   Cardiovascular:      Rate and Rhythm: Normal rate and regular rhythm.   Pulmonary:      Effort: Pulmonary effort is normal. No respiratory distress.   Abdominal:       General: There is no distension.      Palpations: Abdomen is soft.      Tenderness: There is no abdominal tenderness.      Comments: Incisions c/d/i, erythema around central incisions likely reactive to dermabond   Skin:     General: Skin is warm and dry.   Neurological:      General: No focal deficit present.      Mental Status: She is alert and oriented to person, place, and time.         Significant Labs:  CBC:   Recent Labs   Lab 08/19/20  0531   WBC 9.89   RBC 3.39*   HGB 9.7*   HCT 30.8*      MCV 91   MCH 28.6   MCHC 31.5*     BMP:   Recent Labs   Lab 08/18/20  0454 08/19/20  0531   GLU 79 85    140   K 4.6 3.2*    105   CO2 24 25   BUN 16 7   CREATININE 0.8 0.8   CALCIUM 8.5* 8.6*   MG 2.1  --        Significant Diagnostics:  None    Assessment/Plan:     * Obesity, morbid  59 yo F s/p laparoscopic gastric sleeve 8/18    Continue CLD   Convert meds to PO (PCA, nausea meds)  DVT ppx  PPI BID  Ambulate    Dispo: Will remove dermabond due to skin reaction and replace with steristrips. D/c this afternoon if able to tolerate CLD and nausea/pain controlled.        Darlin Hendrix MD  General Surgery  [facility]

## 2020-08-19 NOTE — ASSESSMENT & PLAN NOTE
61 yo F s/p laparoscopic gastric sleeve 8/18    Continue CLD   Convert meds to PO (PCA, nausea meds)  DVT ppx  PPI BID  Ambulate    Dispo: Will remove dermabond due to skin reaction and replace with steristrips. D/c this afternoon if able to tolerate CLD and nausea/pain controlled.

## 2020-08-19 NOTE — SUBJECTIVE & OBJECTIVE
Interval History:   No acute events overnight.   Tolerating some clears overnight. Minimal nausea.  Pain well controlled.        Medications:  Continuous Infusions:   sodium chloride 0.9% 50 mL/hr at 08/18/20 0819     Scheduled Meds:   enoxaparin  40 mg Subcutaneous Q24H    ondansetron  8 mg Oral Q6H    pantoprazole  40 mg Intravenous BID     PRN Meds:acetaminophen, diphenhydrAMINE, hydrocodone-apap 7.5-325 MG/15 ML, hydrocodone-apap 7.5-325 MG/15 ML, promethazine     Review of patient's allergies indicates:   Allergen Reactions    Contrast media Hives    Iodine and iodide containing products Hives and Itching    Latex, natural rubber Rash     Objective:     Vital Signs (Most Recent):  Temp: 98.8 °F (37.1 °C) (08/19/20 0807)  Pulse: 89 (08/19/20 0807)  Resp: 16 (08/19/20 0807)  BP: 139/67 (08/19/20 0807)  SpO2: (!) 93 % (08/19/20 0807) Vital Signs (24h Range):  Temp:  [98 °F (36.7 °C)-100.1 °F (37.8 °C)] 98.8 °F (37.1 °C)  Pulse:  [70-95] 89  Resp:  [16-18] 16  SpO2:  [88 %-98 %] 93 %  BP: (103-170)/(49-79) 139/67     Weight: 84.4 kg (186 lb 1.1 oz)  Body mass index is 36.34 kg/m².    Intake/Output - Last 3 Shifts       08/17 0700 - 08/18 0659 08/18 0700 - 08/19 0659 08/19 0700 - 08/20 0659    I.V. (mL/kg) 2000 (23.7)      Total Intake(mL/kg) 2000 (23.7)      Net +2000             Urine Occurrence 3 x            Physical Exam  Vitals signs and nursing note reviewed.   Constitutional:       Appearance: Normal appearance.   Cardiovascular:      Rate and Rhythm: Normal rate and regular rhythm.   Pulmonary:      Effort: Pulmonary effort is normal. No respiratory distress.   Abdominal:      General: There is no distension.      Palpations: Abdomen is soft.      Tenderness: There is no abdominal tenderness.      Comments: Incisions c/d/i, erythema around central incisions likely reactive to dermabond   Skin:     General: Skin is warm and dry.   Neurological:      General: No focal deficit present.      Mental  Status: She is alert and oriented to person, place, and time.         Significant Labs:  CBC:   Recent Labs   Lab 08/19/20  0531   WBC 9.89   RBC 3.39*   HGB 9.7*   HCT 30.8*      MCV 91   MCH 28.6   MCHC 31.5*     BMP:   Recent Labs   Lab 08/18/20  0454 08/19/20  0531   GLU 79 85    140   K 4.6 3.2*    105   CO2 24 25   BUN 16 7   CREATININE 0.8 0.8   CALCIUM 8.5* 8.6*   MG 2.1  --        Significant Diagnostics:  None

## 2020-08-19 NOTE — DISCHARGE SUMMARY
[facility]  DISCHARGE SUMMARY  General Surgery      Admit Date:  8/17/2020    Discharge Date and Time:  8/19/2020       Attending Physician:  Yuni Chaudhari*     Discharge Provider:  Shaun Peters MD     Reason for Admission:  Obesity, morbid     Procedures Performed:  Procedure(s) (LRB):  GASTRECTOMY, SLEEVE, LAPAROSCOPIC, with intra op EGD, needs to be first case in the AM (N/A)  REPAIR, HERNIA, HIATAL, LAPAROSCOPIC (N/A)    Hospital Course:  Please see the preoperative H&P and other available documentation for full details related to history prior to this admission.  Briefly, Kathryn Wagner is a 60 y.o. female who was admitted following scheduled elective surgery for Obesity, morbid    Following a complete preoperative discussion of the risks and benefits of surgery with signed informed consent, the patient was taken to the operating room on 8/17/2020 and underwent the above stated procedures.  The patient tolerated surgery well and there were no complications.  Please see the operative report for full intraoperative findings and details.  Postoperatively, the patient did well and was transferred from the PACU to the floor in stable condition where they had a stable and uncomplicated hospital course.  Labs and vital signs remained stable and appropriate throughout course.  Diet was advanced as tolerated and the patient's pain was controlled on oral pain medications without problem.  Currently, the patient is doing well at 2 Days Post-Op and is stable and appropriate for discharge home at this time.      Consults:  None.    Significant Diagnostic Studies:   Recent Labs   Lab 08/18/20 0454 08/19/20  0531   WBC 9.84 9.89   HGB 10.0* 9.7*   HCT 31.8* 30.8*    205     Recent Labs   Lab 08/18/20 0454 08/19/20  0531    140   K 4.6 3.2*    105   CO2 24 25   BUN 16 7   CREATININE 0.8 0.8   GLU 79 85   CALCIUM 8.5* 8.6*   MG 2.1  --    PHOS 2.8  --    No results for  input(s): INR, PTT, LABHEPA, LACTATE, TROPONINI, CPK, CPKMB, MB, BNP in the last 72 hours.No results for input(s): PH, PCO2, PO2, HCO3 in the last 72 hours.      Final Diagnoses:   Principal Problem:  Obesity, morbid   Secondary Diagnoses:    Active Hospital Problems    Diagnosis  POA    *Obesity, morbid [E66.01]  Yes      Resolved Hospital Problems   No resolved problems to display.       Discharged Condition:  Good    Disposition:  Home or Self Care    Follow Up/Patient Instructions:     Medications:  Reconciled Home Medications:    Current Discharge Medication List      CONTINUE these medications which have NOT CHANGED    Details   DULoxetine (CYMBALTA) 60 MG capsule Take 1 capsule (60 mg total) by mouth once daily.  Qty: 90 capsule, Refills: 3    Associated Diagnoses: Other depression; Chronic left-sided low back pain with left-sided sciatica; Hip pain, left      losartan (COZAAR) 50 MG tablet Take 1 tablet (50 mg total) by mouth once daily. Stop lisinopril hctz  Qty: 90 tablet, Refills: 3    Associated Diagnoses: Hypertension, essential      omeprazole (PRILOSEC) 40 MG capsule Take 1 capsule (40 mg total) by mouth every morning. Open capsule and take with apple sauce  Qty: 30 capsule, Refills: 2    Comments: For bedside delivery 8/17  Associated Diagnoses: Class 2 severe obesity due to excess calories with serious comorbidity in adult, unspecified BMI; S/P laparoscopic sleeve gastrectomy      oxycodone-acetaminophen (PERCOCET)  mg per tablet Take 1 tablet by mouth every 8 (eight) hours as needed.       traZODone (DESYREL) 50 MG tablet Take 50 mg by mouth every evening.      hydrocodone-acetaminophen (HYCET) solution 7.5-325 mg/15mL Take 15 mLs by mouth 4 (four) times daily as needed.  Qty: 118 mL, Refills: 0    Comments: Quantity prescribed more than 7 day supply? No  For bedside delivery 8/17  Associated Diagnoses: Class 2 severe obesity due to excess calories with serious comorbidity in adult,  unspecified BMI; S/P laparoscopic sleeve gastrectomy      naloxone (NARCAN) 4 mg/actuation Spry Narcan 4 mg/actuation nasal spray      ondansetron (ZOFRAN-ODT) 8 MG TbDL DISSOLVE 1 tablet (8 mg total) by mouth every 6 (six) hours as needed.  Qty: 30 tablet, Refills: 0    Comments: For bedside delivery 8/17  Associated Diagnoses: Class 2 severe obesity due to excess calories with serious comorbidity in adult, unspecified BMI; S/P laparoscopic sleeve gastrectomy      ursodioL (ACTIGALL) 500 MG tablet Take 1 tablet (500 mg total) by mouth once daily.  Qty: 30 tablet, Refills: 5    Comments: For bedside delivery 8/17  Associated Diagnoses: Class 2 severe obesity due to excess calories with serious comorbidity in adult, unspecified BMI; S/P laparoscopic sleeve gastrectomy           Discharge Procedure Orders   Diet clear liquid   Order Comments: Per instructions in Bariatric paperwork     Lifting restrictions     Notify your health care provider if you experience any of the following:  temperature >100.4     Notify your health care provider if you experience any of the following:  persistent nausea and vomiting or diarrhea     Notify your health care provider if you experience any of the following:  severe uncontrolled pain     Notify your health care provider if you experience any of the following:  redness, tenderness, or signs of infection (pain, swelling, redness, odor or green/yellow discharge around incision site)     Notify your health care provider if you experience any of the following:  difficulty breathing or increased cough     Notify your health care provider if you experience any of the following:  severe persistent headache     Notify your health care provider if you experience any of the following:  worsening rash     No dressing needed     Activity as tolerated     Follow-up Information     [facility] - Bariatric Surg 2nd Fl In 2 weeks.    Specialty: Bariatrics  Contact information:  [address]  [facility] [id number] [phone]  Additional information:  [facility] - Main Building, 2nd Floor   Please park in [facility] and use escalator or Clinic elevator                 Shaun Peters MD

## 2020-08-19 NOTE — NURSING
Pt received AVS and verbalized understanding, medications delivered to bedside, IV removed, wheelchair ordered for transport to vehicle

## 2020-08-20 ENCOUNTER — TELEPHONE (OUTPATIENT)
Dept: BARIATRICS | Facility: CLINIC | Age: 60
End: 2020-08-20

## 2020-08-20 NOTE — PLAN OF CARE
08/19/20 1532   Final Note   Assessment Type Final Discharge Note   Anticipated Discharge Disposition Home   What phone number can be called within the next 1-3 days to see how you are doing after discharge? [phone]   Hospital Follow Up  Appt(s) scheduled? Yes   Discharge plans and expectations educations in teach back method with documentation complete? Yes     Future Appointments   Date Time Provider Department Center   9/1/2020 10:00 AM LAB, APPOINTMENT [place] [facility]   9/1/2020 10:30 AM KEHINDE Fuentes   9/1/2020 11:00 AM MESERET Kee   10/13/2020  9:00 AM [facility] [place] [facility]   10/13/2020  9:30 AM KEHINDE Fuentes   10/13/2020 10:00 AM MESERET Kee

## 2020-08-20 NOTE — TELEPHONE ENCOUNTER
Patient stated she is having complications around her incisions. I asked patient to send us pictures so I can show to the nurses or [facility] to see if we maybe need to get her in sooner then her appt scheduled.

## 2020-08-20 NOTE — TELEPHONE ENCOUNTER
----- Message from Natasha Bejarano sent at 8/20/2020 11:47 AM CDT -----  Contact: Patient Called [phone]  Calling to speak with Nurse.  Had surgery on Monday 8/17/20 and is having some complications

## 2020-08-21 LAB
FINAL PATHOLOGIC DIAGNOSIS: NORMAL
GROSS: NORMAL

## 2020-08-24 ENCOUNTER — TELEPHONE (OUTPATIENT)
Dept: BARIATRICS | Facility: CLINIC | Age: 60
End: 2020-08-24

## 2020-08-24 NOTE — TELEPHONE ENCOUNTER
Called to check in 1 week post op from bariatric surgery.    Water protocol began at 7 am and completed while in hospital/ medicine cups given to you by nursing to take home (y/n):y    Dehydration assessment:  Urine output/color:lighter in evening  Chest pain:n  Persistent increased heart rate:n  Fatigue:n  N/V: n  Dizzy/weak: n  BM: last bm yesterday  Protein and fluid intake assessment: (food diary)  Fluid intake: water, 24 ounces  Protein supplements: Lamiecco  Protein intake yesterday: 2 per day  Vitamins  -What vitamins are you taking?y  -Are you tolerating well?trying to calcium citrate once   Medications  Omeprazole:y  Hycet:n  How are you tolerating pain at this time? (rate on a scale from 1 to 10; >7 notify PA/MD)2  Are you having any problems? (f/u with PCP, cardiologist, endocrinologist)n  How is your support system at home?yes  Exercise reminder (light exercise at this time, no lifting above 10 lbs)yes     Questions for nurse/MA/PA: no     Assessment:  Doing well.     Discussion:  Continue to work on fluid and protein intake.     Confirmed date and time for 2 week po labs and clinic visit  ----- Message from Gaviota Zavala RD sent at 2020  9:44 AM CDT -----  Regardin week post op phone call

## 2020-09-01 ENCOUNTER — CLINICAL SUPPORT (OUTPATIENT)
Dept: BARIATRICS | Facility: CLINIC | Age: 60
End: 2020-09-01

## 2020-09-01 ENCOUNTER — OFFICE VISIT (OUTPATIENT)
Dept: BARIATRICS | Facility: CLINIC | Age: 60
End: 2020-09-01
Payer: MEDICARE

## 2020-09-01 VITALS — WEIGHT: 175 LBS | HEIGHT: 60 IN | BODY MASS INDEX: 34.36 KG/M2

## 2020-09-01 DIAGNOSIS — E66.01 CLASS 2 SEVERE OBESITY DUE TO EXCESS CALORIES WITH SERIOUS COMORBIDITY AND BODY MASS INDEX (BMI) OF 35.0 TO 35.9 IN ADULT: ICD-10-CM

## 2020-09-01 DIAGNOSIS — Z98.890 POST-OPERATIVE STATE: Primary | ICD-10-CM

## 2020-09-01 DIAGNOSIS — R63.4 WEIGHT LOSS: ICD-10-CM

## 2020-09-01 DIAGNOSIS — Z98.84 S/P LAPAROSCOPIC SLEEVE GASTRECTOMY: ICD-10-CM

## 2020-09-01 DIAGNOSIS — K21.9 GASTROESOPHAGEAL REFLUX DISEASE WITHOUT ESOPHAGITIS: ICD-10-CM

## 2020-09-01 DIAGNOSIS — E78.2 MIXED HYPERLIPIDEMIA: ICD-10-CM

## 2020-09-01 DIAGNOSIS — I10 HYPERTENSION, ESSENTIAL: ICD-10-CM

## 2020-09-01 PROCEDURE — 99024 POSTOP FOLLOW-UP VISIT: CPT | Mod: 95,,, | Performed by: PHYSICIAN ASSISTANT

## 2020-09-01 PROCEDURE — 99499 NO LOS: ICD-10-PCS | Mod: 95,,, | Performed by: DIETITIAN, REGISTERED

## 2020-09-01 PROCEDURE — 99499 UNLISTED E&M SERVICE: CPT | Mod: 95,,, | Performed by: DIETITIAN, REGISTERED

## 2020-09-01 PROCEDURE — 99024 PR POST-OP FOLLOW-UP VISIT: ICD-10-PCS | Mod: 95,,, | Performed by: PHYSICIAN ASSISTANT

## 2020-09-01 NOTE — PROGRESS NOTES
BARIATRIC POST-OPERATIVE VISIT:  The patient location is: home  The chief complaint leading to consultation is: 2 week post op    Visit type: audiovisual    Face to Face time with patient: 10 minutes of total time spent on the encounter, which includes face to face time and non-face to face time preparing to see the patient (eg, review of tests), Obtaining and/or reviewing separately obtained history, Documenting clinical information in the electronic or other health record, Independently interpreting results (not separately reported) and communicating results to the patient/family/caregiver, or Care coordination (not separately reported).         Each patient to whom he or she provides medical services by telemedicine is:  (1) informed of the relationship between the physician and patient and the respective role of any other health care provider with respect to management of the patient; and (2) notified that he or she may decline to receive medical services by telemedicine and may withdraw from such care at any time.    Notes:     HPI:  Kathryn Wagner is a 60 y.o. year old female presents for 2 week post op visit following s/p sleeve.  she is doing well and tolerating the diet without difficulty.  she has no complaints.    Pt states that she is doing pretty well states once vomited after he vitamins but none since then.     Denies: nausea, vomiting, abdominal pain, changes in bowel movement pattern, fever, chills, dysphagia, chest pain, and shortness of breath.    Review of Systems   Constitutional: Negative for fatigue and fever.   HENT: Negative for tinnitus and trouble swallowing.    Respiratory: Negative for choking, chest tightness and shortness of breath.    Cardiovascular: Negative for chest pain, palpitations and leg swelling.   Gastrointestinal: Negative for abdominal pain, constipation, diarrhea, nausea and vomiting.   Genitourinary: Negative for decreased urine volume and dysuria.   Musculoskeletal:  Negative for back pain, gait problem, joint swelling, myalgias and neck pain.   Neurological: Negative for dizziness, facial asymmetry, numbness and headaches.   Psychiatric/Behavioral: Negative for decreased concentration. The patient is not nervous/anxious.        EXERCISE & VITAMINS:  See Bariatric Assessment  Adherent to vitamin regimen   MEDICATIONS/ALLERGIES:  Have been reviewed.    DIET: Liquid Bariatric Diet.  3 protein shakes daily, ~90 grams protein.  51 fl oz SF clear beverage.      Broth     See Dietician note from today for a more detailed assessment.      Physical Exam  Constitutional:       Appearance: She is obese.   HENT:      Head: Normocephalic and atraumatic.   Eyes:      Extraocular Movements: Extraocular movements intact.      Conjunctiva/sclera: Conjunctivae normal.   Neck:      Musculoskeletal: Normal range of motion and neck supple.   Pulmonary:      Effort: Pulmonary effort is normal. No respiratory distress.   Abdominal:      General: There is no distension.      Palpations: Abdomen is soft.      Tenderness: There is no abdominal tenderness.      Comments: WHSS   Fading ecchymosis no discharge noted no erythema no tenderness all noted visually on pt palpation    Skin:     General: Skin is warm.      Coloration: Skin is not jaundiced.   Neurological:      General: No focal deficit present.      Mental Status: She is alert and oriented to person, place, and time.   Psychiatric:         Mood and Affect: Mood normal.         Behavior: Behavior normal.         Thought Content: Thought content normal.         Judgment: Judgment normal.         ASSESSMENT:  - Morbid obesity s/p sleeve gastrectomy .  - Co-morbidities: depression, dyslipidemia, GERD and hypertension  - Good Weight loss, 11#'s and 18% EWL  - No formal Exercise routine  - Good Diet  - good Vitamin regimen    PLAN:  - Ursodiol 300 mg twice daily for 6 months  - Anti-Acid medication, PPI daily for 3 months  - No lifting more than 10  lbs for 6 weeks  - Miralax daily for constipation  - Emphasized the importance of regular exercise and adherence to bariatric diet to achieve maximum weight loss.  - Encouraged patient to start/continue regular exercise.  - Follow-up with dietician to advance diet.  - Continue daily vitamins and medications.  - RTC per post op schedule  - Call the office for any issues.  - Check labs today.

## 2020-09-01 NOTE — PATIENT INSTRUCTIONS
High Protein Pureed Diet    2 weeks after gastric bypass and sleeve you may be ready to add pureed food to your diet.  All food should be the consistency of baby food, or thinner.  Follow pureed diet for the next 2 weeks.    Protein - It is very important to pay attention to protein intake during this time.      Inadequate protein intake can cause:  ? Delayed Wound Healing  ? Hair Loss  ? Muscle Breakdown    Meal Plan - Eat 3-4 meals per day (2-4 tbsp each), with protein supplements in between to meet protein needs.  Meeting protein needs daily will help increase healing, decrease muscle loss, and increase weight loss.  Your goal is  grams of protein a day.    Protein First - Always eat the foods with the highest protein first.  Foods high in protein include milk, yogurt, cheese, egg whites, and blenderized meat, seafood, and beans.    Fluids - Keep track in your journal of how much you are drinking; you should try to drink at least 64oz of fluids every day.      Foods allowed: Portion size Protein (g)   ? Sugar-free clear liquids As desired 0   ? Skim or 1% milk ½ cup 4   ? Sugar free pudding, light yogurt, custard (use skim or 1% milk in preparation) 3 oz 2.5   ? Strained baby food meats, or home-made pureed lean meats and shrimp 1 oz 7   ? Beans (red, white, black, lima, richey, fat free refried, hummus) and lentils ¼ cup 4   ? Low-fat/fat free cheese.(cottage cheese, mozzarella string cheese, ricotta cheese, Laughing Cow, Baby Bell, cheddar, etc) ¼ cup 7-8   ? Scrambled eggs or Egg Beaters 1 or ¼ cup 6   ? Edamame or Tofu, mashed ¼ cup 5   ? Unflavored protein powder (add to 1 scoop to  98% fat free soups or SF pudding) 3 Tbsp 9   ? *PB2: peanut powder (45 calories) 2 Tbsp 5     *PB2 powdered peanut butter: 45 calories vs. 190 calories in 2 tbsp of regular peanut butter. Purchase online at [employer], or  at various [employer], [employer], [employer], [employer] and "[employer].      Bariatric Liquid/Pureed Sample  Menu    3-4 small meals plus 2-3 protein drinks per day.    8am 1 egg or ¼ cup Egg Beaters   9am 1 cup water, or decaf coffee or tea   10am Protein drink, 30g protein   11am 2 tbsp low-fat cottage cheese, and 1 tbsp pureed peaches   12pm 1 cup water, or sugar-free lemonade    1pm 2 tbsp pureed chicken, and 1 tbsp pureed carrots    2pm 1 cup water, or sugar-free lemonade   3pm Protein drink, 30g protein   5pm 1 cup water    6pm 1 cup hi-protein creamy chicken soup 14g protein (see Recipe below)   7pm 1 cup water, or sugar-free fruit punch    8pm 1 cup water     This sample menu provides approx. 80g protein and 64oz fluids.  Liquid protein supplements should contain 20-30g protein and less than 4 grams of sugar each.    ? Sip fluids continuously in between meals.  Drink at least ¼ cup every 15 minutes.  ? For fluids: ¼ cup = 2 oz = 4 tbsp       RECIPE IDEAS for Bariatric Pureed Diet:    Hi-Protein Creamy Chicken Soup: (10g protein per 1 cup serving)  Empty 1 can of 98% fat free cream of chicken soup into saucepan. Then  blend 1 scoop of unflavored protein powder with 1 can of skim milk until smooth.  Add protein milk to saucepan and heat to warm. (Note: Do NOT boil. Protein powder may clump if heated too hot).     Hi-Protein Pudding: (14g protein per ½ cup serving)  Add 2 scoops protein powder to 2 cups cold skim milk and mix well.  Stir in dry Jell-O Sugar-Free Instant Pudding mix.  Chill and Enjoy!    Tuna Mousse (12g protein per ¼ cup serving) Page 135 in book Eating Well After Weight Loss Surgery.  In a  or , combine all ingredients and pulse until smooth.  2 6-ounce cans tuna packed in water, drained  2 tbsp low-fat mayonnaise  2 tbsp fat-free sour cream  2 tbsp fat-free cream cheese, softened  ½ cup shallots, finely chopped  1 tbsp lemon juice  ¼ tsp ground pepper  ½ tsp celery seed    Chocolate Peanut Butter Mousse  (28g protein total)  6oz plain Greek yogurt  4 tbsp chocolate PB2

## 2020-09-01 NOTE — PROGRESS NOTES
The patient location is:  Patient Home   The chief complaint leading to consultation is: post op bariatric nutrition follow up  Visit type: Virtual visit with synchronous audio and video  Total time spent with patient: 15 minutes  Each patient to whom he or she provides medical services by telemedicine is:  (1) informed of the relationship between the physician and patient and the respective role of any other health care provider with respect to management of the patient; and (2) notified that he or she may decline to receive medical services by telemedicine and may withdraw from such care at any time.  Nutrition Handout located in AVS section of pt's [facility] and/or sent as a message via [facility] portal.  Pt informed of handout and how to access this information in [employer] [facility].  NUTRITION NOTE    Referring Physician: Yuni Chaudhari MD  Reason for MNT Referral: Follow-up 2 Weeks s/p Gastric Sleeve    PAST MEDICAL HISTORY:  Denies nausea, vomiting, constipation and diarrhea.  Reports doing well.    Past Medical History:   Diagnosis Date    Colon polyp     Depression     Encounter for blood transfusion     GERD (gastroesophageal reflux disease)     High cholesterol     Hypertension     Lumbar spondylosis 03/04/2015    L3-4 fusion; L5S1 fusion    Obesity     Restless leg syndrome        CLINICAL DATA:  60 y.o. female.    There were no vitals filed for this visit.    Current Weight: 175 lbs  BMI: 34.18  Total Weight Loss: 11 lbs  Excess Weight Loss: 18%    LABS:  No recent bariatric labs    CURRENT DIET:  Bariatric Liquid Diet    Diet Recall: 90 grams of protein/day;40-48 oz of fluids/day    Diet Includes: water 16.9- 2 to 2.5 bottles per day, chicken broth  Protein Supplements: [employer]life 30 gms of protein 3 per day    EXERCISE:  See BA  VITAMINS/MINERALS:  See BA    ASSESSMENT:  Doing fairly well overall.  Adequate protein intake.  Inadequate fluid intake.    BARIATRIC DIET DISCUSSION:  Instructed and  provided written materials on bariatric pureed diet plan and soft diet- to start Sept 14  Reinforced post-op nutrition guidelines.    PLAN/RECOMMONDATIONS:  Advance to bariatric pureed diet.  Maintain protein intake.  Increase fluid intake.  Continue light exercise.  Begin appropriate vitamins & minerals.  Adjust vitamins & minerals by: as recommended    Return to clinic in 6 weeks.    SESSION TIME: 15 minutes

## 2020-09-02 ENCOUNTER — LAB VISIT (OUTPATIENT)
Dept: LAB | Facility: HOSPITAL | Age: 60
End: 2020-09-02
Attending: PHYSICIAN ASSISTANT
Payer: MEDICAID

## 2020-09-02 DIAGNOSIS — Z71.3 DIETARY COUNSELING: ICD-10-CM

## 2020-09-02 DIAGNOSIS — Z01.818 PRE-OP EVALUATION: ICD-10-CM

## 2020-09-02 DIAGNOSIS — R06.09 DOE (DYSPNEA ON EXERTION): ICD-10-CM

## 2020-09-02 DIAGNOSIS — E78.2 MIXED HYPERLIPIDEMIA: ICD-10-CM

## 2020-09-02 DIAGNOSIS — Z79.899 OTHER LONG TERM (CURRENT) DRUG THERAPY: ICD-10-CM

## 2020-09-02 DIAGNOSIS — K21.9 GASTROESOPHAGEAL REFLUX DISEASE WITHOUT ESOPHAGITIS: ICD-10-CM

## 2020-09-02 DIAGNOSIS — I10 HYPERTENSION, ESSENTIAL: ICD-10-CM

## 2020-09-02 DIAGNOSIS — R73.09 ABNORMAL GLUCOSE: ICD-10-CM

## 2020-09-02 DIAGNOSIS — E66.01 SEVERE OBESITY: ICD-10-CM

## 2020-09-02 DIAGNOSIS — K91.2 POSTSURGICAL MALABSORPTION, NOT ELSEWHERE CLASSIFIED: ICD-10-CM

## 2020-09-02 LAB
ALBUMIN SERPL BCP-MCNC: 4.3 G/DL (ref 3.5–5.2)
ALP SERPL-CCNC: 67 U/L (ref 55–135)
ALT SERPL W/O P-5'-P-CCNC: 20 U/L (ref 10–44)
ANION GAP SERPL CALC-SCNC: 11 MMOL/L (ref 8–16)
AST SERPL-CCNC: 21 U/L (ref 10–40)
BASOPHILS # BLD AUTO: 0.04 K/UL (ref 0–0.2)
BASOPHILS NFR BLD: 0.5 % (ref 0–1.9)
BILIRUB SERPL-MCNC: 0.3 MG/DL (ref 0.1–1)
BUN SERPL-MCNC: 20 MG/DL (ref 6–20)
CALCIUM SERPL-MCNC: 9.7 MG/DL (ref 8.7–10.5)
CHLORIDE SERPL-SCNC: 104 MMOL/L (ref 95–110)
CHOLEST SERPL-MCNC: 206 MG/DL (ref 120–199)
CHOLEST/HDLC SERPL: 4.8 {RATIO} (ref 2–5)
CO2 SERPL-SCNC: 25 MMOL/L (ref 23–29)
CREAT SERPL-MCNC: 0.9 MG/DL (ref 0.5–1.4)
DIFFERENTIAL METHOD: ABNORMAL
EOSINOPHIL # BLD AUTO: 0.4 K/UL (ref 0–0.5)
EOSINOPHIL NFR BLD: 5.2 % (ref 0–8)
ERYTHROCYTE [DISTWIDTH] IN BLOOD BY AUTOMATED COUNT: 13.4 % (ref 11.5–14.5)
EST. GFR  (AFRICAN AMERICAN): >60 ML/MIN/1.73 M^2
EST. GFR  (NON AFRICAN AMERICAN): >60 ML/MIN/1.73 M^2
GLUCOSE SERPL-MCNC: 88 MG/DL (ref 70–110)
HCT VFR BLD AUTO: 39.3 % (ref 37–48.5)
HDLC SERPL-MCNC: 43 MG/DL (ref 40–75)
HDLC SERPL: 20.9 % (ref 20–50)
HGB BLD-MCNC: 12.2 G/DL (ref 12–16)
IMM GRANULOCYTES # BLD AUTO: 0.01 K/UL (ref 0–0.04)
IMM GRANULOCYTES NFR BLD AUTO: 0.1 % (ref 0–0.5)
IRON SERPL-MCNC: 48 UG/DL (ref 30–160)
LDLC SERPL CALC-MCNC: 140.2 MG/DL (ref 63–159)
LYMPHOCYTES # BLD AUTO: 2.3 K/UL (ref 1–4.8)
LYMPHOCYTES NFR BLD: 29.9 % (ref 18–48)
MCH RBC QN AUTO: 28.6 PG (ref 27–31)
MCHC RBC AUTO-ENTMCNC: 31 G/DL (ref 32–36)
MCV RBC AUTO: 92 FL (ref 82–98)
MONOCYTES # BLD AUTO: 0.5 K/UL (ref 0.3–1)
MONOCYTES NFR BLD: 6.4 % (ref 4–15)
NEUTROPHILS # BLD AUTO: 4.4 K/UL (ref 1.8–7.7)
NEUTROPHILS NFR BLD: 57.9 % (ref 38–73)
NONHDLC SERPL-MCNC: 163 MG/DL
NRBC BLD-RTO: 0 /100 WBC
PLATELET # BLD AUTO: 394 K/UL (ref 150–350)
PMV BLD AUTO: 12.3 FL (ref 9.2–12.9)
POTASSIUM SERPL-SCNC: 4.1 MMOL/L (ref 3.5–5.1)
PROT SERPL-MCNC: 7.6 G/DL (ref 6–8.4)
RBC # BLD AUTO: 4.27 M/UL (ref 4–5.4)
SATURATED IRON: 15 % (ref 20–50)
SODIUM SERPL-SCNC: 140 MMOL/L (ref 136–145)
TOTAL IRON BINDING CAPACITY: 315 UG/DL (ref 250–450)
TRANSFERRIN SERPL-MCNC: 213 MG/DL (ref 200–375)
TRIGL SERPL-MCNC: 114 MG/DL (ref 30–150)
WBC # BLD AUTO: 7.68 K/UL (ref 3.9–12.7)

## 2020-09-02 PROCEDURE — 80061 LIPID PANEL: CPT

## 2020-09-02 PROCEDURE — 83540 ASSAY OF IRON: CPT

## 2020-09-02 PROCEDURE — 36415 COLL VENOUS BLD VENIPUNCTURE: CPT | Mod: PO

## 2020-09-02 PROCEDURE — 85025 COMPLETE CBC W/AUTO DIFF WBC: CPT

## 2020-09-02 PROCEDURE — 84425 ASSAY OF VITAMIN B-1: CPT

## 2020-09-02 PROCEDURE — 80053 COMPREHEN METABOLIC PANEL: CPT

## 2020-09-02 PROCEDURE — 82607 VITAMIN B-12: CPT

## 2020-09-02 PROCEDURE — 82306 VITAMIN D 25 HYDROXY: CPT

## 2020-09-03 LAB
25(OH)D3+25(OH)D2 SERPL-MCNC: 25 NG/ML (ref 30–96)
VIT B12 SERPL-MCNC: 1260 PG/ML (ref 210–950)

## 2020-09-09 ENCOUNTER — PATIENT MESSAGE (OUTPATIENT)
Dept: FAMILY MEDICINE | Facility: CLINIC | Age: 60
End: 2020-09-09

## 2020-09-09 NOTE — TELEPHONE ENCOUNTER
Received disability paperwork from [employer], presume that this is due to chronic back issues for which she is followed by [facility].  Would defer to them regarding this.

## 2020-09-14 LAB — VIT B1 BLD-MCNC: 83 UG/L (ref 38–122)

## 2020-10-12 ENCOUNTER — PATIENT OUTREACH (OUTPATIENT)
Dept: ADMINISTRATIVE | Facility: OTHER | Age: 60
End: 2020-10-12

## 2020-10-12 ENCOUNTER — TELEPHONE (OUTPATIENT)
Dept: BARIATRICS | Facility: CLINIC | Age: 60
End: 2020-10-12

## 2020-10-12 NOTE — TELEPHONE ENCOUNTER
----- Message from Francisco Reyna sent at 10/12/2020 11:54 AM CDT -----  Patient called to speak w/ someone regarding rescheduling her post-op f/u for virtual visit, requesting callback [phone]

## 2020-10-12 NOTE — TELEPHONE ENCOUNTER
Phoned patient and discussed making her 8 wk f/u appointments as virtual.  Rescheduled for same date and time.  Informed that we would like the labs before the appointment but she wanted them later in the day on Wednesday. Informed her that we usually like th labs results to review at the appointment.  She verbalized understanding that we would not have the results for the appointment.  She is aware. Changed labs to Wednesday at 1030 AM in [place] per patients request.

## 2020-10-12 NOTE — PROGRESS NOTES
LINKS immunization registry updated  Care Everywhere updated  Health Maintenance updated  Chart reviewed for overdue Proactive [facility]coun[facility] (KAYLIE) health maintenance testing (CRS, Breast Ca, Diabetic Eye Exam)   Orders entered:N/A

## 2020-10-15 ENCOUNTER — LAB VISIT (OUTPATIENT)
Dept: FAMILY MEDICINE | Facility: CLINIC | Age: 60
End: 2020-10-15
Payer: MEDICARE

## 2020-10-15 DIAGNOSIS — K21.9 GASTROESOPHAGEAL REFLUX DISEASE WITHOUT ESOPHAGITIS: ICD-10-CM

## 2020-10-15 DIAGNOSIS — Z71.3 DIETARY COUNSELING: ICD-10-CM

## 2020-10-15 DIAGNOSIS — Z01.818 PRE-OP EVALUATION: ICD-10-CM

## 2020-10-15 DIAGNOSIS — R73.09 ABNORMAL GLUCOSE: ICD-10-CM

## 2020-10-15 DIAGNOSIS — E78.2 MIXED HYPERLIPIDEMIA: ICD-10-CM

## 2020-10-15 DIAGNOSIS — I10 HYPERTENSION, ESSENTIAL: ICD-10-CM

## 2020-10-15 DIAGNOSIS — R06.09 DOE (DYSPNEA ON EXERTION): ICD-10-CM

## 2020-10-15 DIAGNOSIS — Z79.899 OTHER LONG TERM (CURRENT) DRUG THERAPY: ICD-10-CM

## 2020-10-15 DIAGNOSIS — E66.01 SEVERE OBESITY: ICD-10-CM

## 2020-10-15 DIAGNOSIS — K91.2 POSTSURGICAL MALABSORPTION, NOT ELSEWHERE CLASSIFIED: ICD-10-CM

## 2020-10-15 LAB
BASOPHILS # BLD AUTO: 0.04 K/UL (ref 0–0.2)
BASOPHILS NFR BLD: 0.6 % (ref 0–1.9)
DIFFERENTIAL METHOD: ABNORMAL
EOSINOPHIL # BLD AUTO: 0.8 K/UL (ref 0–0.5)
EOSINOPHIL NFR BLD: 10.9 % (ref 0–8)
ERYTHROCYTE [DISTWIDTH] IN BLOOD BY AUTOMATED COUNT: 13.7 % (ref 11.5–14.5)
HCT VFR BLD AUTO: 40.2 % (ref 37–48.5)
HGB BLD-MCNC: 12.6 G/DL (ref 12–16)
IMM GRANULOCYTES # BLD AUTO: 0.01 K/UL (ref 0–0.04)
IMM GRANULOCYTES NFR BLD AUTO: 0.1 % (ref 0–0.5)
LYMPHOCYTES # BLD AUTO: 2.3 K/UL (ref 1–4.8)
LYMPHOCYTES NFR BLD: 33.4 % (ref 18–48)
MCH RBC QN AUTO: 28.2 PG (ref 27–31)
MCHC RBC AUTO-ENTMCNC: 31.3 G/DL (ref 32–36)
MCV RBC AUTO: 90 FL (ref 82–98)
MONOCYTES # BLD AUTO: 0.5 K/UL (ref 0.3–1)
MONOCYTES NFR BLD: 7.3 % (ref 4–15)
NEUTROPHILS # BLD AUTO: 3.3 K/UL (ref 1.8–7.7)
NEUTROPHILS NFR BLD: 47.7 % (ref 38–73)
NRBC BLD-RTO: 0 /100 WBC
PLATELET # BLD AUTO: 325 K/UL (ref 150–350)
PMV BLD AUTO: 12.9 FL (ref 9.2–12.9)
RBC # BLD AUTO: 4.47 M/UL (ref 4–5.4)
WBC # BLD AUTO: 7 K/UL (ref 3.9–12.7)

## 2020-10-15 PROCEDURE — 80061 LIPID PANEL: CPT

## 2020-10-15 PROCEDURE — 85025 COMPLETE CBC W/AUTO DIFF WBC: CPT

## 2020-10-15 PROCEDURE — 82607 VITAMIN B-12: CPT

## 2020-10-15 PROCEDURE — 84425 ASSAY OF VITAMIN B-1: CPT

## 2020-10-15 PROCEDURE — 36415 COLL VENOUS BLD VENIPUNCTURE: CPT

## 2020-10-15 PROCEDURE — 82306 VITAMIN D 25 HYDROXY: CPT

## 2020-10-15 PROCEDURE — 83540 ASSAY OF IRON: CPT

## 2020-10-15 PROCEDURE — 80053 COMPREHEN METABOLIC PANEL: CPT

## 2020-10-15 NOTE — PROGRESS NOTES
Venipuncture performed with 23 gauge butterfly, x's 1 attempt,  to L Antecubital vein.  Specimens collected per orders.      Pressure dressing applied to site, instructed patient to remove dressing in 10-15 minutes, OK to re-adjust dressing if pressure causing any discomfort, to observe closely for numbness and/or discoloration to hand or fingers, and to notify provider if bleeding persists after applying constant pressure lasting 30 minutes.

## 2020-10-16 ENCOUNTER — PATIENT MESSAGE (OUTPATIENT)
Dept: BARIATRICS | Facility: CLINIC | Age: 60
End: 2020-10-16

## 2020-10-16 LAB
25(OH)D3+25(OH)D2 SERPL-MCNC: 36 NG/ML (ref 30–96)
ALBUMIN SERPL BCP-MCNC: 4.3 G/DL (ref 3.5–5.2)
ALP SERPL-CCNC: 68 U/L (ref 55–135)
ALT SERPL W/O P-5'-P-CCNC: 19 U/L (ref 10–44)
ANION GAP SERPL CALC-SCNC: 12 MMOL/L (ref 8–16)
AST SERPL-CCNC: 24 U/L (ref 10–40)
BILIRUB SERPL-MCNC: 0.4 MG/DL (ref 0.1–1)
BUN SERPL-MCNC: 23 MG/DL (ref 6–20)
CALCIUM SERPL-MCNC: 9.8 MG/DL (ref 8.7–10.5)
CHLORIDE SERPL-SCNC: 102 MMOL/L (ref 95–110)
CHOLEST SERPL-MCNC: 238 MG/DL (ref 120–199)
CHOLEST/HDLC SERPL: 4.8 {RATIO} (ref 2–5)
CO2 SERPL-SCNC: 27 MMOL/L (ref 23–29)
CREAT SERPL-MCNC: 0.8 MG/DL (ref 0.5–1.4)
EST. GFR  (AFRICAN AMERICAN): >60 ML/MIN/1.73 M^2
EST. GFR  (NON AFRICAN AMERICAN): >60 ML/MIN/1.73 M^2
GLUCOSE SERPL-MCNC: 87 MG/DL (ref 70–110)
HDLC SERPL-MCNC: 50 MG/DL (ref 40–75)
HDLC SERPL: 21 % (ref 20–50)
IRON SERPL-MCNC: 63 UG/DL (ref 30–160)
LDLC SERPL CALC-MCNC: 158.2 MG/DL (ref 63–159)
NONHDLC SERPL-MCNC: 188 MG/DL
POTASSIUM SERPL-SCNC: 4.6 MMOL/L (ref 3.5–5.1)
PROT SERPL-MCNC: 7.6 G/DL (ref 6–8.4)
SATURATED IRON: 18 % (ref 20–50)
SODIUM SERPL-SCNC: 141 MMOL/L (ref 136–145)
TOTAL IRON BINDING CAPACITY: 357 UG/DL (ref 250–450)
TRANSFERRIN SERPL-MCNC: 241 MG/DL (ref 200–375)
TRIGL SERPL-MCNC: 149 MG/DL (ref 30–150)
VIT B12 SERPL-MCNC: 781 PG/ML (ref 210–950)

## 2020-10-21 LAB — VIT B1 BLD-MCNC: 80 UG/L (ref 38–122)

## 2020-10-22 ENCOUNTER — OFFICE VISIT (OUTPATIENT)
Dept: FAMILY MEDICINE | Facility: CLINIC | Age: 60
End: 2020-10-22
Payer: MEDICARE

## 2020-10-22 VITALS
HEIGHT: 60 IN | DIASTOLIC BLOOD PRESSURE: 96 MMHG | BODY MASS INDEX: 32.2 KG/M2 | WEIGHT: 164 LBS | SYSTOLIC BLOOD PRESSURE: 126 MMHG | TEMPERATURE: 99 F | HEART RATE: 88 BPM

## 2020-10-22 DIAGNOSIS — I10 BENIGN ESSENTIAL HTN: ICD-10-CM

## 2020-10-22 DIAGNOSIS — F33.41 RECURRENT MAJOR DEPRESSION IN PARTIAL REMISSION: Primary | ICD-10-CM

## 2020-10-22 PROCEDURE — 3008F BODY MASS INDEX DOCD: CPT | Mod: CPTII,S$GLB,, | Performed by: NURSE PRACTITIONER

## 2020-10-22 PROCEDURE — 3074F PR MOST RECENT SYSTOLIC BLOOD PRESSURE < 130 MM HG: ICD-10-PCS | Mod: CPTII,S$GLB,, | Performed by: NURSE PRACTITIONER

## 2020-10-22 PROCEDURE — 3080F PR MOST RECENT DIASTOLIC BLOOD PRESSURE >= 90 MM HG: ICD-10-PCS | Mod: CPTII,S$GLB,, | Performed by: NURSE PRACTITIONER

## 2020-10-22 PROCEDURE — 3074F SYST BP LT 130 MM HG: CPT | Mod: CPTII,S$GLB,, | Performed by: NURSE PRACTITIONER

## 2020-10-22 PROCEDURE — 99499 UNLISTED E&M SERVICE: CPT | Mod: S$GLB,,, | Performed by: NURSE PRACTITIONER

## 2020-10-22 PROCEDURE — 99213 PR OFFICE/OUTPT VISIT, EST, LEVL III, 20-29 MIN: ICD-10-PCS | Mod: S$GLB,,, | Performed by: NURSE PRACTITIONER

## 2020-10-22 PROCEDURE — 99213 OFFICE O/P EST LOW 20 MIN: CPT | Mod: S$GLB,,, | Performed by: NURSE PRACTITIONER

## 2020-10-22 PROCEDURE — 99499 RISK ADDL DX/OHS AUDIT: ICD-10-PCS | Mod: S$GLB,,, | Performed by: NURSE PRACTITIONER

## 2020-10-22 PROCEDURE — 3008F PR BODY MASS INDEX (BMI) DOCUMENTED: ICD-10-PCS | Mod: CPTII,S$GLB,, | Performed by: NURSE PRACTITIONER

## 2020-10-22 PROCEDURE — 3080F DIAST BP >= 90 MM HG: CPT | Mod: CPTII,S$GLB,, | Performed by: NURSE PRACTITIONER

## 2020-10-22 RX ORDER — DULOXETIN HYDROCHLORIDE 30 MG/1
30 CAPSULE, DELAYED RELEASE ORAL DAILY
Qty: 14 CAPSULE | Refills: 0 | OUTPATIENT
Start: 2020-10-22 | End: 2020-10-22 | Stop reason: SDUPTHER

## 2020-10-22 RX ORDER — ESCITALOPRAM OXALATE 10 MG/1
TABLET ORAL
Qty: 30 TABLET | Refills: 1 | OUTPATIENT
Start: 2020-10-22 | End: 2020-10-22 | Stop reason: SDUPTHER

## 2020-10-22 RX ORDER — DULOXETIN HYDROCHLORIDE 20 MG/1
CAPSULE, DELAYED RELEASE ORAL
Qty: 30 CAPSULE | Refills: 0 | OUTPATIENT
Start: 2020-10-22 | End: 2020-10-22

## 2020-10-22 RX ORDER — DULOXETIN HYDROCHLORIDE 20 MG/1
CAPSULE, DELAYED RELEASE ORAL
Qty: 30 CAPSULE | Refills: 0 | OUTPATIENT
Start: 2020-10-22 | End: 2020-10-22 | Stop reason: SDUPTHER

## 2020-10-22 RX ORDER — DULOXETIN HYDROCHLORIDE 30 MG/1
30 CAPSULE, DELAYED RELEASE ORAL DAILY
Qty: 14 CAPSULE | Refills: 0 | OUTPATIENT
Start: 2020-10-22 | End: 2020-10-22

## 2020-10-22 RX ORDER — ESCITALOPRAM OXALATE 10 MG/1
TABLET ORAL
Qty: 30 TABLET | Refills: 1 | OUTPATIENT
Start: 2020-10-22 | End: 2020-10-22

## 2020-10-22 RX ORDER — DULOXETIN HYDROCHLORIDE 20 MG/1
CAPSULE, DELAYED RELEASE ORAL
Qty: 30 CAPSULE | Refills: 0 | OUTPATIENT
Start: 2020-10-22 | End: 2021-03-11

## 2020-10-22 RX ORDER — DULOXETIN HYDROCHLORIDE 20 MG/1
CAPSULE, DELAYED RELEASE ORAL
Qty: 30 CAPSULE | Refills: 0 | Status: SHIPPED | OUTPATIENT
Start: 2020-10-22 | End: 2020-10-22

## 2020-10-22 RX ORDER — ESCITALOPRAM OXALATE 10 MG/1
TABLET ORAL
Qty: 30 TABLET | Refills: 1 | OUTPATIENT
Start: 2020-10-22 | End: 2021-03-11

## 2020-10-22 RX ORDER — DULOXETIN HYDROCHLORIDE 30 MG/1
30 CAPSULE, DELAYED RELEASE ORAL DAILY
Qty: 14 CAPSULE | Refills: 0 | Status: SHIPPED | OUTPATIENT
Start: 2020-10-22 | End: 2020-10-22 | Stop reason: CLARIF

## 2020-10-22 RX ORDER — DULOXETIN HYDROCHLORIDE 30 MG/1
30 CAPSULE, DELAYED RELEASE ORAL DAILY
Qty: 14 CAPSULE | Refills: 0 | OUTPATIENT
Start: 2020-10-22 | End: 2021-03-11

## 2020-10-22 RX ORDER — ESCITALOPRAM OXALATE 10 MG/1
TABLET ORAL
Qty: 30 TABLET | Refills: 1 | Status: SHIPPED | OUTPATIENT
Start: 2020-10-22 | End: 2020-10-22

## 2020-10-22 NOTE — PATIENT INSTRUCTIONS
Keep a BP log for 2 weeks 2-3x per week. Upload it into [facility].    Tapering onto escitalopram from the duloxetine:  Weeks 1-2  1/2 tab escialopram and 2 20-mg cymbalta  Weeks 3-4  1 tab escitalopram and 1 30-mg cymbalta  Weeks 5-6  1-1.5 tab escitalopram and 1 20-mg cymbalta  Weeks 7-8  1-2 tab escitalopram and off cymbalta    If you have concerns or questions, please do not hesitate to call.  If you have any questions, please do not hesitate to call.  You can reach us at [phone] Monday through Friday 7 a.m. to 6:30 p.m., Saturday 9 a.m. to 4:30 p.m. and Sunday 9 a.m to 2:30 p.m.     National Suicide Prevention Lifeline  1-772.871.7448    Thank you for using the [facility]!    DANIELA Martin, CNP, FNP-BC  [facility]    To rate your experience with BEKA Martin, click on the link below:      [email or web address]

## 2020-10-22 NOTE — PROGRESS NOTES
NAME@ is a 60 y.o. female patient of Dr. Edward Contreras MD who presents to the clinic today for   Chief Complaint   Patient presents with    Depression    bariatric surgery     follow up    .    HPI        Pt, who is not known to me, reports today for a physical.  Had weight loss surgery 2 months ago.  Bought a car.  Moved to [place].  The weight loss has been a long journey.  Has been feeling like she doesn't want to get out of bed for about 6 months.  Has been encouraged to seek assistance.  Took quite an effort to come here today.  Has been on Cymbalta for about 2 years, since her back surgery.  The back pain is better since her last surgery.    Seemed like she did well on Prozac for years.  The only reason to change was to help the pain.      Patient last seen by PCP Dr. Contreras 4/2020 on the [facility].  He has now     Past Medical History:   Diagnosis Date    Colon polyp     Depression     Encounter for blood transfusion     GERD (gastroesophageal reflux disease)     High cholesterol     Hypertension     Lumbar spondylosis 03/04/2015    L3-4 fusion; L5S1 fusion    Obesity     Restless leg syndrome        Interval Hx:  She hasn't been feeling herself, she states.  She doesn't feel she can enjoy her grandchildren and daughter.      Current Outpatient Medications:     DULoxetine (CYMBALTA) 60 MG capsule, Take 1 capsule (60 mg total) by mouth once daily., Disp: 90 capsule, Rfl: 3    omeprazole (PRILOSEC) 40 MG capsule, Take 1 capsule (40 mg total) by mouth every morning. Open capsule and take with apple sauce, Disp: 30 capsule, Rfl: 2    ondansetron (ZOFRAN-ODT) 8 MG TbDL, DISSOLVE 1 tablet (8 mg total) by mouth every 6 (six) hours as needed., Disp: 30 tablet, Rfl: 0    oxycodone-acetaminophen (PERCOCET)  mg per tablet, Take 1 tablet by mouth every 8 (eight) hours as needed. , Disp: , Rfl:     traZODone (DESYREL) 50 MG tablet, Take 50 mg by mouth every evening., Disp: , Rfl:      hydrocodone-acetaminophen (HYCET) solution 7.5-325 mg/15mL, Take 15 mLs by mouth 4 (four) times daily as needed. (Patient not taking: Reported on 10/22/2020), Disp: 118 mL, Rfl: 0    losartan (COZAAR) 50 MG tablet, Take 1 tablet (50 mg total) by mouth once daily. Stop lisinopril hctz (Patient not taking: Reported on 10/22/2020), Disp: 90 tablet, Rfl: 3    naloxone (NARCAN) 4 mg/actuation Spry, Narcan 4 mg/actuation nasal spray, Disp: , Rfl:     ursodioL (ACTIGALL) 500 MG tablet, Take 1 tablet (500 mg total) by mouth once daily., Disp: 30 tablet, Rfl: 5    Medications:  Is taking his regular medication(s) as prescribed none side effects, expresses no concerns.    Pt denies the following symptoms:  Suicidal ideation    OTC Medications in use besides vitamins or those on the medication list are none.    Patient is a former smoker.     Health promotion/maintenance: will review at next visit .    ROS    Constitutional:  No fever,  increased fatigue.    HEENT:  Negative    Heart/CV:  No palpitations, no CP, no edema.      Lungs:  no cough, noshortness of breath.    GI:  no abd pain, no diarrhea, no constipation.        Small hiatal hernia replaced when the surgery was done.  Some constipation.      :  no urgency, no frequency, no difficulty holding urine.    MS:  no swelling, chronic back pain.    Skin:  No rashes, no itching of the skin    PHYSICAL EXAM    Alert, coop 60 y.o. female patient in no acute distress.    Vitals:    10/22/20 1426   BP: (!) 126/96   Pulse: 88   Temp: 98.6 °F (37 °C)   Weight: 74.4 kg (164 lb 0.4 oz)   Height: 5' (1.524 m)     VS reviewed.  VS DBP elevated.  CC, nursing note, medications & PMH all reviewed today.    Head:  Normocephalic, atraumatic.    EENT: Ext nose/ears normal.  Eyes with normal lids, not injected.    Thyroid:  Not enlarged.           Resp:  Respirations even, unlabored.             Lungs CTA bilat.    Heart:  RRR, no murmur.  CV:  Cap RF brisk, no pedal edema  noted.    MS:  Walks with steady gait, arm movement smooth and symmetrical.              NEURO:  Alert and oriented x 4.  Responds appropriately during interaction.                  Skin:  Warm, dry, color good.    Psych:  Responds appropriately throughout the visit.               Relaxed.  Well-groomed.    Current moderate episode of major depressive disorder, unspecified whether recurrent  -     DULoxetine (CYMBALTA) 20 MG capsule; 2 daily for Weeks 1 & 2.  1 daily for 2 weeks Weeks 5 & 6.  Dispense: 30 capsule; Refill: 0  -     escitalopram oxalate (LEXAPRO) 10 MG tablet; 1/2 tablet daily for 2 weeks then 1 tablet daily for 2 weeks.  May increase to 1.5 tablets if needed after 2 weeks.  Dispense: 30 tablet; Refill: 1  -     DULoxetine (CYMBALTA) 30 MG capsule; Take 1 capsule (30 mg total) by mouth once daily. Weeks 3 & 4.  Dispense: 14 capsule; Refill: 0    Pt today presents for an annual physical/interval exam today for   Chief Complaint   Patient presents with    Depression    bariatric surgery     follow up    /surveilance of chronic medical conditions, reporting the following concerns.  It's hard to get out of bed, low energy, which is unusual for her.  She was on Prozac for years and did well.  Was changed to duloxetine to help with her pain but doesn't feel it's helping her depression.  Also, she stopped taking her losartan because she's lost a lot of weight and is afraid her BP will go too low.    The following chronic conditions are stable: none .      These chronic conditions are not stable depression, HTN .  The following changes have been recommended change from duloxetine to escitalopram over an 8 week period by adding escitalopram gradually and decreasing duloxetine gradually.  Also, the pt will monitor her BP over the next 2 weeks to see how her BP is doing and check back here.  She can take a picture of the log and upload it. .    Lab & Radiological Tests Ordered subsequent to the visit are  none--she is being followed closely after her gastric sleeve surgery.    Health screening/Promotion addressed during the visit: none--will address at f/u visit.     Reminded the patient if there are any concerns, call here, PCP office or [facility].  If urgent concerns, the patient is advised to call here, the PCP office or [facility] or go to the URGENT CARE or ER.  Explained exam findings, diagnosis and treatment plan to patient.  Questions answered and patient states understanding..

## 2020-10-23 ENCOUNTER — TELEPHONE (OUTPATIENT)
Dept: FAMILY MEDICINE | Facility: CLINIC | Age: 60
End: 2020-10-23

## 2020-10-23 NOTE — TELEPHONE ENCOUNTER
rx set to print. Called rx into pharmacy per how Queta Ac NP has in epic.       Called pt notified and she verbally understood.

## 2020-10-23 NOTE — TELEPHONE ENCOUNTER
----- Message from Sharona Arndt sent at 10/23/2020  2:53 PM CDT -----  Contact: VERONICA ESTEVEZ [[id number]]  Patient is requesting a call back from the nurse stated she was seen on 10/22/2020, and medications was to be sent to her local pharmacy and the pharmacy has no record of prescriptions being sent.  Please resent to the pharmacy listed below.    Please call the patient upon request at phone number [phone].      Patient will be using   [id number] [address] [address]  Phone: [phone] Fax: [phone]    Thanks!

## 2020-10-30 ENCOUNTER — CLINICAL SUPPORT (OUTPATIENT)
Dept: BARIATRICS | Facility: CLINIC | Age: 60
End: 2020-10-30
Payer: MEDICARE

## 2020-10-30 ENCOUNTER — OFFICE VISIT (OUTPATIENT)
Dept: BARIATRICS | Facility: CLINIC | Age: 60
End: 2020-10-30
Payer: MEDICARE

## 2020-10-30 VITALS
DIASTOLIC BLOOD PRESSURE: 70 MMHG | SYSTOLIC BLOOD PRESSURE: 120 MMHG | WEIGHT: 165.38 LBS | BODY MASS INDEX: 32.47 KG/M2 | HEIGHT: 60 IN

## 2020-10-30 DIAGNOSIS — Z98.84 S/P LAPAROSCOPIC SLEEVE GASTRECTOMY: ICD-10-CM

## 2020-10-30 DIAGNOSIS — E78.2 MIXED HYPERLIPIDEMIA: ICD-10-CM

## 2020-10-30 DIAGNOSIS — Z98.890 POST-OPERATIVE STATE: Primary | ICD-10-CM

## 2020-10-30 DIAGNOSIS — R63.4 WEIGHT LOSS: ICD-10-CM

## 2020-10-30 DIAGNOSIS — K21.9 GASTROESOPHAGEAL REFLUX DISEASE WITHOUT ESOPHAGITIS: ICD-10-CM

## 2020-10-30 PROCEDURE — 99499 NO LOS: ICD-10-PCS | Mod: S$GLB,,, | Performed by: DIETITIAN, REGISTERED

## 2020-10-30 PROCEDURE — 99024 POSTOP FOLLOW-UP VISIT: CPT | Mod: S$GLB,,, | Performed by: PHYSICIAN ASSISTANT

## 2020-10-30 PROCEDURE — 99999 PR PBB SHADOW E&M-EST. PATIENT-LVL III: ICD-10-PCS | Mod: PBBFAC,,, | Performed by: PHYSICIAN ASSISTANT

## 2020-10-30 PROCEDURE — 99499 UNLISTED E&M SERVICE: CPT | Mod: S$GLB,,, | Performed by: DIETITIAN, REGISTERED

## 2020-10-30 PROCEDURE — 99024 PR POST-OP FOLLOW-UP VISIT: ICD-10-PCS | Mod: S$GLB,,, | Performed by: PHYSICIAN ASSISTANT

## 2020-10-30 PROCEDURE — 99999 PR PBB SHADOW E&M-EST. PATIENT-LVL III: CPT | Mod: PBBFAC,,, | Performed by: PHYSICIAN ASSISTANT

## 2020-10-30 NOTE — PROGRESS NOTES
BARIATRIC POST-OPERATIVE VISIT:    HPI:  Kathryn Wagner is a 60 y.o. year old female presents for 8 week post op visit following s/p sleeve.  she is doing well and tolerating the diet without difficulty.  she has no complaints.    Pt states that she is doing ok, states that she was taking vitamins three times a day    Some gas in stomach but belches it up     Taking Prilosec every other day     Not taking BP meds anymore      Denies: nausea, vomiting, abdominal pain, changes in bowel movement pattern, fever, chills, dysphagia, chest pain, and shortness of breath.    Review of Systems   Constitutional: Negative for fatigue and fever.   HENT: Negative for tinnitus and trouble swallowing.    Respiratory: Negative for choking, chest tightness and shortness of breath.    Cardiovascular: Negative for chest pain, palpitations and leg swelling.   Gastrointestinal: Negative for abdominal pain, constipation, diarrhea, nausea and vomiting.   Genitourinary: Negative for decreased urine volume and dysuria.   Musculoskeletal: Negative for back pain, gait problem, joint swelling, myalgias and neck pain.   Neurological: Negative for dizziness, facial asymmetry, numbness and headaches.   Psychiatric/Behavioral: Negative for decreased concentration. The patient is not nervous/anxious.        EXERCISE & VITAMINS:  See Bariatric Assessment  Adherent to vitamin regimen ( taking all three times a day)   MEDICATIONS/ALLERGIES:  Have been reviewed.    DIET: Soft Bariatric Diet.~75 grams protein.  51 fl oz SF clear beverage.      About 2 oz   Am: shakes   Lunch: beans or tuna with greens   Supper: protein and veggie   After dinner: protein shake     See Dietician note from today for a more detailed assessment.      Physical Exam  Constitutional:       Appearance: She is obese.   HENT:      Head: Normocephalic and atraumatic.   Eyes:      Extraocular Movements: Extraocular movements intact.      Conjunctiva/sclera: Conjunctivae normal.       Pupils: Pupils are equal, round, and reactive to light.   Neck:      Musculoskeletal: Normal range of motion and neck supple.   Cardiovascular:      Rate and Rhythm: Normal rate.   Pulmonary:      Effort: Pulmonary effort is normal. No respiratory distress.   Abdominal:      General: There is no distension.      Palpations: Abdomen is soft.      Tenderness: There is no abdominal tenderness.      Comments: WHSS      Skin:     General: Skin is warm.      Coloration: Skin is not jaundiced.   Neurological:      General: No focal deficit present.      Mental Status: She is alert and oriented to person, place, and time.   Psychiatric:         Mood and Affect: Mood normal.         Behavior: Behavior normal.         Thought Content: Thought content normal.         Judgment: Judgment normal.         ASSESSMENT:  - Morbid obesity s/p sleeve gastrectomy .  - Co-morbidities: depression, dyslipidemia, GERD and hypertension  - Good Weight loss, 21#'s and 35% EWL  - No formal Exercise routine  - Good Diet  - good Vitamin regimen    PLAN:  - Ursodiol 300 mg twice daily for 6 months  - Anti-Acid medication, PPI daily for 3 months    -  Okay to discontinue omeprazole, slow taper discussed and instructions provided.   - No lifting more than 10 lbs for 6 weeks  - Miralax daily for constipation  - Emphasized the importance of regular exercise and adherence to bariatric diet to achieve maximum weight loss.  - Encouraged patient to start/continue regular exercise.  - Follow-up with dietician to advance diet.  - Continue daily vitamins and medications.  - RTC per post op schedule  - Call the office for any issues.  - Check labs today.

## 2020-10-30 NOTE — PATIENT INSTRUCTIONS
Meal Ideas for Regular Bariatric Diet  *Recipes and products available at [email or web address].[email or web address]      Breakfast: (15-20g protein)    - Egg white omelet: 2 egg whites or ½ cup Egg Beaters. (Optional proteins: cheese, shrimp, black beans, chicken, sliced turkey) (Optional veggies: tomatoes, salsa, spinach, mushrooms, onions, green peppers, or small slice avocado)     - Egg and sausage: 1 egg or ¼ cup Egg Beaters (any variety), with 1 brandon or 2 links of Turkey sausage or Veggie breakfast sausage ([place] or [place])    - Crust-less breakfast quiche: To make a glass pie dish, mix 4oz part skim Ricotta, 1 cup skim milk, and 2 eggs as your base. Add protein: shredded cheese, sliced lean ham or turkey, turkey noyola/sausage. Add veggies: tomato, onion, green onion, mushroom, green pepper, spinach, etc.    - Yogurt parfait: Mix 1 - 6oz container Dannon Light N Fit vanilla yogurt, with ¼ cup crushed unsalted nuts    - Cottage cheese and fruit: ½ cup part-skim cottage cheese or ricotta cheese topped with fresh fruit or sugar free preserves     - Bushra Trivedi's Vanilla Egg custard* (add 2 Tbsp instant coffee granules to make Cappuccino Custard*)    - Hi-Protein café latte (skim milk, decaf coffee, 1 scoop protein powder). Optional to add Sugar free syrup or extract flavoring.    - Breakfast Lox: spread fat free cream cheese on slices of smoked salmon. Serve over scrambled or egg over easy (sauteed with nonstick cookspray) OR on a cucumber slice    - Eggwhich: Scramble or cook 1 large egg over easy using nonstick cookspray. Place between 2 slices of Cuban noyola and low fat cheese.     Lunch: (20-30g protein)    - ½ cup Black bean soup (Homemade or Progresso), with ¼ cup shredded low-fat cheese. Top with chopped tomato or fresh salsa.     - Lean deli turkey breast and low-fat sliced cheese, mustard or light doty to moisten, rolled up together, or wrapped in a Adal lettuce leaf    - Chicken salad made from dinner  leftovers, moisten with low-fat salad dressing or light doty. Also try leftover salmon, shrimp, tuna or boiled eggs. Serve ½ cup over dark green salad    - Fat-free canned refried beans, topped with ¼ cup shredded low-fat cheese. Top with chopped tomato or fresh salsa.     - Greek salad: Top mixed greens with 1-2oz grilled chicken, tomatoes, red onions, 2-3 kalamata olives, and sprinkle lightly with feta cheese. Spritz with Balsamic vinegar to taste.     - Crust-less lunch quiche: To make a glass pie dish, mix 4oz part skim Ricotta, 1 cup skim milk, and 2 eggs as your base. Add protein: shredded cheese, sliced lean ham or turkey, shrimp, chicken. Add veggies: tomato, onion, green onion, mushroom, green pepper, spinach, artichoke, broccoli, etc.    - Pizza bake: spread a  govind xiomara mushroom with tomato sauce, low-fat shredded mozzarella and turkey pepperoni or [place] noyola. Add any veggies. Roast for 10-15 minutes, until cheese melted.     - Cucumber crab bites: Spread ¼ cup crab dip (lump crabmeat + light cream cheese and green onions) over sliced cucumber.     - Chicken with light spinach and artichoke dip*: Puree in : 6oz cooked and drained spinach, 2 cloves garlic, 1 can cannelloni beans, ½ cup chopped green onions, 1 can drained artichoke hearts (not marinated in oil), lemon juice and basil. Mix in 2oz chopped up chicken.    Supper: (20-30g protein)    - Serve grilled fish over dark green salad tossed with low-fat dressing, served with grilled asparagus jordan     - Rotisserie chicken salad: served with sliced strawberries, walnuts, fat-free feta cheese crumbles and 1 tbsp Carballos Own Light Raspberry [place] Vinaigrette    - Shrimp cocktail: Dip cold boiled shrimp in homemade low-sugar cocktail sauce (1/2 cup Pavan One Carb ketchup, 2 tbsp horseradish, 1/4 tsp hot sauce, 1 tsp Worcestershire sauce, 1 tbsp freshly-squeezed lemon juice). Serve with dark green salad, walnuts, and crumbled blue  cheese drizzled with olive oil and Balsamic vinegar    - Tuna Melt: Spread tuna salad onto 2 thick slices of tomato. Top with low-fat cheese and broil until cheese is melted. May also be made with chicken salad of shrimp salad. [place] with different types of cheeses.    - Chicken or beef fajitas (no tortilla, rice, beans, chips). Top meat and veggies w/ fresh salsa, fat free sour cream.     - Homemade low-fat Chili using extra lean ground beef or ground turkey. Top with shredded cheese and salsa as desired. May add dollop fat-free sour cream if desired    - Chicken parmesan: Top chicken breast w/ low sugar marinara sauce, mozzarella cheese and bake until chicken reaches 165*.  Serve w/ spaghetti SQUASH or Venezuelan cut green beans    - Dinner Omelet with shrimp or chicken and onion, green peppers and chives.    - No noodle lasagna: Use sliced zucchini or eggplant in place of noodles.  Layer with part skim ricotta cheese and low sugar meat sauce (use very lean ground beef or ground turkey).    - Mexican chicken bake: Bake chunks of chicken breast or thigh with taco seasoning, Pace brand enchilada sauce, green onions and low-fat cheese. Serve with ¼ cup black beans or fat free refried beans topped with chopped tomatoes or salsa.    - Sayra frozen meatballs, simmered in Classico Marinara sauce. Different flavors of salsa or spaghetti sauce create different dishes! Sprinkle with parmesan cheese. Serve with grilled or steamed veggies, or a dark green salad.    - Simmer boneless skinless chicken thigh chunks in Classico Marinara sauce or roasted salsa until tender with chopped onion, bell pepper, garlic, mushrooms, spinach, etc.     - Hamburger or veggie burger, without the bun, dressed the way you like. Served with grilled or steamed veggies.    - Eggplant parmesan: Bake slices of eggplant at 350 degrees for 15 minutes. Layer tomato sauce, sliced eggplant and low-fat mozzarella cheese in a baking dish and cover with  foil. Bake 30-40 more minutes or until bubbly. Uncover and bake at 400 degrees for about 15 more minutes, or until top is slightly crisp.    - Fish tacos: grilled/baked white fish, wrapped in Adal lettuce leaf, topped with salsa, shredded low-fat cheese, and light coleslaw.    - Chicken keyanna: Sprinkle chicken w/ 1 tsp of hidden valley ranch dip mix. Then grill chicken and top with black beans, salsa and 1 tsp fat free sour cream.     - Cauliflower pizza crust: Use cauliflower as crust (see recipe on pinterest, no flour!). Top w/ low fat cheese, turkey pepperoni and veggies and bake again    - chicken or turkey crust pizza: use ground chicken or turkey instead of cauliflower, spread in [place] and bake at 350 for about 20-30 minutes(may want to add garlic, black pepper, oregano and other herbs to ground meat mixture).  Remove and top w/ low fat cheese, turkey pepperoni and veggies and bake again for another 10 minutes or until cheese is browned.     Snacks: (100-200 calories; >5g protein)    - 1 low-fat cheese stick with 8 cherry tomatoes or 1 serving fresh fruit  - 4 thin slices fat-free turkey breast and 1 slice low-fat cheese  - 4 thin slices fat-free honey ham with wedge of melon  - 6-8 edamame pods (equivalent to about 1/4 cup edamame without pods).   - 1/4 cup unsalted nuts with ½ cup fruit  - 6-oz container Dannon Light n Fit vanilla yogurt, topped with 1oz unsalted nuts         - apple, celery or baby carrots spread with 2 Tbsp PB2  - apple slices with 1 oz slice low-fat cheese  - Apple slices dipped in 2 Tbsp of PB2  - celery, cucumber, bell pepper or baby carrots dipped in ¼ cup hummus bean spread or light spinach and artichoke dip (*recipe in lunch section)  - celery, cucumber, baby carrots dipped in high protein greek yogurt (Mix 16 oz plain greek yogurt + 1 packet of hidden valley ranch dip mix)  - Maurilio Links Beef Steak - 14g protein! (similar to beef jerky)  - 2 wedges Laughing Cow - Light Herb  & Garlic Cheese with sliced cucumber or green bell pepper  - 1/2 cup low-fat cottage cheese with ¼ cup fruit or ¼ cup salsa  - RTD Protein drinks: Atkins, Low Carb Slim Fast, EAS light, Muscle Milk Light, etc.  - Homemade Protein drinks: GNC Soy95, Isopure, Nectar, UNJURY, Whey Gourmet, etc. Mix 1 scoop powder with 8oz skim/1% milk or light soymilk.  - Protein bars: Atkins, EAS, Pure Protein, Think Thin, Detour, etc. Must have 0-4 grams sugar - Read the label.    Takeout Options: No more than twice/week  Deli - Salads (no pasta or rice), meats, cheeses. Roasted chicken. Lox (salmon)    Mexican - Platters which don't include tortillas, chips, or rice. Go easy on the beans. Example: Fajitas without the tortillas. Ask the  not to bring chips to the table if they are too tempting.    Greek - Meat or fish and vegetable, but no bread or rice. Including hummus, baba ganoush, etc, is OK. Most sit-down Greek restaurants can provide you with cucumber slices for dipping instead of gael bread.    Fast Food (Avoid as much as possible) - Salads (no croutons and limit salad dressing to 2 tbsp), grilled chicken sandwich without the bun and ask for no doty. Inocencias low fat chili or Taco Bell pintos and cheese.    BBQ - The meats are fine if you ask for sauces on the side, but most of the traditional side dishes are loaded with carbs. Zev slaw, baked beans and BBQ sauce are typically made with sugar.    Chinese - Nothing deep-fried, no rice or noodles. Many Chinese sauces have starch and sugar in them, so you'll have to use your judgement. If you find that these sauces trigger cravings, or cause Dumping, you can ask for the sauce to be made without sugar or just use soy sauce.

## 2020-10-30 NOTE — PROGRESS NOTES
NUTRITION NOTE    Referring Physician: Dr. Chaudhari  Reason for MNT Referral: Follow-up 8 weeks s/p Gastric Sleeve    PAST MEDICAL HISTORY:    Denies nausea, vomiting, constipation and diarrhea.  Reports doing well.    Past Medical History:   Diagnosis Date    Colon polyp     Depression     Encounter for blood transfusion     GERD (gastroesophageal reflux disease)     High cholesterol     Hypertension     Lumbar spondylosis 03/04/2015    L3-4 fusion; L5S1 fusion    Obesity     Restless leg syndrome        CLINICAL DATA:  60 y.o. female.      CURRENT DIET:  Bariatric Soft Diet.  Diet Recall: 60-80 grams of protein/day; 48 oz of fluids/day    - P3 prot pack  1pm: seasoned tuna pouch with beans and quinoa (1/2 pouch = 2oz)  - persimmon (1/2)  - green olives  - small regular Gatorade + Miralax  - Fairlife nutrition plan    Diet includes:  Meal Pattern: 1-2 meal(s) + 2 protein supplement(s)  Adequate protein supplement intake. Fairlife nutrition plan.  Adequate dairy intake.  Adequate vegetable intake. Tolerates lettuce occ.  Adequate fruit intake.    EXERCISE:   Adequate light exercise.    Restrictions to Exercise: None.    VITAMINS / MINERALS:  Multivitamins bid  B-Complex  Calcium Citrate + Vitamin D  Vitamin B12: Sublingual. - not taking    ASSESSMENT:  Doing well overall.  Weight loss.  Inadequate calorie intake.  Adequate protein intake.  Adequate fluid intake.  Following diet appropriately.  Exercising.  Inadequate vitamins & minerals.    BARIATRIC DIET DISCUSSION:  Instructed and provided written materials on bariatric diet plan.  Reinforced post-op nutrition guidelines.    PLAN / RECOMMENDATIONS:  May begin to incorporate raw vegetables, lettuce, unsalted nuts, and protein bars as tolerated.  Continue excellent diet plan.  Adjust diet by aiming to have 3 small meals per day, protein first.  Maintain protein intake from 2 prot shakes/day. Increase gradually, protein from food.  Maintain fluid  intake.  Continue exercise.  Continue appropriate vitamins & minerals.  Adjust vitamins & minerals by starting B12 sub 500mcg daily.    Return to clinic in 4 months.    SESSION TIME: 15 minutes

## 2020-11-17 ENCOUNTER — TELEPHONE (OUTPATIENT)
Dept: FAMILY MEDICINE | Facility: CLINIC | Age: 60
End: 2020-11-17

## 2020-11-17 NOTE — TELEPHONE ENCOUNTER
Patient understands that she needs to follow up with [facility] but [facility] is requesting the PCP to sign off on paperwork also.

## 2020-11-17 NOTE — TELEPHONE ENCOUNTER
----- Message from Edward Contreras MD sent at 11/17/2020 12:16 PM CST -----  Regarding: FW: Paperwork  Contact: Kathryn  I previously received disability paperwork from [employer], presume that this is due to chronic back issues for which she is followed by [facility].  Would defer to them regarding this. I dont' know enough about her condition to effectively fill out the forms  ----- Message -----  From: Reny Castro  Sent: 11/17/2020  11:21 AM CST  To: [facility] [place] Staff  Subject: Paperwork                                        Type: Patient Call Back    Who called:Kathryn    What is the request in detail:Patient needs paperwork from [employer] to be completed by 11/20. Please call to discuss    Can the clinic reply by [facility]?    Would the patient rather a call back or a response via [facility]? Call    Best call back number:[phone]

## 2020-11-18 ENCOUNTER — TELEPHONE (OUTPATIENT)
Dept: FAMILY MEDICINE | Facility: CLINIC | Age: 60
End: 2020-11-18

## 2020-11-18 NOTE — TELEPHONE ENCOUNTER
----- Message from Natasha Colon sent at 11/18/2020  2:00 PM CST -----  Contact: Patient [phone]  Type: Patient Call Back    Who called: Patient    What is the request in detail:Calling to find out if Dr Contreras received her email? Regarding [facility] and her long term disability. Patient states the paperwork needs to be signed by tomorrow, 11-19-20 and emailed back to [facility]. Please call pt.     Would the patient rather a call back or a response via [facility]?  Call back    Best call back number: [phone]

## 2020-11-18 NOTE — TELEPHONE ENCOUNTER
Informed patient that I still never received the paperwork. Will try to call [facility] to resend.

## 2020-11-19 ENCOUNTER — TELEPHONE (OUTPATIENT)
Dept: FAMILY MEDICINE | Facility: CLINIC | Age: 60
End: 2020-11-19

## 2020-11-19 NOTE — TELEPHONE ENCOUNTER
----- Message from Michela Bravo sent at 11/19/2020 10:23 AM CST -----  Type: Patient Call Back    Who called: Pt     What is the request in detail: Pt would like the Dr to know that [employer] will be faxing over a paper for the Dr to fill out.     Can the clinic reply by [facility]? No     Would the patient rather a call back or a response via [facility]? Call Back     Best call back number: [phone]    Additional Information:      Thank You

## 2020-12-17 ENCOUNTER — TELEPHONE (OUTPATIENT)
Dept: FAMILY MEDICINE | Facility: CLINIC | Age: 60
End: 2020-12-17

## 2020-12-17 NOTE — TELEPHONE ENCOUNTER
----- Message from Guicho PASCAL Frisard sent at 12/17/2020  2:52 PM CST -----  Regarding: Cancellation of Nurse visit/flu shot  Contact: patient  Patient called in and stated she has a 3pm nurse visit/flu shot appointment today Thursday 12/17/20 and needs to cancel.  Patient wanted to see if she could get a call back to reschedule tomorrow Friday 12/18/20.  Call back number is [phone].

## 2020-12-18 ENCOUNTER — CLINICAL SUPPORT (OUTPATIENT)
Dept: FAMILY MEDICINE | Facility: CLINIC | Age: 60
End: 2020-12-18
Payer: MEDICARE

## 2020-12-18 DIAGNOSIS — Z23 NEEDS FLU SHOT: Primary | ICD-10-CM

## 2020-12-18 PROCEDURE — G0008 FLU VACCINE (QUAD) GREATER THAN OR EQUAL TO 3YO PRESERVATIVE FREE IM: ICD-10-PCS | Mod: S$GLB,,, | Performed by: FAMILY MEDICINE

## 2020-12-18 PROCEDURE — 90686 FLU VACCINE (QUAD) GREATER THAN OR EQUAL TO 3YO PRESERVATIVE FREE IM: ICD-10-PCS | Mod: S$GLB,,, | Performed by: FAMILY MEDICINE

## 2020-12-18 PROCEDURE — 90686 IIV4 VACC NO PRSV 0.5 ML IM: CPT | Mod: S$GLB,,, | Performed by: FAMILY MEDICINE

## 2020-12-18 PROCEDURE — G0008 ADMIN INFLUENZA VIRUS VAC: HCPCS | Mod: S$GLB,,, | Performed by: FAMILY MEDICINE

## 2021-02-08 ENCOUNTER — PATIENT OUTREACH (OUTPATIENT)
Dept: ADMINISTRATIVE | Facility: OTHER | Age: 61
End: 2021-02-08

## 2021-02-08 DIAGNOSIS — Z12.31 BREAST CANCER SCREENING BY MAMMOGRAM: Primary | ICD-10-CM

## 2021-02-18 ENCOUNTER — OFFICE VISIT (OUTPATIENT)
Dept: ORTHOPEDICS | Facility: CLINIC | Age: 61
End: 2021-02-18
Payer: MEDICARE

## 2021-02-18 ENCOUNTER — HOSPITAL ENCOUNTER (OUTPATIENT)
Dept: RADIOLOGY | Facility: HOSPITAL | Age: 61
Discharge: HOME | End: 2021-02-18
Attending: NURSE PRACTITIONER
Payer: MEDICARE

## 2021-02-18 VITALS — WEIGHT: 165 LBS | BODY MASS INDEX: 32.39 KG/M2 | HEIGHT: 60 IN

## 2021-02-18 DIAGNOSIS — M70.62 GREATER TROCHANTERIC BURSITIS OF LEFT HIP: Primary | ICD-10-CM

## 2021-02-18 DIAGNOSIS — M25.552 LEFT HIP PAIN: Primary | ICD-10-CM

## 2021-02-18 DIAGNOSIS — M25.552 LEFT HIP PAIN: ICD-10-CM

## 2021-02-18 PROCEDURE — 99999 PR PBB SHADOW E&M-EST. PATIENT-LVL III: ICD-10-PCS | Mod: PBBFAC,,, | Performed by: NURSE PRACTITIONER

## 2021-02-18 PROCEDURE — 99999 PR PBB SHADOW E&M-EST. PATIENT-LVL III: CPT | Mod: PBBFAC,,, | Performed by: NURSE PRACTITIONER

## 2021-02-18 PROCEDURE — 99213 OFFICE O/P EST LOW 20 MIN: CPT | Mod: PBBFAC,25,PN | Performed by: NURSE PRACTITIONER

## 2021-02-18 PROCEDURE — 73502 X-RAY EXAM HIP UNI 2-3 VIEWS: CPT | Mod: 26,LT,, | Performed by: RADIOLOGY

## 2021-02-18 PROCEDURE — 73502 X-RAY EXAM HIP UNI 2-3 VIEWS: CPT | Mod: TC,PO,LT

## 2021-02-18 PROCEDURE — 99203 PR OFFICE/OUTPT VISIT, NEW, LEVL III, 30-44 MIN: ICD-10-PCS | Mod: S$GLB,,, | Performed by: NURSE PRACTITIONER

## 2021-02-18 PROCEDURE — 99203 OFFICE O/P NEW LOW 30 MIN: CPT | Mod: S$GLB,,, | Performed by: NURSE PRACTITIONER

## 2021-02-18 PROCEDURE — 73502 XR HIP 2 VIEW LEFT: ICD-10-PCS | Mod: 26,LT,, | Performed by: RADIOLOGY

## 2021-03-11 ENCOUNTER — OFFICE VISIT (OUTPATIENT)
Dept: FAMILY MEDICINE | Facility: CLINIC | Age: 61
End: 2021-03-11
Payer: MEDICARE

## 2021-03-11 ENCOUNTER — TELEPHONE (OUTPATIENT)
Dept: FAMILY MEDICINE | Facility: CLINIC | Age: 61
End: 2021-03-11

## 2021-03-11 VITALS
OXYGEN SATURATION: 97 % | TEMPERATURE: 98 F | DIASTOLIC BLOOD PRESSURE: 62 MMHG | WEIGHT: 148.5 LBS | HEART RATE: 89 BPM | BODY MASS INDEX: 29.16 KG/M2 | HEIGHT: 60 IN | SYSTOLIC BLOOD PRESSURE: 84 MMHG

## 2021-03-11 DIAGNOSIS — F33.41 RECURRENT MAJOR DEPRESSION IN PARTIAL REMISSION: Primary | ICD-10-CM

## 2021-03-11 DIAGNOSIS — F51.01 PRIMARY INSOMNIA: ICD-10-CM

## 2021-03-11 DIAGNOSIS — N39.0 ACUTE UTI: ICD-10-CM

## 2021-03-11 LAB
BILIRUB SERPL-MCNC: NORMAL MG/DL
BLOOD URINE, POC: NORMAL
CLARITY, POC UA: CLEAR
COLOR, POC UA: NORMAL
GLUCOSE UR QL STRIP: NORMAL
KETONES UR QL STRIP: NORMAL
LEUKOCYTE ESTERASE URINE, POC: NORMAL
NITRITE, POC UA: NORMAL
PH, POC UA: 1.01
PROTEIN, POC: NORMAL
SPECIFIC GRAVITY, POC UA: NORMAL
UROBILINOGEN, POC UA: NORMAL

## 2021-03-11 PROCEDURE — 81002 URINALYSIS NONAUTO W/O SCOPE: CPT | Mod: S$GLB,,, | Performed by: NURSE PRACTITIONER

## 2021-03-11 PROCEDURE — 99214 OFFICE O/P EST MOD 30 MIN: CPT | Mod: 25,S$GLB,, | Performed by: NURSE PRACTITIONER

## 2021-03-11 PROCEDURE — 99214 PR OFFICE/OUTPT VISIT, EST, LEVL IV, 30-39 MIN: ICD-10-PCS | Mod: 25,S$GLB,, | Performed by: NURSE PRACTITIONER

## 2021-03-11 PROCEDURE — 81002 POCT URINE DIPSTICK WITHOUT MICROSCOPE: ICD-10-PCS | Mod: S$GLB,,, | Performed by: NURSE PRACTITIONER

## 2021-03-11 PROCEDURE — 87086 URINE CULTURE/COLONY COUNT: CPT | Performed by: NURSE PRACTITIONER

## 2021-03-11 RX ORDER — ESCITALOPRAM OXALATE 10 MG/1
TABLET ORAL
Qty: 45 TABLET | Refills: 1 | Status: SHIPPED | OUTPATIENT
Start: 2021-03-11 | End: 2021-03-18 | Stop reason: SDUPTHER

## 2021-03-11 RX ORDER — TRAZODONE HYDROCHLORIDE 50 MG/1
TABLET ORAL
Qty: 45 TABLET | Refills: 2 | Status: SHIPPED | OUTPATIENT
Start: 2021-03-11 | End: 2021-07-01

## 2021-03-11 RX ORDER — CIPROFLOXACIN 250 MG/5ML
250 KIT ORAL 2 TIMES DAILY
Qty: 10 ML | Refills: 0 | Status: SHIPPED | OUTPATIENT
Start: 2021-03-11 | End: 2021-03-16

## 2021-03-11 RX ORDER — CIPROFLOXACIN 250 MG/1
250 TABLET, FILM COATED ORAL EVERY 12 HOURS
COMMUNITY
End: 2021-05-21 | Stop reason: ALTCHOICE

## 2021-03-13 LAB — BACTERIA UR CULT: NO GROWTH

## 2021-03-18 ENCOUNTER — PATIENT MESSAGE (OUTPATIENT)
Dept: FAMILY MEDICINE | Facility: CLINIC | Age: 61
End: 2021-03-18

## 2021-03-18 DIAGNOSIS — F33.41 RECURRENT MAJOR DEPRESSION IN PARTIAL REMISSION: ICD-10-CM

## 2021-03-19 ENCOUNTER — PATIENT MESSAGE (OUTPATIENT)
Dept: FAMILY MEDICINE | Facility: CLINIC | Age: 61
End: 2021-03-19

## 2021-03-19 RX ORDER — ESCITALOPRAM OXALATE 10 MG/1
TABLET ORAL
Qty: 45 TABLET | Refills: 1 | Status: SHIPPED | OUTPATIENT
Start: 2021-03-19 | End: 2021-07-05

## 2021-03-24 ENCOUNTER — TELEPHONE (OUTPATIENT)
Dept: FAMILY MEDICINE | Facility: CLINIC | Age: 61
End: 2021-03-24

## 2021-03-24 DIAGNOSIS — I95.9 HYPOTENSION, UNSPECIFIED HYPOTENSION TYPE: Primary | ICD-10-CM

## 2021-03-24 DIAGNOSIS — R42 DIZZINESS: ICD-10-CM

## 2021-03-30 ENCOUNTER — PATIENT OUTREACH (OUTPATIENT)
Dept: ADMINISTRATIVE | Facility: OTHER | Age: 61
End: 2021-03-30

## 2021-04-05 ENCOUNTER — OFFICE VISIT (OUTPATIENT)
Dept: CARDIOLOGY | Facility: CLINIC | Age: 61
End: 2021-04-05
Payer: MEDICARE

## 2021-04-05 VITALS
WEIGHT: 145.06 LBS | BODY MASS INDEX: 28.48 KG/M2 | HEART RATE: 84 BPM | HEIGHT: 60 IN | SYSTOLIC BLOOD PRESSURE: 116 MMHG | DIASTOLIC BLOOD PRESSURE: 70 MMHG

## 2021-04-05 DIAGNOSIS — E78.2 MIXED HYPERLIPIDEMIA: ICD-10-CM

## 2021-04-05 DIAGNOSIS — I95.9 HYPOTENSION, UNSPECIFIED HYPOTENSION TYPE: Primary | ICD-10-CM

## 2021-04-05 DIAGNOSIS — R42 DIZZINESS: ICD-10-CM

## 2021-04-05 DIAGNOSIS — Z86.79 HISTORY OF HYPERTENSION: ICD-10-CM

## 2021-04-05 PROCEDURE — 3008F PR BODY MASS INDEX (BMI) DOCUMENTED: ICD-10-PCS | Mod: CPTII,S$GLB,, | Performed by: INTERNAL MEDICINE

## 2021-04-05 PROCEDURE — 3078F DIAST BP <80 MM HG: CPT | Mod: CPTII,S$GLB,, | Performed by: INTERNAL MEDICINE

## 2021-04-05 PROCEDURE — 99204 OFFICE O/P NEW MOD 45 MIN: CPT | Mod: S$GLB,,, | Performed by: INTERNAL MEDICINE

## 2021-04-05 PROCEDURE — 3008F BODY MASS INDEX DOCD: CPT | Mod: CPTII,S$GLB,, | Performed by: INTERNAL MEDICINE

## 2021-04-05 PROCEDURE — 99204 PR OFFICE/OUTPT VISIT, NEW, LEVL IV, 45-59 MIN: ICD-10-PCS | Mod: S$GLB,,, | Performed by: INTERNAL MEDICINE

## 2021-04-05 PROCEDURE — 99499 RISK ADDL DX/OHS AUDIT: ICD-10-PCS | Mod: S$PBB,,, | Performed by: INTERNAL MEDICINE

## 2021-04-05 PROCEDURE — 3078F PR MOST RECENT DIASTOLIC BLOOD PRESSURE < 80 MM HG: ICD-10-PCS | Mod: CPTII,S$GLB,, | Performed by: INTERNAL MEDICINE

## 2021-04-05 PROCEDURE — 93000 ELECTROCARDIOGRAM COMPLETE: CPT | Mod: S$GLB,,, | Performed by: INTERNAL MEDICINE

## 2021-04-05 PROCEDURE — 1126F PR PAIN SEVERITY QUANTIFIED, NO PAIN PRESENT: ICD-10-PCS | Mod: S$GLB,,, | Performed by: INTERNAL MEDICINE

## 2021-04-05 PROCEDURE — 3074F PR MOST RECENT SYSTOLIC BLOOD PRESSURE < 130 MM HG: ICD-10-PCS | Mod: CPTII,S$GLB,, | Performed by: INTERNAL MEDICINE

## 2021-04-05 PROCEDURE — 99999 PR PBB SHADOW E&M-EST. PATIENT-LVL IV: ICD-10-PCS | Mod: PBBFAC,,, | Performed by: INTERNAL MEDICINE

## 2021-04-05 PROCEDURE — 3074F SYST BP LT 130 MM HG: CPT | Mod: CPTII,S$GLB,, | Performed by: INTERNAL MEDICINE

## 2021-04-05 PROCEDURE — 99499 UNLISTED E&M SERVICE: CPT | Mod: S$PBB,,, | Performed by: INTERNAL MEDICINE

## 2021-04-05 PROCEDURE — 99999 PR PBB SHADOW E&M-EST. PATIENT-LVL IV: CPT | Mod: PBBFAC,,, | Performed by: INTERNAL MEDICINE

## 2021-04-05 PROCEDURE — 93000 EKG 12-LEAD: ICD-10-PCS | Mod: S$GLB,,, | Performed by: INTERNAL MEDICINE

## 2021-04-05 PROCEDURE — 1126F AMNT PAIN NOTED NONE PRSNT: CPT | Mod: S$GLB,,, | Performed by: INTERNAL MEDICINE

## 2021-04-08 DIAGNOSIS — Z86.79 HISTORY OF HYPERTENSION: Primary | ICD-10-CM

## 2021-04-08 DIAGNOSIS — E78.2 MIXED HYPERLIPIDEMIA: ICD-10-CM

## 2021-04-27 ENCOUNTER — TELEPHONE (OUTPATIENT)
Dept: FAMILY MEDICINE | Facility: CLINIC | Age: 61
End: 2021-04-27

## 2021-04-28 ENCOUNTER — OFFICE VISIT (OUTPATIENT)
Dept: FAMILY MEDICINE | Facility: CLINIC | Age: 61
End: 2021-04-28
Payer: MEDICARE

## 2021-04-28 VITALS
SYSTOLIC BLOOD PRESSURE: 100 MMHG | BODY MASS INDEX: 27.79 KG/M2 | DIASTOLIC BLOOD PRESSURE: 60 MMHG | TEMPERATURE: 98 F | HEART RATE: 68 BPM | OXYGEN SATURATION: 99 % | WEIGHT: 142.31 LBS

## 2021-04-28 DIAGNOSIS — R10.11 RUQ ABDOMINAL PAIN: Primary | ICD-10-CM

## 2021-04-28 DIAGNOSIS — K91.2 POSTSURGICAL MALABSORPTION, NOT ELSEWHERE CLASSIFIED: ICD-10-CM

## 2021-04-28 LAB
BILIRUB SERPL-MCNC: NORMAL MG/DL
BLOOD URINE, POC: NORMAL
CLARITY, POC UA: CLEAR
COLOR, POC UA: NORMAL
GLUCOSE UR QL STRIP: NORMAL
KETONES UR QL STRIP: NORMAL
LEUKOCYTE ESTERASE URINE, POC: NORMAL
NITRITE, POC UA: NORMAL
PH, POC UA: 7
PROTEIN, POC: NORMAL
SPECIFIC GRAVITY, POC UA: 1
UROBILINOGEN, POC UA: NORMAL

## 2021-04-28 PROCEDURE — 3008F BODY MASS INDEX DOCD: CPT | Mod: CPTII,S$GLB,, | Performed by: NURSE PRACTITIONER

## 2021-04-28 PROCEDURE — 82607 VITAMIN B-12: CPT | Performed by: NURSE PRACTITIONER

## 2021-04-28 PROCEDURE — 81002 POCT URINE DIPSTICK WITHOUT MICROSCOPE: ICD-10-PCS | Mod: S$GLB,,, | Performed by: NURSE PRACTITIONER

## 2021-04-28 PROCEDURE — 81002 URINALYSIS NONAUTO W/O SCOPE: CPT | Mod: S$GLB,,, | Performed by: NURSE PRACTITIONER

## 2021-04-28 PROCEDURE — 3008F PR BODY MASS INDEX (BMI) DOCUMENTED: ICD-10-PCS | Mod: CPTII,S$GLB,, | Performed by: NURSE PRACTITIONER

## 2021-04-28 PROCEDURE — 80061 LIPID PANEL: CPT | Performed by: NURSE PRACTITIONER

## 2021-04-28 PROCEDURE — 85025 COMPLETE CBC W/AUTO DIFF WBC: CPT | Performed by: NURSE PRACTITIONER

## 2021-04-28 PROCEDURE — 84425 ASSAY OF VITAMIN B-1: CPT | Performed by: NURSE PRACTITIONER

## 2021-04-28 PROCEDURE — 99214 OFFICE O/P EST MOD 30 MIN: CPT | Mod: 25,S$GLB,, | Performed by: NURSE PRACTITIONER

## 2021-04-28 PROCEDURE — 82306 VITAMIN D 25 HYDROXY: CPT | Performed by: NURSE PRACTITIONER

## 2021-04-28 PROCEDURE — 83690 ASSAY OF LIPASE: CPT | Performed by: NURSE PRACTITIONER

## 2021-04-28 PROCEDURE — 82150 ASSAY OF AMYLASE: CPT | Performed by: NURSE PRACTITIONER

## 2021-04-28 PROCEDURE — 83540 ASSAY OF IRON: CPT | Performed by: NURSE PRACTITIONER

## 2021-04-28 PROCEDURE — 1126F PR PAIN SEVERITY QUANTIFIED, NO PAIN PRESENT: ICD-10-PCS | Mod: S$GLB,,, | Performed by: NURSE PRACTITIONER

## 2021-04-28 PROCEDURE — 1126F AMNT PAIN NOTED NONE PRSNT: CPT | Mod: S$GLB,,, | Performed by: NURSE PRACTITIONER

## 2021-04-28 PROCEDURE — 99214 PR OFFICE/OUTPT VISIT, EST, LEVL IV, 30-39 MIN: ICD-10-PCS | Mod: 25,S$GLB,, | Performed by: NURSE PRACTITIONER

## 2021-04-28 PROCEDURE — 80053 COMPREHEN METABOLIC PANEL: CPT | Performed by: NURSE PRACTITIONER

## 2021-04-29 LAB
25(OH)D3+25(OH)D2 SERPL-MCNC: 43 NG/ML (ref 30–96)
ALBUMIN SERPL BCP-MCNC: 4.1 G/DL (ref 3.5–5.2)
ALP SERPL-CCNC: 58 U/L (ref 55–135)
ALT SERPL W/O P-5'-P-CCNC: 15 U/L (ref 10–44)
AMYLASE SERPL-CCNC: 77 U/L (ref 20–110)
ANION GAP SERPL CALC-SCNC: 9 MMOL/L (ref 8–16)
AST SERPL-CCNC: 25 U/L (ref 10–40)
BASOPHILS # BLD AUTO: 0.05 K/UL (ref 0–0.2)
BASOPHILS NFR BLD: 0.7 % (ref 0–1.9)
BILIRUB SERPL-MCNC: 0.4 MG/DL (ref 0.1–1)
BUN SERPL-MCNC: 14 MG/DL (ref 6–20)
CALCIUM SERPL-MCNC: 9 MG/DL (ref 8.7–10.5)
CHLORIDE SERPL-SCNC: 101 MMOL/L (ref 95–110)
CHOLEST SERPL-MCNC: 238 MG/DL (ref 120–199)
CHOLEST/HDLC SERPL: 4.4 {RATIO} (ref 2–5)
CO2 SERPL-SCNC: 27 MMOL/L (ref 23–29)
CREAT SERPL-MCNC: 0.8 MG/DL (ref 0.5–1.4)
DIFFERENTIAL METHOD: ABNORMAL
EOSINOPHIL # BLD AUTO: 0.7 K/UL (ref 0–0.5)
EOSINOPHIL NFR BLD: 9.3 % (ref 0–8)
ERYTHROCYTE [DISTWIDTH] IN BLOOD BY AUTOMATED COUNT: 13.7 % (ref 11.5–14.5)
EST. GFR  (AFRICAN AMERICAN): >60 ML/MIN/1.73 M^2
EST. GFR  (NON AFRICAN AMERICAN): >60 ML/MIN/1.73 M^2
GLUCOSE SERPL-MCNC: 82 MG/DL (ref 70–110)
HCT VFR BLD AUTO: 37.9 % (ref 37–48.5)
HDLC SERPL-MCNC: 54 MG/DL (ref 40–75)
HDLC SERPL: 22.7 % (ref 20–50)
HGB BLD-MCNC: 12.5 G/DL (ref 12–16)
IMM GRANULOCYTES # BLD AUTO: 0.02 K/UL (ref 0–0.04)
IMM GRANULOCYTES NFR BLD AUTO: 0.3 % (ref 0–0.5)
IRON SERPL-MCNC: 76 UG/DL (ref 30–160)
LDLC SERPL CALC-MCNC: 151 MG/DL (ref 63–159)
LIPASE SERPL-CCNC: 32 U/L (ref 4–60)
LYMPHOCYTES # BLD AUTO: 2.1 K/UL (ref 1–4.8)
LYMPHOCYTES NFR BLD: 29.2 % (ref 18–48)
MCH RBC QN AUTO: 28.8 PG (ref 27–31)
MCHC RBC AUTO-ENTMCNC: 33 G/DL (ref 32–36)
MCV RBC AUTO: 87 FL (ref 82–98)
MONOCYTES # BLD AUTO: 0.4 K/UL (ref 0.3–1)
MONOCYTES NFR BLD: 5.9 % (ref 4–15)
NEUTROPHILS # BLD AUTO: 3.9 K/UL (ref 1.8–7.7)
NEUTROPHILS NFR BLD: 54.6 % (ref 38–73)
NONHDLC SERPL-MCNC: 184 MG/DL
NRBC BLD-RTO: 0 /100 WBC
PLATELET # BLD AUTO: 246 K/UL (ref 150–450)
PMV BLD AUTO: 13.4 FL (ref 9.2–12.9)
POTASSIUM SERPL-SCNC: 4.5 MMOL/L (ref 3.5–5.1)
PROT SERPL-MCNC: 7.5 G/DL (ref 6–8.4)
RBC # BLD AUTO: 4.34 M/UL (ref 4–5.4)
SATURATED IRON: 22 % (ref 20–50)
SODIUM SERPL-SCNC: 137 MMOL/L (ref 136–145)
TOTAL IRON BINDING CAPACITY: 349 UG/DL (ref 250–450)
TRANSFERRIN SERPL-MCNC: 236 MG/DL (ref 200–375)
TRIGL SERPL-MCNC: 165 MG/DL (ref 30–150)
VIT B12 SERPL-MCNC: 1078 PG/ML (ref 210–950)
WBC # BLD AUTO: 7.1 K/UL (ref 3.9–12.7)

## 2021-05-04 LAB — VIT B1 BLD-MCNC: 84 UG/L (ref 38–122)

## 2021-05-21 ENCOUNTER — OFFICE VISIT (OUTPATIENT)
Dept: FAMILY MEDICINE | Facility: CLINIC | Age: 61
End: 2021-05-21
Payer: MEDICARE

## 2021-05-21 VITALS
WEIGHT: 139.25 LBS | BODY MASS INDEX: 27.34 KG/M2 | HEART RATE: 60 BPM | DIASTOLIC BLOOD PRESSURE: 74 MMHG | SYSTOLIC BLOOD PRESSURE: 110 MMHG | TEMPERATURE: 98 F | HEIGHT: 60 IN

## 2021-05-21 DIAGNOSIS — R10.9 ABDOMINAL PAIN, ACUTE: Primary | ICD-10-CM

## 2021-05-21 DIAGNOSIS — Z20.5 EXPOSURE TO HEPATITIS C: ICD-10-CM

## 2021-05-21 PROBLEM — E66.01 OBESITY, MORBID: Status: RESOLVED | Noted: 2020-08-17 | Resolved: 2021-05-21

## 2021-05-21 PROCEDURE — 99214 OFFICE O/P EST MOD 30 MIN: CPT | Mod: S$GLB,,, | Performed by: PHYSICIAN ASSISTANT

## 2021-05-21 PROCEDURE — 3008F PR BODY MASS INDEX (BMI) DOCUMENTED: ICD-10-PCS | Mod: CPTII,S$GLB,, | Performed by: PHYSICIAN ASSISTANT

## 2021-05-21 PROCEDURE — 99214 PR OFFICE/OUTPT VISIT, EST, LEVL IV, 30-39 MIN: ICD-10-PCS | Mod: S$GLB,,, | Performed by: PHYSICIAN ASSISTANT

## 2021-05-21 PROCEDURE — 36415 COLL VENOUS BLD VENIPUNCTURE: CPT | Mod: S$GLB,,, | Performed by: PHYSICIAN ASSISTANT

## 2021-05-21 PROCEDURE — 36415 PR COLLECTION VENOUS BLOOD,VENIPUNCTURE: ICD-10-PCS | Mod: S$GLB,,, | Performed by: PHYSICIAN ASSISTANT

## 2021-05-21 PROCEDURE — 3008F BODY MASS INDEX DOCD: CPT | Mod: CPTII,S$GLB,, | Performed by: PHYSICIAN ASSISTANT

## 2021-05-21 PROCEDURE — 1126F AMNT PAIN NOTED NONE PRSNT: CPT | Mod: S$GLB,,, | Performed by: PHYSICIAN ASSISTANT

## 2021-05-21 PROCEDURE — 86803 HEPATITIS C AB TEST: CPT | Performed by: PHYSICIAN ASSISTANT

## 2021-05-21 PROCEDURE — 1126F PR PAIN SEVERITY QUANTIFIED, NO PAIN PRESENT: ICD-10-PCS | Mod: S$GLB,,, | Performed by: PHYSICIAN ASSISTANT

## 2021-05-24 LAB — HCV AB SERPL QL IA: NEGATIVE

## 2021-05-28 ENCOUNTER — HOSPITAL ENCOUNTER (OUTPATIENT)
Dept: RADIOLOGY | Facility: HOSPITAL | Age: 61
Discharge: HOME | End: 2021-05-28
Attending: NURSE PRACTITIONER
Payer: MEDICARE

## 2021-05-28 PROCEDURE — 76700 US EXAM ABDOM COMPLETE: CPT | Mod: TC,PO

## 2021-05-28 PROCEDURE — 76700 US ABDOMEN COMPLETE: ICD-10-PCS | Mod: 26,,, | Performed by: RADIOLOGY

## 2021-05-28 PROCEDURE — 76700 US EXAM ABDOM COMPLETE: CPT | Mod: 26,,, | Performed by: RADIOLOGY

## 2021-05-31 ENCOUNTER — TELEPHONE (OUTPATIENT)
Dept: FAMILY MEDICINE | Facility: CLINIC | Age: 61
End: 2021-05-31

## 2021-05-31 DIAGNOSIS — R10.9 ABDOMINAL PAIN, ACUTE: ICD-10-CM

## 2021-05-31 DIAGNOSIS — N13.30 HYDRONEPHROSIS, UNSPECIFIED HYDRONEPHROSIS TYPE: Primary | ICD-10-CM

## 2021-05-31 DIAGNOSIS — K80.20 GALLSTONES: ICD-10-CM

## 2021-06-25 ENCOUNTER — HOSPITAL ENCOUNTER (OUTPATIENT)
Dept: CARDIOLOGY | Facility: HOSPITAL | Age: 61
Discharge: HOME | End: 2021-06-25
Attending: INTERNAL MEDICINE
Payer: MEDICARE

## 2021-06-25 ENCOUNTER — PATIENT OUTREACH (OUTPATIENT)
Dept: ADMINISTRATIVE | Facility: OTHER | Age: 61
End: 2021-06-25

## 2021-06-25 ENCOUNTER — HOSPITAL ENCOUNTER (OUTPATIENT)
Dept: RADIOLOGY | Facility: HOSPITAL | Age: 61
Discharge: HOME | End: 2021-06-25
Attending: INTERNAL MEDICINE
Payer: MEDICARE

## 2021-06-25 VITALS — HEIGHT: 60 IN | BODY MASS INDEX: 27.29 KG/M2 | WEIGHT: 139 LBS

## 2021-06-25 DIAGNOSIS — Z12.31 BREAST CANCER SCREENING BY MAMMOGRAM: ICD-10-CM

## 2021-06-25 DIAGNOSIS — I95.9 HYPOTENSION, UNSPECIFIED HYPOTENSION TYPE: ICD-10-CM

## 2021-06-25 LAB
ASCENDING AORTA: 2.82 CM
AV INDEX (PROSTH): 0.88
AV MEAN GRADIENT: 4 MMHG
AV PEAK GRADIENT: 8 MMHG
AV VALVE AREA: 2.47 CM2
AV VELOCITY RATIO: 0.87
BSA FOR ECHO PROCEDURE: 1.63 M2
CV ECHO LV RWT: 0.32 CM
DOP CALC AO PEAK VEL: 1.38 M/S
DOP CALC AO VTI: 28.3 CM
DOP CALC LVOT AREA: 2.8 CM2
DOP CALC LVOT DIAMETER: 1.89 CM
DOP CALC LVOT PEAK VEL: 1.2 M/S
DOP CALC LVOT STROKE VOLUME: 69.77 CM3
DOP CALCLVOT PEAK VEL VTI: 24.88 CM
E WAVE DECELERATION TIME: 188.93 MSEC
E/A RATIO: 0.69
E/E' RATIO: 7.87 M/S
ECHO LV POSTERIOR WALL: 0.73 CM (ref 0.6–1.1)
EJECTION FRACTION: 55 %
FRACTIONAL SHORTENING: 39 % (ref 28–44)
INTERVENTRICULAR SEPTUM: 0.82 CM (ref 0.6–1.1)
LA MAJOR: 4.16 CM
LA MINOR: 4.51 CM
LA WIDTH: 3.5 CM
LEFT ATRIUM SIZE: 3.47 CM
LEFT ATRIUM VOLUME INDEX: 27.9 ML/M2
LEFT ATRIUM VOLUME: 44.68 CM3
LEFT INTERNAL DIMENSION IN SYSTOLE: 2.78 CM (ref 2.1–4)
LEFT VENTRICLE DIASTOLIC VOLUME INDEX: 59.55 ML/M2
LEFT VENTRICLE DIASTOLIC VOLUME: 95.28 ML
LEFT VENTRICLE MASS INDEX: 70 G/M2
LEFT VENTRICLE SYSTOLIC VOLUME INDEX: 18.1 ML/M2
LEFT VENTRICLE SYSTOLIC VOLUME: 28.99 ML
LEFT VENTRICULAR INTERNAL DIMENSION IN DIASTOLE: 4.56 CM (ref 3.5–6)
LEFT VENTRICULAR MASS: 111.49 G
LV LATERAL E/E' RATIO: 6.56 M/S
LV SEPTAL E/E' RATIO: 9.83 M/S
MV A" WAVE DURATION": 11.23 MSEC
MV PEAK A VEL: 0.86 M/S
MV PEAK E VEL: 0.59 M/S
MV STENOSIS PRESSURE HALF TIME: 54.79 MS
MV VALVE AREA P 1/2 METHOD: 4.02 CM2
PISA TR MAX VEL: 2.22 M/S
PULM VEIN S/D RATIO: 1.29
PV PEAK D VEL: 0.35 M/S
PV PEAK S VEL: 0.45 M/S
RA MAJOR: 4.33 CM
RA PRESSURE: 3 MMHG
RA WIDTH: 2.82 CM
RIGHT VENTRICULAR END-DIASTOLIC DIMENSION: 2.15 CM
RV TISSUE DOPPLER FREE WALL SYSTOLIC VELOCITY 1 (APICAL 4 CHAMBER VIEW): 13.62 CM/S
SINUS: 2.77 CM
STJ: 2.62 CM
TDI LATERAL: 0.09 M/S
TDI SEPTAL: 0.06 M/S
TDI: 0.08 M/S
TR MAX PG: 20 MMHG
TRICUSPID ANNULAR PLANE SYSTOLIC EXCURSION: 2.59 CM
TV REST PULMONARY ARTERY PRESSURE: 23 MMHG

## 2021-06-25 PROCEDURE — 77063 BREAST TOMOSYNTHESIS BI: CPT | Mod: 26,,, | Performed by: RADIOLOGY

## 2021-06-25 PROCEDURE — 93306 TTE W/DOPPLER COMPLETE: CPT | Mod: 26,,, | Performed by: INTERNAL MEDICINE

## 2021-06-25 PROCEDURE — 77063 MAMMO DIGITAL SCREENING BILAT WITH TOMO: ICD-10-PCS | Mod: 26,,, | Performed by: RADIOLOGY

## 2021-06-25 PROCEDURE — 77067 MAMMO DIGITAL SCREENING BILAT WITH TOMO: ICD-10-PCS | Mod: 26,,, | Performed by: RADIOLOGY

## 2021-06-25 PROCEDURE — 77067 SCR MAMMO BI INCL CAD: CPT | Mod: TC,PO

## 2021-06-25 PROCEDURE — 93306 ECHO (CUPID ONLY): ICD-10-PCS | Mod: 26,,, | Performed by: INTERNAL MEDICINE

## 2021-06-25 PROCEDURE — 93306 TTE W/DOPPLER COMPLETE: CPT | Mod: PO

## 2021-06-25 PROCEDURE — 77067 SCR MAMMO BI INCL CAD: CPT | Mod: 26,,, | Performed by: RADIOLOGY

## 2021-06-28 ENCOUNTER — OFFICE VISIT (OUTPATIENT)
Dept: UROLOGY | Facility: CLINIC | Age: 61
End: 2021-06-28
Payer: MEDICARE

## 2021-06-28 ENCOUNTER — LAB VISIT (OUTPATIENT)
Dept: LAB | Facility: HOSPITAL | Age: 61
End: 2021-06-28
Attending: UROLOGY
Payer: MEDICAID

## 2021-06-28 ENCOUNTER — OFFICE VISIT (OUTPATIENT)
Dept: GASTROENTEROLOGY | Facility: CLINIC | Age: 61
End: 2021-06-28
Payer: MEDICARE

## 2021-06-28 VITALS — BODY MASS INDEX: 26.22 KG/M2 | WEIGHT: 138.88 LBS | HEIGHT: 61 IN

## 2021-06-28 VITALS — WEIGHT: 135.38 LBS | BODY MASS INDEX: 25.56 KG/M2 | HEIGHT: 61 IN

## 2021-06-28 DIAGNOSIS — Z01.812 PRE-PROCEDURE LAB EXAM: ICD-10-CM

## 2021-06-28 DIAGNOSIS — N13.30 HYDRONEPHROSIS, UNSPECIFIED HYDRONEPHROSIS TYPE: ICD-10-CM

## 2021-06-28 DIAGNOSIS — Z87.19 HISTORY OF DIVERTICULOSIS: ICD-10-CM

## 2021-06-28 DIAGNOSIS — R12 HEARTBURN: ICD-10-CM

## 2021-06-28 DIAGNOSIS — R93.2 ABNORMAL GALLBLADDER ULTRASOUND: ICD-10-CM

## 2021-06-28 DIAGNOSIS — Z87.448 HISTORY OF HYDRONEPHROSIS: ICD-10-CM

## 2021-06-28 DIAGNOSIS — Z86.010 HISTORY OF COLON POLYPS: ICD-10-CM

## 2021-06-28 DIAGNOSIS — R10.11 RUQ PAIN: Primary | ICD-10-CM

## 2021-06-28 LAB
BILIRUB SERPL-MCNC: NORMAL MG/DL
BLOOD URINE, POC: NORMAL
CLARITY, POC UA: CLEAR
COLOR, POC UA: YELLOW
CREAT SERPL-MCNC: 0.9 MG/DL (ref 0.5–1.4)
EST. GFR  (AFRICAN AMERICAN): >60 ML/MIN/1.73 M^2
EST. GFR  (NON AFRICAN AMERICAN): >60 ML/MIN/1.73 M^2
GLUCOSE UR QL STRIP: NORMAL
KETONES UR QL STRIP: NORMAL
LEUKOCYTE ESTERASE URINE, POC: NORMAL
NITRITE, POC UA: NORMAL
PH, POC UA: 6.5
PROTEIN, POC: NORMAL
SPECIFIC GRAVITY, POC UA: 1.01
UROBILINOGEN, POC UA: 0.2

## 2021-06-28 PROCEDURE — 99999 PR PBB SHADOW E&M-EST. PATIENT-LVL IV: ICD-10-PCS | Mod: PBBFAC,,, | Performed by: NURSE PRACTITIONER

## 2021-06-28 PROCEDURE — 99203 PR OFFICE/OUTPT VISIT, NEW, LEVL III, 30-44 MIN: ICD-10-PCS | Mod: 25,S$GLB,, | Performed by: UROLOGY

## 2021-06-28 PROCEDURE — 99999 PR PBB SHADOW E&M-EST. PATIENT-LVL IV: CPT | Mod: PBBFAC,,, | Performed by: NURSE PRACTITIONER

## 2021-06-28 PROCEDURE — 36415 COLL VENOUS BLD VENIPUNCTURE: CPT | Mod: PO | Performed by: UROLOGY

## 2021-06-28 PROCEDURE — 1126F AMNT PAIN NOTED NONE PRSNT: CPT | Mod: S$GLB,,, | Performed by: UROLOGY

## 2021-06-28 PROCEDURE — 3008F PR BODY MASS INDEX (BMI) DOCUMENTED: ICD-10-PCS | Mod: CPTII,S$GLB,, | Performed by: NURSE PRACTITIONER

## 2021-06-28 PROCEDURE — 3008F PR BODY MASS INDEX (BMI) DOCUMENTED: ICD-10-PCS | Mod: CPTII,S$GLB,, | Performed by: UROLOGY

## 2021-06-28 PROCEDURE — 99203 OFFICE O/P NEW LOW 30 MIN: CPT | Mod: 25,S$GLB,, | Performed by: UROLOGY

## 2021-06-28 PROCEDURE — 99203 OFFICE O/P NEW LOW 30 MIN: CPT | Mod: S$GLB,,, | Performed by: NURSE PRACTITIONER

## 2021-06-28 PROCEDURE — 81002 URINALYSIS NONAUTO W/O SCOPE: CPT | Mod: S$GLB,,, | Performed by: UROLOGY

## 2021-06-28 PROCEDURE — 1126F PR PAIN SEVERITY QUANTIFIED, NO PAIN PRESENT: ICD-10-PCS | Mod: S$GLB,,, | Performed by: UROLOGY

## 2021-06-28 PROCEDURE — 99203 PR OFFICE/OUTPT VISIT, NEW, LEVL III, 30-44 MIN: ICD-10-PCS | Mod: S$GLB,,, | Performed by: NURSE PRACTITIONER

## 2021-06-28 PROCEDURE — 3008F BODY MASS INDEX DOCD: CPT | Mod: CPTII,S$GLB,, | Performed by: UROLOGY

## 2021-06-28 PROCEDURE — 99999 PR PBB SHADOW E&M-EST. PATIENT-LVL III: CPT | Mod: PBBFAC,,, | Performed by: UROLOGY

## 2021-06-28 PROCEDURE — 81002 POCT URINE DIPSTICK WITHOUT MICROSCOPE: ICD-10-PCS | Mod: S$GLB,,, | Performed by: UROLOGY

## 2021-06-28 PROCEDURE — 1126F PR PAIN SEVERITY QUANTIFIED, NO PAIN PRESENT: ICD-10-PCS | Mod: S$GLB,,, | Performed by: NURSE PRACTITIONER

## 2021-06-28 PROCEDURE — 3008F BODY MASS INDEX DOCD: CPT | Mod: CPTII,S$GLB,, | Performed by: NURSE PRACTITIONER

## 2021-06-28 PROCEDURE — 1126F AMNT PAIN NOTED NONE PRSNT: CPT | Mod: S$GLB,,, | Performed by: NURSE PRACTITIONER

## 2021-06-28 PROCEDURE — 82565 ASSAY OF CREATININE: CPT | Performed by: UROLOGY

## 2021-06-28 PROCEDURE — 99999 PR PBB SHADOW E&M-EST. PATIENT-LVL III: ICD-10-PCS | Mod: PBBFAC,,, | Performed by: UROLOGY

## 2021-06-28 RX ORDER — PANTOPRAZOLE SODIUM 40 MG/1
40 TABLET, DELAYED RELEASE ORAL
Qty: 30 TABLET | Refills: 1 | Status: SHIPPED | OUTPATIENT
Start: 2021-06-28 | End: 2022-02-23 | Stop reason: SDUPTHER

## 2021-07-01 ENCOUNTER — PATIENT MESSAGE (OUTPATIENT)
Dept: FAMILY MEDICINE | Facility: CLINIC | Age: 61
End: 2021-07-01

## 2021-07-07 ENCOUNTER — TELEPHONE (OUTPATIENT)
Dept: GASTROENTEROLOGY | Facility: CLINIC | Age: 61
End: 2021-07-07

## 2021-07-15 ENCOUNTER — OFFICE VISIT (OUTPATIENT)
Dept: FAMILY MEDICINE | Facility: CLINIC | Age: 61
End: 2021-07-15
Payer: MEDICARE

## 2021-07-15 VITALS
OXYGEN SATURATION: 96 % | HEART RATE: 74 BPM | RESPIRATION RATE: 18 BRPM | WEIGHT: 134.5 LBS | DIASTOLIC BLOOD PRESSURE: 68 MMHG | SYSTOLIC BLOOD PRESSURE: 90 MMHG | BODY MASS INDEX: 26.26 KG/M2

## 2021-07-15 DIAGNOSIS — L72.0 EPIDERMAL CYST: Primary | ICD-10-CM

## 2021-07-15 PROCEDURE — 3008F PR BODY MASS INDEX (BMI) DOCUMENTED: ICD-10-PCS | Mod: CPTII,S$GLB,, | Performed by: NURSE PRACTITIONER

## 2021-07-15 PROCEDURE — 1125F AMNT PAIN NOTED PAIN PRSNT: CPT | Mod: S$GLB,,, | Performed by: NURSE PRACTITIONER

## 2021-07-15 PROCEDURE — 99499 NO LOS: ICD-10-PCS | Mod: S$GLB,,, | Performed by: NURSE PRACTITIONER

## 2021-07-15 PROCEDURE — 3008F BODY MASS INDEX DOCD: CPT | Mod: CPTII,S$GLB,, | Performed by: NURSE PRACTITIONER

## 2021-07-15 PROCEDURE — 99499 UNLISTED E&M SERVICE: CPT | Mod: S$GLB,,, | Performed by: NURSE PRACTITIONER

## 2021-07-15 PROCEDURE — 1125F PR PAIN SEVERITY QUANTIFIED, PAIN PRESENT: ICD-10-PCS | Mod: S$GLB,,, | Performed by: NURSE PRACTITIONER

## 2021-07-20 ENCOUNTER — TELEPHONE (OUTPATIENT)
Dept: CARDIOLOGY | Facility: CLINIC | Age: 61
End: 2021-07-20

## 2021-07-29 PROBLEM — L02.91 ABSCESS: Status: ACTIVE | Noted: 2021-07-29

## 2021-08-25 ENCOUNTER — LAB VISIT (OUTPATIENT)
Dept: PRIMARY CARE CLINIC | Facility: OTHER | Age: 61
End: 2021-08-25
Payer: MEDICARE

## 2021-08-25 DIAGNOSIS — Z20.822 EXPOSURE TO COVID-19 VIRUS: Primary | ICD-10-CM

## 2021-08-25 PROCEDURE — U0003 INFECTIOUS AGENT DETECTION BY NUCLEIC ACID (DNA OR RNA); SEVERE ACUTE RESPIRATORY SYNDROME CORONAVIRUS 2 (SARS-COV-2) (CORONAVIRUS DISEASE [COVID-19]), AMPLIFIED PROBE TECHNIQUE, MAKING USE OF HIGH THROUGHPUT TECHNOLOGIES AS DESCRIBED BY CMS-2020-01-R: HCPCS | Performed by: FAMILY MEDICINE

## 2021-08-26 ENCOUNTER — PATIENT MESSAGE (OUTPATIENT)
Dept: FAMILY MEDICINE | Facility: CLINIC | Age: 61
End: 2021-08-26

## 2021-08-27 ENCOUNTER — TELEPHONE (OUTPATIENT)
Dept: FAMILY MEDICINE | Facility: CLINIC | Age: 61
End: 2021-08-27

## 2021-08-27 DIAGNOSIS — U07.1 COVID-19 VIRUS DETECTED: ICD-10-CM

## 2021-08-27 LAB
SARS-COV-2 RNA RESP QL NAA+PROBE: DETECTED
SARS-COV-2- CYCLE NUMBER: 13

## 2021-08-31 ENCOUNTER — PATIENT MESSAGE (OUTPATIENT)
Dept: FAMILY MEDICINE | Facility: CLINIC | Age: 61
End: 2021-08-31

## 2021-09-09 ENCOUNTER — TELEPHONE (OUTPATIENT)
Dept: GASTROENTEROLOGY | Facility: CLINIC | Age: 61
End: 2021-09-09

## 2021-10-01 DIAGNOSIS — F33.41 RECURRENT MAJOR DEPRESSION IN PARTIAL REMISSION: ICD-10-CM

## 2021-10-02 RX ORDER — ESCITALOPRAM OXALATE 10 MG/1
TABLET ORAL
Qty: 45 TABLET | Refills: 1 | Status: SHIPPED | OUTPATIENT
Start: 2021-10-02 | End: 2022-01-08

## 2021-10-11 DIAGNOSIS — F51.01 PRIMARY INSOMNIA: ICD-10-CM

## 2021-10-13 RX ORDER — TRAZODONE HYDROCHLORIDE 50 MG/1
50 TABLET ORAL NIGHTLY
Qty: 90 TABLET | Refills: 3 | Status: SHIPPED | OUTPATIENT
Start: 2021-10-13 | End: 2022-02-23 | Stop reason: SDUPTHER

## 2021-11-01 ENCOUNTER — PES CALL (OUTPATIENT)
Dept: ADMINISTRATIVE | Facility: CLINIC | Age: 61
End: 2021-11-01
Payer: MEDICARE

## 2021-11-05 ENCOUNTER — TELEPHONE (OUTPATIENT)
Dept: GASTROENTEROLOGY | Facility: CLINIC | Age: 61
End: 2021-11-05
Payer: MEDICARE

## 2021-12-09 ENCOUNTER — TELEPHONE (OUTPATIENT)
Dept: ENDOSCOPY | Facility: HOSPITAL | Age: 61
End: 2021-12-09
Payer: MEDICARE

## 2021-12-09 ENCOUNTER — TELEPHONE (OUTPATIENT)
Dept: GASTROENTEROLOGY | Facility: CLINIC | Age: 61
End: 2021-12-09
Payer: MEDICARE

## 2021-12-22 ENCOUNTER — TELEPHONE (OUTPATIENT)
Dept: FAMILY MEDICINE | Facility: CLINIC | Age: 61
End: 2021-12-22
Payer: MEDICARE

## 2022-01-10 ENCOUNTER — PATIENT MESSAGE (OUTPATIENT)
Dept: GASTROENTEROLOGY | Facility: CLINIC | Age: 62
End: 2022-01-10
Payer: MEDICARE

## 2022-01-10 DIAGNOSIS — Z01.818 PRE-OP TESTING: Primary | ICD-10-CM

## 2022-01-11 ENCOUNTER — TELEPHONE (OUTPATIENT)
Dept: GASTROENTEROLOGY | Facility: CLINIC | Age: 62
End: 2022-01-11
Payer: MEDICARE

## 2022-01-11 NOTE — TELEPHONE ENCOUNTER
Called and spoke with the patient to reschedule her colonoscopy that she previously cancelled due to transportation issues, colonoscopy rescheduled with the patient, patient verbalized understanding of procedure date as well as pre procedure covid test date.

## 2022-01-13 DIAGNOSIS — R12 HEARTBURN: ICD-10-CM

## 2022-01-13 DIAGNOSIS — R10.11 RUQ PAIN: ICD-10-CM

## 2022-01-13 RX ORDER — PANTOPRAZOLE SODIUM 40 MG/1
40 TABLET, DELAYED RELEASE ORAL
Qty: 30 TABLET | Refills: 1 | OUTPATIENT
Start: 2022-01-13

## 2022-01-25 ENCOUNTER — PATIENT MESSAGE (OUTPATIENT)
Dept: BARIATRICS | Facility: CLINIC | Age: 62
End: 2022-01-25
Payer: MEDICARE

## 2022-01-25 ENCOUNTER — DOCUMENTATION ONLY (OUTPATIENT)
Dept: BARIATRICS | Facility: CLINIC | Age: 62
End: 2022-01-25
Payer: MEDICARE

## 2022-01-25 NOTE — PROGRESS NOTES
Bariatric dashboard updated from [facility] - Surgery Scheduled to Post-op. S/p Sleeve 8/17/2020 with Dr. Chaudhari. Message sent via portal to schedule yearly follow-up.

## 2022-02-22 ENCOUNTER — PATIENT MESSAGE (OUTPATIENT)
Dept: BARIATRICS | Facility: CLINIC | Age: 62
End: 2022-02-22
Payer: MEDICARE

## 2022-02-23 ENCOUNTER — OFFICE VISIT (OUTPATIENT)
Dept: FAMILY MEDICINE | Facility: CLINIC | Age: 62
End: 2022-02-23
Payer: MEDICARE

## 2022-02-23 ENCOUNTER — TELEPHONE (OUTPATIENT)
Dept: FAMILY MEDICINE | Facility: CLINIC | Age: 62
End: 2022-02-23

## 2022-02-23 VITALS
RESPIRATION RATE: 18 BRPM | WEIGHT: 122.44 LBS | SYSTOLIC BLOOD PRESSURE: 90 MMHG | TEMPERATURE: 98 F | BODY MASS INDEX: 23.92 KG/M2 | DIASTOLIC BLOOD PRESSURE: 70 MMHG | HEART RATE: 71 BPM | OXYGEN SATURATION: 97 %

## 2022-02-23 DIAGNOSIS — R12 HEARTBURN: ICD-10-CM

## 2022-02-23 DIAGNOSIS — R53.83 OTHER FATIGUE: ICD-10-CM

## 2022-02-23 DIAGNOSIS — F33.41 RECURRENT MAJOR DEPRESSION IN PARTIAL REMISSION: ICD-10-CM

## 2022-02-23 DIAGNOSIS — K91.2 POSTSURGICAL MALABSORPTION, NOT ELSEWHERE CLASSIFIED: ICD-10-CM

## 2022-02-23 DIAGNOSIS — F33.41 MAJOR DEPRESSIVE DISORDER, RECURRENT, IN PARTIAL REMISSION: Primary | ICD-10-CM

## 2022-02-23 DIAGNOSIS — K21.9 GASTROESOPHAGEAL REFLUX DISEASE WITHOUT ESOPHAGITIS: ICD-10-CM

## 2022-02-23 DIAGNOSIS — Z13.29 SCREENING FOR ENDOCRINE DISORDER: ICD-10-CM

## 2022-02-23 DIAGNOSIS — Z13.220 SCREENING CHOLESTEROL LEVEL: ICD-10-CM

## 2022-02-23 DIAGNOSIS — K29.30 CHRONIC SUPERFICIAL GASTRITIS WITHOUT BLEEDING: ICD-10-CM

## 2022-02-23 DIAGNOSIS — F51.01 PRIMARY INSOMNIA: ICD-10-CM

## 2022-02-23 DIAGNOSIS — Z98.84 H/O GASTRIC BYPASS: ICD-10-CM

## 2022-02-23 PROCEDURE — 3078F DIAST BP <80 MM HG: CPT | Mod: CPTII,S$GLB,, | Performed by: NURSE PRACTITIONER

## 2022-02-23 PROCEDURE — 3074F PR MOST RECENT SYSTOLIC BLOOD PRESSURE < 130 MM HG: ICD-10-PCS | Mod: CPTII,S$GLB,, | Performed by: NURSE PRACTITIONER

## 2022-02-23 PROCEDURE — 3008F PR BODY MASS INDEX (BMI) DOCUMENTED: ICD-10-PCS | Mod: CPTII,S$GLB,, | Performed by: NURSE PRACTITIONER

## 2022-02-23 PROCEDURE — 3008F BODY MASS INDEX DOCD: CPT | Mod: CPTII,S$GLB,, | Performed by: NURSE PRACTITIONER

## 2022-02-23 PROCEDURE — 99214 OFFICE O/P EST MOD 30 MIN: CPT | Mod: S$GLB,,, | Performed by: NURSE PRACTITIONER

## 2022-02-23 PROCEDURE — 3078F PR MOST RECENT DIASTOLIC BLOOD PRESSURE < 80 MM HG: ICD-10-PCS | Mod: CPTII,S$GLB,, | Performed by: NURSE PRACTITIONER

## 2022-02-23 PROCEDURE — 3074F SYST BP LT 130 MM HG: CPT | Mod: CPTII,S$GLB,, | Performed by: NURSE PRACTITIONER

## 2022-02-23 PROCEDURE — 1159F PR MEDICATION LIST DOCUMENTED IN MEDICAL RECORD: ICD-10-PCS | Mod: CPTII,S$GLB,, | Performed by: NURSE PRACTITIONER

## 2022-02-23 PROCEDURE — 99214 PR OFFICE/OUTPT VISIT, EST, LEVL IV, 30-39 MIN: ICD-10-PCS | Mod: S$GLB,,, | Performed by: NURSE PRACTITIONER

## 2022-02-23 PROCEDURE — 1159F MED LIST DOCD IN RCRD: CPT | Mod: CPTII,S$GLB,, | Performed by: NURSE PRACTITIONER

## 2022-02-23 RX ORDER — BUPROPION HYDROCHLORIDE 150 MG/1
150 TABLET, EXTENDED RELEASE ORAL DAILY
Qty: 30 TABLET | Refills: 0 | Status: SHIPPED | OUTPATIENT
Start: 2022-02-23 | End: 2022-04-11

## 2022-02-23 RX ORDER — TRAZODONE HYDROCHLORIDE 50 MG/1
50 TABLET ORAL NIGHTLY
Qty: 90 TABLET | Refills: 3 | Status: SHIPPED | OUTPATIENT
Start: 2022-02-23 | End: 2022-05-13

## 2022-02-23 RX ORDER — PANTOPRAZOLE SODIUM 40 MG/1
40 TABLET, DELAYED RELEASE ORAL
Qty: 30 TABLET | Refills: 1 | Status: SHIPPED | OUTPATIENT
Start: 2022-02-23 | End: 2022-04-11

## 2022-02-23 RX ORDER — ESCITALOPRAM OXALATE 10 MG/1
20 TABLET ORAL DAILY
Qty: 60 TABLET | Refills: 2 | Status: SHIPPED | OUTPATIENT
Start: 2022-02-23 | End: 2022-04-11

## 2022-02-23 NOTE — Clinical Note
Good morning! Mrs. Wagner is still having dizziness and her BP is even lower 10 months after you saw her. She had been drinking a sports drink most days. She got so dizzy she passed out for a second about 3 weeks ago.  Her BP today is even lower than when you saw her.  Weight has been stable around 120-122 lbs.  What do you recommend?  Queta Martin NP,  I saw your patient today in [facility]. If you have any questions, please do not hesitate to contact me.  Thank you!  BEKA Martin, [facility]

## 2022-02-23 NOTE — TELEPHONE ENCOUNTER
----- Message from Madhu Landon sent at 2/23/2022  2:10 PM CST -----  Contact: Crow  Type:  Pharmacy Calling to Clarify an RX    Name of Caller:  Crow  Pharmacy Name:      [id number] [address] [address]  Phone: [phone] Fax: [phone]      Prescription Name:  Trazodone  What do they need to clarify?:  2 sets of directions  Best Call Back Number:  : [phone]   Additional Information:

## 2022-02-23 NOTE — TELEPHONE ENCOUNTER
Spoke with pharmacy to confirm Trazodone order. Provider confirmed one 50 mg tab in evening per Isa. Parker Ac

## 2022-02-23 NOTE — PROGRESS NOTES
Kathryn Wagner is a 62 y.o. female patient of Dr. Queta Martin NP who presents to the clinic today for   Chief Complaint   Patient presents with    Annual Exam     Blood work, low bp with dizziness   .    HPI        Pt, who is known to me, reports today for a physical.    Patient last seen by PCP 7/15/2021 by me.    Past Medical History:   Diagnosis Date    Class 2 obesity due to excess calories in adult 3/4/2015    Colon polyp     Depression     Diverticulosis     Encounter for blood transfusion     GERD (gastroesophageal reflux disease)     High cholesterol     Hypertension     No longer taking medication since weight loss    Lumbar spondylosis 03/04/2015    L3-4 fusion; L5S1 fusion    Obesity     Restless leg syndrome        Interval Hx:  Feeling well.    Under the care of Dr. Clarence Quintero.  Sees her every 3 months.  She wanted to check for osteoporosis.  Pt has a screening test but the results never went to Dr. Quintero.      Current Outpatient Medications:     dicyclomine (BENTYL) 20 mg tablet, Take 1 tablet (20 mg total) by mouth 2 (two) times daily., Disp: 30 tablet, Rfl: 0    EScitalopram oxalate (LEXAPRO) 10 MG tablet, TAKE 1 AND 1/2 TABLETS BY MOUTH DAILY, Disp: 45 tablet, Rfl: 1    HYDROcodone-acetaminophen (NORCO) 7.5-325 mg per tablet, Take 1 tablet by mouth every 6 (six) hours as needed for Pain., Disp: 28 tablet, Rfl: 0    naloxone (NARCAN) 4 mg/actuation Spry, Narcan 4 mg/actuation nasal spray, Disp: , Rfl:     pantoprazole (PROTONIX) 40 MG tablet, Take 1 tablet (40 mg total) by mouth before breakfast., Disp: 30 tablet, Rfl: 1    traZODone (DESYREL) 50 MG tablet, Take 1 tablet (50 mg total) by mouth every evening. TAKE ONE AND ONE-HALF TABLETS BY MOUTH AT BEDTIME, Disp: 90 tablet, Rfl: 3    docusate sodium (COLACE) 100 MG capsule, Take 1 capsule (100 mg total) by mouth 2 (two) times daily. (Patient not taking: Reported on 2/23/2022), Disp: 30 capsule, Rfl: 0     ondansetron (ZOFRAN-ODT) 4 MG TbDL, Take 1 tablet (4 mg total) by mouth every 6 (six) hours as needed (nausea). (Patient not taking: Reported on 2/23/2022), Disp: 20 tablet, Rfl: 0    ondansetron (ZOFRAN-ODT) 8 MG TbDL, Take 1 tablet (8 mg total) by mouth every 6 (six) hours as needed (nausea). (Patient not taking: Reported on 2/23/2022), Disp: 12 tablet, Rfl: 0    oxycodone-acetaminophen (PERCOCET)  mg per tablet, Take 1 tablet by mouth every 8 (eight) hours as needed. , Disp: , Rfl:     Medications:  Is taking his regular medication(s) as prescribed no side effects, expresses no concerns.  Has not had a problem with constipation since she had her gall bladder out.     Pt denies the following symptoms:  Constipation, cramping    OTC Medications in use besides vitamins or those on the medication list are none.    Patient is not a smoker.     ROS    Constitutional:  No fever, +++ fatigue.    HEENT:  Negative    Heart/CV:  No palpitations, no CP, no edema.     Lungs:  No cough, noshortness of breath.    GI:  No abd pain, + diarrhea, no constipation, no black stools, no bloody stools, no mucus with BMs.         Last BM every 3 days, normal for pt    :  No urgency, no frequency, no difficulty holding urine, no change in color.                     GYN:   s/p hysterectomy with ovaries remaining.    MS:  No swelling, chronic low back pain, no redness of bones, joints or muscles.         Hip and low back pain (lumbar).      Skin:  No rashes or itching of the skin    Psych:  Very hard to get herself out of bed r/t depression.  No SI    PHYSICAL EXAM    Alert, coop 62 y.o. female patient in no acute distress.    Vitals:    02/23/22 1103   BP: 90/70   Pulse: 71   Resp: 18   Temp: 98.2 °F (36.8 °C)   SpO2: 97%   Weight: 55.6 kg (122 lb 7.5 oz)     VS reviewed.  VS BP hypotensive.  CC, nursing note, medications & PMH all reviewed today.    Head:  Normocephalic, atraumatic.    EENT: Ext nose/ears normal.  Eyes with  normal lids, not injected.    Thyroid:  Not enlarged.           Resp:  Respirations even, unlabored.             Lungs CTA bilat.    Heart:  RRR, no murmur.  CV:  Cap RF brisk, no pedal edema noted.    MS:  Walks with steady gait, arm movement smooth and symmetrical.            Multiple healed scars low back (lumbar area)            NEURO:  Alert and oriented x 4.  Responds appropriately during interaction.    Posterior tibialis:   Right:Present  Left: Present              Skin:  Warm, dry, color good.    Psych:  Responds appropriately throughout the visit.               Flat affect.  Well-groomed.    Major depressive disorder, recurrent, in partial remission  -     buPROPion (WELLBUTRIN SR) 150 MG TBSR 12 hr tablet; Take 1 tablet (150 mg total) by mouth once daily.  Dispense: 30 tablet; Refill: 0    Heartburn  -     pantoprazole (PROTONIX) 40 MG tablet; Take 1 tablet (40 mg total) by mouth before breakfast.  Dispense: 30 tablet; Refill: 1    Primary insomnia weaned off ambien  -     traZODone (DESYREL) 50 MG tablet; Take 1 tablet (50 mg total) by mouth every evening. TAKE ONE AND ONE-HALF TABLETS BY MOUTH AT BEDTIME  Dispense: 90 tablet; Refill: 3  -     TSH; Future; Expected date: 02/23/2022    Recurrent major depression in partial remission  -     EScitalopram oxalate (LEXAPRO) 10 MG tablet; Take 2 tablets (20 mg total) by mouth once daily.  Dispense: 60 tablet; Refill: 2    Screening cholesterol level    Gastroesophageal reflux disease without esophagitis  -     Vitamin B12; Future; Expected date: 02/23/2022    H/O gastric bypass  -     TSH; Future; Expected date: 02/23/2022  -     Comprehensive Metabolic Panel; Future; Expected date: 02/23/2022  -     Magnesium; Future; Expected date: 02/23/2022  -     CBC Auto Differential; Future; Expected date: 02/23/2022    Screening for endocrine disorder  -     TSH; Future; Expected date: 02/23/2022  -     Hemoglobin A1C; Future; Expected date: 02/23/2022    Other fatigue    -     TSH; Future; Expected date: 02/23/2022    Postsurgical malabsorption, not elsewhere classified   -     Vitamin B12; Future; Expected date: 02/23/2022    Chronic superficial gastritis without bleeding   -     CBC Auto Differential; Future; Expected date: 02/23/2022          Pt today presents for an annual physical/interval exam today for   Chief Complaint   Patient presents with    Annual Exam     Blood work, low bp with dizziness   /surveilance of chronic medical conditions, reporting continuing concerns:  Reflux  LBP-chronic s/p surgeries, sees back surgeon who follow her pain.  Depression--Lexapro 20 helping but still no motivation or feeling of energy.  Low BP--cardiologist recommened Gatorade daily and increased fluids.  No SI.    The following chronic conditions are stable: LBP, reflux .      These chronic conditions are not stable depression, low BP .  The following changes have been recommended: add wellbutrin 150 mg and note sent to Dr. Lomax about her low BP. .    Lab & Radiological Tests Ordered subsequent to the visit are above.  The results are pending..    Health screening/Promotion addressed during the visit: she would like a flu shot and shingles shot.  Referred to pharmacy for shingles vaccination.     Reminded the patient if there are any concerns, call here, PCP office or [facility].  If urgent concerns, the patient is advised to call here, the PCP office or [facility] or go to the URGENT CARE or ER.  Explained exam findings, diagnosis and treatment plan to patient.  Questions answered and patient states understanding.

## 2022-02-23 NOTE — PATIENT INSTRUCTIONS
Hilary Logan RN  Called you to ask you to set an appointment with geriatrics.    Shingles and Covid booster shots are recommende.d

## 2022-02-24 ENCOUNTER — TELEPHONE (OUTPATIENT)
Dept: BARIATRICS | Facility: CLINIC | Age: 62
End: 2022-02-24
Payer: MEDICARE

## 2022-02-25 ENCOUNTER — TELEPHONE (OUTPATIENT)
Dept: FAMILY MEDICINE | Facility: CLINIC | Age: 62
End: 2022-02-25
Payer: MEDICARE

## 2022-02-25 NOTE — TELEPHONE ENCOUNTER
Patient states she has been trying all these suggestions from Dr. Cornejo for some time, will continue them, but thinks it may be time to consider low dose medication although she would not prefer to start a new med. Coming in Wednesday for bp check.

## 2022-02-25 NOTE — TELEPHONE ENCOUNTER
----- Message from Queta Martin NP sent at 2/23/2022  6:33 PM CST -----  Please call pt and ask her to drink at least 1 quart of sports drink without sugar (G2 or Powerade Zero) PLUS at least 1 qt other fluids.  Increase salt in food/eating salty foods.  Recheck BP in 1 week.   If she can't increase the BP, he suggests a medicine.  However, prefers she add more salt to her diet.  Isa Martin, DANIELA, CNP, FNP-[facility]      ----- Message -----  From: Clemente Lomax MD  Sent: 2/23/2022  12:33 PM CST  To: Queta Martin NP    Would like if she could increase her salt intake to raise her BP without meds, but may consider low dose midodrine or low dose fludrocortisone if she is persistently symptomatic despite dietary changes.    -Clemente  ----- Message -----  From: Queta Martin NP  Sent: 2/23/2022  12:00 PM CST  To: Clemente Lomax MD    Good morning!  Mrs. Wagner is still having dizziness and her BP is even lower 10 months after you saw her. She had been drinking a sports drink most days. She got so dizzy she passed out for a second about 3 weeks ago.  Her BP today is even lower than when you saw her.  Weight has been stable around 120-122 lbs.  What do you recommend?   Queta Martin NP,  I saw your patient today in [facility]. If you have any questions, please do not hesitate to contact me.  Thank you!  BEKA Martin, [facility]

## 2022-02-28 ENCOUNTER — PATIENT MESSAGE (OUTPATIENT)
Dept: FAMILY MEDICINE | Facility: CLINIC | Age: 62
End: 2022-02-28
Payer: MEDICARE

## 2022-03-02 ENCOUNTER — TELEPHONE (OUTPATIENT)
Dept: FAMILY MEDICINE | Facility: CLINIC | Age: 62
End: 2022-03-02
Payer: MEDICARE

## 2022-03-02 NOTE — TELEPHONE ENCOUNTER
----- Message from Diana Duarte sent at 3/2/2022  9:07 AM CST -----  Contact: pt  Type: Needs Medical Advice    Who Called:  pt  Best Call Back Number: [phone]    Additional Information: Requesting a call back regarding rescheduling her nurse visit   Please Advise ---Thank you

## 2022-03-04 ENCOUNTER — TELEPHONE (OUTPATIENT)
Dept: FAMILY MEDICINE | Facility: CLINIC | Age: 62
End: 2022-03-04
Payer: MEDICARE

## 2022-03-04 ENCOUNTER — PATIENT MESSAGE (OUTPATIENT)
Dept: FAMILY MEDICINE | Facility: CLINIC | Age: 62
End: 2022-03-04
Payer: MEDICARE

## 2022-03-04 NOTE — TELEPHONE ENCOUNTER
----- Message from Joanne Espinosa sent at 3/4/2022 10:20 AM CST -----  Contact: patient  Type: Needs Medical Advice  Who Called:  patient  Best Call Back Number: [phone]   Additional Information: patient needs to reschedule nurse visit and labs for this afternoon. She has diarrhea-

## 2022-03-07 ENCOUNTER — LAB VISIT (OUTPATIENT)
Dept: FAMILY MEDICINE | Facility: CLINIC | Age: 62
End: 2022-03-07
Payer: MEDICARE

## 2022-03-07 ENCOUNTER — PATIENT MESSAGE (OUTPATIENT)
Dept: BARIATRICS | Facility: CLINIC | Age: 62
End: 2022-03-07
Payer: MEDICARE

## 2022-03-07 ENCOUNTER — CLINICAL SUPPORT (OUTPATIENT)
Dept: FAMILY MEDICINE | Facility: CLINIC | Age: 62
End: 2022-03-07
Payer: MEDICARE

## 2022-03-07 VITALS — SYSTOLIC BLOOD PRESSURE: 98 MMHG | HEART RATE: 76 BPM | DIASTOLIC BLOOD PRESSURE: 64 MMHG

## 2022-03-07 DIAGNOSIS — F51.01 PRIMARY INSOMNIA: ICD-10-CM

## 2022-03-07 DIAGNOSIS — K91.2 POSTSURGICAL MALABSORPTION, NOT ELSEWHERE CLASSIFIED: ICD-10-CM

## 2022-03-07 DIAGNOSIS — Z13.29 SCREENING FOR ENDOCRINE DISORDER: ICD-10-CM

## 2022-03-07 DIAGNOSIS — Z98.84 H/O GASTRIC BYPASS: ICD-10-CM

## 2022-03-07 DIAGNOSIS — K21.9 GASTROESOPHAGEAL REFLUX DISEASE WITHOUT ESOPHAGITIS: ICD-10-CM

## 2022-03-07 DIAGNOSIS — R53.83 OTHER FATIGUE: ICD-10-CM

## 2022-03-07 DIAGNOSIS — K29.30 CHRONIC SUPERFICIAL GASTRITIS WITHOUT BLEEDING: ICD-10-CM

## 2022-03-07 DIAGNOSIS — Z01.30 BP CHECK: Primary | ICD-10-CM

## 2022-03-07 PROCEDURE — 83036 HEMOGLOBIN GLYCOSYLATED A1C: CPT | Performed by: NURSE PRACTITIONER

## 2022-03-07 PROCEDURE — 80061 LIPID PANEL: CPT | Performed by: NURSE PRACTITIONER

## 2022-03-07 PROCEDURE — 82607 VITAMIN B-12: CPT | Performed by: NURSE PRACTITIONER

## 2022-03-07 PROCEDURE — 36415 PR COLLECTION VENOUS BLOOD,VENIPUNCTURE: ICD-10-PCS | Mod: S$GLB,,, | Performed by: NURSE PRACTITIONER

## 2022-03-07 PROCEDURE — 84443 ASSAY THYROID STIM HORMONE: CPT | Performed by: NURSE PRACTITIONER

## 2022-03-07 PROCEDURE — 83735 ASSAY OF MAGNESIUM: CPT | Performed by: NURSE PRACTITIONER

## 2022-03-07 PROCEDURE — 85025 COMPLETE CBC W/AUTO DIFF WBC: CPT | Performed by: NURSE PRACTITIONER

## 2022-03-07 PROCEDURE — 80053 COMPREHEN METABOLIC PANEL: CPT | Performed by: NURSE PRACTITIONER

## 2022-03-07 PROCEDURE — 36415 COLL VENOUS BLD VENIPUNCTURE: CPT | Mod: S$GLB,,, | Performed by: NURSE PRACTITIONER

## 2022-03-07 NOTE — PROGRESS NOTES
,      Blood pressure reading after 15 minutes was 98/64 , Pulse 76  Queta Ac NP notified.    Kathryn Wagner 62 y.o. female is here today for Blood Pressure check.   History of HTN yes, but not anymore, BP been running low.     Review of patient's allergies indicates:   Allergen Reactions    Contrast media Hives    Iodine and iodide containing products Hives and Itching    Latex, natural rubber Rash     Creatinine   Date Value Ref Range Status   10/21/2021 0.71 0.50 - 1.40 mg/dL Final     Sodium   Date Value Ref Range Status   10/21/2021 137 136 - 145 mmol/L Final     Potassium   Date Value Ref Range Status   10/21/2021 4.0 3.5 - 5.1 mmol/L Final     Comment:     Specimen slightly hemolyzed   ]  Patient not on any BP meds     Current Outpatient Medications:     buPROPion (WELLBUTRIN SR) 150 MG TBSR 12 hr tablet, Take 1 tablet (150 mg total) by mouth once daily., Disp: 30 tablet, Rfl: 0    docusate sodium (COLACE) 100 MG capsule, Take 1 capsule (100 mg total) by mouth 2 (two) times daily. (Patient not taking: Reported on 2/23/2022), Disp: 30 capsule, Rfl: 0    EScitalopram oxalate (LEXAPRO) 10 MG tablet, Take 2 tablets (20 mg total) by mouth once daily., Disp: 60 tablet, Rfl: 2    HYDROcodone-acetaminophen (NORCO) 7.5-325 mg per tablet, Take 1 tablet by mouth every 6 (six) hours as needed for Pain., Disp: 28 tablet, Rfl: 0    naloxone (NARCAN) 4 mg/actuation Spry, Narcan 4 mg/actuation nasal spray, Disp: , Rfl:     ondansetron (ZOFRAN-ODT) 8 MG TbDL, Take 1 tablet (8 mg total) by mouth every 6 (six) hours as needed (nausea). (Patient not taking: Reported on 2/23/2022), Disp: 12 tablet, Rfl: 0    oxycodone-acetaminophen (PERCOCET)  mg per tablet, Take 1 tablet by mouth every 8 (eight) hours as needed. , Disp: , Rfl:     pantoprazole (PROTONIX) 40 MG tablet, Take 1 tablet (40 mg total) by mouth before breakfast., Disp: 30 tablet, Rfl: 1    traZODone (DESYREL) 50 MG tablet, Take 1 tablet (50 mg  total) by mouth every evening. TAKE ONE AND ONE-HALF TABLETS BY MOUTH AT BEDTIME, Disp: 90 tablet, Rfl: 3  Does patient have record of home blood pressure readings no. Readings have been averaging n/a.   Last dose of blood pressure medication was taken at n/a.  Patient is asymptomatic.   Complains of n/a.

## 2022-03-07 NOTE — PROGRESS NOTES
Venipuncture performed with 23 gauge butterfly, x's 2 attempt.  Successful venipuncture to R Cephalic vein.  Specimens collected per orders.      Pressure dressing applied to site, instructed patient to remove dressing in 10-15 minutes, OK to re-adjust dressing if pressure causing any discomfort, to observe closely for numbness and/or discoloration to hand or fingers, and to notify provider if bleeding persists after applying constant pressure lasting 30 minutes.

## 2022-03-08 LAB
ALBUMIN SERPL BCP-MCNC: 4 G/DL (ref 3.5–5.2)
ALP SERPL-CCNC: 60 U/L (ref 55–135)
ALT SERPL W/O P-5'-P-CCNC: 14 U/L (ref 10–44)
ANION GAP SERPL CALC-SCNC: 12 MMOL/L (ref 8–16)
AST SERPL-CCNC: 26 U/L (ref 10–40)
BASOPHILS # BLD AUTO: 0.04 K/UL (ref 0–0.2)
BASOPHILS NFR BLD: 0.6 % (ref 0–1.9)
BILIRUB SERPL-MCNC: 0.2 MG/DL (ref 0.1–1)
BUN SERPL-MCNC: 19 MG/DL (ref 8–23)
CALCIUM SERPL-MCNC: 9 MG/DL (ref 8.7–10.5)
CHLORIDE SERPL-SCNC: 101 MMOL/L (ref 95–110)
CHOLEST SERPL-MCNC: 189 MG/DL (ref 120–199)
CHOLEST/HDLC SERPL: 3.7 {RATIO} (ref 2–5)
CO2 SERPL-SCNC: 26 MMOL/L (ref 23–29)
CREAT SERPL-MCNC: 0.9 MG/DL (ref 0.5–1.4)
DIFFERENTIAL METHOD: ABNORMAL
EOSINOPHIL # BLD AUTO: 0.5 K/UL (ref 0–0.5)
EOSINOPHIL NFR BLD: 7.6 % (ref 0–8)
ERYTHROCYTE [DISTWIDTH] IN BLOOD BY AUTOMATED COUNT: 13.9 % (ref 11.5–14.5)
EST. GFR  (AFRICAN AMERICAN): >60 ML/MIN/1.73 M^2
EST. GFR  (NON AFRICAN AMERICAN): >60 ML/MIN/1.73 M^2
ESTIMATED AVG GLUCOSE: 88 MG/DL (ref 68–131)
GLUCOSE SERPL-MCNC: 139 MG/DL (ref 70–110)
HBA1C MFR BLD: 4.7 % (ref 4–5.6)
HCT VFR BLD AUTO: 36.2 % (ref 37–48.5)
HDLC SERPL-MCNC: 51 MG/DL (ref 40–75)
HDLC SERPL: 27 % (ref 20–50)
HGB BLD-MCNC: 11.5 G/DL (ref 12–16)
IMM GRANULOCYTES # BLD AUTO: 0.01 K/UL (ref 0–0.04)
IMM GRANULOCYTES NFR BLD AUTO: 0.1 % (ref 0–0.5)
LDLC SERPL CALC-MCNC: 95.8 MG/DL (ref 63–159)
LYMPHOCYTES # BLD AUTO: 2.3 K/UL (ref 1–4.8)
LYMPHOCYTES NFR BLD: 32.9 % (ref 18–48)
MAGNESIUM SERPL-MCNC: 1.9 MG/DL (ref 1.6–2.6)
MCH RBC QN AUTO: 29.9 PG (ref 27–31)
MCHC RBC AUTO-ENTMCNC: 31.8 G/DL (ref 32–36)
MCV RBC AUTO: 94 FL (ref 82–98)
MONOCYTES # BLD AUTO: 0.4 K/UL (ref 0.3–1)
MONOCYTES NFR BLD: 5.6 % (ref 4–15)
NEUTROPHILS # BLD AUTO: 3.8 K/UL (ref 1.8–7.7)
NEUTROPHILS NFR BLD: 53.2 % (ref 38–73)
NONHDLC SERPL-MCNC: 138 MG/DL
NRBC BLD-RTO: 0 /100 WBC
PLATELET # BLD AUTO: 234 K/UL (ref 150–450)
PMV BLD AUTO: 13.3 FL (ref 9.2–12.9)
POTASSIUM SERPL-SCNC: 4 MMOL/L (ref 3.5–5.1)
PROT SERPL-MCNC: 6.5 G/DL (ref 6–8.4)
RBC # BLD AUTO: 3.85 M/UL (ref 4–5.4)
SODIUM SERPL-SCNC: 139 MMOL/L (ref 136–145)
TRIGL SERPL-MCNC: 211 MG/DL (ref 30–150)
TSH SERPL DL<=0.005 MIU/L-ACNC: 0.49 UIU/ML (ref 0.4–4)
VIT B12 SERPL-MCNC: 712 PG/ML (ref 210–950)
WBC # BLD AUTO: 7.08 K/UL (ref 3.9–12.7)

## 2022-03-09 ENCOUNTER — PATIENT MESSAGE (OUTPATIENT)
Dept: FAMILY MEDICINE | Facility: CLINIC | Age: 62
End: 2022-03-09
Payer: MEDICARE

## 2022-03-09 DIAGNOSIS — D64.9 NORMOCYTIC ANEMIA: Primary | ICD-10-CM

## 2022-03-10 ENCOUNTER — TELEPHONE (OUTPATIENT)
Dept: FAMILY MEDICINE | Facility: CLINIC | Age: 62
End: 2022-03-10
Payer: MEDICARE

## 2022-03-10 DIAGNOSIS — D50.8 IRON DEFICIENCY ANEMIA SECONDARY TO INADEQUATE DIETARY IRON INTAKE: Primary | ICD-10-CM

## 2022-03-11 ENCOUNTER — LAB VISIT (OUTPATIENT)
Dept: FAMILY MEDICINE | Facility: CLINIC | Age: 62
End: 2022-03-11
Payer: MEDICARE

## 2022-03-11 DIAGNOSIS — D64.9 NORMOCYTIC ANEMIA: ICD-10-CM

## 2022-03-11 PROCEDURE — 82728 ASSAY OF FERRITIN: CPT | Performed by: NURSE PRACTITIONER

## 2022-03-11 PROCEDURE — 84466 ASSAY OF TRANSFERRIN: CPT | Performed by: NURSE PRACTITIONER

## 2022-03-11 PROCEDURE — 85045 AUTOMATED RETICULOCYTE COUNT: CPT | Performed by: NURSE PRACTITIONER

## 2022-03-11 PROCEDURE — 36415 COLL VENOUS BLD VENIPUNCTURE: CPT

## 2022-03-11 NOTE — TELEPHONE ENCOUNTER
Patient came in today for labs. Advised per message from Queta Ac NP and she stated she really does not want to be on any more medications at the moment. She feels she is on a lot and would not like to add anymore. Please advise.

## 2022-03-11 NOTE — PROGRESS NOTES
Venipuncture performed with 23 gauge butterfly, x's 1 attempt.  Successful venipuncture to R Antecubital vein.  Specimens collected per orders.      Pressure dressing applied to site, instructed patient to remove dressing in 10-15 minutes, OK to re-adjust dressing if pressure causing any discomfort, to observe closely for numbness and/or discoloration to hand or fingers, and to notify provider if bleeding persists after applying constant pressure lasting 30 minutes.

## 2022-03-11 NOTE — TELEPHONE ENCOUNTER
Her cardiologist advised she may need medication to increase her BP.  Does she want to try that?  DANIELA Martin, CNP, FNP-BC  [facility]

## 2022-03-12 LAB
FERRITIN SERPL-MCNC: 92 NG/ML (ref 20–300)
IRON SERPL-MCNC: 58 UG/DL (ref 30–160)
RETICS/RBC NFR AUTO: 1.3 % (ref 0.5–2.5)
SATURATED IRON: 15 % (ref 20–50)
TOTAL IRON BINDING CAPACITY: 376 UG/DL (ref 250–450)
TRANSFERRIN SERPL-MCNC: 254 MG/DL (ref 200–375)

## 2022-03-14 ENCOUNTER — PATIENT MESSAGE (OUTPATIENT)
Dept: FAMILY MEDICINE | Facility: CLINIC | Age: 62
End: 2022-03-14
Payer: MEDICARE

## 2022-03-15 NOTE — TELEPHONE ENCOUNTER
OK.  Continue drinking the electrolyte solution (sugar free gatorade).  Isa Ac, APRN, CNP, FNP-BC  [facility]

## 2022-03-16 ENCOUNTER — PATIENT MESSAGE (OUTPATIENT)
Dept: FAMILY MEDICINE | Facility: CLINIC | Age: 62
End: 2022-03-16
Payer: MEDICARE

## 2022-03-21 ENCOUNTER — TELEPHONE (OUTPATIENT)
Dept: GASTROENTEROLOGY | Facility: CLINIC | Age: 62
End: 2022-03-21
Payer: MEDICARE

## 2022-03-21 NOTE — TELEPHONE ENCOUNTER
----- Message from Elida Olmedo sent at 3/21/2022  3:31 PM CDT -----  Contact: patient  Type: Needs Medical Advice  Who Called:  patient   Symptoms (please be specific):    How long has patient had these symptoms:    Pharmacy name and phone #:    Best Call Back Number: [phone] (home) [phone] (work)    Additional Information: contact to advise what patient is to take to prep for her colonoscopy on Friday

## 2022-03-21 NOTE — TELEPHONE ENCOUNTER
Returned call to the patient, instructions discussed with the patient, my chart message sent to patient with instructions, patient verbalized understanding of this.

## 2022-03-25 ENCOUNTER — TELEPHONE (OUTPATIENT)
Dept: ENDOSCOPY | Facility: HOSPITAL | Age: 62
End: 2022-03-25
Payer: MEDICARE

## 2022-03-25 NOTE — TELEPHONE ENCOUNTER
Called and spoke with the patient, patient states that she is getting a cold and requests to reschedule her procedure and covid test, procedure and covid test rescheduled with patient, patient verbalized understanding of this.

## 2022-04-05 ENCOUNTER — PES CALL (OUTPATIENT)
Dept: ADMINISTRATIVE | Facility: CLINIC | Age: 62
End: 2022-04-05
Payer: MEDICARE

## 2022-04-07 ENCOUNTER — PATIENT MESSAGE (OUTPATIENT)
Dept: BARIATRICS | Facility: CLINIC | Age: 62
End: 2022-04-07
Payer: MEDICARE

## 2022-04-10 DIAGNOSIS — F33.41 MAJOR DEPRESSIVE DISORDER, RECURRENT, IN PARTIAL REMISSION: ICD-10-CM

## 2022-04-10 DIAGNOSIS — R12 HEARTBURN: ICD-10-CM

## 2022-04-11 RX ORDER — PANTOPRAZOLE SODIUM 40 MG/1
TABLET, DELAYED RELEASE ORAL
Qty: 30 TABLET | Refills: 1 | Status: SHIPPED | OUTPATIENT
Start: 2022-04-11 | End: 2022-05-19 | Stop reason: ALTCHOICE

## 2022-04-11 RX ORDER — BUPROPION HYDROCHLORIDE 150 MG/1
TABLET, EXTENDED RELEASE ORAL
Qty: 30 TABLET | Refills: 0 | Status: SHIPPED | OUTPATIENT
Start: 2022-04-11 | End: 2022-10-13

## 2022-04-12 ENCOUNTER — PATIENT MESSAGE (OUTPATIENT)
Dept: BARIATRICS | Facility: CLINIC | Age: 62
End: 2022-04-12
Payer: MEDICARE

## 2022-05-04 ENCOUNTER — TELEPHONE (OUTPATIENT)
Dept: GASTROENTEROLOGY | Facility: CLINIC | Age: 62
End: 2022-05-04
Payer: MEDICARE

## 2022-05-04 NOTE — TELEPHONE ENCOUNTER
----- Message from Minerva Bell sent at 5/4/2022  4:37 PM CDT -----  Contact: pt [phone]  Type: Needs Medical Advice  Who Called:  Pt    Best Call Back Number: [phone]    Additional Information: Pt would like an upper GI checked during coloscopy pls call back and advise.

## 2022-05-04 NOTE — TELEPHONE ENCOUNTER
Returned call to pt and scheduled pt for office visit to discuss EGD. Pt scheduled with BEKA Montaño. Pt verbalized understanding to all

## 2022-05-05 ENCOUNTER — PATIENT MESSAGE (OUTPATIENT)
Dept: BARIATRICS | Facility: CLINIC | Age: 62
End: 2022-05-05
Payer: MEDICARE

## 2022-05-10 ENCOUNTER — PATIENT MESSAGE (OUTPATIENT)
Dept: BARIATRICS | Facility: CLINIC | Age: 62
End: 2022-05-10
Payer: MEDICARE

## 2022-05-18 ENCOUNTER — PATIENT OUTREACH (OUTPATIENT)
Dept: ADMINISTRATIVE | Facility: OTHER | Age: 62
End: 2022-05-18
Payer: MEDICARE

## 2022-05-18 NOTE — PROGRESS NOTES
Health Maintenance Due   Topic Date Due    HIV Screening  Never done    Shingles Vaccine (2 of 3) 07/22/2016    Colorectal Cancer Screening  11/19/2017    COVID-19 Vaccine (2 - Booster for Emmanuelle series) 02/17/2022    Mammogram  06/25/2022     Updates were requested from care everywhere.  Chart was reviewed for overdue Proactive [facility] Encounters (KAYL[facility]) topics (CRS, Breast Cancer Screening, Eye exam)  Health Maintenance has been updated.  [facility] immunization registry triggered.  Immunizations were reconciled.

## 2022-05-19 ENCOUNTER — OFFICE VISIT (OUTPATIENT)
Dept: GASTROENTEROLOGY | Facility: CLINIC | Age: 62
End: 2022-05-19
Payer: MEDICARE

## 2022-05-19 VITALS — BODY MASS INDEX: 23.98 KG/M2 | WEIGHT: 122.13 LBS | HEIGHT: 60 IN

## 2022-05-19 DIAGNOSIS — Z87.19 HISTORY OF CHRONIC CONSTIPATION: ICD-10-CM

## 2022-05-19 DIAGNOSIS — R10.11 RUQ PAIN: Primary | ICD-10-CM

## 2022-05-19 DIAGNOSIS — Z90.49 S/P CHOLECYSTECTOMY: ICD-10-CM

## 2022-05-19 DIAGNOSIS — D64.9 NORMOCYTIC ANEMIA: ICD-10-CM

## 2022-05-19 DIAGNOSIS — R12 HEARTBURN: ICD-10-CM

## 2022-05-19 DIAGNOSIS — Z86.010 HISTORY OF COLON POLYPS: ICD-10-CM

## 2022-05-19 PROCEDURE — 3044F PR MOST RECENT HEMOGLOBIN A1C LEVEL <7.0%: ICD-10-PCS | Mod: CPTII,S$GLB,, | Performed by: NURSE PRACTITIONER

## 2022-05-19 PROCEDURE — 99999 PR PBB SHADOW E&M-EST. PATIENT-LVL III: ICD-10-PCS | Mod: PBBFAC,,, | Performed by: NURSE PRACTITIONER

## 2022-05-19 PROCEDURE — 99214 OFFICE O/P EST MOD 30 MIN: CPT | Mod: S$GLB,,, | Performed by: NURSE PRACTITIONER

## 2022-05-19 PROCEDURE — 3008F BODY MASS INDEX DOCD: CPT | Mod: CPTII,S$GLB,, | Performed by: NURSE PRACTITIONER

## 2022-05-19 PROCEDURE — 99999 PR PBB SHADOW E&M-EST. PATIENT-LVL III: CPT | Mod: PBBFAC,,, | Performed by: NURSE PRACTITIONER

## 2022-05-19 PROCEDURE — 1159F MED LIST DOCD IN RCRD: CPT | Mod: CPTII,S$GLB,, | Performed by: NURSE PRACTITIONER

## 2022-05-19 PROCEDURE — 1159F PR MEDICATION LIST DOCUMENTED IN MEDICAL RECORD: ICD-10-PCS | Mod: CPTII,S$GLB,, | Performed by: NURSE PRACTITIONER

## 2022-05-19 PROCEDURE — 1160F RVW MEDS BY RX/DR IN RCRD: CPT | Mod: CPTII,S$GLB,, | Performed by: NURSE PRACTITIONER

## 2022-05-19 PROCEDURE — 1160F PR REVIEW ALL MEDS BY PRESCRIBER/CLIN PHARMACIST DOCUMENTED: ICD-10-PCS | Mod: CPTII,S$GLB,, | Performed by: NURSE PRACTITIONER

## 2022-05-19 PROCEDURE — 99214 PR OFFICE/OUTPT VISIT, EST, LEVL IV, 30-39 MIN: ICD-10-PCS | Mod: S$GLB,,, | Performed by: NURSE PRACTITIONER

## 2022-05-19 PROCEDURE — 3044F HG A1C LEVEL LT 7.0%: CPT | Mod: CPTII,S$GLB,, | Performed by: NURSE PRACTITIONER

## 2022-05-19 PROCEDURE — 3008F PR BODY MASS INDEX (BMI) DOCUMENTED: ICD-10-PCS | Mod: CPTII,S$GLB,, | Performed by: NURSE PRACTITIONER

## 2022-05-19 RX ORDER — ESOMEPRAZOLE MAGNESIUM 40 MG/1
40 CAPSULE, DELAYED RELEASE ORAL
Qty: 30 CAPSULE | Refills: 2 | Status: SHIPPED | OUTPATIENT
Start: 2022-05-19 | End: 2022-08-22

## 2022-05-19 RX ORDER — OXYCODONE AND ACETAMINOPHEN 7.5; 325 MG/1; MG/1
1 TABLET ORAL EVERY 6 HOURS PRN
COMMUNITY
Start: 2022-05-14

## 2022-05-19 NOTE — PROGRESS NOTES
Subjective:       Patient ID: Kathryn Wagner is a 62 y.o. female Body mass index is 23.85 kg/m².    Chief Complaint: Abdominal Pain    Established patient of Dr. Mariscal & myself.     Patient is currently scheduled for surveillance colonoscopy on 6/16/2022 with Dr. Bella.    Abdominal Pain  Episode onset: started ~4/2021. The problem occurs daily (mostly in the evening). Duration: lasts for a couple of hours. The problem has been unchanged (had improved on bland diet/gastric sleeve diet; unchanged since our last visit). The pain is located in the RUQ. The pain is at a severity of 0/10 (currently). The quality of the pain is aching. The abdominal pain does not radiate. Associated symptoms include constipation (chronic her whole life; unchanged; bowel movements are once every 1-3 days of firm to loose stools) and weight loss (had gastric sleeve done in 8/2020, lost ~72 lbs, stable for the past few months). Pertinent negatives include no belching, diarrhea, dysuria, fever, flatus, hematochezia, melena, nausea or vomiting. Exacerbated by: red meat, fried food. She has tried oral narcotic analgesics (oxycodone helps abdominal pain) for the symptoms. Prior diagnostic workup includes ultrasound, surgery, CT scan and GI consult (saw urology earlier today and is scheduled for CT scan). Her past medical history is significant for gallstones and GERD (daily, despite taking protonix 40 mg once daily; PAST TREATMENT: nexium- helped, prilosec). There is no history of Crohn's disease, pancreatitis, PUD or ulcerative colitis.     Review of Systems   Constitutional: Positive for weight loss (had gastric sleeve done in 8/2020, lost ~72 lbs, stable for the past few months). Negative for appetite change, chills, fatigue and fever.        Oxycodone PRN- taking QID   HENT: Negative for sore throat and trouble swallowing.    Respiratory: Negative for cough, choking and shortness of breath.    Cardiovascular: Negative for chest pain.    Gastrointestinal: Positive for abdominal pain and constipation (chronic her whole life; unchanged; bowel movements are once every 1-3 days of firm to loose stools). Negative for anal bleeding, blood in stool, diarrhea, flatus, hematochezia, melena, nausea, rectal pain and vomiting.   Genitourinary: Negative for difficulty urinating, dysuria and flank pain.   Neurological: Negative for weakness.       No LMP recorded. Patient has had a hysterectomy.  Past Medical History:   Diagnosis Date    Class 2 obesity due to excess calories in adult 3/4/2015    Colon polyp     Depression     Diverticulosis     Encounter for blood transfusion     GERD (gastroesophageal reflux disease)     High cholesterol     Hypertension     No longer taking medication since weight loss    Lumbar spondylosis 2015    L3-4 fusion; L5S1 fusion    Obesity     Restless leg syndrome      Past Surgical History:   Procedure Laterality Date    BACK SURGERY Bilateral     2015, 2015 fusion    BELT ABDOMINOPLASTY      BREAST SURGERY      breast reduction    CARPAL TUNNEL RELEASE Left      SECTION      x3    CHOLECYSTECTOMY      COLONOSCOPY  2012    in procedures: diverticulosis, 2 colon polyps removed, repeat in 5 years for surveillance; biopsy: sigmoid-hyperplastic polyps.    COSMETIC SURGERY      ESOPHAGOGASTRODUODENOSCOPY N/A 2020    Procedure: ESOPHAGOGASTRODUODENOSCOPY (EGD);  Surgeon: Gianni Mariscal MD;  Location: [address];  Service: Endoscopy;  Laterality: N/A; small hiatal hernia, gastritis; biopsy: stomach- unremarkable    HYSTERECTOMY      partial    INCISION AND DRAINAGE OF ABSCESS N/A 2021    Procedure: INCISION AND DRAINAGE, ABSCESS;  Surgeon: Danielito Wong MD;  Location: [facility];  Service: General;  Laterality: N/A;    LAPAROSCOPIC CHOLECYSTECTOMY N/A 2021    Procedure: CHOLECYSTECTOMY, LAPAROSCOPIC;  Surgeon: Danielito Wong MD;  Location: [facility];  Service:  General;  Laterality: N/A;    LAPAROSCOPIC SLEEVE GASTRECTOMY N/A 2020    Procedure: GASTRECTOMY, SLEEVE, LAPAROSCOPIC, with intra op EGD, needs to be first case in the AM;  Surgeon: Yuni Chaudhari MD;  Location: [facility] OR [address] [place] [address];  Service: General;  Laterality: N/A;    TOTAL REDUCTION MAMMOPLASTY      TUBAL LIGATION       Family History   Problem Relation Age of Onset    Hypertension Father     No Known Problems Mother     No Known Problems Sister     No Known Problems Brother     No Known Problems Maternal Aunt     No Known Problems Maternal Uncle     No Known Problems Paternal Aunt     No Known Problems Paternal Uncle     No Known Problems Maternal Grandmother     No Known Problems Maternal Grandfather     No Known Problems Paternal Grandmother     No Known Problems Paternal Grandfather     Amblyopia Neg Hx     Blindness Neg Hx     Cancer Neg Hx     Cataracts Neg Hx     Diabetes Neg Hx     Glaucoma Neg Hx     Macular degeneration Neg Hx     Retinal detachment Neg Hx     Strabismus Neg Hx     Stroke Neg Hx     Thyroid disease Neg Hx     Colon cancer Neg Hx     Crohn's disease Neg Hx     Esophageal cancer Neg Hx     Stomach cancer Neg Hx     Ulcerative colitis Neg Hx      Social History     Tobacco Use    Smoking status: Current Every Day Smoker     Packs/day: 0.50     Types: Cigarettes     Last attempt to quit: 3/18/2020     Years since quittin.1    Smokeless tobacco: Never Used   Substance Use Topics    Alcohol use: Yes     Alcohol/week: 0.0 - 1.0 standard drinks    Drug use: Yes     Types: Oxycodone     Wt Readings from Last 20 Encounters:   22 55.4 kg (122 lb 2.2 oz)   22 55.6 kg (122 lb 7.5 oz)   10/21/21 58.1 kg (128 lb)   21 60.8 kg (134 lb)   21 61.1 kg (134 lb 11.2 oz)   21 61 kg (134 lb 7.7 oz)   07/15/21 61 kg (134 lb 7.7 oz)   21 60.8 kg (134 lb)   21 61.4 kg (135 lb 5.8 oz)   21 63 kg (138 lb 14.2 oz)    06/25/21 63 kg (139 lb)   05/21/21 63.2 kg (139 lb 3.5 oz)   04/28/21 64.5 kg (142 lb 4.9 oz)   04/05/21 65.8 kg (145 lb 1 oz)   03/11/21 67.4 kg (148 lb 7.7 oz)   02/18/21 74.8 kg (165 lb)   10/30/20 75 kg (165 lb 5.5 oz)   10/22/20 74.4 kg (164 lb 0.4 oz)   09/01/20 79.4 kg (175 lb)   08/19/20 84.4 kg (186 lb 1.1 oz)     Lab Results   Component Value Date    WBC 7.08 03/07/2022    HGB 11.5 (L) 03/07/2022    HCT 36.2 (L) 03/07/2022    MCV 94 03/07/2022     03/07/2022     Lab Results   Component Value Date    IRON 58 03/11/2022    TIBC 376 03/11/2022    FERRITIN 92 03/11/2022     CMP  Sodium   Date Value Ref Range Status   03/07/2022 139 136 - 145 mmol/L Final     Potassium   Date Value Ref Range Status   03/07/2022 4.0 3.5 - 5.1 mmol/L Final     Chloride   Date Value Ref Range Status   03/07/2022 101 95 - 110 mmol/L Final     CO2   Date Value Ref Range Status   03/07/2022 26 23 - 29 mmol/L Final     Glucose   Date Value Ref Range Status   03/07/2022 139 (H) 70 - 110 mg/dL Final     BUN   Date Value Ref Range Status   03/07/2022 19 8 - 23 mg/dL Final     Creatinine   Date Value Ref Range Status   03/07/2022 0.9 0.5 - 1.4 mg/dL Final     Calcium   Date Value Ref Range Status   03/07/2022 9.0 8.7 - 10.5 mg/dL Final     Total Protein   Date Value Ref Range Status   03/07/2022 6.5 6.0 - 8.4 g/dL Final     Albumin   Date Value Ref Range Status   03/07/2022 4.0 3.5 - 5.2 g/dL Final     Total Bilirubin   Date Value Ref Range Status   03/07/2022 0.2 0.1 - 1.0 mg/dL Final     Comment:     For infants and newborns, interpretation of results should be based  on gestational age, weight and in agreement with clinical  observations.    Premature Infant recommended reference ranges:  Up to 24 hours.............<8.0 mg/dL  Up to 48 hours............<12.0 mg/dL  3-5 days..................<15.0 mg/dL  6-29 days.................<15.0 mg/dL       Alkaline Phosphatase   Date Value Ref Range Status   03/07/2022 60 55 - 135 U/L  Final     AST   Date Value Ref Range Status   03/07/2022 26 10 - 40 U/L Final     ALT   Date Value Ref Range Status   03/07/2022 14 10 - 44 U/L Final     Anion Gap   Date Value Ref Range Status   03/07/2022 12 8 - 16 mmol/L Final     eGFR if    Date Value Ref Range Status   03/07/2022 >60.0 >60 mL/min/1.73 m^2 Final     eGFR if non    Date Value Ref Range Status   03/07/2022 >60.0 >60 mL/min/1.73 m^2 Final     Comment:     Calculation used to obtain the estimated glomerular filtration  rate (eGFR) is the CKD-EPI equation.        Lab Results   Component Value Date    AMYLASE 77 04/28/2021     Lab Results   Component Value Date    LIPASE 32 04/28/2021     Lab Results   Component Value Date    TSH 0.488 03/07/2022     Reviewed prior medical records including radiology report of 10/21/2021 CT abdomen pelvis; 7/7/2021 limited abdominal ultrasound; & endoscopy history (see surgical history).    Objective:      Physical Exam  Vitals and nursing note reviewed.   Constitutional:       General: She is not in acute distress.     Appearance: Normal appearance. She is well-developed. She is not diaphoretic.   HENT:      Mouth/Throat:      Comments: Patient is wearing a face mask, which covers patient's mouth and nose, due to COVID 19 concerns.  Eyes:      General: No scleral icterus.     Conjunctiva/sclera: Conjunctivae normal.      Pupils: Pupils are equal, round, and reactive to light.   Pulmonary:      Effort: Pulmonary effort is normal. No respiratory distress.      Breath sounds: Normal breath sounds. No wheezing.   Abdominal:      General: Bowel sounds are normal. There is no distension or abdominal bruit.      Palpations: Abdomen is soft. Abdomen is not rigid. There is no mass.      Tenderness: There is no abdominal tenderness. There is no guarding or rebound. Negative signs include Benson's sign and McBurney's sign.   Skin:     General: Skin is warm and dry.      Coloration: Skin is not  pale.      Findings: No erythema or rash.      Comments: Non-jaundiced   Neurological:      Mental Status: She is alert and oriented to person, place, and time.   Psychiatric:         Behavior: Behavior normal.         Thought Content: Thought content normal.         Judgment: Judgment normal.         Assessment:       1. RUQ pain    2. S/P cholecystectomy    3. Heartburn    4. History of colon polyps    5. Normocytic anemia    6. History of chronic constipation        Plan:       RUQ pain  - DISCONTINUE PROTONIX DUE TO ALTERNATE THERAPY  -  RESTART   esomeprazole (NEXIUM) 40 MG capsule; Take 1 capsule (40 mg total) by mouth before breakfast.  Dispense: 30 capsule; Refill: 2  - schedule EGD, discussed procedure with patient, including risks and benefits, patient verbalized understanding  - avoid/minimize use of NSAIDs- since they can cause GI upset, bleeding and/or ulcers. If NSAID must be taken, recommend take with food.    S/P cholecystectomy  Recommend follow-up with general surgery for continued evaluation and management; possible adhesions?.    Heartburn  -   RESTART  esomeprazole (NEXIUM) 40 MG capsule; Take 1 capsule (40 mg total) by mouth before breakfast.  Dispense: 30 capsule; Refill: 2  - schedule EGD, discussed procedure with patient, including risks and benefits, patient verbalized understanding    History of colon polyps  - CONTINUE WITH COLONOSCOPY AS SCHEDULED FOR 6/16/2022    Normocytic anemia  Recommend follow-up with Primary Care Provider for continued evaluation and management.    History of chronic constipation  - Does not require endoscopic evaluation at this time. However, patient is due for a screening colonoscopy and it is already scheduled. CONTINUE WITH COLONOSCOPY AS SCHEDULED FOR 6/16/2022  Recommend daily exercise as tolerated, adequate water intake (six 8-oz glasses of water daily), and high fiber diet. OTC fiber supplements are recommended if diet does not reach daily fiber goal  (20-30 grams daily), such as Metamucil, Citrucel, or FiberCon (take as directed, separate from other oral medications by >2 hours).  -Recommend taking an OTC stool softener such as Colace as directed to avoid hard stools and straining with bowel movements PRN  -If still no improvement with these measures, call/follow-up  - discussed with patient that a side effect of narcotic pain medications is constipation, advised patient to talk to provider who manages pain medication, patient verbalized understanding    Follow up in about 1 month (around 6/19/2022), or if symptoms worsen or fail to improve, for follow-up with Dr. Bella for continued evaluation & management.      If no improvement in symptoms or symptoms worsen, call/follow-up at clinic or go to ER.        37 minutes of total time spent on the encounter, which includes face to face time and non-face to face time preparing to see the patient (eg, review of tests), Obtaining and/or reviewing separately obtained history, Documenting clinical information in the electronic or other health record, Independently interpreting results (not separately reported) and communicating results to the patient/family/caregiver, or Care coordination (not separately reported).

## 2022-05-26 NOTE — PATIENT INSTRUCTIONS
Abdominal Pain    Abdominal pain is pain in the stomach or belly area. Everyone has this pain from time to time. In many cases it goes away on its own. But abdominal pain can sometimes be due to a serious problem, such as appendicitis. So its important to know when to seek help.  Causes of abdominal pain  There are many possible causes of abdominal pain. Common causes in adults include:  Constipation, diarrhea, or gas  Stomach acid flowing back up into the esophagus (acid reflux or heartburn)  Severe acid reflux, called GERD (gastroesophageal reflux disease)  A sore in the lining of the stomach or small intestine (peptic ulcer)  Inflammation of the gallbladder, liver, or pancreas  Gallstones or kidney stones  Appendicitis   Intestinal blockage   An internal organ pushing through a muscle or other tissue (hernia)  Urinary tract infections  In women, menstrual cramps, fibroids, or endometriosis  Inflammation or infection of the intestines  Diagnosing the cause of abdominal pain  Your healthcare provider will do a physical exam help find the cause of your pain. If needed, tests will be ordered. Belly pain has many possible causes. So it can be hard to find the reason for your pain. Giving details about your pain can help. Tell your provider where and when you feel the pain, and what makes it better or worse. Also let your provider know if you have other symptoms such as:  Fever  Tiredness  Upset stomach (nausea)  Vomiting  Changes in bathroom habits  Treating abdominal pain  Some causes of pain need emergency medical treatment right away. These include appendicitis or a bowel blockage. Other problems can be treated with rest, fluids, or medicines. Your healthcare provider can give you specific instructions for treatment or self-care based on what is causing your pain.  If you have vomiting or diarrhea, sip water or other clear fluids. When you are ready to eat solid foods again, start with small amounts of  easy-to-digest, low-fat foods. These include apple sauce, toast, or crackers.   When to seek medical care  Call 911 or go to the hospital right away if you:  Cant pass stool and are vomiting  Are vomiting blood or have bloody diarrhea or black, tarry diarrhea  Have chest, neck, or shoulder pain  Feel like you might pass out  Have pain in your shoulder blades with nausea  Have sudden, severe belly pain  Have new, severe pain unlike any you have felt before  Have a belly that is rigid, hard, and tender to touch  Call your healthcare provider if you have:  Pain for more than 5 days  Bloating for more than 2 days  Diarrhea for more than 5 days  A fever of 100.4°F (38.0°C) or higher, or as directed by your provider  Pain that gets worse  Weight loss for no reason  Continued lack of appetite  Blood in your stool  How to prevent abdominal pain  Here are some tips to help prevent abdominal pain:  Eat smaller amounts of food at one time.  Avoid greasy, fried, or other high-fat foods.  Avoid foods that give you gas.  Exercise regularly.  Drink plenty of fluids.  To help prevent GERD symptoms:  Quit smoking.  Reduce alcohol and certain foods that increase stomach acid.  Avoid aspirin and over-the-counter pain and fever medicines (NSAIDS or nonsteroidal anti-inflammatory drugs), if possible  Lose extra weight.  Finish eating at least 2 hours before you go to bed or lie down.  Raise the head of your bed.  Date Last Reviewed: 7/1/2016  © 5014-8094 [employer]. [address] [place] [address], [place] [address]. All rights reserved. This information is not intended as a substitute for professional medical care. Always follow your healthcare professional's instructions.         GERD (Adult)    The esophagus is a tube that carries food from the mouth to the stomach. A valve at the lower end of the esophagus prevents stomach acid from flowing upward. When this valve doesn't work properly, stomach contents may repeatedly flow  "back up (reflux) into the esophagus. This is called gastroesophageal reflux disease (GERD). GERD can irritate the esophagus. It can cause problems with swallowing or breathing. In severe cases, GERD can cause recurrent pneumonia or other serious problems.  Symptoms of reflux include burning, pressure or sharp pain in the upper abdomen or mid to lower chest. The pain can spread to the neck, back, or shoulder. There may be belching, an acid taste in the back of the throat, chronic cough, or sore throat or hoarseness. GERD symptoms often occur during the day after a big meal. They can also occur at night when lying down.   Home care  Lifestyle changes can help reduce symptoms. If needed, medicines may be prescribed. Symptoms often improve with treatment, but if treatment is stopped, the symptoms often return after a few months. So most persons with GERD will need to continue treatment.  Lifestyle changes  Limit or avoid fatty, fried, and spicy foods, as well as coffee, chocolate, mint, and foods with high acid content such as tomatoes and citrus fruit and juices (orange, grapefruit, lemon).  Dont eat large meals, especially at night. Frequent, smaller meals are best. Do not lie down right after eating. And dont eat anything 3 hours before going to bed.  Avoid drinking alcohol and smoking. As much as possible, stay away from second hand smoke.  If you are overweight, losing weight will reduce symptoms.   Avoid wearing tight clothing around your stomach area.  If your symptoms occur during sleep, use a foam wedge to elevate your upper body (not just your head.) Or, place 4" blocks under the head of your bed.  Medicines  If needed, medicines can help relieve the symptoms of GERD and prevent damage to the esophagus. Discuss a medicine plan with your healthcare provider. This may include one or more of the following medicines:  Antacids to help neutralize the normal acids in your stomach.  Acid blockers (H2 blockers) to " decrease acid production.  Acid inhibitors (PPIs) to decrease acid production in a different way than the blockers. They may work better, but can take a little longer to take effect.  Take an antacid 30-60 minutes after eating and at bedtime, but not at the same time as an acid blocker.  Try not to take medicines such as ibuprofen and aspirin. If you are taking aspirin for your heart or other medical reasons, talk to your healthcare provider about stopping it.  Follow-up care  Follow up with your healthcare provider or as advised by our staff.  When to seek medical advice  Call your healthcare provider if any of the following occur:  Stomach pain gets worse or moves to the lower right abdomen (appendix area)  Chest pain appears or gets worse, or spreads to the back, neck, shoulder, or arm  Frequent vomiting (cant keep down liquids)  Blood in the stool or vomit (red or black in color)  Feeling weak or dizzy  Fever of 100.4ºF (38ºC) or higher, or as directed by your healthcare provider  Date Last Reviewed: 6/23/2015 © 2000-2017 [employer]. [address] [place] [address], [place] [address]. All rights reserved. This information is not intended as a substitute for professional medical care. Always follow your healthcare professional's instructions.

## 2022-06-10 ENCOUNTER — PATIENT MESSAGE (OUTPATIENT)
Dept: BARIATRICS | Facility: CLINIC | Age: 62
End: 2022-06-10
Payer: MEDICARE

## 2022-06-14 ENCOUNTER — PATIENT MESSAGE (OUTPATIENT)
Dept: BARIATRICS | Facility: CLINIC | Age: 62
End: 2022-06-14
Payer: MEDICARE

## 2022-07-05 ENCOUNTER — PATIENT MESSAGE (OUTPATIENT)
Dept: BARIATRICS | Facility: CLINIC | Age: 62
End: 2022-07-05
Payer: MEDICARE

## 2022-08-02 ENCOUNTER — PATIENT MESSAGE (OUTPATIENT)
Dept: BARIATRICS | Facility: CLINIC | Age: 62
End: 2022-08-02
Payer: MEDICARE

## 2022-08-04 ENCOUNTER — PATIENT MESSAGE (OUTPATIENT)
Dept: BARIATRICS | Facility: CLINIC | Age: 62
End: 2022-08-04
Payer: MEDICARE

## 2022-08-04 ENCOUNTER — PATIENT MESSAGE (OUTPATIENT)
Dept: FAMILY MEDICINE | Facility: CLINIC | Age: 62
End: 2022-08-04
Payer: MEDICARE

## 2022-08-04 RX ORDER — SULFACETAMIDE SODIUM 100 MG/ML
1 SOLUTION/ DROPS OPHTHALMIC
Qty: 5 ML | Refills: 0 | Status: SHIPPED | OUTPATIENT
Start: 2022-08-04 | End: 2022-08-05 | Stop reason: SDUPTHER

## 2022-08-05 RX ORDER — SULFACETAMIDE SODIUM 100 MG/ML
1 SOLUTION/ DROPS OPHTHALMIC
Qty: 5 ML | Refills: 0 | Status: SHIPPED | OUTPATIENT
Start: 2022-08-05 | End: 2022-08-15

## 2022-08-19 ENCOUNTER — PES CALL (OUTPATIENT)
Dept: ADMINISTRATIVE | Facility: CLINIC | Age: 62
End: 2022-08-19
Payer: MEDICARE

## 2022-08-25 ENCOUNTER — PATIENT MESSAGE (OUTPATIENT)
Dept: GASTROENTEROLOGY | Facility: CLINIC | Age: 62
End: 2022-08-25
Payer: MEDICARE

## 2022-08-29 ENCOUNTER — TELEPHONE (OUTPATIENT)
Dept: GASTROENTEROLOGY | Facility: CLINIC | Age: 62
End: 2022-08-29
Payer: MEDICARE

## 2022-08-29 NOTE — TELEPHONE ENCOUNTER
Returned call to the patient, patient was advised of the choices of covid testing and the patient requested rapid testing morning of procedure, risks were discussed with the patient, patient verbalized understanding of this, surgery center notified of patient request via teams message.

## 2022-08-29 NOTE — TELEPHONE ENCOUNTER
----- Message from Haris Hernandez sent at 8/29/2022 10:02 AM CDT -----  Type: Patient Call Back         Who called:Pt          What is the request in detail: Pt called in regarding up coming OP- Outpatient Procedures on 9/1/22 pt would like to know if she have to do a Covid test ?         Can the clinic reply by [facility]?no          Would the patient rather a call back or a response via [facility]?call back          Best call back number:[phone] (mobile)          Additional Information:           Thank You

## 2022-08-30 ENCOUNTER — TELEPHONE (OUTPATIENT)
Dept: GASTROENTEROLOGY | Facility: CLINIC | Age: 62
End: 2022-08-30
Payer: MEDICARE

## 2022-08-30 NOTE — TELEPHONE ENCOUNTER
----- Message from Radha Riddle sent at 8/30/2022 10:31 AM CDT -----  .Type:  Patient Call Back    Who Called: PT       Does the patient know what this is regarding?: PT CALLED ABOUT HER UPCOMING PROCEDURE ON 9/1/2022     Would the patient rather a call back YES     Best Call Back Number: [phone]    Additional Information: Thank You

## 2022-08-30 NOTE — TELEPHONE ENCOUNTER
Returned call to the patient, patient states that she wanted to make sure that her blood pressure would be monitored during her procedure as she can have episodes of low blood pressure, patient advised that all of her vital signs would be monitored, patient verbalized understanding of this.

## 2022-08-31 ENCOUNTER — ANESTHESIA EVENT (OUTPATIENT)
Dept: ENDOSCOPY | Facility: HOSPITAL | Age: 62
End: 2022-08-31
Payer: MEDICARE

## 2022-08-31 ENCOUNTER — TELEPHONE (OUTPATIENT)
Dept: GASTROENTEROLOGY | Facility: CLINIC | Age: 62
End: 2022-08-31
Payer: MEDICARE

## 2022-08-31 NOTE — TELEPHONE ENCOUNTER
----- Message from Manjinder Wharton sent at 8/31/2022 10:20 AM CDT -----  Type: Needs Medical Advice  Who Called:  Patient    Best Call Back Number:  [phone]  Additional Information:  Patient states that she would like a callback regarding questions about her procedure

## 2022-08-31 NOTE — TELEPHONE ENCOUNTER
Called patient back in regards to questions regarding procedures. Pt stated she needed new prep instructions as mag was recalled. Informed pt of new prep instructions, pt verbalized understanding.

## 2022-08-31 NOTE — H&P
History & Physical - Short Stay  Gastroenterology      SUBJECTIVE:     Procedure: Gastroscopy and Colonoscopy    Chief Complaint/Indication for Procedure:  GERD.  S/p gastric sleeve 8/2020.  Colon cancer screen    History of Present Illness:  See recent GI OV note:  Office Visit  5/19/2022  [facility]  BEKA Ugalde  Gastroenterology RUQ pain +5 more  Dx Abdominal Pain  Reason for Visit     Progress Notes  BEKA Ugalde (Nurse Practitioner)   Gastroenterology  Subjective:       Patient ID: Kathryn Wagner is a 62 y.o. female Body mass index is 23.85 kg/m².     Chief Complaint: Abdominal Pain     Established patient of Dr. Mariscal & myself.     Patient is currently scheduled for surveillance colonoscopy on 6/16/2022 with Dr. Bella.     Abdominal Pain  Episode onset: started ~4/2021. The problem occurs daily (mostly in the evening). Duration: lasts for a couple of hours. The problem has been unchanged (had improved on bland diet/gastric sleeve diet; unchanged since our last visit). The pain is located in the RUQ. The pain is at a severity of 0/10 (currently). The quality of the pain is aching. The abdominal pain does not radiate. Associated symptoms include constipation (chronic her whole life; unchanged; bowel movements are once every 1-3 days of firm to loose stools) and weight loss (had gastric sleeve done in 8/2020, lost ~72 lbs, stable for the past few months). Pertinent negatives include no belching, diarrhea, dysuria, fever, flatus, hematochezia, melena, nausea or vomiting. Exacerbated by: red meat, fried food. She has tried oral narcotic analgesics (oxycodone helps abdominal pain) for the symptoms. Prior diagnostic workup includes ultrasound, surgery, CT scan and GI consult (saw urology earlier today and is scheduled for CT scan). Her past medical history is significant for gallstones and GERD (daily, despite taking protonix 40 mg once daily; PAST TREATMENT: nexium- helped,  prilosec). There is no history of Crohn's disease, pancreatitis, PUD or ulcerative colitis.      Review of Systems   Constitutional: Positive for weight loss (had gastric sleeve done in 8/2020, lost ~72 lbs, stable for the past few months). Negative for appetite change, chills, fatigue and fever.        Oxycodone PRN- taking QID   HENT: Negative for sore throat and trouble swallowing.    Respiratory: Negative for cough, choking and shortness of breath.    Cardiovascular: Negative for chest pain.   Gastrointestinal: Positive for abdominal pain and constipation (chronic her whole life; unchanged; bowel movements are once every 1-3 days of firm to loose stools). Negative for anal bleeding, blood in stool, diarrhea, flatus, hematochezia, melena, nausea, rectal pain and vomiting.     Assessment:       1. RUQ pain    2. S/P cholecystectomy    3. Heartburn    4. History of colon polyps    5. Normocytic anemia    6. History of chronic constipation        Plan:       RUQ pain  - DISCONTINUE PROTONIX DUE TO ALTERNATE THERAPY  -  RESTART   esomeprazole (NEXIUM) 40 MG capsule; Take 1 capsule (40 mg total) by mouth before breakfast.  Dispense: 30 capsule; Refill: 2  - schedule EGD, discussed procedure with patient, including risks and benefits, patient verbalized understanding  - avoid/minimize use of NSAIDs- since they can cause GI upset, bleeding and/or ulcers. If NSAID must be taken, recommend take with food.     S/P cholecystectomy  Recommend follow-up with general surgery for continued evaluation and management; possible adhesions?.     Heartburn  -   RESTART  esomeprazole (NEXIUM) 40 MG capsule; Take 1 capsule (40 mg total) by mouth before breakfast.  Dispense: 30 capsule; Refill: 2  - schedule EGD, discussed procedure with patient, including risks and benefits, patient verbalized understanding     History of colon polyps  - CONTINUE WITH COLONOSCOPY AS SCHEDULED FOR 6/16/2022     Normocytic anemia  Recommend follow-up  with Primary Care Provider for continued evaluation and management.     History of chronic constipation  - Does not require endoscopic evaluation at this time. However, patient is due for a screening colonoscopy and it is already scheduled. CONTINUE WITH COLONOSCOPY AS SCHEDULED FOR 6/16/2022  Recommend daily exercise as tolerated, adequate water intake (six 8-oz glasses of water daily), and high fiber diet. OTC fiber supplements are recommended if diet does not reach daily fiber goal (20-30 grams daily), such as Metamucil, Citrucel, or FiberCon (take as directed, separate from other oral medications by >2 hours).  -Recommend taking an OTC stool softener such as Colace as directed to avoid hard stools and straining with bowel movements PRN  -If still no improvement with these measures, call/follow-up  - discussed with patient that a side effect of narcotic pain medications is constipation, advised patient to talk to provider who manages pain medication, patient verbalized understanding     Follow up in about 1 month (around 6/19/2022), or if symptoms worsen or fail to improve, for follow-up with Dr. Bella for continued evaluation & management.                See most recent Upper GI endoscopy 6/18/2020:  Indications:          For therapy of esophageal reflux   Providers:            Gianni Mariscal MD  Impression:           - Small hiatal hernia.                         - Normal examined duodenum.                         - Erythematous mucosa in the prepyloric region of                         the stomach. Biopsied.   Recommendation:       - PDischarge patient to home (ambulatory).                         - Resume previous diet.                         - Continue present medications.                         - Await pathology results.                         - Return to referring physician after studies are complete.   Gianni Mariscal MD   6/18/2020        Stomach, antrum (biopsy):   Intestinal metaplasia, focal, no  dysplasia   Antral mucosa with mild reactive/regenerative changes   Considerations include chemical type gastropathy   No active inflammation No Helicobacter organisms      See the last Colonoscopy 11/19/2012:  Indications:         Screening for colorectal malignant neoplasm   Providers:           Zhao Marie MD  Impression:  - Moderate diverticulosis in the sigmoid colon, in                        the descending colon, in the transverse colon and in                        the ascending colon. There was no evidence of                        diverticular bleeding.                        - Two 2 mm polyps in the sigmoid colon. Resected and                        retrieved.   Recommendation:      - Await pathology results.                        - Repeat colonoscopy in 5 years for surveillance                        based on pathology results.                        - Return to GI clinic in 1 month.   Zhao Marie MD   11/19/2012     FINAL PATHOLOGIC DIAGNOSIS   Colon biopsies, sigmoid  -hyperplastic polyps.      Wt Readings from Last 20 Encounters:   08/29/22 55.3 kg (122 lb)   05/19/22 55.4 kg (122 lb 2.2 oz)   02/23/22 55.6 kg (122 lb 7.5 oz)   10/21/21 58.1 kg (128 lb)   08/12/21 60.8 kg (134 lb)   07/29/21 61.1 kg (134 lb 11.2 oz)   07/19/21 61 kg (134 lb 7.7 oz)   07/15/21 61 kg (134 lb 7.7 oz)   07/06/21 60.8 kg (134 lb)   06/28/21 61.4 kg (135 lb 5.8 oz)   06/28/21 63 kg (138 lb 14.2 oz)   06/25/21 63 kg (139 lb)   05/21/21 63.2 kg (139 lb 3.5 oz)   04/28/21 64.5 kg (142 lb 4.9 oz)   04/05/21 65.8 kg (145 lb 1 oz)   03/11/21 67.4 kg (148 lb 7.7 oz)   02/18/21 74.8 kg (165 lb)   10/30/20 75 kg (165 lb 5.5 oz)   10/22/20 74.4 kg (164 lb 0.4 oz)   09/01/20 79.4 kg (175 lb)   ]        PTA Medications   Medication Sig    buPROPion (WELLBUTRIN SR) 150 MG TBSR 12 hr tablet TAKE ONE TABLET BY MOUTH DAILY    EScitalopram oxalate (LEXAPRO) 10 MG tablet Take 1.5 tablets (15 mg total) by mouth once daily.     esomeprazole (NEXIUM) 40 MG capsule TAKE 1 CAPSULE BY MOUTH BEFORE BREAKFAST.    oxyCODONE-acetaminophen (PERCOCET) 7.5-325 mg per tablet Take 1 tablet by mouth every 6 (six) hours as needed.    traZODone (DESYREL) 50 MG tablet TAKE 1 AND 1/2 TABLET BY MOUTH AT BEDTIME    docusate sodium (COLACE) 100 MG capsule Take 1 capsule (100 mg total) by mouth 2 (two) times daily. (Patient not taking: Reported on 2022)    naloxone (NARCAN) 4 mg/actuation Spry Narcan 4 mg/actuation nasal spray       Review of patient's allergies indicates:   Allergen Reactions    Contrast media Hives    Iodine and iodide containing products Hives and Itching    Latex, natural rubber Rash        Past Medical History:   Diagnosis Date    Class 2 obesity due to excess calories in adult 3/4/2015    Colon polyp     Depression     Diverticulosis     Encounter for blood transfusion     GERD (gastroesophageal reflux disease)     High cholesterol     Hypertension     No longer taking medication since weight loss    Lumbar spondylosis 2015    L3-4 fusion; L5S1 fusion    Obesity     Restless leg syndrome      Past Surgical History:   Procedure Laterality Date    BACK SURGERY Bilateral     2015, 2015 fusion    BELT ABDOMINOPLASTY      BREAST SURGERY      breast reduction    CARPAL TUNNEL RELEASE Left      SECTION      x3    CHOLECYSTECTOMY      COLONOSCOPY  2012    in procedures: diverticulosis, 2 colon polyps removed, repeat in 5 years for surveillance; biopsy: sigmoid-hyperplastic polyps.    COSMETIC SURGERY      ESOPHAGOGASTRODUODENOSCOPY N/A 2020    Procedure: ESOPHAGOGASTRODUODENOSCOPY (EGD);  Surgeon: Gianni Mariscal MD;  Location: [address];  Service: Endoscopy;  Laterality: N/A; small hiatal hernia, gastritis; biopsy: stomach- unremarkable    HYSTERECTOMY      partial    INCISION AND DRAINAGE OF ABSCESS N/A 2021    Procedure: INCISION AND DRAINAGE, ABSCESS;  Surgeon: Danielito Wong MD;   Location: [facility];  Service: General;  Laterality: N/A;    LAPAROSCOPIC CHOLECYSTECTOMY N/A 2021    Procedure: CHOLECYSTECTOMY, LAPAROSCOPIC;  Surgeon: Danielito Wong MD;  Location: [facility];  Service: General;  Laterality: N/A;    LAPAROSCOPIC SLEEVE GASTRECTOMY N/A 2020    Procedure: GASTRECTOMY, SLEEVE, LAPAROSCOPIC, with intra op EGD, needs to be first case in the AM;  Surgeon: Yuni Chaudhari MD;  Location: [address];  Service: General;  Laterality: N/A;    TOTAL REDUCTION MAMMOPLASTY      TUBAL LIGATION       Family History   Problem Relation Age of Onset    Hypertension Father     No Known Problems Mother     No Known Problems Sister     No Known Problems Brother     No Known Problems Maternal Aunt     No Known Problems Maternal Uncle     No Known Problems Paternal Aunt     No Known Problems Paternal Uncle     No Known Problems Maternal Grandmother     No Known Problems Maternal Grandfather     No Known Problems Paternal Grandmother     No Known Problems Paternal Grandfather     Amblyopia Neg Hx     Blindness Neg Hx     Cancer Neg Hx     Cataracts Neg Hx     Diabetes Neg Hx     Glaucoma Neg Hx     Macular degeneration Neg Hx     Retinal detachment Neg Hx     Strabismus Neg Hx     Stroke Neg Hx     Thyroid disease Neg Hx     Colon cancer Neg Hx     Crohn's disease Neg Hx     Esophageal cancer Neg Hx     Stomach cancer Neg Hx     Ulcerative colitis Neg Hx      Social History     Tobacco Use    Smoking status: Every Day     Packs/day: 0.50     Types: Cigarettes     Last attempt to quit: 3/18/2020     Years since quittin.4    Smokeless tobacco: Never   Substance Use Topics    Alcohol use: Yes     Alcohol/week: 0.0 - 1.0 standard drinks    Drug use: Yes     Types: Oxycodone         OBJECTIVE:     Vital Signs (Most Recent)  Temp: 98.4 °F (36.9 °C) (22 0950)    Physical Exam:  :  Ht: 5' (152.4 cm)   Wt: 55.3 kg (122 lb)   BMI: 23.83 kg/m²                                                       GENERAL:  Comfortable, in no acute distress.                                 HEENT EXAM:  Nonicteric.  No adenopathy.  Oropharynx is clear.               NECK:  Supple.                                                               LUNGS:  Clear.                                                               CARDIAC:  Regular rate and rhythm.  S1, S2.  No murmur.                      ABDOMEN:  Soft, positive bowel sounds, nontender.  No hepatosplenomegaly or masses.  No rebound or guarding.                                             EXTREMITIES:  No edema.     MENTAL STATUS:  Alert and oriented.    ASSESSMENT/PLAN:     Assessment: GERD.  S/p gastric sleeve 8/2020.  Colon cancer screen    Plan: Gastroscopy and Colonoscopy    Anesthesia Plan:   MAC / General Anaesthesia    ASA Grade: ASA 2 - Patient with mild systemic disease with no functional limitations    MALLAMPATI SCORE: I (soft palate, uvula, fauces, and tonsillar pillars visible)

## 2022-09-01 ENCOUNTER — HOSPITAL ENCOUNTER (OUTPATIENT)
Facility: HOSPITAL | Age: 62
Discharge: HOME | End: 2022-09-01
Attending: INTERNAL MEDICINE | Admitting: INTERNAL MEDICINE
Payer: MEDICARE

## 2022-09-01 ENCOUNTER — ANESTHESIA (OUTPATIENT)
Dept: ENDOSCOPY | Facility: HOSPITAL | Age: 62
End: 2022-09-01
Payer: MEDICARE

## 2022-09-01 DIAGNOSIS — K21.9 GASTROESOPHAGEAL REFLUX DISEASE, UNSPECIFIED WHETHER ESOPHAGITIS PRESENT: ICD-10-CM

## 2022-09-01 PROBLEM — Z86.010 HISTORY OF COLON POLYPS: Status: ACTIVE | Noted: 2022-09-01

## 2022-09-01 PROBLEM — Z86.0100 HISTORY OF COLON POLYPS: Status: ACTIVE | Noted: 2022-09-01

## 2022-09-01 LAB
CTP QC/QA: YES
SARS-COV-2 AG RESP QL IA.RAPID: NEGATIVE

## 2022-09-01 PROCEDURE — 43239 EGD BIOPSY SINGLE/MULTIPLE: CPT | Mod: 51,,, | Performed by: INTERNAL MEDICINE

## 2022-09-01 PROCEDURE — 27201012 HC FORCEPS, HOT/COLD, DISP: Mod: PO | Performed by: INTERNAL MEDICINE

## 2022-09-01 PROCEDURE — D9220A PRA ANESTHESIA: Mod: CRNA,,, | Performed by: NURSE ANESTHETIST, CERTIFIED REGISTERED

## 2022-09-01 PROCEDURE — 63600175 PHARM REV CODE 636 W HCPCS: Mod: PO | Performed by: INTERNAL MEDICINE

## 2022-09-01 PROCEDURE — 45385 PR COLONOSCOPY,REMV LESN,SNARE: ICD-10-PCS | Mod: PT,,, | Performed by: INTERNAL MEDICINE

## 2022-09-01 PROCEDURE — D9220A PRA ANESTHESIA: ICD-10-PCS | Mod: ANES,,, | Performed by: ANESTHESIOLOGY

## 2022-09-01 PROCEDURE — 88305 TISSUE EXAM BY PATHOLOGIST: CPT | Mod: 59 | Performed by: PATHOLOGY

## 2022-09-01 PROCEDURE — 43239 PR EGD, FLEX, W/BIOPSY, SGL/MULTI: ICD-10-PCS | Mod: 51,,, | Performed by: INTERNAL MEDICINE

## 2022-09-01 PROCEDURE — 27201089 HC SNARE, DISP (ANY): Mod: PO | Performed by: INTERNAL MEDICINE

## 2022-09-01 PROCEDURE — 25000003 PHARM REV CODE 250: Mod: PO | Performed by: NURSE ANESTHETIST, CERTIFIED REGISTERED

## 2022-09-01 PROCEDURE — D9220A PRA ANESTHESIA: Mod: ANES,,, | Performed by: ANESTHESIOLOGY

## 2022-09-01 PROCEDURE — 43239 EGD BIOPSY SINGLE/MULTIPLE: CPT | Mod: PO | Performed by: INTERNAL MEDICINE

## 2022-09-01 PROCEDURE — 45385 COLONOSCOPY W/LESION REMOVAL: CPT | Mod: PT,,, | Performed by: INTERNAL MEDICINE

## 2022-09-01 PROCEDURE — 88305 TISSUE EXAM BY PATHOLOGIST: ICD-10-PCS | Mod: 26,,, | Performed by: PATHOLOGY

## 2022-09-01 PROCEDURE — 45385 COLONOSCOPY W/LESION REMOVAL: CPT | Mod: PT,PO | Performed by: INTERNAL MEDICINE

## 2022-09-01 PROCEDURE — 88305 TISSUE EXAM BY PATHOLOGIST: CPT | Mod: 26,,, | Performed by: PATHOLOGY

## 2022-09-01 PROCEDURE — 25000003 PHARM REV CODE 250: Mod: PO | Performed by: INTERNAL MEDICINE

## 2022-09-01 PROCEDURE — 37000008 HC ANESTHESIA 1ST 15 MINUTES: Mod: PO | Performed by: INTERNAL MEDICINE

## 2022-09-01 PROCEDURE — 63600175 PHARM REV CODE 636 W HCPCS: Mod: PO | Performed by: NURSE ANESTHETIST, CERTIFIED REGISTERED

## 2022-09-01 PROCEDURE — D9220A PRA ANESTHESIA: ICD-10-PCS | Mod: CRNA,,, | Performed by: NURSE ANESTHETIST, CERTIFIED REGISTERED

## 2022-09-01 PROCEDURE — 37000009 HC ANESTHESIA EA ADD 15 MINS: Mod: PO | Performed by: INTERNAL MEDICINE

## 2022-09-01 RX ORDER — LIDOCAINE HYDROCHLORIDE 10 MG/ML
1 INJECTION, SOLUTION EPIDURAL; INFILTRATION; INTRACAUDAL; PERINEURAL ONCE
Status: COMPLETED | OUTPATIENT
Start: 2022-09-01 | End: 2022-09-01

## 2022-09-01 RX ORDER — PROPOFOL 10 MG/ML
VIAL (ML) INTRAVENOUS
Status: DISCONTINUED | OUTPATIENT
Start: 2022-09-01 | End: 2022-09-01

## 2022-09-01 RX ORDER — SODIUM CHLORIDE 0.9 % (FLUSH) 0.9 %
10 SYRINGE (ML) INJECTION
Status: DISCONTINUED | OUTPATIENT
Start: 2022-09-01 | End: 2022-09-01 | Stop reason: HOSPADM

## 2022-09-01 RX ORDER — LIDOCAINE HYDROCHLORIDE 20 MG/ML
INJECTION INTRAVENOUS
Status: DISCONTINUED | OUTPATIENT
Start: 2022-09-01 | End: 2022-09-01

## 2022-09-01 RX ORDER — PROPOFOL 10 MG/ML
VIAL (ML) INTRAVENOUS CONTINUOUS PRN
Status: DISCONTINUED | OUTPATIENT
Start: 2022-09-01 | End: 2022-09-01

## 2022-09-01 RX ORDER — SODIUM CHLORIDE, SODIUM LACTATE, POTASSIUM CHLORIDE, CALCIUM CHLORIDE 600; 310; 30; 20 MG/100ML; MG/100ML; MG/100ML; MG/100ML
INJECTION, SOLUTION INTRAVENOUS CONTINUOUS
Status: DISCONTINUED | OUTPATIENT
Start: 2022-09-01 | End: 2022-09-01 | Stop reason: HOSPADM

## 2022-09-01 RX ORDER — FENTANYL CITRATE 50 UG/ML
INJECTION, SOLUTION INTRAMUSCULAR; INTRAVENOUS
Status: DISCONTINUED | OUTPATIENT
Start: 2022-09-01 | End: 2022-09-01

## 2022-09-01 RX ADMIN — LIDOCAINE HYDROCHLORIDE 80 MG: 20 INJECTION, SOLUTION INTRAVENOUS at 10:09

## 2022-09-01 RX ADMIN — SODIUM CHLORIDE, SODIUM LACTATE, POTASSIUM CHLORIDE, AND CALCIUM CHLORIDE: .6; .31; .03; .02 INJECTION, SOLUTION INTRAVENOUS at 10:09

## 2022-09-01 RX ADMIN — LIDOCAINE HYDROCHLORIDE 10 MG: 10 INJECTION, SOLUTION EPIDURAL; INFILTRATION; INTRACAUDAL; PERINEURAL at 10:09

## 2022-09-01 RX ADMIN — GLYCOPYRROLATE 0.2 MG: 0.2 INJECTION, SOLUTION INTRAMUSCULAR; INTRAVITREAL at 10:09

## 2022-09-01 RX ADMIN — PROPOFOL 80 MG: 10 INJECTION, EMULSION INTRAVENOUS at 10:09

## 2022-09-01 RX ADMIN — FENTANYL CITRATE 50 MCG: 50 INJECTION, SOLUTION INTRAMUSCULAR; INTRAVENOUS at 10:09

## 2022-09-01 RX ADMIN — PROPOFOL 150 MCG/KG/MIN: 10 INJECTION, EMULSION INTRAVENOUS at 10:09

## 2022-09-01 NOTE — [employer] PATIENT INSTRUCTIONS
Discharge Summary/Instructions for after Colonoscopy with   Biopsy/Polypectomy  Kathryn Wagner    Thursday, September 1, 2022  Ilia Bella MD  RESTRICTIONS ON ACTIVITY:  - Do not drive a car or operate machinery until the day after the procedure.      - The following day: return to full activity including work.  - For  3 days: No heavy lifting, straining or running.  - Diet: You can have solid foods, but no gassy foods (i.e. beans, broccoli,   cabbage, etc).  TREATMENT FOR COMMON SIDE EFFECTS:  - Mild abdominal pain and bloating or excessive gas: rest, eat lightly and   use a heating pad.  SYMPTOMS TO WATCH FOR AND REPORT TO YOUR PHYSICIAN:  1. Severe abdominal pain.  2. Fever within 24 hours after a procedure.  3. A large amount of rectal bleeding. (A small amount of blood from the   rectum is not serious, especially if hemorrhoids are present.  3.  Because air was put into your colon during the procedure, expelling   large amounts of air from your rectum is normal.  4.  You may not have a bowel movement for 1-3 days because of the   colonoscopy prep.  This is normal.  5.  Call immediately if you notice any of the following:   Chills and/or fever over 101   Persistent vomiting   Severe abdominal pain, other than gas cramps   Severe chest pain   Black, tarry stools   Any bleeding - exceeding one tablespoon  Your doctor recommends these additional instructions:  We are waiting for your pathology results.   Eat a high fiber diet and eat a low fat diet.   Take a fiber supplement, for example Citrucel, Fibercon, Konsyl or   Metamucil.   Your physician has recommended a repeat colonoscopy in 5 years for   surveillance.  None  If you have any questions or problems, please call your physician.  EMERGENCY PHONE NUMBER: [phone]  LAB RESULTS: Call in two (2) weeks for lab results, [phone]  ___________________________________________  Nurse  Signature  ___________________________________________  Patient/Designated Responsible Party Signature  Ilia Bella MD  9/1/2022 12:17:19 PM  This report has been verified and signed electronically.  Dear patient,  As a result of recent federal legislation (The Federal Cures Act), you may   receive lab or pathology results from your procedure in your [facility]   account before your physician is able to contact you. Your physician or   their representative will relay the results to you with their   recommendations at their soonest availability.  Thank you.  [employer]

## 2022-09-01 NOTE — PATIENT INSTRUCTIONS
Recovery After Procedural Sedation (Adult)   You have been given medicine by vein to make you sleep during your surgery. This may have included both a pain medicine and sleeping medicine. Most of the effects have worn off. But you may still have some drowsiness for the next 6 to 8 hours.  Home care  Follow these guidelines when you get home:  For the next 8 hours, you should be watched by a responsible adult. This person should make sure your condition is not getting worse.  Don't drink any alcohol for the next 24 hours.  Don't drive, operate dangerous machinery, or make important business or personal decisions during the next 24 hours.  To prevent injury or falls, use caution when standing and walking for at least 24 hours after your procedure.  Note: Your healthcare provider may tell you not to take any medicine by mouth for pain or sleep in the next 4 hours. These medicines may react with the medicines you were given in the hospital. This could cause a much stronger response than usual.  Follow-up care  Follow up with your healthcare provider if you are not alert and back to your usual level of activity within 12 hours.  When to seek medical advice  Call your healthcare provider right away if any of these occur:  Drowsiness gets worse  Weakness or dizziness gets worse  Repeated vomiting  You can't be awakened  Fever  New rash  [employer] last reviewed this educational content on 9/1/2019  © 4094-8982 [employer]. [address] [place] [address], [place] [address]. All rights reserved. This information is not intended as a substitute for professional medical care. Always follow your healthcare professional's instructions       Tips to Control Acid Reflux    To control acid reflux, youll need to make some basic diet and lifestyle changes. The simple steps outlined below may be all youll need to ease discomfort.  Watch what you eat  Avoid fatty foods and spicy foods.  Eat fewer acidic foods, such as citrus and  tomato-based foods. These can increase symptoms.  Limit drinking alcohol, caffeine, and fizzy beverages. All increase acid reflux.  Try limiting chocolate, peppermint, and spearmint. These can worsen acid reflux in some people.  Watch when you eat  Avoid lying down for 3 hours after eating.  Do not snack before going to bed.  Raise your head  Raising your head and upper body by 4 to 6 inches helps limit reflux when youre lying down. Put blocks under the head of your bed frame to raise it.  Other changes  Lose weight, if you need to  Dont exercise near bedtime  Avoid tight-fitting clothes  Limit aspirin and ibuprofen  Stop smoking   Date Last Reviewed: 7/1/2016  © 6023-1070 [employer]. [address] [place] [address], [place] [address]. All rights reserved. This information is not intended as a substitute for professional medical care. Always follow your healthcare professional's instructions.         High-Fiber Diet  Fiber is in fruits, vegetables, cereals, and grains. Fiber passes through your body undigested. A high-fiber diet helps food move through your intestinal tract. The added bulk is helpful in preventing constipation. In people with diverticulosis, fiber helps clean out the pouches along the colon wall. It also prevents new pouches from forming. A high-fiber diet reduces the risk of colon cancer. It also lowers blood cholesterol and prevents high blood sugar in people with diabetes.    The fiber-rich foods listed below should be part of your diet. If you are not used to high-fiber foods, start with 1 or 2 foods from this list. Every 3 to 4 days add a new one to your diet. Do this until you are eating 4 high-fiber foods per day. This should give you 20 to 35 grams of fiber a day. It is also important to drink a lot of water when you are on this diet. You should have 6 to 8 glasses of water a day. Water makes the fiber swell and increases the benefit.  Foods high in dietary fiber  The following foods  are high in dietary fiber:  Breads. Breads made with 100% whole-wheat flour; mao, wheat, or rye crackers; whole-grain tortillas, bran muffins.  Cereals. Whole-grain and bran cereals with bran (shredded wheat, wheat flakes, raisin bran, corn bran); oatmeal, rolled oats, granola, and brown rice.  Fruits. Fresh fruits and their edible skins (pears, prunes, raisins, berries, apples, and apricots); bananas, citrus fruit, mangoes, pineapple; and prune juice.  Nuts. Any nuts and seeds.  Vegetables. Best served raw or lightly cooked. All types, especially: green peas, celery, eggplant, potatoes, spinach, broccoli, [place] sprouts, winter squash, carrots, cauliflower, soybeans, lentils, and fresh and dried beans of all kinds.  Other. Popcorn, any spices.  Date Last Reviewed: 8/1/2016  © 0424-6115 [employer]. [address] [place] [address]. All rights reserved. This information is not intended as a substitute for professional medical care. Always follow your healthcare professional's instructions.

## 2022-09-01 NOTE — ANESTHESIA PREPROCEDURE EVALUATION
09/01/2022  Kathryn Wagner is a 62 y.o., female.      Pre-op Assessment    I have reviewed the NPO Status.   I have reviewed the Medications.     Review of Systems  Anesthesia Hx:  No problems with previous Anesthesia    Cardiovascular:   Exercise tolerance: good Hypertension    Pulmonary:  Pulmonary Normal    Hepatic/GI:   GERD    Musculoskeletal:   Arthritis     Neurological:   Neuromuscular Disease,    Endocrine:  Endocrine Normal    Psych:   Psychiatric History          Physical Exam  General: Well nourished        Anesthesia Plan  Type of Anesthesia, risks & benefits discussed:    Anesthesia Type: Gen Natural Airway  Intra-op Monitoring Plan: Standard ASA Monitors  Induction:  IV  Informed Consent: Informed consent signed with the Patient and all parties understand the risks and agree with anesthesia plan.  All questions answered.   ASA Score: 2    Ready For Surgery From Anesthesia Perspective.     .

## 2022-09-01 NOTE — ANESTHESIA POSTPROCEDURE EVALUATION
Anesthesia Post Evaluation    Patient: Kathryn Wagner    Procedure(s) Performed: Procedure(s) (LRB):  EGD (ESOPHAGOGASTRODUODENOSCOPY) (N/A)  COLONOSCOPY (N/A)    Final Anesthesia Type: general      Patient location during evaluation: PACU  Patient participation: Yes- Able to Participate  Level of consciousness: awake and alert and oriented  Post-procedure vital signs: reviewed and stable  Pain management: adequate  Airway patency: patent    PONV status at discharge: No PONV  Anesthetic complications: no      Cardiovascular status: blood pressure returned to baseline  Respiratory status: unassisted, spontaneous ventilation and room air  Hydration status: euvolemic  Follow-up not needed.          Vitals Value Taken Time   /59 09/01/22 1159   Temp  09/01/22 1228   Pulse 67 09/01/22 1159   Resp 16 09/01/22 1159   SpO2 100 % 09/01/22 1159         Event Time   Out of Recovery 12:18:00         Pain/Morales Score: Morales Score: 10 (9/1/2022 11:58 AM)

## 2022-09-01 NOTE — PROVATION PATIENT INSTRUCTIONS
Discharge Summary/Instructions after an Endoscopic Procedure  Patient Name: Kathryn Wagner  Patient MRN: [id number]  Patient YOB: 1960 Thursday, September 1, 2022  Ilia Bella MD  Dear patient,  As a result of recent federal legislation (The Federal Cures Act), you may   receive lab or pathology results from your procedure in your [facility]   account before your physician is able to contact you. Your physician or   their representative will relay the results to you with their   recommendations at their soonest availability.  Thank you,  RESTRICTIONS:  During your procedure today, you received medications for sedation.  These   medications may affect your judgment, balance and coordination.  Therefore,   for 24 hours, you have the following restrictions:   - DO NOT drive a car, operate machinery, make legal/financial decisions,   sign important papers or drink alcohol.    ACTIVITY:  Today: no heavy lifting, straining or running due to procedural   sedation/anesthesia.  The following day: return to full activity including work.  DIET:  Eat and drink normally unless instructed otherwise.     TREATMENT FOR COMMON SIDE EFFECTS:  - Mild abdominal pain, nausea, belching, bloating or excessive gas:  rest,   eat lightly and use a heating pad.  - Sore Throat: treat with throat lozenges and/or gargle with warm salt   water.  - Because air was used during the procedure, expelling large amounts of air   from your rectum or belching is normal.  - If a bowel prep was taken, you may not have a bowel movement for 1-3 days.    This is normal.  SYMPTOMS TO WATCH FOR AND REPORT TO YOUR PHYSICIAN:  1. Abdominal pain or bloating, other than gas cramps.  2. Chest pain.  3. Back pain.  4. Signs of infection such as: chills or fever occurring within 24 hours   after the procedure.  5. Rectal bleeding, which would show as bright red, maroon, or black stools.   (A tablespoon of blood from the rectum is not serious,  especially if   hemorrhoids are present.)  6. Vomiting.  7. Weakness or dizziness.  GO DIRECTLY TO THE NEAREST EMERGENCY ROOM IF YOU HAVE ANY OF THE FOLLOWING:      Difficulty breathing              Chills and/or fever over 101 F   Persistent vomiting and/or vomiting blood   Severe abdominal pain   Severe chest pain   Black, tarry stools   Bleeding- more than one tablespoon   Any other symptom or condition that you feel may need urgent attention  Your doctor recommends these additional instructions:  If any biopsies were taken, your doctors clinic will contact you in 1 to 2   weeks with any results.  Follow an antireflux regimen.  This includes:       - Do not lie down for at least 3 to 4 hours after meals.        - Raise the head of the bed 4 to 6 inches.        - Decrease excess weight.        - Avoid citrus juices and other acidic foods, alcohol, chocolate, mints,   coffee and other caffeinated beverages, carbonated beverages, fatty and   fried foods.        - Avoid tight-fitting clothing.        - Avoid cigarettes and other tobacco products.   Continue your present medications.   Take Nexium (esomeprazole) 40 mg by mouth once a day.   Return to GI clinic in 6-8 weeks.   .  For questions, problems or results please call your physician - Ilia Bella MD at Work:  [phone].  EMERGENCY PHONE NUMBER: [phone], LAB RESULTS: [phone]  IF A COMPLICATION OR EMERGENCY SITUATION ARISES AND YOU ARE UNABLE TO REACH   YOUR PHYSICIAN - GO DIRECTLY TO THE EMERGENCY ROOM.  ___________________________________________  Nurse Signature  ___________________________________________  Patient/Designated Responsible Party Signature  Ilia Bella MD  9/1/2022 12:01:24 PM  This report has been verified and signed electronically.  Dear patient,  As a result of recent federal legislation (The Federal Cures Act), you may   receive lab or pathology results from your procedure in your [facility]   account  before your physician is able to contact you. Your physician or   their representative will relay the results to you with their   recommendations at their soonest availability.  Thank you.  PROVATION

## 2022-09-01 NOTE — BRIEF OP NOTE
Discharge Note  Short Stay      SUMMARY     Admit Date: 9/1/2022    Attending Physician: Ilia Bella Jr., MD     Discharge Physician: Ilia Bella Jr., MD    Discharge Date: 9/1/2022 12:19 PM    Final Diagnosis: History of colon polyps [Z86.010]    Impression:            - Normal oropharynx.                          - Normal esophagus.                          - Z-line regular, 38-39 cm from the incisors.                          - Patulous lower esophageal sphincter (NERD).                          - Normal cardia and gastric fundus.                          - A sleeve gastrectomy was found, characterized by                          healthy appearing mucosa.                          - Slight antritis. Biopsied.                          - Normal pylorus.                          - Normal examined duodenum.                          - Normal major papilla.   Recommendation:        - Perform a colonoscopy as previously scheduled.                          - Discharge patient to home after that.                          - Await pathology results.                          - Follow an antireflux regimen.                          - Continue present medications.                          - Use Nexium (esomeprazole) 40 mg PO daily.                          - Call the G[facility] clinic in 2 weeks for reports (if                          you haven't heard from us sooner) [phone].                          - Return to GI clinic in 6-8 weeks.                          - Repeat upper endoscopy PRN.     Impression:            - Three 2 to 3 mm polyps in the transverse colon,                          removed with a cold snare. Resected and retrieved.                          - Diverticulosis in the sigmoid colon.                          - Tortuous sigmoid colon.                          - Non-bleeding internal hemorrhoids.                          - The examination was otherwise normal.                          - The examined  portion of the ileum was normal.   Recommendation:        - Discharge patient to home.                          - Await pathology results.                          - High fiber diet and low fat diet.                          - Call the G[facility] clinic in 2 weeks for reports (if                          you haven't heard from us sooner) [phone].                          - Repeat colonoscopy in 5 years for surveillance.                          - Use fiber, for example Citrucel, Fibercon,                          Konsyl or Metamucil.                          - Continue present medications.           Ilia Bella MD   9/1/2022       Disposition: HOME OR SELF CARE    Patient Instructions:   Current Discharge Medication List        CONTINUE these medications which have NOT CHANGED    Details   buPROPion (WELLBUTRIN SR) 150 MG TBSR 12 hr tablet TAKE ONE TABLET BY MOUTH DAILY  Qty: 30 tablet, Refills: 0    Associated Diagnoses: Major depressive disorder, recurrent, in partial remission      EScitalopram oxalate (LEXAPRO) 10 MG tablet Take 1.5 tablets (15 mg total) by mouth once daily.  Qty: 45 tablet, Refills: 2    Associated Diagnoses: Recurrent major depression in partial remission      esomeprazole (NEXIUM) 40 MG capsule TAKE 1 CAPSULE BY MOUTH BEFORE BREAKFAST.  Qty: 30 capsule, Refills: 01    Associated Diagnoses: Heartburn; RUQ pain      oxyCODONE-acetaminophen (PERCOCET) 7.5-325 mg per tablet Take 1 tablet by mouth every 6 (six) hours as needed.      traZODone (DESYREL) 50 MG tablet TAKE 1 AND 1/2 TABLET BY MOUTH AT BEDTIME  Qty: 90 tablet, Refills: 3    Associated Diagnoses: Primary insomnia      docusate sodium (COLACE) 100 MG capsule Take 1 capsule (100 mg total) by mouth 2 (two) times daily.  Qty: 30 capsule, Refills: 0      naloxone (NARCAN) 4 mg/actuation Spry Narcan 4 mg/actuation nasal spray             Discharge Procedure Orders (must include Diet, Follow-up, Activity)    Follow Up:  Follow up with  PCP as per your routine.  Please follow an anti reflux diet and a high fiber diet.  Activity as tolerated.    No driving day of procedure.

## 2022-09-01 NOTE — PLAN OF CARE
Pt meets criteria for discharge. Discharge instructions given to pt/family; V/U. Pt belongings returned to pt/family. Pt/family instructed to call office with questions or concerns following procedure, V/U. VSS, no issues or distress noted.

## 2022-09-02 ENCOUNTER — TELEPHONE (OUTPATIENT)
Dept: ENDOSCOPY | Facility: HOSPITAL | Age: 62
End: 2022-09-02
Payer: MEDICARE

## 2022-09-02 VITALS
SYSTOLIC BLOOD PRESSURE: 105 MMHG | TEMPERATURE: 98 F | HEIGHT: 60 IN | DIASTOLIC BLOOD PRESSURE: 59 MMHG | BODY MASS INDEX: 23.95 KG/M2 | OXYGEN SATURATION: 100 % | WEIGHT: 122 LBS | RESPIRATION RATE: 16 BRPM | HEART RATE: 67 BPM

## 2022-09-02 NOTE — LETTER
September 2, 2022      [facility]  [address] [place] [address]  Phone: [phone]       Patient: Kathryn Wagner   YOB: 1960  Date of Visit: 09/02/2022    To Whom It May Concern:    Lee Wagner  was at [facility] on 09/01/2022. The patient may return to workl on 09/06/2022 with no restrictions. If you have any questions or concerns, or if I can be of further assistance, please do not hesitate to contact me.    Sincerely,    Dr. Ilia Bella

## 2022-09-06 ENCOUNTER — PES CALL (OUTPATIENT)
Dept: ADMINISTRATIVE | Facility: CLINIC | Age: 62
End: 2022-09-06
Payer: MEDICARE

## 2022-09-07 ENCOUNTER — PATIENT MESSAGE (OUTPATIENT)
Dept: BARIATRICS | Facility: CLINIC | Age: 62
End: 2022-09-07
Payer: MEDICARE

## 2022-09-09 LAB
FINAL PATHOLOGIC DIAGNOSIS: NORMAL
GROSS: NORMAL
Lab: NORMAL

## 2022-09-26 ENCOUNTER — PATIENT MESSAGE (OUTPATIENT)
Dept: FAMILY MEDICINE | Facility: CLINIC | Age: 62
End: 2022-09-26
Payer: MEDICARE

## 2022-09-28 DIAGNOSIS — F33.41 RECURRENT MAJOR DEPRESSION IN PARTIAL REMISSION: ICD-10-CM

## 2022-09-29 ENCOUNTER — PATIENT MESSAGE (OUTPATIENT)
Dept: FAMILY MEDICINE | Facility: CLINIC | Age: 62
End: 2022-09-29
Payer: MEDICARE

## 2022-09-29 RX ORDER — ESCITALOPRAM OXALATE 10 MG/1
TABLET ORAL
Qty: 60 TABLET | Refills: 6 | Status: SHIPPED | OUTPATIENT
Start: 2022-09-29 | End: 2022-10-13 | Stop reason: SDUPTHER

## 2022-10-06 ENCOUNTER — PATIENT MESSAGE (OUTPATIENT)
Dept: BARIATRICS | Facility: CLINIC | Age: 62
End: 2022-10-06
Payer: MEDICARE

## 2022-10-06 ENCOUNTER — TELEPHONE (OUTPATIENT)
Dept: FAMILY MEDICINE | Facility: CLINIC | Age: 62
End: 2022-10-06
Payer: MEDICARE

## 2022-10-06 NOTE — TELEPHONE ENCOUNTER
----- Message from Camden Smallwood sent at 10/6/2022  1:28 PM CDT -----  Name Of Caller: Kathryn        Provider Name: Queta Ac NP        Does patient feel the need to be seen today? no        Relationship to the Pt?: patient        Contact Preference?:[phone]        What is the nature of the call?: Patient states that she needs to speak with someone in the office regarding her current prescriptions

## 2022-10-11 ENCOUNTER — PES CALL (OUTPATIENT)
Dept: ADMINISTRATIVE | Facility: OTHER | Age: 62
End: 2022-10-11
Payer: MEDICARE

## 2022-10-13 ENCOUNTER — OFFICE VISIT (OUTPATIENT)
Dept: FAMILY MEDICINE | Facility: CLINIC | Age: 62
End: 2022-10-13
Payer: MEDICARE

## 2022-10-13 ENCOUNTER — TELEPHONE (OUTPATIENT)
Dept: FAMILY MEDICINE | Facility: CLINIC | Age: 62
End: 2022-10-13
Payer: MEDICARE

## 2022-10-13 VITALS
BODY MASS INDEX: 24.74 KG/M2 | SYSTOLIC BLOOD PRESSURE: 112 MMHG | OXYGEN SATURATION: 97 % | HEART RATE: 70 BPM | DIASTOLIC BLOOD PRESSURE: 60 MMHG | WEIGHT: 126.63 LBS

## 2022-10-13 DIAGNOSIS — E46 PROTEIN-CALORIE MALNUTRITION, UNSPECIFIED SEVERITY: ICD-10-CM

## 2022-10-13 DIAGNOSIS — Z12.31 ENCOUNTER FOR SCREENING MAMMOGRAM FOR MALIGNANT NEOPLASM OF BREAST: Primary | ICD-10-CM

## 2022-10-13 DIAGNOSIS — F33.41 RECURRENT MAJOR DEPRESSION IN PARTIAL REMISSION: ICD-10-CM

## 2022-10-13 DIAGNOSIS — D50.8 IRON DEFICIENCY ANEMIA SECONDARY TO INADEQUATE DIETARY IRON INTAKE: ICD-10-CM

## 2022-10-13 DIAGNOSIS — Z98.84 H/O GASTRIC BYPASS: ICD-10-CM

## 2022-10-13 DIAGNOSIS — R68.82 DECREASED LIBIDO WITHOUT SEXUAL DYSFUNCTION: Primary | ICD-10-CM

## 2022-10-13 DIAGNOSIS — F34.1 PERSISTENT DEPRESSIVE DISORDER: ICD-10-CM

## 2022-10-13 LAB
ALBUMIN SERPL BCP-MCNC: 4 G/DL (ref 3.5–5.2)
ALP SERPL-CCNC: 62 U/L (ref 55–135)
ALT SERPL W/O P-5'-P-CCNC: 24 U/L (ref 10–44)
ANION GAP SERPL CALC-SCNC: 9 MMOL/L (ref 8–16)
AST SERPL-CCNC: 29 U/L (ref 10–40)
BASOPHILS # BLD AUTO: 0.05 K/UL (ref 0–0.2)
BASOPHILS NFR BLD: 0.7 % (ref 0–1.9)
BILIRUB SERPL-MCNC: 0.2 MG/DL (ref 0.1–1)
BUN SERPL-MCNC: 16 MG/DL (ref 8–23)
CALCIUM SERPL-MCNC: 9.2 MG/DL (ref 8.7–10.5)
CHLORIDE SERPL-SCNC: 102 MMOL/L (ref 95–110)
CO2 SERPL-SCNC: 27 MMOL/L (ref 23–29)
CREAT SERPL-MCNC: 0.8 MG/DL (ref 0.5–1.4)
DIFFERENTIAL METHOD: ABNORMAL
EOSINOPHIL # BLD AUTO: 0.6 K/UL (ref 0–0.5)
EOSINOPHIL NFR BLD: 8 % (ref 0–8)
ERYTHROCYTE [DISTWIDTH] IN BLOOD BY AUTOMATED COUNT: 12.6 % (ref 11.5–14.5)
EST. GFR  (NO RACE VARIABLE): >60 ML/MIN/1.73 M^2
FERRITIN SERPL-MCNC: 127 NG/ML (ref 20–300)
GLUCOSE SERPL-MCNC: 78 MG/DL (ref 70–110)
HCT VFR BLD AUTO: 34.6 % (ref 37–48.5)
HGB BLD-MCNC: 11.1 G/DL (ref 12–16)
IMM GRANULOCYTES # BLD AUTO: 0.01 K/UL (ref 0–0.04)
IMM GRANULOCYTES NFR BLD AUTO: 0.1 % (ref 0–0.5)
LYMPHOCYTES # BLD AUTO: 1.8 K/UL (ref 1–4.8)
LYMPHOCYTES NFR BLD: 25 % (ref 18–48)
MCH RBC QN AUTO: 29.8 PG (ref 27–31)
MCHC RBC AUTO-ENTMCNC: 32.1 G/DL (ref 32–36)
MCV RBC AUTO: 93 FL (ref 82–98)
MONOCYTES # BLD AUTO: 0.5 K/UL (ref 0.3–1)
MONOCYTES NFR BLD: 6.8 % (ref 4–15)
NEUTROPHILS # BLD AUTO: 4.4 K/UL (ref 1.8–7.7)
NEUTROPHILS NFR BLD: 59.4 % (ref 38–73)
NRBC BLD-RTO: 0 /100 WBC
PLATELET # BLD AUTO: 223 K/UL (ref 150–450)
PMV BLD AUTO: 12.4 FL (ref 9.2–12.9)
POTASSIUM SERPL-SCNC: 4.1 MMOL/L (ref 3.5–5.1)
PROT SERPL-MCNC: 6.5 G/DL (ref 6–8.4)
RBC # BLD AUTO: 3.72 M/UL (ref 4–5.4)
SODIUM SERPL-SCNC: 138 MMOL/L (ref 136–145)
T4 FREE SERPL-MCNC: 0.9 NG/DL (ref 0.71–1.51)
TSH SERPL DL<=0.005 MIU/L-ACNC: 0.07 UIU/ML (ref 0.4–4)
VIT B12 SERPL-MCNC: 741 PG/ML (ref 210–950)
WBC # BLD AUTO: 7.36 K/UL (ref 3.9–12.7)

## 2022-10-13 PROCEDURE — 1159F MED LIST DOCD IN RCRD: CPT | Mod: CPTII,S$GLB,, | Performed by: PHYSICIAN ASSISTANT

## 2022-10-13 PROCEDURE — 3078F PR MOST RECENT DIASTOLIC BLOOD PRESSURE < 80 MM HG: ICD-10-PCS | Mod: CPTII,S$GLB,, | Performed by: PHYSICIAN ASSISTANT

## 2022-10-13 PROCEDURE — 82728 ASSAY OF FERRITIN: CPT | Performed by: PHYSICIAN ASSISTANT

## 2022-10-13 PROCEDURE — 83001 ASSAY OF GONADOTROPIN (FSH): CPT | Performed by: PHYSICIAN ASSISTANT

## 2022-10-13 PROCEDURE — 99214 OFFICE O/P EST MOD 30 MIN: CPT | Mod: S$GLB,,, | Performed by: PHYSICIAN ASSISTANT

## 2022-10-13 PROCEDURE — 84443 ASSAY THYROID STIM HORMONE: CPT | Performed by: PHYSICIAN ASSISTANT

## 2022-10-13 PROCEDURE — G0008 ADMIN INFLUENZA VIRUS VAC: HCPCS | Mod: S$GLB,,, | Performed by: PHYSICIAN ASSISTANT

## 2022-10-13 PROCEDURE — 99214 PR OFFICE/OUTPT VISIT, EST, LEVL IV, 30-39 MIN: ICD-10-PCS | Mod: S$GLB,,, | Performed by: PHYSICIAN ASSISTANT

## 2022-10-13 PROCEDURE — 84403 ASSAY OF TOTAL TESTOSTERONE: CPT | Performed by: PHYSICIAN ASSISTANT

## 2022-10-13 PROCEDURE — 99499 UNLISTED E&M SERVICE: CPT | Mod: S$GLB,,, | Performed by: PHYSICIAN ASSISTANT

## 2022-10-13 PROCEDURE — 1160F PR REVIEW ALL MEDS BY PRESCRIBER/CLIN PHARMACIST DOCUMENTED: ICD-10-PCS | Mod: CPTII,S$GLB,, | Performed by: PHYSICIAN ASSISTANT

## 2022-10-13 PROCEDURE — 84439 ASSAY OF FREE THYROXINE: CPT | Performed by: PHYSICIAN ASSISTANT

## 2022-10-13 PROCEDURE — 36415 COLL VENOUS BLD VENIPUNCTURE: CPT | Mod: S$GLB,,, | Performed by: PHYSICIAN ASSISTANT

## 2022-10-13 PROCEDURE — 3044F PR MOST RECENT HEMOGLOBIN A1C LEVEL <7.0%: ICD-10-PCS | Mod: CPTII,S$GLB,, | Performed by: PHYSICIAN ASSISTANT

## 2022-10-13 PROCEDURE — 80053 COMPREHEN METABOLIC PANEL: CPT | Performed by: PHYSICIAN ASSISTANT

## 2022-10-13 PROCEDURE — 1160F RVW MEDS BY RX/DR IN RCRD: CPT | Mod: CPTII,S$GLB,, | Performed by: PHYSICIAN ASSISTANT

## 2022-10-13 PROCEDURE — 3074F SYST BP LT 130 MM HG: CPT | Mod: CPTII,S$GLB,, | Performed by: PHYSICIAN ASSISTANT

## 2022-10-13 PROCEDURE — 90686 IIV4 VACC NO PRSV 0.5 ML IM: CPT | Mod: S$GLB,,, | Performed by: PHYSICIAN ASSISTANT

## 2022-10-13 PROCEDURE — 82607 VITAMIN B-12: CPT | Performed by: PHYSICIAN ASSISTANT

## 2022-10-13 PROCEDURE — 90686 FLU VACCINE (QUAD) GREATER THAN OR EQUAL TO 3YO PRESERVATIVE FREE IM: ICD-10-PCS | Mod: S$GLB,,, | Performed by: PHYSICIAN ASSISTANT

## 2022-10-13 PROCEDURE — 83002 ASSAY OF GONADOTROPIN (LH): CPT | Performed by: PHYSICIAN ASSISTANT

## 2022-10-13 PROCEDURE — 36415 PR COLLECTION VENOUS BLOOD,VENIPUNCTURE: ICD-10-PCS | Mod: S$GLB,,, | Performed by: PHYSICIAN ASSISTANT

## 2022-10-13 PROCEDURE — 3044F HG A1C LEVEL LT 7.0%: CPT | Mod: CPTII,S$GLB,, | Performed by: PHYSICIAN ASSISTANT

## 2022-10-13 PROCEDURE — 85025 COMPLETE CBC W/AUTO DIFF WBC: CPT | Performed by: PHYSICIAN ASSISTANT

## 2022-10-13 PROCEDURE — 82672 ASSAY OF ESTROGEN: CPT | Performed by: PHYSICIAN ASSISTANT

## 2022-10-13 PROCEDURE — 3074F PR MOST RECENT SYSTOLIC BLOOD PRESSURE < 130 MM HG: ICD-10-PCS | Mod: CPTII,S$GLB,, | Performed by: PHYSICIAN ASSISTANT

## 2022-10-13 PROCEDURE — 3078F DIAST BP <80 MM HG: CPT | Mod: CPTII,S$GLB,, | Performed by: PHYSICIAN ASSISTANT

## 2022-10-13 PROCEDURE — G0008 FLU VACCINE (QUAD) GREATER THAN OR EQUAL TO 3YO PRESERVATIVE FREE IM: ICD-10-PCS | Mod: S$GLB,,, | Performed by: PHYSICIAN ASSISTANT

## 2022-10-13 PROCEDURE — 36415 COLL VENOUS BLD VENIPUNCTURE: CPT | Performed by: PHYSICIAN ASSISTANT

## 2022-10-13 PROCEDURE — 1159F PR MEDICATION LIST DOCUMENTED IN MEDICAL RECORD: ICD-10-PCS | Mod: CPTII,S$GLB,, | Performed by: PHYSICIAN ASSISTANT

## 2022-10-13 RX ORDER — ESCITALOPRAM OXALATE 20 MG/1
20 TABLET ORAL NIGHTLY
Qty: 90 TABLET | Refills: 3 | Status: SHIPPED | OUTPATIENT
Start: 2022-10-13

## 2022-10-13 NOTE — PROGRESS NOTES
Kathryn DEIDRA Wagner  62 y.o. female     Chief Complaint   Patient presents with    Medication Refill       HPI:  63 y/o female presents to the clinic having issues with her Lexapro prescription and having concerns about decreased libido. Pt reports that the pharmacy was not letting her refill her medication, so she needed to get it re-sent to a new pharmacy. Mood regulation has been much better with 20 mg Lexapro nightly. In regard to libido, she has noticed a decreased arousal and desire for sex. She reports that she does not have sexual dysfunction, but just no motivation or desire to initiate or participate in intercourse.     She wants to schedule her mammogram and get UTD on flu and pneumococcal vaccines.     No concerns for illness.     Past Medical History:   Diagnosis Date    Class 2 obesity due to excess calories in adult 3/4/2015    Colon polyp     Depression     Diverticulosis     Encounter for blood transfusion     GERD (gastroesophageal reflux disease)     High cholesterol     Hypertension     No longer taking medication since weight loss    Lumbar spondylosis 03/04/2015    L3-4 fusion; L5S1 fusion    Obesity     Restless leg syndrome        Review of patient's allergies indicates:   Allergen Reactions    Contrast media Hives    Iodine and iodide containing products Hives and Itching    Latex, natural rubber Rash        Review of Systems   Constitutional:  Negative for chills, fever, malaise/fatigue and weight loss.   HENT:  Negative for congestion, sinus pain and sore throat.    Respiratory:  Negative for cough and hemoptysis.    Cardiovascular:  Negative for chest pain and palpitations.   Gastrointestinal:  Negative for abdominal pain, constipation, diarrhea, nausea and vomiting.   Genitourinary:  Negative for dysuria, frequency and urgency.   Musculoskeletal:  Negative for myalgias and neck pain.   Neurological:  Negative for dizziness, tingling and headaches.   Psychiatric/Behavioral:  Positive for  depression (w/ decreased libido). Negative for hallucinations, memory loss, substance abuse and suicidal ideas. The patient is nervous/anxious. The patient does not have insomnia.        Physical Exam  Vitals reviewed.   Constitutional:       General: She is not in acute distress.     Appearance: Normal appearance. She is normal weight. She is not ill-appearing.   HENT:      Head: Normocephalic and atraumatic.      Right Ear: Tympanic membrane, ear canal and external ear normal.      Left Ear: Tympanic membrane, ear canal and external ear normal.      Nose: Nose normal.      Mouth/Throat:      Mouth: Mucous membranes are moist.      Pharynx: Oropharynx is clear. No oropharyngeal exudate or posterior oropharyngeal erythema.   Cardiovascular:      Rate and Rhythm: Normal rate and regular rhythm.      Pulses: Normal pulses.      Heart sounds: Normal heart sounds. No murmur heard.    No friction rub. No gallop.   Pulmonary:      Effort: Pulmonary effort is normal. No respiratory distress.      Breath sounds: Normal breath sounds.   Neurological:      General: No focal deficit present.      Mental Status: She is alert and oriented to person, place, and time.   Psychiatric:         Mood and Affect: Mood normal.         Behavior: Behavior normal.         Thought Content: Thought content normal.         Judgment: Judgment normal.      Lab Results   Component Value Date    WBC 7.08 03/07/2022    HGB 11.5 (L) 03/07/2022    HCT 36.2 (L) 03/07/2022    MCV 94 03/07/2022     03/07/2022     CMP  Sodium   Date Value Ref Range Status   03/07/2022 139 136 - 145 mmol/L Final     Potassium   Date Value Ref Range Status   03/07/2022 4.0 3.5 - 5.1 mmol/L Final     Chloride   Date Value Ref Range Status   03/07/2022 101 95 - 110 mmol/L Final     CO2   Date Value Ref Range Status   03/07/2022 26 23 - 29 mmol/L Final     Glucose   Date Value Ref Range Status   03/07/2022 139 (H) 70 - 110 mg/dL Final     BUN   Date Value Ref Range  Status   03/07/2022 19 8 - 23 mg/dL Final     Creatinine   Date Value Ref Range Status   03/07/2022 0.9 0.5 - 1.4 mg/dL Final     Calcium   Date Value Ref Range Status   03/07/2022 9.0 8.7 - 10.5 mg/dL Final     Total Protein   Date Value Ref Range Status   03/07/2022 6.5 6.0 - 8.4 g/dL Final     Albumin   Date Value Ref Range Status   03/07/2022 4.0 3.5 - 5.2 g/dL Final     Total Bilirubin   Date Value Ref Range Status   03/07/2022 0.2 0.1 - 1.0 mg/dL Final     Comment:     For infants and newborns, interpretation of results should be based  on gestational age, weight and in agreement with clinical  observations.    Premature Infant recommended reference ranges:  Up to 24 hours.............<8.0 mg/dL  Up to 48 hours............<12.0 mg/dL  3-5 days..................<15.0 mg/dL  6-29 days.................<15.0 mg/dL       Alkaline Phosphatase   Date Value Ref Range Status   03/07/2022 60 55 - 135 U/L Final     AST   Date Value Ref Range Status   03/07/2022 26 10 - 40 U/L Final     ALT   Date Value Ref Range Status   03/07/2022 14 10 - 44 U/L Final     Anion Gap   Date Value Ref Range Status   03/07/2022 12 8 - 16 mmol/L Final     eGFR if    Date Value Ref Range Status   03/07/2022 >60.0 >60 mL/min/1.73 m^2 Final     eGFR if non    Date Value Ref Range Status   03/07/2022 >60.0 >60 mL/min/1.73 m^2 Final     Comment:     Calculation used to obtain the estimated glomerular filtration  rate (eGFR) is the CKD-EPI equation.        Lab Results   Component Value Date    CHOL 189 03/07/2022     Lab Results   Component Value Date    HDL 51 03/07/2022     Lab Results   Component Value Date    LDLCALC 95.8 03/07/2022     Lab Results   Component Value Date    TRIG 211 (H) 03/07/2022     Lab Results   Component Value Date    CHOLHDL 27.0 03/07/2022     Lab Results   Component Value Date    HGBA1C 4.7 03/07/2022        ASSESSMENT AND PLAN    Decreased libido without sexual dysfunction  -     TSH;  Future; Expected date: 10/13/2022  -     T4, Free; Future; Expected date: 10/13/2022  -     TESTOSTERONE; Future; Expected date: 10/13/2022  -     Estrogens, Total; Future; Expected date: 10/13/2022  -     FOLLICLE STIMULATING HORMONE; Future; Expected date: 10/13/2022  -     LUTEINIZING HORMONE; Future; Expected date: 10/13/2022    Recurrent major depression in partial remission  -     EScitalopram oxalate (LEXAPRO) 20 MG tablet; Take 1 tablet (20 mg total) by mouth every evening.  Dispense: 90 tablet; Refill: 3    Iron deficiency anemia secondary to inadequate dietary iron intake  -     Ferritin; Future; Expected date: 10/13/2022  -     CBC Auto Differential; Future; Expected date: 10/13/2022  -     Comprehensive Metabolic Panel; Future; Expected date: 10/13/2022    H/O gastric bypass  -     Ferritin; Future; Expected date: 10/13/2022  -     Vitamin B12; Future; Expected date: 10/13/2022    Protein-calorie malnutrition, unspecified severity  -     Vitamin B12; Future; Expected date: 10/13/2022    Depression, unspecified  -     TSH; Future; Expected date: 10/13/2022  -     T4, Free; Future; Expected date: 10/13/2022    Other orders  -     Influenza - Quadrivalent (PF)     I spent 30 minutes on this encounter, time includes face-to-face, chart review, documentation, test review and orders.    DYANA Boland-S2  [facility]   Physician Assistant Student     SHIRA Harper IIIC

## 2022-10-14 ENCOUNTER — TELEPHONE (OUTPATIENT)
Dept: FAMILY MEDICINE | Facility: CLINIC | Age: 62
End: 2022-10-14
Payer: MEDICARE

## 2022-10-14 LAB
FSH SERPL-ACNC: 68.63 MIU/ML
LH SERPL-ACNC: 16.2 MIU/ML
TESTOST SERPL-MCNC: 10 NG/DL (ref 5–73)

## 2022-10-14 NOTE — TELEPHONE ENCOUNTER
----- Message from Queta Ac NP sent at 10/14/2022  8:06 AM CDT -----  Results after yesterday's visit.  Sent it to DYANA Antonio.

## 2022-10-17 LAB — ESTROGEN SERPL-MCNC: 42 PG/ML

## 2022-10-18 DIAGNOSIS — E05.90 SUBCLINICAL HYPERTHYROIDISM: Primary | ICD-10-CM

## 2022-11-02 ENCOUNTER — PES CALL (OUTPATIENT)
Dept: ADMINISTRATIVE | Facility: CLINIC | Age: 62
End: 2022-11-02
Payer: MEDICARE

## 2022-11-25 ENCOUNTER — PES CALL (OUTPATIENT)
Dept: ADMINISTRATIVE | Facility: CLINIC | Age: 62
End: 2022-11-25
Payer: MEDICARE

## 2022-11-28 ENCOUNTER — PES CALL (OUTPATIENT)
Dept: ADMINISTRATIVE | Facility: CLINIC | Age: 62
End: 2022-11-28
Payer: MEDICARE

## 2022-12-06 ENCOUNTER — LAB VISIT (OUTPATIENT)
Dept: FAMILY MEDICINE | Facility: CLINIC | Age: 62
End: 2022-12-06
Payer: MEDICARE

## 2022-12-06 DIAGNOSIS — E05.90 SUBCLINICAL HYPERTHYROIDISM: ICD-10-CM

## 2022-12-06 PROCEDURE — 84443 ASSAY THYROID STIM HORMONE: CPT | Performed by: PHYSICIAN ASSISTANT

## 2022-12-06 PROCEDURE — 84439 ASSAY OF FREE THYROXINE: CPT | Performed by: PHYSICIAN ASSISTANT

## 2022-12-07 DIAGNOSIS — E05.90 HYPERTHYROIDISM: Primary | ICD-10-CM

## 2022-12-07 LAB
T4 FREE SERPL-MCNC: 1.01 NG/DL (ref 0.71–1.51)
TSH SERPL DL<=0.005 MIU/L-ACNC: 0.09 UIU/ML (ref 0.4–4)

## 2022-12-08 ENCOUNTER — TELEPHONE (OUTPATIENT)
Dept: FAMILY MEDICINE | Facility: CLINIC | Age: 62
End: 2022-12-08
Payer: MEDICARE

## 2022-12-08 NOTE — TELEPHONE ENCOUNTER
Pt seeing Cassius Antonio III, PA-C, TSH has been uncontrolled. He recommended to endo, however next appt is 5/2023. Is there other options for patients? Please advise.

## 2022-12-12 ENCOUNTER — TELEPHONE (OUTPATIENT)
Dept: GASTROENTEROLOGY | Facility: CLINIC | Age: 62
End: 2022-12-12
Payer: MEDICARE

## 2022-12-12 NOTE — TELEPHONE ENCOUNTER
Returned patient call. Scheduled f/u appt with Fide Lim. Pt asking results from procedures, message sent over to Dr. Bella for finalized pathology results. Pt stated she is still have abdominal pain as before. Stated I will inform Dr. Bella.

## 2022-12-12 NOTE — TELEPHONE ENCOUNTER
----- Message from Heriestrellita Des sent at 12/12/2022 11:01 AM CST -----  Contact: Patient  Type:  Patient Call          Who Called: Patient         Does the patient know what this is regarding?: Requesting a call back pt said that she would like to scheduled a appt on 12/22 the same day with her other appt ;  Pt said that she's in pain and also would like to have the  results from her test ; please advise           Would the patient rather a call back or a response via [facility]? Call           Best Call Back Number: [phone]             Additional Information:

## 2022-12-22 ENCOUNTER — LAB VISIT (OUTPATIENT)
Dept: LAB | Facility: HOSPITAL | Age: 62
End: 2022-12-22
Attending: INTERNAL MEDICINE
Payer: MEDICARE

## 2022-12-22 ENCOUNTER — OFFICE VISIT (OUTPATIENT)
Dept: GASTROENTEROLOGY | Facility: CLINIC | Age: 62
End: 2022-12-22
Payer: MEDICARE

## 2022-12-22 ENCOUNTER — OFFICE VISIT (OUTPATIENT)
Dept: ENDOCRINOLOGY | Facility: CLINIC | Age: 62
End: 2022-12-22
Payer: MEDICARE

## 2022-12-22 ENCOUNTER — TELEPHONE (OUTPATIENT)
Dept: ENDOCRINOLOGY | Facility: CLINIC | Age: 62
End: 2022-12-22
Payer: MEDICARE

## 2022-12-22 VITALS
RESPIRATION RATE: 18 BRPM | WEIGHT: 128.75 LBS | BODY MASS INDEX: 25.28 KG/M2 | HEIGHT: 60 IN | DIASTOLIC BLOOD PRESSURE: 68 MMHG | HEART RATE: 80 BPM | SYSTOLIC BLOOD PRESSURE: 116 MMHG

## 2022-12-22 VITALS — HEIGHT: 60 IN | WEIGHT: 128.75 LBS | BODY MASS INDEX: 25.28 KG/M2

## 2022-12-22 DIAGNOSIS — Z98.890 HISTORY OF ESOPHAGOGASTRODUODENOSCOPY (EGD): Primary | ICD-10-CM

## 2022-12-22 DIAGNOSIS — Z87.19 HISTORY OF DIVERTICULOSIS: ICD-10-CM

## 2022-12-22 DIAGNOSIS — K58.1 IRRITABLE BOWEL SYNDROME WITH CONSTIPATION: ICD-10-CM

## 2022-12-22 DIAGNOSIS — Z90.3 H/O GASTRIC SLEEVE: ICD-10-CM

## 2022-12-22 DIAGNOSIS — E05.90 HYPERTHYROIDISM: ICD-10-CM

## 2022-12-22 DIAGNOSIS — K59.09 CHRONIC CONSTIPATION: ICD-10-CM

## 2022-12-22 DIAGNOSIS — D64.9 NORMOCYTIC ANEMIA: ICD-10-CM

## 2022-12-22 DIAGNOSIS — Z98.890 HISTORY OF COLONOSCOPY: ICD-10-CM

## 2022-12-22 DIAGNOSIS — Z86.010 HISTORY OF COLON POLYPS: ICD-10-CM

## 2022-12-22 DIAGNOSIS — Z87.19 HISTORY OF GASTRITIS: ICD-10-CM

## 2022-12-22 DIAGNOSIS — Z90.49 S/P CHOLECYSTECTOMY: ICD-10-CM

## 2022-12-22 DIAGNOSIS — R10.11 RIGHT UPPER QUADRANT ABDOMINAL PAIN: ICD-10-CM

## 2022-12-22 DIAGNOSIS — E04.9 GOITER: Primary | ICD-10-CM

## 2022-12-22 DIAGNOSIS — K64.8 INTERNAL HEMORRHOIDS: ICD-10-CM

## 2022-12-22 LAB
T3 SERPL-MCNC: 108 NG/DL (ref 60–180)
T4 FREE SERPL-MCNC: 0.94 NG/DL (ref 0.71–1.51)
THYROPEROXIDASE IGG SERPL-ACNC: <6 IU/ML
TSH SERPL DL<=0.005 MIU/L-ACNC: 0.28 UIU/ML (ref 0.4–4)

## 2022-12-22 PROCEDURE — 1159F MED LIST DOCD IN RCRD: CPT | Mod: CPTII,S$GLB,, | Performed by: NURSE PRACTITIONER

## 2022-12-22 PROCEDURE — 86376 MICROSOMAL ANTIBODY EACH: CPT | Performed by: INTERNAL MEDICINE

## 2022-12-22 PROCEDURE — 3044F HG A1C LEVEL LT 7.0%: CPT | Mod: CPTII,S$GLB,, | Performed by: NURSE PRACTITIONER

## 2022-12-22 PROCEDURE — 3044F PR MOST RECENT HEMOGLOBIN A1C LEVEL <7.0%: ICD-10-PCS | Mod: CPTII,S$GLB,, | Performed by: NURSE PRACTITIONER

## 2022-12-22 PROCEDURE — 1160F PR REVIEW ALL MEDS BY PRESCRIBER/CLIN PHARMACIST DOCUMENTED: ICD-10-PCS | Mod: CPTII,S$GLB,, | Performed by: NURSE PRACTITIONER

## 2022-12-22 PROCEDURE — 99999 PR PBB SHADOW E&M-EST. PATIENT-LVL III: CPT | Mod: PBBFAC,,, | Performed by: NURSE PRACTITIONER

## 2022-12-22 PROCEDURE — 3078F DIAST BP <80 MM HG: CPT | Mod: CPTII,S$GLB,, | Performed by: INTERNAL MEDICINE

## 2022-12-22 PROCEDURE — 1159F PR MEDICATION LIST DOCUMENTED IN MEDICAL RECORD: ICD-10-PCS | Mod: CPTII,S$GLB,, | Performed by: NURSE PRACTITIONER

## 2022-12-22 PROCEDURE — 3008F BODY MASS INDEX DOCD: CPT | Mod: CPTII,S$GLB,, | Performed by: INTERNAL MEDICINE

## 2022-12-22 PROCEDURE — 99999 PR PBB SHADOW E&M-EST. PATIENT-LVL IV: ICD-10-PCS | Mod: PBBFAC,,, | Performed by: INTERNAL MEDICINE

## 2022-12-22 PROCEDURE — 99204 PR OFFICE/OUTPT VISIT, NEW, LEVL IV, 45-59 MIN: ICD-10-PCS | Mod: S$GLB,,, | Performed by: INTERNAL MEDICINE

## 2022-12-22 PROCEDURE — 1160F RVW MEDS BY RX/DR IN RCRD: CPT | Mod: CPTII,S$GLB,, | Performed by: NURSE PRACTITIONER

## 2022-12-22 PROCEDURE — 3044F PR MOST RECENT HEMOGLOBIN A1C LEVEL <7.0%: ICD-10-PCS | Mod: CPTII,S$GLB,, | Performed by: INTERNAL MEDICINE

## 2022-12-22 PROCEDURE — 99214 PR OFFICE/OUTPT VISIT, EST, LEVL IV, 30-39 MIN: ICD-10-PCS | Mod: S$GLB,,, | Performed by: NURSE PRACTITIONER

## 2022-12-22 PROCEDURE — 84480 ASSAY TRIIODOTHYRONINE (T3): CPT | Performed by: INTERNAL MEDICINE

## 2022-12-22 PROCEDURE — 3044F HG A1C LEVEL LT 7.0%: CPT | Mod: CPTII,S$GLB,, | Performed by: INTERNAL MEDICINE

## 2022-12-22 PROCEDURE — 3008F PR BODY MASS INDEX (BMI) DOCUMENTED: ICD-10-PCS | Mod: CPTII,S$GLB,, | Performed by: NURSE PRACTITIONER

## 2022-12-22 PROCEDURE — 3074F PR MOST RECENT SYSTOLIC BLOOD PRESSURE < 130 MM HG: ICD-10-PCS | Mod: CPTII,S$GLB,, | Performed by: INTERNAL MEDICINE

## 2022-12-22 PROCEDURE — 83520 IMMUNOASSAY QUANT NOS NONAB: CPT | Performed by: INTERNAL MEDICINE

## 2022-12-22 PROCEDURE — 84443 ASSAY THYROID STIM HORMONE: CPT | Performed by: INTERNAL MEDICINE

## 2022-12-22 PROCEDURE — 99999 PR PBB SHADOW E&M-EST. PATIENT-LVL III: ICD-10-PCS | Mod: PBBFAC,,, | Performed by: NURSE PRACTITIONER

## 2022-12-22 PROCEDURE — 3078F PR MOST RECENT DIASTOLIC BLOOD PRESSURE < 80 MM HG: ICD-10-PCS | Mod: CPTII,S$GLB,, | Performed by: INTERNAL MEDICINE

## 2022-12-22 PROCEDURE — 84439 ASSAY OF FREE THYROXINE: CPT | Performed by: INTERNAL MEDICINE

## 2022-12-22 PROCEDURE — 36415 COLL VENOUS BLD VENIPUNCTURE: CPT | Mod: PO | Performed by: INTERNAL MEDICINE

## 2022-12-22 PROCEDURE — 3008F PR BODY MASS INDEX (BMI) DOCUMENTED: ICD-10-PCS | Mod: CPTII,S$GLB,, | Performed by: INTERNAL MEDICINE

## 2022-12-22 PROCEDURE — 99999 PR PBB SHADOW E&M-EST. PATIENT-LVL IV: CPT | Mod: PBBFAC,,, | Performed by: INTERNAL MEDICINE

## 2022-12-22 PROCEDURE — 99214 OFFICE O/P EST MOD 30 MIN: CPT | Mod: S$GLB,,, | Performed by: NURSE PRACTITIONER

## 2022-12-22 PROCEDURE — 3074F SYST BP LT 130 MM HG: CPT | Mod: CPTII,S$GLB,, | Performed by: INTERNAL MEDICINE

## 2022-12-22 PROCEDURE — 99204 OFFICE O/P NEW MOD 45 MIN: CPT | Mod: S$GLB,,, | Performed by: INTERNAL MEDICINE

## 2022-12-22 PROCEDURE — 3008F BODY MASS INDEX DOCD: CPT | Mod: CPTII,S$GLB,, | Performed by: NURSE PRACTITIONER

## 2022-12-22 RX ORDER — DICYCLOMINE HYDROCHLORIDE 10 MG/1
10 CAPSULE ORAL EVERY 6 HOURS PRN
Qty: 60 CAPSULE | Refills: 0 | Status: SHIPPED | OUTPATIENT
Start: 2022-12-22 | End: 2023-02-02

## 2022-12-22 NOTE — PROGRESS NOTES
Subjective:       Patient ID: Kathryn Wagner is a 62 y.o. female Body mass index is 25.14 kg/m².    Chief Complaint: Results (Results from Egd and colonoscopy)    Established patient of Dr. Mariscal, Dr. Bella, & myself.     Abdominal Pain  Episode onset: started ~4/2021. The problem occurs daily (mostly in the evening). Duration: lasts for a couple of hours. The problem has been unchanged (had improved on bland diet/gastric sleeve diet; unchanged since our last visit; reports never improved after cholecystectomy). The pain is located in the RUQ. The pain is at a severity of 0/10 (currently). The quality of the pain is aching and dull. The abdominal pain does not radiate. Associated symptoms include constipation (chronic her whole life; unchanged; bowel movements are once every 3 days of firm stools) and weight loss (had gastric sleeve done in 8/2020, lost ~82 lbs). Pertinent negatives include no belching, diarrhea, dysuria, fever, flatus, hematochezia, melena, nausea or vomiting. Exacerbated by: red meat, fried food. She has tried oral narcotic analgesics and proton pump inhibitors (oxycodone helps abdominal pain, in pain management) for the symptoms. Prior diagnostic workup includes ultrasound, surgery, CT scan, GI consult, lower endoscopy and upper endoscopy (seeing urology). Her past medical history is significant for abdominal surgery, gallstones and GERD (controlled with taking nexium 40 mg once daily, recurs if she misses a dose; PAST TREATMENT: protonix, prilosec). There is no history of Crohn's disease, pancreatitis, PUD or ulcerative colitis.     Review of Systems   Constitutional:  Positive for weight loss (had gastric sleeve done in 8/2020, lost ~82 lbs). Negative for appetite change, chills, fatigue and fever.        Oxycodone PRN- taking QID   HENT:  Negative for sore throat and trouble swallowing.    Respiratory:  Negative for cough, choking and shortness of breath.    Cardiovascular:  Negative for chest  pain.   Gastrointestinal:  Positive for abdominal pain and constipation (chronic her whole life; unchanged; bowel movements are once every 3 days of firm stools). Negative for anal bleeding, blood in stool, diarrhea, flatus, hematochezia, melena, nausea, rectal pain and vomiting.   Genitourinary:  Negative for difficulty urinating, dysuria and flank pain.   Neurological:  Negative for weakness.       No LMP recorded. Patient has had a hysterectomy.  Past Medical History:   Diagnosis Date    Class 2 obesity due to excess calories in adult 3/4/2015    Colon polyp     Depression     Diverticulosis     Encounter for blood transfusion     GERD (gastroesophageal reflux disease)     High cholesterol     Hypertension     No longer taking medication since weight loss    Lumbar spondylosis 2015    L3-4 fusion; L5S1 fusion    Obesity     Restless leg syndrome      Past Surgical History:   Procedure Laterality Date    BACK SURGERY Bilateral     2015, 2015 fusion    BELT ABDOMINOPLASTY      BREAST SURGERY      breast reduction    CARPAL TUNNEL RELEASE Left      SECTION      x3    CHOLECYSTECTOMY      COLONOSCOPY  2012    in procedures: diverticulosis, 2 colon polyps removed, repeat in 5 years for surveillance; biopsy: sigmoid-hyperplastic polyps.    COLONOSCOPY N/A 2022    Procedure: COLONOSCOPY;  Surgeon: Ilia Bella Jr., MD;  Location: [facility];  Service: Endoscopy;  Laterality: N/A; Repeat colonoscopy in 5 years for surveillance    COSMETIC SURGERY      ESOPHAGOGASTRODUODENOSCOPY N/A 2020    Procedure: ESOPHAGOGASTRODUODENOSCOPY (EGD);  Surgeon: Gianni Mariscal MD;  Location: [address]);  Service: Endoscopy;  Laterality: N/A; small hiatal hernia, gastritis; biopsy: stomach- unremarkable    ESOPHAGOGASTRODUODENOSCOPY N/A 2022    Procedure: EGD (ESOPHAGOGASTRODUODENOSCOPY);  Surgeon: Ilia Bella Jr., MD;  Location: [facility];  Service: Endoscopy;  Laterality:  N/A;    HYSTERECTOMY      partial    INCISION AND DRAINAGE OF ABSCESS N/A 2021    Procedure: INCISION AND DRAINAGE, ABSCESS;  Surgeon: Danielito Wong MD;  Location: [facility];  Service: General;  Laterality: N/A;    LAPAROSCOPIC CHOLECYSTECTOMY N/A 2021    Procedure: CHOLECYSTECTOMY, LAPAROSCOPIC;  Surgeon: Danielito Wong MD;  Location: [facility];  Service: General;  Laterality: N/A;    LAPAROSCOPIC SLEEVE GASTRECTOMY N/A 2020    Procedure: GASTRECTOMY, SLEEVE, LAPAROSCOPIC, with intra op EGD, needs to be first case in the AM;  Surgeon: Yuni Chaudhari MD;  Location: [facility] OR [address] [place] [address];  Service: General;  Laterality: N/A;    TOTAL REDUCTION MAMMOPLASTY      TUBAL LIGATION       Family History   Problem Relation Age of Onset    Hypertension Father     No Known Problems Mother     No Known Problems Sister     No Known Problems Brother     No Known Problems Maternal Aunt     No Known Problems Maternal Uncle     No Known Problems Paternal Aunt     No Known Problems Paternal Uncle     No Known Problems Maternal Grandmother     No Known Problems Maternal Grandfather     No Known Problems Paternal Grandmother     No Known Problems Paternal Grandfather     Amblyopia Neg Hx     Blindness Neg Hx     Cancer Neg Hx     Cataracts Neg Hx     Diabetes Neg Hx     Glaucoma Neg Hx     Macular degeneration Neg Hx     Retinal detachment Neg Hx     Strabismus Neg Hx     Stroke Neg Hx     Thyroid disease Neg Hx     Colon cancer Neg Hx     Crohn's disease Neg Hx     Esophageal cancer Neg Hx     Stomach cancer Neg Hx     Ulcerative colitis Neg Hx      Social History     Tobacco Use    Smoking status: Every Day     Packs/day: 0.50     Types: Cigarettes     Last attempt to quit: 3/18/2020     Years since quittin.7    Smokeless tobacco: Never   Substance Use Topics    Alcohol use: Yes     Alcohol/week: 0.0 - 1.0 standard drinks    Drug use: Yes     Types: Oxycodone     Wt Readings from Last 20 Encounters:    12/22/22 58.4 kg (128 lb 12 oz)   12/22/22 58.4 kg (128 lb 12 oz)   10/13/22 57.5 kg (126 lb 10.5 oz)   08/29/22 55.3 kg (122 lb)   05/19/22 55.4 kg (122 lb 2.2 oz)   02/23/22 55.6 kg (122 lb 7.5 oz)   10/21/21 58.1 kg (128 lb)   08/12/21 60.8 kg (134 lb)   07/29/21 61.1 kg (134 lb 11.2 oz)   07/19/21 61 kg (134 lb 7.7 oz)   07/15/21 61 kg (134 lb 7.7 oz)   07/06/21 60.8 kg (134 lb)   06/28/21 61.4 kg (135 lb 5.8 oz)   06/28/21 63 kg (138 lb 14.2 oz)   06/25/21 63 kg (139 lb)   05/21/21 63.2 kg (139 lb 3.5 oz)   04/28/21 64.5 kg (142 lb 4.9 oz)   04/05/21 65.8 kg (145 lb 1 oz)   03/11/21 67.4 kg (148 lb 7.7 oz)   02/18/21 74.8 kg (165 lb)     Lab Results   Component Value Date    WBC 7.36 10/13/2022    HGB 11.1 (L) 10/13/2022    HCT 34.6 (L) 10/13/2022    MCV 93 10/13/2022     10/13/2022     Lab Results   Component Value Date    IRON 58 03/11/2022    TIBC 376 03/11/2022    FERRITIN 127 10/13/2022     CMP  Sodium   Date Value Ref Range Status   10/13/2022 138 136 - 145 mmol/L Final     Potassium   Date Value Ref Range Status   10/13/2022 4.1 3.5 - 5.1 mmol/L Final     Chloride   Date Value Ref Range Status   10/13/2022 102 95 - 110 mmol/L Final     CO2   Date Value Ref Range Status   10/13/2022 27 23 - 29 mmol/L Final     Glucose   Date Value Ref Range Status   10/13/2022 78 70 - 110 mg/dL Final     BUN   Date Value Ref Range Status   10/13/2022 16 8 - 23 mg/dL Final     Creatinine   Date Value Ref Range Status   10/13/2022 0.8 0.5 - 1.4 mg/dL Final     Calcium   Date Value Ref Range Status   10/13/2022 9.2 8.7 - 10.5 mg/dL Final     Total Protein   Date Value Ref Range Status   10/13/2022 6.5 6.0 - 8.4 g/dL Final     Albumin   Date Value Ref Range Status   10/13/2022 4.0 3.5 - 5.2 g/dL Final     Total Bilirubin   Date Value Ref Range Status   10/13/2022 0.2 0.1 - 1.0 mg/dL Final     Comment:     For infants and newborns, interpretation of results should be based  on gestational age, weight and in agreement  with clinical  observations.    Premature Infant recommended reference ranges:  Up to 24 hours.............<8.0 mg/dL  Up to 48 hours............<12.0 mg/dL  3-5 days..................<15.0 mg/dL  6-29 days.................<15.0 mg/dL       Alkaline Phosphatase   Date Value Ref Range Status   10/13/2022 62 55 - 135 U/L Final     AST   Date Value Ref Range Status   10/13/2022 29 10 - 40 U/L Final     ALT   Date Value Ref Range Status   10/13/2022 24 10 - 44 U/L Final     Anion Gap   Date Value Ref Range Status   10/13/2022 9 8 - 16 mmol/L Final     eGFR if    Date Value Ref Range Status   03/07/2022 >60.0 >60 mL/min/1.73 m^2 Final     eGFR if non    Date Value Ref Range Status   03/07/2022 >60.0 >60 mL/min/1.73 m^2 Final     Comment:     Calculation used to obtain the estimated glomerular filtration  rate (eGFR) is the CKD-EPI equation.        Lab Results   Component Value Date    AMYLASE 77 04/28/2021     Lab Results   Component Value Date    LIPASE 32 04/28/2021     Lab Results   Component Value Date    TSH 0.276 (L) 12/22/2022     Reviewed prior medical records including radiology report of 10/21/2021 CT abdomen pelvis; 7/7/2021 limited abdominal ultrasound; & endoscopy history (see surgical history).    Objective:      Physical Exam  Vitals and nursing note reviewed.   Constitutional:       General: She is not in acute distress.     Appearance: Normal appearance. She is well-developed. She is not diaphoretic.   HENT:      Mouth/Throat:      Lips: Pink. No lesions.      Mouth: Mucous membranes are moist. No oral lesions.      Tongue: No lesions.      Pharynx: Oropharynx is clear. No pharyngeal swelling or posterior oropharyngeal erythema.   Eyes:      General: No scleral icterus.     Conjunctiva/sclera: Conjunctivae normal.      Pupils: Pupils are equal, round, and reactive to light.   Pulmonary:      Effort: Pulmonary effort is normal. No respiratory distress.      Breath sounds:  Normal breath sounds. No wheezing.   Abdominal:      General: Bowel sounds are normal. There is no distension or abdominal bruit.      Palpations: Abdomen is soft. Abdomen is not rigid. There is no mass.      Tenderness: There is no abdominal tenderness. There is no guarding or rebound. Negative signs include Benson's sign and McBurney's sign.   Skin:     General: Skin is warm and dry.      Coloration: Skin is not jaundiced or pale.      Findings: No erythema or rash.   Neurological:      Mental Status: She is alert and oriented to person, place, and time.   Psychiatric:         Behavior: Behavior normal.         Thought Content: Thought content normal.         Judgment: Judgment normal.       Assessment:       1. History of esophagogastroduodenoscopy (EGD)    2. History of gastritis    3. H/O gastric sleeve    4. Right upper quadrant abdominal pain    5. History of colonoscopy    6. History of colon polyps    7. History of diverticulosis    8. Internal hemorrhoids    9. Irritable bowel syndrome with constipation    10. Chronic constipation    11. Normocytic anemia    12. S/P cholecystectomy          Plan:       History of esophagogastroduodenoscopy (EGD) & History of gastritis  -  CONTINUE   esomeprazole (NEXIUM) 40 MG capsule; Take 1 capsule (40 mg total) by mouth before breakfast.  - avoid/minimize use of NSAIDs- since they can cause GI upset, bleeding and/or ulcers. If NSAID must be taken, recommend take with food.    H/O gastric sleeve  Recommend follow-up with bariatric surgery for continued evaluation and management.    Right upper quadrant abdominal pain  -     CT Abdomen Pelvis  Without Contrast; Future; Expected date: 12/22/2022  -   START  dicyclomine (BENTYL) 10 MG capsule; Take 1 capsule (10 mg total) by mouth every 6 (six) hours as needed (abdominal cramping/spasms).  Dispense: 60 capsule; Refill: 0    History of colonoscopy & History of colon polyps  - next surveillance colonoscopy due  9/2025-9/2027    History of diverticulosis  Recommend high fiber diet (20-30 grams of fiber daily)/OTC fiber supplements daily as directed, such as Benefiber or Metamucil.    Internal hemorrhoids  - avoid constipation and straining with bowel movements; try using an OTC stool softener as directed and increase fiber in diet (20-30 grams daily)/OTC fiber supplement such as metamucil (take as directed)  - recommend SITZ baths  - possible referral to colorectal surgery if symptoms persist    Irritable bowel syndrome with constipation  -   START  linaCLOtide (LINZESS) 72 mcg Cap capsule; Take 1 capsule (72 mcg total) by mouth before breakfast. Takes > 30 minutes before 1st meal of the day  Dispense: 30 capsule; Refill: 1  -  START   dicyclomine (BENTYL) 10 MG capsule; Take 1 capsule (10 mg total) by mouth every 6 (six) hours as needed (abdominal cramping/spasms).  Dispense: 60 capsule; Refill: 0    Chronic constipation  - START    linaCLOtide (LINZESS) 72 mcg Cap capsule; Take 1 capsule (72 mcg total) by mouth before breakfast. Takes > 30 minutes before 1st meal of the day  Dispense: 30 capsule; Refill: 1  - Recommend daily exercise as tolerated, adequate water intake (six 8-oz glasses of water daily), and high fiber diet. OTC fiber supplements are recommended if diet does not reach daily fiber goal (20-30 grams daily), such as Metamucil, Citrucel, or FiberCon (take as directed, separate from other oral medications by >2 hours).  -Recommend taking an OTC stool softener such as Colace as directed to avoid hard stools and straining with bowel movements PRN  -If still no improvement with these measures, call/follow-up  - discussed with patient that a side effect of narcotic pain medications is constipation, advised patient to talk to provider who manages pain medication, patient verbalized understanding    S/P cholecystectomy  Recommend follow-up with general surgery for continued evaluation and management; possible  adhesions?.    Normocytic anemia  Recommend follow-up with Primary Care Provider for continued evaluation and management.    Follow up in about 1 month (around 1/22/2023), or if symptoms worsen or fail to improve.      If no improvement in symptoms or symptoms worsen, call/follow-up at clinic or go to ER.        38 minutes of total time spent on the encounter, which includes face to face time and non-face to face time preparing to see the patient (e.g., review of tests), Obtaining and/or reviewing separately obtained history, Documenting clinical information in the electronic or other health record, Independently interpreting results (not separately reported) and communicating results to the patient/family/caregiver, or Care coordination (not separately reported).

## 2022-12-22 NOTE — PATIENT INSTRUCTIONS
Gastritis (Adult)    Gastritis is inflammation and irritation of the stomach lining. It can be present for a short time (acute) or be long lasting (chronic). Gastritis is often caused by infection with bacteria called H pylori. More than a third of people in the [place] have this bacteria in their bodies. In many cases, H pylori causes no problems or symptoms. In some people, though, the infection irritates the stomach lining and causes gastritis. Other causes of stomach irritation include drinking alcohol or taking pain-relieving medicines called NSAIDs (such as aspirin or ibuprofen).   Symptoms of gastritis can include:  Abdominal pain or bloating  Loss of appetite  Nausea or vomiting  Vomiting blood or having black stools  Feeling more tired than usual  An inflamed and irritated stomach lining is more likely to develop a sore called an ulcer. To help prevent this, gastritis should be treated.  Home care  If needed, medicines may be prescribed. If you have H pylori infection, treating it will likely relieve your symptoms. Other changes can help reduce stomach irritation and help it heal.  If you have been prescribed medicines for H pylori infection, take them as directed. Take all of the medicine until it is finished or your healthcare provider tells you to stop, even if you feel better.  Your healthcare provider may recommend avoiding NSAIDs. If you take daily aspirin for your heart or other medical reasons, do not stop without talking to your healthcare provider first.  Avoid drinking alcohol.  Stop smoking. Smoking can irritate the stomach and delay healing. As much as possible, stay away from second hand smoke.  Follow-up care  Follow up with your healthcare provider, or as advised by our staff. Testing may be needed to check for inflammation or an ulcer.  When to seek medical advice  Call your healthcare provider for any of the following:  Stomach pain that gets worse or moves to the lower right abdomen (appendix  area)  Chest pain that appears or gets worse, or spreads to the back, neck, shoulder, or arm  Frequent vomiting (cant keep down liquids)  Blood in the stool or vomit (red or black in color)  Feeling weak or dizzy  Fever of 100.4ºF (38ºC) or higher, or as directed by your healthcare provider  Date Last Reviewed: 6/22/2015  © 7144-7702 [employer]. [address] [place] [address], [place] [address]. All rights reserved. This information is not intended as a substitute for professional medical care. Always follow your healthcare professional's instructions.      Understanding Colon and Rectal Polyps    The colon (also called the large intestine) is a muscular tube that forms the last part of the digestive tract. It absorbs water and stores food waste. The colon is about 4 to 6 feet long. The rectum is the last 6 inches of the colon. The colon and rectum have a smooth lining composed of millions of cells. Changes in these cells can lead to growths in the colon that can become cancerous and should be removed. Multiple tests are available to screen for colon cancer, but the colonoscopy is the most recommended test. During colonoscopy, these polyps can be removed. How often you need this test depends on many things including your condition, your family history, symptoms, and what the findings were at the previous colonoscopy.   When the colon lining changes  Changes that happen in the cells that line the colon or rectum can lead to growths called polyps. Over a period of years, polyps can turn cancerous. Removing polyps early may prevent cancer from ever forming.  Polyps  Polyps are fleshy clumps of tissue that form on the lining of the colon or rectum. Small polyps are usually benign (not cancerous). However, over time, cells in a polyp can change and become cancerous. Certain types of polyps known as adenomatous polyps are premalignant. The risk for invasive cancer increases with the size of the polyp and certain  cell and gene features. This means that they can become cancerous if they're not removed. Hyperplastic polyps are benign. They can grow quite large and not turn cancerous.   Cancer  Almost all colorectal cancers start when polyp cells begin growing abnormally. As a cancerous tumor grows, it may involve more and more of the colon or rectum. In time, cancer can also grow beyond the colon or rectum and spread to nearby organs or to glands called lymph nodes. The cells can also travel to other parts of the body. This is known as metastasis. The earlier a cancerous tumor is removed, the better the chance of preventing its spread.    Date Last Reviewed: 8/1/2016  © 9272-7307 [employer]. [address] [place] [address], [place] [address]. All rights reserved. This information is not intended as a substitute for professional medical care. Always follow your healthcare professional's instructions. Understanding Diverticulosis and Diverticulitis     Pouches or diverticula usually occur in the lower part of the colon called the sigmoid.     The colon (large intestine) is the last part of the digestive tract. It absorbs water from stool and changes it from a liquid to a solid. In certain cases, small pouches called diverticula can form in the colon wall. This condition is called diverticulosis. The pouches can become infected. If this happens, it becomes a more serious problem called diverticulitis. These problems can be painful. But they can be managed.  Managing your condition  Diet changes or medicines may be prescribed.   If you have diverticulosis  Recommendations include:  Diet changes are often enough to control symptoms. The main changes are adding fiber (roughage) and drinking more water. Fiber absorbs water as it travels through your colon. This helps your stool stay soft and move smoothly. Water helps this process.  If needed, you may be told to take over-the-counter stool softeners.  To help relieve pain,  antispasmodic medicines may be prescribed.  Watch for changes in your bowel movements. Tell the healthcare provider if you notice any changes.  Begin an exercise program. Ask your healthcare provider how to get started.  Get plenty of rest and sleep.   If you have diverticulitis  Treatment depends on how bad your symptoms are.  For mild symptoms. You may be put on a liquid diet for a short time. Antibiotics are usually prescribed. If these two steps relieve your symptoms, you may then be prescribed a high-fiber diet. If you still have symptoms, your healthcare provider will discuss more treatment choices with you.  For severe symptoms. You may need to be admitted to the hospital. There, you can be given IV antibiotics and fluids. You will also be put on a low-fiber or liquid diet. Although not common, surgery is needed in some people with severe symptoms.  [place] to colon health     Diverticulitis occurs when the pouches become infected or inflamed.     Help keep your colon healthy with a diet that includes plenty of high-fiber fruits, vegetables, and whole grains. Drink plenty of liquids like water and juice. Maintain a healthy lifestyle including regular exercise, stress management, and adequate rest and sleep.   Date Last Reviewed: 7/1/2016  © 4883-0206 [employer]. [address] [place] [address], [place] [address]. All rights reserved. This information is not intended as a substitute for professional medical care. Always follow your healthcare professional's instructions.           Hemorrhoids    Hemorrhoids are swollen and inflamed veins inside the rectum and near the anus. The rectum is the last several inches of the colon. The anus is the passage between the rectum and the outside of the body.  Causes  The veins can become swollen due to increased pressure in them. This is most often caused by:  Chronic constipation or diarrhea  Straining when having a bowel movement  Sitting too long on the toilet  A  low-fiber diet  Pregnancy  Symptoms  Bleeding from the rectum (this may be noticeable after bowel movements)  Lump near the anus  Itching around the anus  Pain around the anus  There are different types of hemorrhoids. Depending on the type you have and the severity, you may be able to treat yourself at home. In some cases, a procedure may be the best treatment option. Your healthcare provider can tell you more about this, if needed.  Home care  General care  To get relief from pain or itching, try:  Topical products. Your healthcare provider may prescribe or recommend creams, ointments, or pads that can be applied to the hemorrhoid. Use these exactly as directed.  Medicines. Your healthcare provider may recommend stool softeners, suppositories, or laxatives to help manage constipation. Use these exactly as directed.  Sitz baths. A sitz bath involves sitting in a few inches of warm bath water. Be careful not to make the water so hot that you burn yourself--test it before sitting in it. Soak for about 10 to 15 minutes a few times a day. This may help relieve pain.  Tips to help prevent hemorrhoids  Eat more fiber. Fiber adds bulk to stool and absorbs water as it moves through your colon. This makes stool softer and easier to pass.  Increase the fiber in your diet with more fiber-rich foods. These include fresh fruit, vegetables, and whole grains.  Take a fiber supplement or bulking agent, if advised to by your provider. These include products such as psyllium or methylcellulose.  Drink plenty of water, if directed to by your provider. This can help keep stool soft.  Be more active. Frequent exercise aids digestion and helps prevent constipation. It may also help make bowel movements more regular.  Dont strain during bowel movements. This can make hemorrhoids more likely. Also, dont sit on the toilet for long periods of time.  Follow-up care  Follow up with your healthcare provider, or as advised. If a culture or  imaging tests were done, you will be notified of the results when they are ready. This may take a few days or longer.  When to seek medical advice  Call your healthcare provider right away if any of these occur:  Increased bleeding from the rectum  Increased pain around the rectum or anus  Weakness or dizziness  Call 911  Call 911 or return to the emergency department right away if any of these occur:  Trouble breathing or swallowing  Fainting or loss of consciousness  Unusually fast heart rate  Vomiting blood  Large amounts of blood in stool  Date Last Reviewed: 6/22/2015 © 2000-2017 [facility]. [address] [place] [address]. All rights reserved. This information is not intended as a substitute for professional medical care. Always follow your healthcare professional's instructions.         Irritable Bowel Syndrome    Irritable bowel syndrome (IBS) is a disorder of the intestines. It is not a disease, but a group of symptoms caused by changes in the way the intestines work. It is fairly common, but the cause is not well understood.  Symptoms of IBS include:  Abdominal pain, discomfort, and cramping  Diarrhea  Constipation or dry, hard stools  Mucous stool  Bloating  Feeling of incomplete bowel movements  It usually results in one of 3 patterns of symptoms:  Chronic abdominal pain and constipation  Recurring episodes of diarrhea, with or without pain  Alternating diarrhea and constipation  Home care  The goal of treatment is to control and relieve your symptoms, so you can lead a full and active life. There is no cure for IBS. But it can be managed.  Diet  Your diet did not cause your IBS, but it can affect it. No one diet works for everyone. Finding the best foods for you may take trial and error. Keep a food log to help find what foods made your symptoms worse. Below are some tips that may help you.  Eat more slowly. Eat smaller amounts at a time, but more often. Remember, you can  always eat more, but cannot eat less once youve eaten too much.  High-fiber foods are complicated. While they may help relieve constipation, they can make your bloating, cramping, gas, and diarrhea worse.  Eat less sugar.  Try cutting out dairy products. Sometimes this helps.  Try cutting out foods that are high in fat and fatty meats.  You can control bloating or passing excess gas. Be careful with gassy vegetables and fruits like beans, cabbage, broccoli, and cauliflower.  Be careful with carbonated beverages and fruit juices. They can make your bloating and diarrhea worse.  Caffeine, alcohol, and stimulants can make symptoms worse. These include coffee, tea, sodas, energy drinks, and chocolate.  Lifestyle  Look for factors that seem to worsen your symptoms. These include stress and emotions.   Although stress does not cause IBS, it may trigger flare-ups. Counseling can help you learn to handle stress. So can self-help measures like exercise, yoga, and meditation.  Depression can occur along with IBS. Your healthcare provider may prescribe antidepressant medicine. This may help with diarrhea, constipation, and cramping, as well as with symptoms of depression.  Smoking doesn't cause IBS, but can make the symptoms worse.  Medicines  Your healthcare provider may prescribe medicines. Take them as directed. For acute flare-ups of your illness, your provider may give you prescription medicines.  Check with your healthcare provider before taking any medicines for diarrhea.  Avoid anti-inflammatory medicines like ibuprofen or naproxen.  Consider nutritional supplements. This is especially true if your diarrhea is prolonged, or you aren't eating or are losing weight  Follow-up care  Follow up with your healthcare provider, or as advised. If a stool sample was taken or cultures were done, you will be told if your treatment needs to change. You can call as directed for the results.  When to seek medical advice  Call your  healthcare provider right away if any of these occur:  Abdominal pain gets worse  Constant abdominal pain moves to the right-lower abdomen  You can't keep liquids down because of vomiting  You have severe diarrhea  You have blood (red or black color) or mucus in your stool  You feel very weak or dizzy, faint, or have extreme thirst  You have a fever of 100.4ºF (38.0ºC) or higher, or as directed by your healthcare provider  Date Last Reviewed: 8/31/2015 © 2000-2017 [employer]. [address] [place] [address], [place] [address]. All rights reserved. This information is not intended as a substitute for professional medical care. Always follow your healthcare professional's instructions.

## 2022-12-22 NOTE — PROGRESS NOTES
CHIEF COMPLAINT: Suppressed TSH  62 y.o. old being seen as a new patient. Recently discovered to have a suppressed TSH. States TSH done as routine. States has been fatigued. States that she was cold. NO palpitations.Has chronic insomnia- No change. On Trazadone. No tremors. No biotin. Has some increased anxiety.       PAST MEDICAL HISTORY/PAST SURGICAL HISTORY:  Reviewed in [facility]    SOCIAL HISTORY: Reviewed in [facility]    FAMILY HISTORY:  No known thyroid disease. No known DM.     MEDICATIONS/ALLERGIES: The patient's MedCard has been updated and reviewed.            PE:    GENERAL: Well developed, well nourished.  NECK: Supple, trachea midline, enlarged thyroid with Right lobe > Left  CHEST: Resp even and unlabored, CTA bilateral.  CARDIAC: RRR, S1, S2 heard, no murmurs, rubs, S3, or S4  SKIN: no acanthosis nigracans.    LABS/Radiology   Latest Reference Range & Units 10/13/22 16:52 12/06/22 14:56   TSH 0.400 - 4.000 uIU/mL 0.065 (L) 0.086 (L)   Free T4 0.71 - 1.51 ng/dL 0.90 1.01   (L): Data is abnormally low    ASSESSMENT/PLAN:  Subclinical hyperthyroidism-discussed unsure if all her symptoms are thyroid related.  Check labs as seen below.  She does have a goiter and will get an ultrasound.  Depending on labs will get a thyroid uptake and scan.  Discussed etiology could be Graves versus thyroiditis versus toxic nodule.    2. Goiter-no obstructive symptoms. See # 1      FOLLOWUP  Needs TSH, Ft4, T3, TRAB, TPO, Thyroid US

## 2022-12-22 NOTE — TELEPHONE ENCOUNTER
----- Message from Kassidy Riojas sent at 12/22/2022 10:06 AM CST -----  Type: Needs Medical Advice  Who Called:  pt  Symptoms (please be specific):  pt said she will be a few mins late to her appt this morning said traffic is bad--please call and advise  Best Call Back Number: [phone] (home) [phone] (work)    Additional Information: thank you

## 2022-12-23 ENCOUNTER — TELEPHONE (OUTPATIENT)
Dept: ENDOCRINOLOGY | Facility: CLINIC | Age: 62
End: 2022-12-23
Payer: MEDICARE

## 2022-12-23 DIAGNOSIS — E05.90 HYPERTHYROIDISM: Primary | ICD-10-CM

## 2022-12-24 LAB — TSH RECEP AB SER-ACNC: <1.1 IU/L (ref 0–1.75)

## 2022-12-27 ENCOUNTER — TELEPHONE (OUTPATIENT)
Dept: ENDOCRINOLOGY | Facility: CLINIC | Age: 62
End: 2022-12-27
Payer: MEDICARE

## 2022-12-28 ENCOUNTER — PATIENT MESSAGE (OUTPATIENT)
Dept: GASTROENTEROLOGY | Facility: CLINIC | Age: 62
End: 2022-12-28
Payer: MEDICARE

## 2023-01-03 ENCOUNTER — TELEPHONE (OUTPATIENT)
Dept: ENDOCRINOLOGY | Facility: CLINIC | Age: 63
End: 2023-01-03
Payer: MEDICARE

## 2023-01-03 ENCOUNTER — TELEPHONE (OUTPATIENT)
Dept: FAMILY MEDICINE | Facility: CLINIC | Age: 63
End: 2023-01-03
Payer: MEDICARE

## 2023-01-03 RX ORDER — ALPRAZOLAM 0.25 MG/1
TABLET ORAL
Qty: 2 TABLET | Refills: 0 | Status: SHIPPED | OUTPATIENT
Start: 2023-01-03 | End: 2023-03-15 | Stop reason: ALTCHOICE

## 2023-01-03 NOTE — TELEPHONE ENCOUNTER
----- Message from Mariana Dawn sent at 1/3/2023  4:21 PM CST -----  Contact: [phone]  Type:  RX Refill Request    Who Called: Pt  Refill or New Rx:New  RX Name and Strength:ALPRAZolam (XANAX) 0.25 MG tablet  How is the patient currently taking it? (ex. 1XDay):as prescribed  Is this a 30 day or 90 day RX:30 day  Preferred Pharmacy with phone number:    [facility]/pharmacy #[id number] [address] [address]  Phone: [phone] Fax: [phone]     Local or Mail Order:Local  Ordering Provider: Noah Terry  Would the patient rather a call back or a response via [facility]? call  Best Call Back Number:[phone]   Additional Information: Pt said this prescription was sent to [facility], but [facility] doesn't have this prescription. Patient wants to know if Dr. Terry can order a new prescription. Please call pt to advise.

## 2023-01-03 NOTE — TELEPHONE ENCOUNTER
----- Message from Gabriella Nunes sent at 1/3/2023 11:24 AM CST -----  Contact: Patient  Type:  Patient Call          Who Called: Patient         Does the patient know what this is regarding?: Requesting a call back pt have questions about the test she have scheduled for tomorrow ; please advise           Would the patient rather a call back or a response via [facility]? Call           Best Call Back Number: [phone]             Additional Information:

## 2023-01-03 NOTE — TELEPHONE ENCOUNTER
Spoke with pt and informed her [facility], received verbal for xanax Rx for Nuc Med Procedure tomorrow

## 2023-01-03 NOTE — TELEPHONE ENCOUNTER
Gave Prescription for 2 pills. Can take 1 hour prior to procedure  Will need to make sure she has someone to drive her.

## 2023-01-03 NOTE — TELEPHONE ENCOUNTER
Pt has Nuc Med uptake tomorrow and states she is claustrophobic  Asking for antianxiety maed, xanax, for her 2 pics    Please advise

## 2023-01-03 NOTE — TELEPHONE ENCOUNTER
S/w [facility] pharmacy. States they were backed up earlier today and was able to place Rx in computer a little while ago. Called pt back to let know. Verbalized understanding.

## 2023-01-03 NOTE — TELEPHONE ENCOUNTER
----- Message from Gabriella Nunes sent at 1/3/2023 11:30 AM CST -----  Contact: patient  Type:  Patient Call          Who Called: Patient         Does the patient know what this is regarding?: Requesting  call back pt may need new orders for before her upcoming appt ; please advise           Would the patient rather a call back or a response via [facility]? Call           Best Call Back Number: [phone]             Additional Information:

## 2023-01-04 ENCOUNTER — HOSPITAL ENCOUNTER (OUTPATIENT)
Dept: RADIOLOGY | Facility: HOSPITAL | Age: 63
Discharge: HOME | End: 2023-01-04
Attending: INTERNAL MEDICINE
Payer: MEDICARE

## 2023-01-04 DIAGNOSIS — E05.90 HYPERTHYROIDISM: ICD-10-CM

## 2023-01-04 PROCEDURE — 78014 NM THYROID UPTAKE AND SCAN: ICD-10-PCS | Mod: 26,,, | Performed by: RADIOLOGY

## 2023-01-04 PROCEDURE — 78014 THYROID IMAGING W/BLOOD FLOW: CPT | Mod: TC,PO

## 2023-01-04 PROCEDURE — 78014 THYROID IMAGING W/BLOOD FLOW: CPT | Mod: 26,,, | Performed by: RADIOLOGY

## 2023-01-05 ENCOUNTER — HOSPITAL ENCOUNTER (OUTPATIENT)
Dept: RADIOLOGY | Facility: HOSPITAL | Age: 63
Discharge: HOME | End: 2023-01-05
Attending: INTERNAL MEDICINE
Payer: MEDICARE

## 2023-01-10 DIAGNOSIS — F51.01 PRIMARY INSOMNIA: ICD-10-CM

## 2023-01-10 RX ORDER — TRAZODONE HYDROCHLORIDE 50 MG/1
75 TABLET ORAL NIGHTLY
Qty: 90 TABLET | Refills: 3 | Status: SHIPPED | OUTPATIENT
Start: 2023-01-10 | End: 2023-08-12

## 2023-01-10 NOTE — TELEPHONE ENCOUNTER
----- Message from Trice Ramsay sent at 1/10/2023  4:27 PM CST -----  Patient needs refill on trazodone to [facility] [place]

## 2023-01-11 ENCOUNTER — PATIENT MESSAGE (OUTPATIENT)
Dept: ENDOCRINOLOGY | Facility: CLINIC | Age: 63
End: 2023-01-11
Payer: MEDICARE

## 2023-01-11 DIAGNOSIS — E05.90 HYPERTHYROIDISM: Primary | ICD-10-CM

## 2023-01-11 RX ORDER — METHIMAZOLE 10 MG/1
10 TABLET ORAL DAILY
Qty: 30 TABLET | Refills: 11 | Status: SHIPPED | OUTPATIENT
Start: 2023-01-11 | End: 2024-02-09 | Stop reason: SDUPTHER

## 2023-01-11 NOTE — TELEPHONE ENCOUNTER
Will start Tapazole 10 mg daily.  Check TSH, free T4, CMP in 6 weeks.    Follow-up 6 months with TSH, free T4 and CMP

## 2023-02-03 ENCOUNTER — PATIENT MESSAGE (OUTPATIENT)
Dept: ENDOCRINOLOGY | Facility: CLINIC | Age: 63
End: 2023-02-03
Payer: MEDICARE

## 2023-02-23 ENCOUNTER — TELEPHONE (OUTPATIENT)
Dept: BARIATRICS | Facility: CLINIC | Age: 63
End: 2023-02-23
Payer: MEDICARE

## 2023-02-23 NOTE — TELEPHONE ENCOUNTER
Called to schedule 2 year post op. [place] a virtual? Patient declined stating she's maintained her weight pretty well and that she follows up with her PCP and Gastro-provider regarding care.

## 2023-03-14 ENCOUNTER — TELEPHONE (OUTPATIENT)
Dept: FAMILY MEDICINE | Facility: CLINIC | Age: 63
End: 2023-03-14
Payer: MEDICARE

## 2023-03-14 DIAGNOSIS — E05.90 HYPERTHYROIDISM: Primary | ICD-10-CM

## 2023-03-14 DIAGNOSIS — E78.2 MIXED HYPERLIPIDEMIA: ICD-10-CM

## 2023-03-14 DIAGNOSIS — I10 HYPERTENSION, UNSPECIFIED TYPE: ICD-10-CM

## 2023-03-14 DIAGNOSIS — Z00.00 WELLNESS EXAMINATION: ICD-10-CM

## 2023-03-15 ENCOUNTER — OFFICE VISIT (OUTPATIENT)
Dept: FAMILY MEDICINE | Facility: CLINIC | Age: 63
End: 2023-03-15
Payer: MEDICARE

## 2023-03-15 ENCOUNTER — PATIENT MESSAGE (OUTPATIENT)
Dept: FAMILY MEDICINE | Facility: CLINIC | Age: 63
End: 2023-03-15

## 2023-03-15 VITALS
WEIGHT: 130.75 LBS | SYSTOLIC BLOOD PRESSURE: 108 MMHG | BODY MASS INDEX: 25.67 KG/M2 | DIASTOLIC BLOOD PRESSURE: 78 MMHG | HEIGHT: 60 IN | HEART RATE: 72 BPM

## 2023-03-15 DIAGNOSIS — R53.83 FATIGUE, UNSPECIFIED TYPE: Primary | ICD-10-CM

## 2023-03-15 DIAGNOSIS — Z00.00 WELLNESS EXAMINATION: ICD-10-CM

## 2023-03-15 DIAGNOSIS — E78.2 MIXED HYPERLIPIDEMIA: ICD-10-CM

## 2023-03-15 DIAGNOSIS — I10 HYPERTENSION, UNSPECIFIED TYPE: ICD-10-CM

## 2023-03-15 DIAGNOSIS — R21 RASH: ICD-10-CM

## 2023-03-15 DIAGNOSIS — E05.90 HYPERTHYROIDISM: ICD-10-CM

## 2023-03-15 LAB
ALBUMIN SERPL BCP-MCNC: 4 G/DL (ref 3.5–5.2)
ALP SERPL-CCNC: 86 U/L (ref 55–135)
ALT SERPL W/O P-5'-P-CCNC: 25 U/L (ref 10–44)
ANION GAP SERPL CALC-SCNC: 5 MMOL/L (ref 8–16)
AST SERPL-CCNC: 25 U/L (ref 10–40)
BASOPHILS # BLD AUTO: 0.06 K/UL (ref 0–0.2)
BASOPHILS NFR BLD: 0.9 % (ref 0–1.9)
BILIRUB SERPL-MCNC: 0.3 MG/DL (ref 0.1–1)
BUN SERPL-MCNC: 15 MG/DL (ref 8–23)
CALCIUM SERPL-MCNC: 9.5 MG/DL (ref 8.7–10.5)
CHLORIDE SERPL-SCNC: 101 MMOL/L (ref 95–110)
CHOLEST SERPL-MCNC: 233 MG/DL (ref 120–199)
CHOLEST/HDLC SERPL: 4 {RATIO} (ref 2–5)
CO2 SERPL-SCNC: 32 MMOL/L (ref 23–29)
CORTIS SERPL-MCNC: 5.5 UG/DL
CREAT SERPL-MCNC: 0.8 MG/DL (ref 0.5–1.4)
DIFFERENTIAL METHOD: ABNORMAL
EOSINOPHIL # BLD AUTO: 0.7 K/UL (ref 0–0.5)
EOSINOPHIL NFR BLD: 10.7 % (ref 0–8)
ERYTHROCYTE [DISTWIDTH] IN BLOOD BY AUTOMATED COUNT: 12.9 % (ref 11.5–14.5)
ERYTHROCYTE [SEDIMENTATION RATE] IN BLOOD BY PHOTOMETRIC METHOD: 18 MM/HR (ref 0–36)
EST. GFR  (NO RACE VARIABLE): >60 ML/MIN/1.73 M^2
GLUCOSE SERPL-MCNC: 79 MG/DL (ref 70–110)
HCT VFR BLD AUTO: 35 % (ref 37–48.5)
HDLC SERPL-MCNC: 58 MG/DL (ref 40–75)
HDLC SERPL: 24.9 % (ref 20–50)
HGB BLD-MCNC: 11.4 G/DL (ref 12–16)
HIV 1+2 AB+HIV1 P24 AG SERPL QL IA: NORMAL
IMM GRANULOCYTES # BLD AUTO: 0.01 K/UL (ref 0–0.04)
IMM GRANULOCYTES NFR BLD AUTO: 0.2 % (ref 0–0.5)
LDLC SERPL CALC-MCNC: 150.8 MG/DL (ref 63–159)
LYMPHOCYTES # BLD AUTO: 2 K/UL (ref 1–4.8)
LYMPHOCYTES NFR BLD: 30.8 % (ref 18–48)
MCH RBC QN AUTO: 29.4 PG (ref 27–31)
MCHC RBC AUTO-ENTMCNC: 32.6 G/DL (ref 32–36)
MCV RBC AUTO: 90 FL (ref 82–98)
MONOCYTES # BLD AUTO: 0.4 K/UL (ref 0.3–1)
MONOCYTES NFR BLD: 6.8 % (ref 4–15)
NEUTROPHILS # BLD AUTO: 3.2 K/UL (ref 1.8–7.7)
NEUTROPHILS NFR BLD: 50.6 % (ref 38–73)
NONHDLC SERPL-MCNC: 175 MG/DL
NRBC BLD-RTO: 0 /100 WBC
PLATELET # BLD AUTO: 222 K/UL (ref 150–450)
PMV BLD AUTO: 12.4 FL (ref 9.2–12.9)
POTASSIUM SERPL-SCNC: 4.8 MMOL/L (ref 3.5–5.1)
PROT SERPL-MCNC: 6.8 G/DL (ref 6–8.4)
RBC # BLD AUTO: 3.88 M/UL (ref 4–5.4)
SODIUM SERPL-SCNC: 138 MMOL/L (ref 136–145)
T4 FREE SERPL-MCNC: 0.94 NG/DL (ref 0.71–1.51)
TRIGL SERPL-MCNC: 121 MG/DL (ref 30–150)
TSH SERPL DL<=0.005 MIU/L-ACNC: 1.13 UIU/ML (ref 0.4–4)
WBC # BLD AUTO: 6.33 K/UL (ref 3.9–12.7)

## 2023-03-15 PROCEDURE — 80061 LIPID PANEL: CPT | Performed by: PHYSICIAN ASSISTANT

## 2023-03-15 PROCEDURE — 99214 OFFICE O/P EST MOD 30 MIN: CPT | Mod: S$GLB,,, | Performed by: PHYSICIAN ASSISTANT

## 2023-03-15 PROCEDURE — 84439 ASSAY OF FREE THYROXINE: CPT | Performed by: PHYSICIAN ASSISTANT

## 2023-03-15 PROCEDURE — 3078F DIAST BP <80 MM HG: CPT | Mod: CPTII,S$GLB,, | Performed by: PHYSICIAN ASSISTANT

## 2023-03-15 PROCEDURE — 86038 ANTINUCLEAR ANTIBODIES: CPT | Performed by: PHYSICIAN ASSISTANT

## 2023-03-15 PROCEDURE — 3078F PR MOST RECENT DIASTOLIC BLOOD PRESSURE < 80 MM HG: ICD-10-PCS | Mod: CPTII,S$GLB,, | Performed by: PHYSICIAN ASSISTANT

## 2023-03-15 PROCEDURE — 36415 COLL VENOUS BLD VENIPUNCTURE: CPT | Mod: S$GLB,,, | Performed by: PHYSICIAN ASSISTANT

## 2023-03-15 PROCEDURE — 1159F MED LIST DOCD IN RCRD: CPT | Mod: CPTII,S$GLB,, | Performed by: PHYSICIAN ASSISTANT

## 2023-03-15 PROCEDURE — 99214 PR OFFICE/OUTPT VISIT, EST, LEVL IV, 30-39 MIN: ICD-10-PCS | Mod: S$GLB,,, | Performed by: PHYSICIAN ASSISTANT

## 2023-03-15 PROCEDURE — 80053 COMPREHEN METABOLIC PANEL: CPT | Performed by: PHYSICIAN ASSISTANT

## 2023-03-15 PROCEDURE — 87389 HIV-1 AG W/HIV-1&-2 AB AG IA: CPT | Performed by: PHYSICIAN ASSISTANT

## 2023-03-15 PROCEDURE — 3008F PR BODY MASS INDEX (BMI) DOCUMENTED: ICD-10-PCS | Mod: CPTII,S$GLB,, | Performed by: PHYSICIAN ASSISTANT

## 2023-03-15 PROCEDURE — 82533 TOTAL CORTISOL: CPT | Performed by: PHYSICIAN ASSISTANT

## 2023-03-15 PROCEDURE — 1159F PR MEDICATION LIST DOCUMENTED IN MEDICAL RECORD: ICD-10-PCS | Mod: CPTII,S$GLB,, | Performed by: PHYSICIAN ASSISTANT

## 2023-03-15 PROCEDURE — 3074F PR MOST RECENT SYSTOLIC BLOOD PRESSURE < 130 MM HG: ICD-10-PCS | Mod: CPTII,S$GLB,, | Performed by: PHYSICIAN ASSISTANT

## 2023-03-15 PROCEDURE — 85652 RBC SED RATE AUTOMATED: CPT | Performed by: PHYSICIAN ASSISTANT

## 2023-03-15 PROCEDURE — 36415 PR COLLECTION VENOUS BLOOD,VENIPUNCTURE: ICD-10-PCS | Mod: S$GLB,,, | Performed by: PHYSICIAN ASSISTANT

## 2023-03-15 PROCEDURE — 84443 ASSAY THYROID STIM HORMONE: CPT | Performed by: PHYSICIAN ASSISTANT

## 2023-03-15 PROCEDURE — 3008F BODY MASS INDEX DOCD: CPT | Mod: CPTII,S$GLB,, | Performed by: PHYSICIAN ASSISTANT

## 2023-03-15 PROCEDURE — 85025 COMPLETE CBC W/AUTO DIFF WBC: CPT | Performed by: PHYSICIAN ASSISTANT

## 2023-03-15 PROCEDURE — 3074F SYST BP LT 130 MM HG: CPT | Mod: CPTII,S$GLB,, | Performed by: PHYSICIAN ASSISTANT

## 2023-03-15 RX ORDER — PREDNISONE 10 MG/1
TABLET ORAL
Qty: 18 TABLET | Refills: 0 | Status: SHIPPED | OUTPATIENT
Start: 2023-03-15 | End: 2023-10-24

## 2023-03-15 RX ORDER — CLINDAMYCIN HYDROCHLORIDE 300 MG/1
300 CAPSULE ORAL 3 TIMES DAILY
Qty: 30 CAPSULE | Refills: 0 | Status: SHIPPED | OUTPATIENT
Start: 2023-03-15 | End: 2023-03-25

## 2023-03-15 NOTE — PROGRESS NOTES
Venipuncture performed with 23 gauge butterfly, x's 1 attempt.  Successful venipuncture to R Basilic vein.  Specimens collected per orders.      Pressure dressing applied to site, instructed patient to remove dressing in 10-15 minutes, OK to re-adjust dressing if pressure causing any discomfort, to observe closely for numbness and/or discoloration to hand or fingers, and to notify provider if bleeding persists after applying constant pressure lasting 30 minutes.

## 2023-03-15 NOTE — PROGRESS NOTES
Subjective:       Patient ID: Kathryn Wagner is a 63 y.o. female.    Chief Complaint: Blister (Face and neck)    Rash  This is a chronic problem. The current episode started more than 1 year ago. The problem has been waxing and waning since onset. The affected locations include the face, neck and chest. The rash is characterized by itchiness, redness and blistering. It is unknown if there was an exposure to a precipitant. Associated symptoms include fatigue. Pertinent negatives include no fever or shortness of breath. Treatments tried: was seen by dermatology in the past. Findings were negative. Was told it was a immunological problem.   Review of Systems   Constitutional:  Positive for fatigue. Negative for activity change, chills and fever.   HENT: Negative.     Eyes: Negative.    Respiratory:  Negative for chest tightness and shortness of breath.    Cardiovascular:  Negative for chest pain.   Gastrointestinal:  Negative for abdominal pain and blood in stool.   Genitourinary: Negative.    Integumentary:  Positive for rash.   Neurological:  Positive for weakness. Negative for dizziness, vertigo, seizures, numbness and headaches.       Past Medical History:   Diagnosis Date    Class 2 obesity due to excess calories in adult 3/4/2015    Colon polyp     Depression     Diverticulosis     Encounter for blood transfusion     GERD (gastroesophageal reflux disease)     High cholesterol     Hypertension     No longer taking medication since weight loss    Lumbar spondylosis 03/04/2015    L3-4 fusion; L5S1 fusion    Obesity     Restless leg syndrome        Objective:      Physical Exam  Vitals reviewed.   Constitutional:       General: She is not in acute distress.     Appearance: Normal appearance. She is not ill-appearing, toxic-appearing or diaphoretic.   Cardiovascular:      Rate and Rhythm: Normal rate and regular rhythm.      Pulses: Normal pulses.      Heart sounds: Normal heart sounds. No murmur heard.    No friction  rub. No gallop.   Pulmonary:      Effort: Pulmonary effort is normal. No respiratory distress.      Breath sounds: Normal breath sounds. No stridor. No wheezing, rhonchi or rales.   Chest:      Chest wall: No tenderness.   Abdominal:      Palpations: Abdomen is soft.      Tenderness: There is no abdominal tenderness.   Skin:     Findings: Rash present. Rash is crusting, pustular and scaling.   Neurological:      Mental Status: She is alert.       Assessment:       Problem List Items Addressed This Visit       Mixed hyperlipidemia simva SE myalgia     Other Visit Diagnoses       Fatigue, unspecified type    -  Primary    Relevant Orders    Sedimentation rate    CORTISOL, RANDOM    Rash        Relevant Orders    MATTIE    Ambulatory referral/consult to Allergy    Hypertension, unspecified type        Hyperthyroidism        Wellness examination                  Plan:       Fatigue, unspecified type  -     Sedimentation rate; Future; Expected date: 03/15/2023  -     CORTISOL, RANDOM; Future; Expected date: 03/15/2023    Rash  -     MATTIE; Future; Expected date: 03/15/2023  -     Ambulatory referral/consult to Allergy; Future; Expected date: 03/22/2023    Hypertension, unspecified type  -     CBC W/ AUTO DIFFERENTIAL  -     COMPREHENSIVE METABOLIC PANEL    Mixed hyperlipidemia simva SE myalgia  -     LIPID PANEL    Hyperthyroidism  -     TSH  -     T4, FREE    Wellness examination  -     HIV 1/2 Ag/Ab (4th Gen)    Other orders  -     predniSONE (DELTASONE) 10 MG tablet; Take 3 daily for 3 days, then 2 daily for three days, then 1 daily for three days.  Dispense: 18 tablet; Refill: 0  -     clindamycin (CLEOCIN) 300 MG capsule; Take 1 capsule (300 mg total) by mouth 3 (three) times daily. for 10 days  Dispense: 30 capsule; Refill: 0       I spent 30 minutes on this encounter, time includes face-to-face, chart review, documentation, test review and orders.

## 2023-03-16 LAB — ANA SER QL IF: NORMAL

## 2023-04-11 ENCOUNTER — TELEPHONE (OUTPATIENT)
Dept: PRIMARY CARE CLINIC | Facility: CLINIC | Age: 63
End: 2023-04-11
Payer: MEDICARE

## 2023-04-17 ENCOUNTER — TELEPHONE (OUTPATIENT)
Dept: GASTROENTEROLOGY | Facility: CLINIC | Age: 63
End: 2023-04-17
Payer: MEDICARE

## 2023-04-17 NOTE — TELEPHONE ENCOUNTER
Called and spoke with the patient, patient was advised that her appointment on 05/04/2023 needed to be rescheduled as Dr. Bella would not be in town that day, patient declined to reschedule at this time and states that she will call the clinic back once she was ready to reschedule.

## 2023-05-07 DIAGNOSIS — R10.11 RUQ PAIN: ICD-10-CM

## 2023-05-07 DIAGNOSIS — R12 HEARTBURN: ICD-10-CM

## 2023-05-08 RX ORDER — ESOMEPRAZOLE MAGNESIUM 40 MG/1
CAPSULE, DELAYED RELEASE ORAL
Qty: 30 CAPSULE | Refills: 3 | Status: SHIPPED | OUTPATIENT
Start: 2023-05-08

## 2023-08-11 DIAGNOSIS — F51.01 PRIMARY INSOMNIA: ICD-10-CM

## 2023-08-12 RX ORDER — TRAZODONE HYDROCHLORIDE 50 MG/1
75 TABLET ORAL NIGHTLY
Qty: 135 TABLET | Refills: 2 | Status: SHIPPED | OUTPATIENT
Start: 2023-08-12

## 2023-09-25 ENCOUNTER — PATIENT MESSAGE (OUTPATIENT)
Dept: ADMINISTRATIVE | Facility: HOSPITAL | Age: 63
End: 2023-09-25
Payer: MEDICARE

## 2023-09-28 ENCOUNTER — TELEPHONE (OUTPATIENT)
Dept: ENDOCRINOLOGY | Facility: CLINIC | Age: 63
End: 2023-09-28
Payer: MEDICARE

## 2023-10-10 DIAGNOSIS — E05.90 HYPERTHYROIDISM: Primary | ICD-10-CM

## 2023-10-18 ENCOUNTER — LAB VISIT (OUTPATIENT)
Dept: LAB | Facility: HOSPITAL | Age: 63
End: 2023-10-18
Attending: PHYSICIAN ASSISTANT
Payer: MEDICARE

## 2023-10-18 DIAGNOSIS — E05.90 HYPERTHYROIDISM: ICD-10-CM

## 2023-10-18 LAB
ALBUMIN SERPL BCP-MCNC: 3.9 G/DL (ref 3.5–5.2)
ALP SERPL-CCNC: 59 U/L (ref 55–135)
ALT SERPL W/O P-5'-P-CCNC: 12 U/L (ref 10–44)
ANION GAP SERPL CALC-SCNC: 10 MMOL/L (ref 8–16)
AST SERPL-CCNC: 19 U/L (ref 10–40)
BASOPHILS # BLD AUTO: 0.05 K/UL (ref 0–0.2)
BASOPHILS NFR BLD: 0.6 % (ref 0–1.9)
BILIRUB SERPL-MCNC: 0.2 MG/DL (ref 0.1–1)
BUN SERPL-MCNC: 16 MG/DL (ref 8–23)
CALCIUM SERPL-MCNC: 9 MG/DL (ref 8.7–10.5)
CHLORIDE SERPL-SCNC: 105 MMOL/L (ref 95–110)
CO2 SERPL-SCNC: 26 MMOL/L (ref 23–29)
CREAT SERPL-MCNC: 0.8 MG/DL (ref 0.5–1.4)
DIFFERENTIAL METHOD: ABNORMAL
EOSINOPHIL # BLD AUTO: 1.2 K/UL (ref 0–0.5)
EOSINOPHIL NFR BLD: 14.7 % (ref 0–8)
ERYTHROCYTE [DISTWIDTH] IN BLOOD BY AUTOMATED COUNT: 12.9 % (ref 11.5–14.5)
EST. GFR  (NO RACE VARIABLE): >60 ML/MIN/1.73 M^2
GLUCOSE SERPL-MCNC: 79 MG/DL (ref 70–110)
HCT VFR BLD AUTO: 36 % (ref 37–48.5)
HGB BLD-MCNC: 11.4 G/DL (ref 12–16)
IMM GRANULOCYTES # BLD AUTO: 0.02 K/UL (ref 0–0.04)
IMM GRANULOCYTES NFR BLD AUTO: 0.3 % (ref 0–0.5)
LYMPHOCYTES # BLD AUTO: 2.3 K/UL (ref 1–4.8)
LYMPHOCYTES NFR BLD: 29.4 % (ref 18–48)
MCH RBC QN AUTO: 28.9 PG (ref 27–31)
MCHC RBC AUTO-ENTMCNC: 31.7 G/DL (ref 32–36)
MCV RBC AUTO: 91 FL (ref 82–98)
MONOCYTES # BLD AUTO: 0.5 K/UL (ref 0.3–1)
MONOCYTES NFR BLD: 6.9 % (ref 4–15)
NEUTROPHILS # BLD AUTO: 3.8 K/UL (ref 1.8–7.7)
NEUTROPHILS NFR BLD: 48.1 % (ref 38–73)
NRBC BLD-RTO: 0 /100 WBC
PLATELET # BLD AUTO: 221 K/UL (ref 150–450)
PMV BLD AUTO: 13.8 FL (ref 9.2–12.9)
POTASSIUM SERPL-SCNC: 3.9 MMOL/L (ref 3.5–5.1)
PROT SERPL-MCNC: 6.7 G/DL (ref 6–8.4)
RBC # BLD AUTO: 3.94 M/UL (ref 4–5.4)
SODIUM SERPL-SCNC: 141 MMOL/L (ref 136–145)
T4 FREE SERPL-MCNC: 0.79 NG/DL (ref 0.71–1.51)
TSH SERPL DL<=0.005 MIU/L-ACNC: 0.88 UIU/ML (ref 0.4–4)
WBC # BLD AUTO: 7.87 K/UL (ref 3.9–12.7)

## 2023-10-18 PROCEDURE — 84439 ASSAY OF FREE THYROXINE: CPT | Performed by: PHYSICIAN ASSISTANT

## 2023-10-18 PROCEDURE — 36415 COLL VENOUS BLD VENIPUNCTURE: CPT | Mod: PO | Performed by: PHYSICIAN ASSISTANT

## 2023-10-18 PROCEDURE — 85025 COMPLETE CBC W/AUTO DIFF WBC: CPT | Performed by: PHYSICIAN ASSISTANT

## 2023-10-18 PROCEDURE — 84443 ASSAY THYROID STIM HORMONE: CPT | Performed by: PHYSICIAN ASSISTANT

## 2023-10-18 PROCEDURE — 80053 COMPREHEN METABOLIC PANEL: CPT | Performed by: PHYSICIAN ASSISTANT

## 2023-10-23 ENCOUNTER — OFFICE VISIT (OUTPATIENT)
Dept: ALLERGY | Facility: CLINIC | Age: 63
End: 2023-10-23
Payer: MEDICARE

## 2023-10-23 VITALS — HEIGHT: 60 IN | WEIGHT: 150.81 LBS | BODY MASS INDEX: 29.61 KG/M2

## 2023-10-23 DIAGNOSIS — R21 RASH: ICD-10-CM

## 2023-10-23 DIAGNOSIS — E73.9 LACTOSE INTOLERANCE: ICD-10-CM

## 2023-10-23 DIAGNOSIS — L30.9 DERMATITIS: Primary | ICD-10-CM

## 2023-10-23 DIAGNOSIS — J31.0 CHRONIC RHINITIS: ICD-10-CM

## 2023-10-23 PROCEDURE — 3008F PR BODY MASS INDEX (BMI) DOCUMENTED: ICD-10-PCS | Mod: CPTII,S$GLB,, | Performed by: ALLERGY & IMMUNOLOGY

## 2023-10-23 PROCEDURE — 1159F PR MEDICATION LIST DOCUMENTED IN MEDICAL RECORD: ICD-10-PCS | Mod: CPTII,S$GLB,, | Performed by: ALLERGY & IMMUNOLOGY

## 2023-10-23 PROCEDURE — 1159F MED LIST DOCD IN RCRD: CPT | Mod: CPTII,S$GLB,, | Performed by: ALLERGY & IMMUNOLOGY

## 2023-10-23 PROCEDURE — 1160F PR REVIEW ALL MEDS BY PRESCRIBER/CLIN PHARMACIST DOCUMENTED: ICD-10-PCS | Mod: CPTII,S$GLB,, | Performed by: ALLERGY & IMMUNOLOGY

## 2023-10-23 PROCEDURE — 1160F RVW MEDS BY RX/DR IN RCRD: CPT | Mod: CPTII,S$GLB,, | Performed by: ALLERGY & IMMUNOLOGY

## 2023-10-23 PROCEDURE — 99999 PR PBB SHADOW E&M-EST. PATIENT-LVL IV: ICD-10-PCS | Mod: PBBFAC,,, | Performed by: ALLERGY & IMMUNOLOGY

## 2023-10-23 PROCEDURE — 3008F BODY MASS INDEX DOCD: CPT | Mod: CPTII,S$GLB,, | Performed by: ALLERGY & IMMUNOLOGY

## 2023-10-23 PROCEDURE — 99999 PR PBB SHADOW E&M-EST. PATIENT-LVL IV: CPT | Mod: PBBFAC,,, | Performed by: ALLERGY & IMMUNOLOGY

## 2023-10-23 PROCEDURE — 99204 PR OFFICE/OUTPT VISIT, NEW, LEVL IV, 45-59 MIN: ICD-10-PCS | Mod: S$GLB,,, | Performed by: ALLERGY & IMMUNOLOGY

## 2023-10-23 PROCEDURE — 99204 OFFICE O/P NEW MOD 45 MIN: CPT | Mod: S$GLB,,, | Performed by: ALLERGY & IMMUNOLOGY

## 2023-10-23 RX ORDER — NAPROXEN 375 MG/1
TABLET ORAL
COMMUNITY
End: 2023-10-24

## 2023-10-23 RX ORDER — DICLOFENAC SODIUM 10 MG/G
2 GEL TOPICAL 4 TIMES DAILY
COMMUNITY
Start: 2023-10-02

## 2023-10-23 RX ORDER — CYCLOBENZAPRINE HCL 5 MG
TABLET ORAL
COMMUNITY
End: 2023-10-24

## 2023-10-23 RX ORDER — AMOXICILLIN 875 MG/1
TABLET, FILM COATED ORAL
COMMUNITY
End: 2023-10-24

## 2023-10-23 RX ORDER — ORPHENADRINE CITRATE 100 MG/1
TABLET, EXTENDED RELEASE ORAL
COMMUNITY
End: 2023-10-24

## 2023-10-23 RX ORDER — HYDROCODONE BITARTRATE AND ACETAMINOPHEN 10; 325 MG/1; MG/1
1 TABLET ORAL EVERY 6 HOURS PRN
COMMUNITY
End: 2023-10-24

## 2023-10-23 RX ORDER — OXYCODONE AND ACETAMINOPHEN 10; 325 MG/1; MG/1
TABLET ORAL
COMMUNITY
End: 2023-12-04 | Stop reason: SDUPTHER

## 2023-10-23 RX ORDER — OXYCODONE AND ACETAMINOPHEN 5; 325 MG/1; MG/1
TABLET ORAL
COMMUNITY
End: 2023-12-04 | Stop reason: SDUPTHER

## 2023-10-23 RX ORDER — CIPROFLOXACIN 250 MG/1
TABLET, FILM COATED ORAL
COMMUNITY
End: 2023-10-24

## 2023-10-23 RX ORDER — TRAMADOL HYDROCHLORIDE 50 MG/1
TABLET ORAL
COMMUNITY
End: 2023-10-24

## 2023-10-23 RX ORDER — KETOROLAC TROMETHAMINE 30 MG/ML
INJECTION, SOLUTION INTRAMUSCULAR; INTRAVENOUS
COMMUNITY
Start: 2023-10-16 | End: 2023-10-24

## 2023-10-23 RX ORDER — PANTOPRAZOLE SODIUM 40 MG/1
TABLET, DELAYED RELEASE ORAL
COMMUNITY
End: 2023-10-24

## 2023-10-23 NOTE — PROGRESS NOTES
Subjective:       Patient ID: Kathryn Wagner is a 63 y.o. female.    Chief Complaint:  Other (Blisters on face and chest)      62 yo woman presents for consult from [facility] for blisters. She states she has blisters on hr face and has had off and on for 5 years. Now she is getting more on face, chest, arms and back. They last several days to week. Go and come. No triggers. Saw derm who advised to see allergy, no biopsy, no treatment. She has had some labs and does have elevated eosinophil count. She denies much rhinitis, in Feb gets some stuffy and runny nose but nothing bad. No asthma. No eczema. She does have watery diarrhea with dairy lately, used to eat a lot of yogurt but now does not tolerate.        Environmental History: see history section for home environment  Review of Systems   HENT:  Positive for congestion and rhinorrhea. Negative for ear pain, facial swelling, mouth sores, nosebleeds, postnasal drip and sinus pressure.    Eyes:  Negative for pain, discharge and itching.   Respiratory:  Negative for cough, chest tightness, shortness of breath and wheezing.    Gastrointestinal:  Positive for abdominal pain and diarrhea.   Skin:  Positive for color change and rash.        Objective:      Physical Exam  Vitals and nursing note reviewed.   Constitutional:       General: She is not in acute distress.     Appearance: Normal appearance. She is not ill-appearing.   HENT:      Nose: No rhinorrhea.   Eyes:      General:         Right eye: No discharge.         Left eye: No discharge.      Conjunctiva/sclera: Conjunctivae normal.   Pulmonary:      Effort: Pulmonary effort is normal. No respiratory distress.   Abdominal:      General: There is no distension.   Skin:     General: Skin is warm and dry.      Findings: Erythema and rash (scattered erythematous blisters on face and chst) present.   Neurological:      Mental Status: She is alert and oriented to person, place, and time.   Psychiatric:         Mood  and Affect: Mood normal.         Behavior: Behavior normal.         Laboratory:   none performed   Assessment:       1. Dermatitis    2. Rash    3. Lactose intolerance    4. Chronic rhinitis         Plan:       Dermatitis- advised blisters are not C/w allergic reaction. IgE mediated allergy causes hives or eczema. Advised can be other skin condition, refer to derm and needs biopsy especially considering elevated eos can be related  Diarrhea- advised is C/w lactose intolerance, explained in detail dn advise avoidance vs trial lacaid tablets  RTC as needed  DYANA Ridley notified of completed consult via Epic    I spent a total of 45 minutes on the day of the visit.  This includes face to face time and non-face to face time preparing to see the patient (eg, review of tests), obtaining and/or reviewing separately obtained history, documenting clinical information in the electronic or other health record, independently interpreting results and communicating results to the patient/family/caregiver, or care coordinator.

## 2023-10-24 ENCOUNTER — OFFICE VISIT (OUTPATIENT)
Dept: ENDOCRINOLOGY | Facility: CLINIC | Age: 63
End: 2023-10-24
Payer: MEDICARE

## 2023-10-24 VITALS
SYSTOLIC BLOOD PRESSURE: 130 MMHG | OXYGEN SATURATION: 99 % | WEIGHT: 128.06 LBS | BODY MASS INDEX: 25.14 KG/M2 | HEART RATE: 75 BPM | DIASTOLIC BLOOD PRESSURE: 68 MMHG | HEIGHT: 60 IN

## 2023-10-24 DIAGNOSIS — E04.9 GOITER: ICD-10-CM

## 2023-10-24 DIAGNOSIS — D64.9 ANEMIA, UNSPECIFIED TYPE: ICD-10-CM

## 2023-10-24 DIAGNOSIS — E05.90 HYPERTHYROIDISM: Primary | ICD-10-CM

## 2023-10-24 PROCEDURE — 3008F BODY MASS INDEX DOCD: CPT | Mod: CPTII,S$GLB,, | Performed by: PHYSICIAN ASSISTANT

## 2023-10-24 PROCEDURE — 3078F PR MOST RECENT DIASTOLIC BLOOD PRESSURE < 80 MM HG: ICD-10-PCS | Mod: CPTII,S$GLB,, | Performed by: PHYSICIAN ASSISTANT

## 2023-10-24 PROCEDURE — 1159F PR MEDICATION LIST DOCUMENTED IN MEDICAL RECORD: ICD-10-PCS | Mod: CPTII,S$GLB,, | Performed by: PHYSICIAN ASSISTANT

## 2023-10-24 PROCEDURE — 3075F PR MOST RECENT SYSTOLIC BLOOD PRESS GE 130-139MM HG: ICD-10-PCS | Mod: CPTII,S$GLB,, | Performed by: PHYSICIAN ASSISTANT

## 2023-10-24 PROCEDURE — 3078F DIAST BP <80 MM HG: CPT | Mod: CPTII,S$GLB,, | Performed by: PHYSICIAN ASSISTANT

## 2023-10-24 PROCEDURE — 3075F SYST BP GE 130 - 139MM HG: CPT | Mod: CPTII,S$GLB,, | Performed by: PHYSICIAN ASSISTANT

## 2023-10-24 PROCEDURE — 99213 PR OFFICE/OUTPT VISIT, EST, LEVL III, 20-29 MIN: ICD-10-PCS | Mod: S$GLB,,, | Performed by: PHYSICIAN ASSISTANT

## 2023-10-24 PROCEDURE — 1160F PR REVIEW ALL MEDS BY PRESCRIBER/CLIN PHARMACIST DOCUMENTED: ICD-10-PCS | Mod: CPTII,S$GLB,, | Performed by: PHYSICIAN ASSISTANT

## 2023-10-24 PROCEDURE — 3008F PR BODY MASS INDEX (BMI) DOCUMENTED: ICD-10-PCS | Mod: CPTII,S$GLB,, | Performed by: PHYSICIAN ASSISTANT

## 2023-10-24 PROCEDURE — 1160F RVW MEDS BY RX/DR IN RCRD: CPT | Mod: CPTII,S$GLB,, | Performed by: PHYSICIAN ASSISTANT

## 2023-10-24 PROCEDURE — 99999 PR PBB SHADOW E&M-EST. PATIENT-LVL IV: CPT | Mod: PBBFAC,,, | Performed by: PHYSICIAN ASSISTANT

## 2023-10-24 PROCEDURE — 99999 PR PBB SHADOW E&M-EST. PATIENT-LVL IV: ICD-10-PCS | Mod: PBBFAC,,, | Performed by: PHYSICIAN ASSISTANT

## 2023-10-24 PROCEDURE — 99213 OFFICE O/P EST LOW 20 MIN: CPT | Mod: S$GLB,,, | Performed by: PHYSICIAN ASSISTANT

## 2023-10-24 PROCEDURE — 1159F MED LIST DOCD IN RCRD: CPT | Mod: CPTII,S$GLB,, | Performed by: PHYSICIAN ASSISTANT

## 2023-10-24 NOTE — PROGRESS NOTES
CHIEF COMPLAINT: Suppressed TSH  63 y.o. old being seen as a f/u. Last seen by Dr. Terry in 12/22. States TSH done as routine. No biotin.     On methimzole 10 mg qd.    + diarrhea, brain fog, cold intolerance, insomnia (on Trazodone).     No hair loss, tremors, constipation, sweating.      Wt Readings from Last 10 Encounters:   10/24/23 58.1 kg (128 lb 1.4 oz)   10/23/23 68.4 kg (150 lb 12.7 oz)   03/15/23 59.3 kg (130 lb 11.7 oz)   12/22/22 58.4 kg (128 lb 12 oz)   12/22/22 58.4 kg (128 lb 12 oz)   10/13/22 57.5 kg (126 lb 10.5 oz)   08/29/22 55.3 kg (122 lb)   05/19/22 55.4 kg (122 lb 2.2 oz)   02/23/22 55.6 kg (122 lb 7.5 oz)   10/21/21 58.1 kg (128 lb)        PAST MEDICAL HISTORY/PAST SURGICAL HISTORY:  Reviewed in [facility]    SOCIAL HISTORY: Reviewed in [facility]    FAMILY HISTORY:  No known thyroid disease. No known DM.     MEDICATIONS/ALLERGIES: The patient's MedCard has been updated and reviewed.      PE:    GENERAL: Well developed, well nourished.  NECK: Supple, trachea midline, enlarged thyroid with Right lobe > Left  CHEST: Resp even and unlabored,   PSYCH: normal mood and affect  SKIN: no acanthosis nigracans.    LABS/Radiology  Lab Results   Component Value Date    TSH 0.875 10/18/2023    V6PYWWS 108 12/22/2022    FREET4 0.79 10/18/2023       Latest Reference Range & Units 10/13/22 16:52 12/06/22 14:56   TSH 0.400 - 4.000 uIU/mL 0.065 (L) 0.086 (L)   Free T4 0.71 - 1.51 ng/dL 0.90 1.01   (L): Data is abnormally low    ASSESSMENT/PLAN:  Subclinical hyperthyroidism    TFTs are wnl. Continue methimazole.     Discussed alt treatment for hyperthyroidism including VARELA or an elective thyroidectomy. Pt elects to stay on methimazole.  Goiter-no obstructive symptoms. See # 1. Thyroid u/s.  Anemia-low rbc. Low before starting methimazole. Referral to hem.     FOLLOWUP  Referral to hematology  Thyroid u/s  F/u in 6 mths w/ labs prior  TSH, Ft4,

## 2023-10-25 ENCOUNTER — TELEPHONE (OUTPATIENT)
Dept: HEMATOLOGY/ONCOLOGY | Facility: CLINIC | Age: 63
End: 2023-10-25
Payer: MEDICARE

## 2023-10-26 ENCOUNTER — TELEPHONE (OUTPATIENT)
Dept: HEMATOLOGY/ONCOLOGY | Facility: CLINIC | Age: 63
End: 2023-10-26
Payer: MEDICARE

## 2023-10-26 NOTE — TELEPHONE ENCOUNTER
----- Message from Wendy Cruz MA sent at 10/26/2023  4:25 PM CDT -----  Regarding: FW: Added to the waiting list  MESSAGE FORWARDED: Added to the waiting list  ----- Message -----  From: Nikky Townsend, Patient Care Assistant  Sent: 10/26/2023   2:00 PM CDT  To: [facility]  Pool  Subject: colten                                            Type: Needs Medical Advice  Who Called:  Kathryn  Best Call Back Number: [phone]    Additional Information:Kathryn would like a sooner appointment with colten CARRIZALES sooner than 12/4 , please call to further discuss, thank you

## 2023-10-26 NOTE — NURSING
Patient aware that 12/4 is the earliest date available, she has been added to wait list.  Patient verbalized understanding

## 2023-12-04 ENCOUNTER — OFFICE VISIT (OUTPATIENT)
Dept: HEMATOLOGY/ONCOLOGY | Facility: CLINIC | Age: 63
End: 2023-12-04
Payer: MEDICARE

## 2023-12-04 ENCOUNTER — TELEPHONE (OUTPATIENT)
Dept: HEMATOLOGY/ONCOLOGY | Facility: CLINIC | Age: 63
End: 2023-12-04
Payer: MEDICARE

## 2023-12-04 ENCOUNTER — LAB VISIT (OUTPATIENT)
Dept: LAB | Facility: HOSPITAL | Age: 63
End: 2023-12-04
Attending: INTERNAL MEDICINE
Payer: MEDICARE

## 2023-12-04 VITALS
WEIGHT: 128.06 LBS | BODY MASS INDEX: 25.14 KG/M2 | HEART RATE: 82 BPM | TEMPERATURE: 98 F | SYSTOLIC BLOOD PRESSURE: 118 MMHG | RESPIRATION RATE: 14 BRPM | OXYGEN SATURATION: 98 % | DIASTOLIC BLOOD PRESSURE: 78 MMHG | HEIGHT: 60 IN

## 2023-12-04 DIAGNOSIS — K14.0 GLOSSITIS: ICD-10-CM

## 2023-12-04 DIAGNOSIS — E55.9 VITAMIN D DEFICIENCY: ICD-10-CM

## 2023-12-04 DIAGNOSIS — D72.10 EOSINOPHILIA, UNSPECIFIED TYPE: ICD-10-CM

## 2023-12-04 DIAGNOSIS — R21 SKIN RASH: ICD-10-CM

## 2023-12-04 DIAGNOSIS — D53.9 NUTRITIONAL ANEMIA, UNSPECIFIED: ICD-10-CM

## 2023-12-04 DIAGNOSIS — D53.9 NUTRITIONAL ANEMIA, UNSPECIFIED: Primary | ICD-10-CM

## 2023-12-04 DIAGNOSIS — D64.9 ANEMIA, UNSPECIFIED TYPE: ICD-10-CM

## 2023-12-04 DIAGNOSIS — D64.9 ANEMIA, UNSPECIFIED TYPE: Primary | ICD-10-CM

## 2023-12-04 DIAGNOSIS — Z90.3 H/O GASTRIC SLEEVE: ICD-10-CM

## 2023-12-04 LAB
25(OH)D3+25(OH)D2 SERPL-MCNC: 30 NG/ML (ref 30–96)
BASOPHILS # BLD AUTO: 0.03 K/UL (ref 0–0.2)
BASOPHILS NFR BLD: 0.4 % (ref 0–1.9)
DIFFERENTIAL METHOD: ABNORMAL
EOSINOPHIL # BLD AUTO: 1.3 K/UL (ref 0–0.5)
EOSINOPHIL NFR BLD: 17.6 % (ref 0–8)
ERYTHROCYTE [DISTWIDTH] IN BLOOD BY AUTOMATED COUNT: 13.4 % (ref 11.5–14.5)
FOLATE SERPL-MCNC: 12.2 NG/ML (ref 4–24)
HAPTOGLOB SERPL-MCNC: 133 MG/DL (ref 30–250)
HCT VFR BLD AUTO: 34.4 % (ref 37–48.5)
HGB BLD-MCNC: 11.4 G/DL (ref 12–16)
IMM GRANULOCYTES # BLD AUTO: 0.01 K/UL (ref 0–0.04)
IMM GRANULOCYTES NFR BLD AUTO: 0.1 % (ref 0–0.5)
LDH SERPL L TO P-CCNC: 166 U/L (ref 110–260)
LYMPHOCYTES # BLD AUTO: 1.9 K/UL (ref 1–4.8)
LYMPHOCYTES NFR BLD: 26.3 % (ref 18–48)
MCH RBC QN AUTO: 29.5 PG (ref 27–31)
MCHC RBC AUTO-ENTMCNC: 33.1 G/DL (ref 32–36)
MCV RBC AUTO: 89 FL (ref 82–98)
MONOCYTES # BLD AUTO: 0.5 K/UL (ref 0.3–1)
MONOCYTES NFR BLD: 6.7 % (ref 4–15)
NEUTROPHILS # BLD AUTO: 3.6 K/UL (ref 1.8–7.7)
NEUTROPHILS NFR BLD: 48.9 % (ref 38–73)
NRBC BLD-RTO: 0 /100 WBC
PLATELET # BLD AUTO: 239 K/UL (ref 150–450)
PMV BLD AUTO: 11.9 FL (ref 9.2–12.9)
RBC # BLD AUTO: 3.86 M/UL (ref 4–5.4)
RETICS/RBC NFR AUTO: 1.4 % (ref 0.5–2.5)
URATE SERPL-MCNC: 2.8 MG/DL (ref 2.4–5.7)
URATE SERPL-MCNC: 2.8 MG/DL (ref 2.4–5.7)
WBC # BLD AUTO: 7.31 K/UL (ref 3.9–12.7)

## 2023-12-04 PROCEDURE — 86334 IMMUNOFIX E-PHORESIS SERUM: CPT | Mod: 26,,, | Performed by: PATHOLOGY

## 2023-12-04 PROCEDURE — 3008F BODY MASS INDEX DOCD: CPT | Mod: CPTII,S$GLB,, | Performed by: INTERNAL MEDICINE

## 2023-12-04 PROCEDURE — 86334 IMMUNOFIX E-PHORESIS SERUM: CPT | Performed by: INTERNAL MEDICINE

## 2023-12-04 PROCEDURE — 84165 PATHOLOGIST INTERPRETATION SPE: ICD-10-PCS | Mod: 26,,, | Performed by: PATHOLOGY

## 2023-12-04 PROCEDURE — 3008F PR BODY MASS INDEX (BMI) DOCUMENTED: ICD-10-PCS | Mod: CPTII,S$GLB,, | Performed by: INTERNAL MEDICINE

## 2023-12-04 PROCEDURE — 84550 ASSAY OF BLOOD/URIC ACID: CPT | Mod: PN | Performed by: INTERNAL MEDICINE

## 2023-12-04 PROCEDURE — 99999 PR PBB SHADOW E&M-EST. PATIENT-LVL IV: CPT | Mod: PBBFAC,,, | Performed by: INTERNAL MEDICINE

## 2023-12-04 PROCEDURE — 86334 PATHOLOGIST INTERPRETATION IFE: ICD-10-PCS | Mod: 26,,, | Performed by: PATHOLOGY

## 2023-12-04 PROCEDURE — 99205 PR OFFICE/OUTPT VISIT, NEW, LEVL V, 60-74 MIN: ICD-10-PCS | Mod: S$GLB,,, | Performed by: INTERNAL MEDICINE

## 2023-12-04 PROCEDURE — 84590 ASSAY OF VITAMIN A: CPT | Performed by: INTERNAL MEDICINE

## 2023-12-04 PROCEDURE — 3074F SYST BP LT 130 MM HG: CPT | Mod: CPTII,S$GLB,, | Performed by: INTERNAL MEDICINE

## 2023-12-04 PROCEDURE — 82525 ASSAY OF COPPER: CPT | Performed by: INTERNAL MEDICINE

## 2023-12-04 PROCEDURE — 85045 AUTOMATED RETICULOCYTE COUNT: CPT | Mod: PN | Performed by: INTERNAL MEDICINE

## 2023-12-04 PROCEDURE — 85060 PATHOLOGIST REVIEW: ICD-10-PCS | Mod: ,,, | Performed by: PATHOLOGY

## 2023-12-04 PROCEDURE — 84165 PROTEIN E-PHORESIS SERUM: CPT | Mod: 26,,, | Performed by: PATHOLOGY

## 2023-12-04 PROCEDURE — 84252 ASSAY OF VITAMIN B-2: CPT | Performed by: INTERNAL MEDICINE

## 2023-12-04 PROCEDURE — 85025 COMPLETE CBC W/AUTO DIFF WBC: CPT | Performed by: INTERNAL MEDICINE

## 2023-12-04 PROCEDURE — 84207 ASSAY OF VITAMIN B-6: CPT | Performed by: INTERNAL MEDICINE

## 2023-12-04 PROCEDURE — 3078F DIAST BP <80 MM HG: CPT | Mod: CPTII,S$GLB,, | Performed by: INTERNAL MEDICINE

## 2023-12-04 PROCEDURE — 82746 ASSAY OF FOLIC ACID SERUM: CPT | Performed by: INTERNAL MEDICINE

## 2023-12-04 PROCEDURE — 83615 LACTATE (LD) (LDH) ENZYME: CPT | Mod: PN | Performed by: INTERNAL MEDICINE

## 2023-12-04 PROCEDURE — 99999 PR PBB SHADOW E&M-EST. PATIENT-LVL IV: ICD-10-PCS | Mod: PBBFAC,,, | Performed by: INTERNAL MEDICINE

## 2023-12-04 PROCEDURE — 3074F PR MOST RECENT SYSTOLIC BLOOD PRESSURE < 130 MM HG: ICD-10-PCS | Mod: CPTII,S$GLB,, | Performed by: INTERNAL MEDICINE

## 2023-12-04 PROCEDURE — 82306 VITAMIN D 25 HYDROXY: CPT | Performed by: INTERNAL MEDICINE

## 2023-12-04 PROCEDURE — 82607 VITAMIN B-12: CPT | Performed by: INTERNAL MEDICINE

## 2023-12-04 PROCEDURE — 1159F MED LIST DOCD IN RCRD: CPT | Mod: CPTII,S$GLB,, | Performed by: INTERNAL MEDICINE

## 2023-12-04 PROCEDURE — 99205 OFFICE O/P NEW HI 60 MIN: CPT | Mod: S$GLB,,, | Performed by: INTERNAL MEDICINE

## 2023-12-04 PROCEDURE — 3078F PR MOST RECENT DIASTOLIC BLOOD PRESSURE < 80 MM HG: ICD-10-PCS | Mod: CPTII,S$GLB,, | Performed by: INTERNAL MEDICINE

## 2023-12-04 PROCEDURE — 85060 BLOOD SMEAR INTERPRETATION: CPT | Mod: ,,, | Performed by: PATHOLOGY

## 2023-12-04 PROCEDURE — 83010 ASSAY OF HAPTOGLOBIN QUANT: CPT | Performed by: INTERNAL MEDICINE

## 2023-12-04 PROCEDURE — 84165 PROTEIN E-PHORESIS SERUM: CPT | Performed by: INTERNAL MEDICINE

## 2023-12-04 PROCEDURE — 84591 ASSAY OF NOS VITAMIN: CPT | Performed by: INTERNAL MEDICINE

## 2023-12-04 PROCEDURE — 36415 COLL VENOUS BLD VENIPUNCTURE: CPT | Mod: PN | Performed by: INTERNAL MEDICINE

## 2023-12-04 PROCEDURE — 83521 IG LIGHT CHAINS FREE EACH: CPT | Mod: 59 | Performed by: INTERNAL MEDICINE

## 2023-12-04 PROCEDURE — 1159F PR MEDICATION LIST DOCUMENTED IN MEDICAL RECORD: ICD-10-PCS | Mod: CPTII,S$GLB,, | Performed by: INTERNAL MEDICINE

## 2023-12-04 NOTE — PROGRESS NOTES
PATIENT: Kathryn Wagner  MRN: [id number]  DATE: 12/4/2023      Diagnosis:   1. Anemia, unspecified type    2. Nutritional anemia, unspecified    3. Eosinophilia, unspecified type    4. Skin rash    5. Glossitis    6. H/O gastric sleeve        Chief Complaint: Anemia (New pt' referred for iron deficiency anemia )      Oncologic History:      Oncologic History     Oncologic Treatment     Pathology           Subjective:    Initial History: Ms. Wagner is a 63 y.o. female with Depression, GERD, HLD, HTN, Obesity, RLS who presents for anemia.  PT has had a persistent normocytic anemia since at least 3/07/22 with absolute eosinophilia.  The patient states she had a gastric sleeve 3 years ago.  She endorses blisters on her face, chest and upper back for some time.  She states she discuss this with her dermatologist whom recommended the patient see a hematologist.  The patient also endorses recent swelling and inflammation of her tongue which has subsequently improved.  She denied any white patches on her tongue.  The patient endorses fatigue, cold intolerance, occasional right upper quadrant pain, arthritis in her hips and knees and occasional swollen lymph nodes in the right side of her neck.  The patient denies CP, cough, SOB, nausea, vomiting, constipation, diarrhea.  The patient denies fever, chills, night sweats, weight loss, easy bruising or bleeding.    Past Medical History:   Past Medical History:   Diagnosis Date    Class 2 obesity due to excess calories in adult 3/4/2015    Colon polyp     Depression     Diverticulosis     Encounter for blood transfusion     GERD (gastroesophageal reflux disease)     High cholesterol     Hypertension     No longer taking medication since weight loss    Lumbar spondylosis 03/04/2015    L3-4 fusion; L5S1 fusion    Obesity     Restless leg syndrome        Past Surgical HIstory:   Past Surgical History:   Procedure Laterality Date    BACK SURGERY Bilateral     June 2015, dec31 2015 fusion     BELT ABDOMINOPLASTY      BREAST SURGERY      breast reduction    CARPAL TUNNEL RELEASE Left      SECTION      x3    CHOLECYSTECTOMY      COLONOSCOPY  2012    in procedures: diverticulosis, 2 colon polyps removed, repeat in 5 years for surveillance; biopsy: sigmoid-hyperplastic polyps.    COLONOSCOPY N/A 2022    Procedure: COLONOSCOPY;  Surgeon: Ilia Bella Jr., MD;  Location: [facility];  Service: Endoscopy;  Laterality: N/A; Repeat colonoscopy in 5 years for surveillance    COSMETIC SURGERY      ESOPHAGOGASTRODUODENOSCOPY N/A 2020    Procedure: ESOPHAGOGASTRODUODENOSCOPY (EGD);  Surgeon: Gianni Mariscal MD;  Location: [facility] (4TH FLR);  Service: Endoscopy;  Laterality: N/A; small hiatal hernia, gastritis; biopsy: stomach- unremarkable    ESOPHAGOGASTRODUODENOSCOPY N/A 2022    Procedure: EGD (ESOPHAGOGASTRODUODENOSCOPY);  Surgeon: Ilia Bella Jr., MD;  Location: [facility];  Service: Endoscopy;  Laterality: N/A;    HYSTERECTOMY      partial    INCISION AND DRAINAGE OF ABSCESS N/A 2021    Procedure: INCISION AND DRAINAGE, ABSCESS;  Surgeon: Danielito Wong MD;  Location: [facility];  Service: General;  Laterality: N/A;    LAPAROSCOPIC CHOLECYSTECTOMY N/A 2021    Procedure: CHOLECYSTECTOMY, LAPAROSCOPIC;  Surgeon: Danielito Wong MD;  Location: [facility];  Service: General;  Laterality: N/A;    LAPAROSCOPIC SLEEVE GASTRECTOMY N/A 2020    Procedure: GASTRECTOMY, SLEEVE, LAPAROSCOPIC, with intra op EGD, needs to be first case in the AM;  Surgeon: Yuni Chaudhari MD;  Location: [facility] OR 2ND FLR;  Service: General;  Laterality: N/A;    TOTAL REDUCTION MAMMOPLASTY      TUBAL LIGATION         Family History:   Family History   Problem Relation Age of Onset    No Known Problems Mother     Hypertension Father     No Known Problems Sister     Skin cancer Brother     No Known Problems Maternal Aunt     No Known Problems Maternal Uncle     No Known Problems  Paternal Aunt     No Known Problems Paternal Uncle     No Known Problems Maternal Grandmother     No Known Problems Maternal Grandfather     No Known Problems Paternal Grandmother     No Known Problems Paternal Grandfather     Amblyopia Neg Hx     Blindness Neg Hx     Cancer Neg Hx     Cataracts Neg Hx     Diabetes Neg Hx     Glaucoma Neg Hx     Macular degeneration Neg Hx     Retinal detachment Neg Hx     Strabismus Neg Hx     Stroke Neg Hx     Thyroid disease Neg Hx     Colon cancer Neg Hx     Crohn's disease Neg Hx     Esophageal cancer Neg Hx     Stomach cancer Neg Hx     Ulcerative colitis Neg Hx        Social History:  reports that she has been smoking vaping with nicotine and cigarettes. She has never used smokeless tobacco. She reports current alcohol use. She reports current drug use. Drug: Oxycodone.    Allergies:  Review of patient's allergies indicates:   Allergen Reactions    Contrast media Hives    Iodine and iodide containing products Hives and Itching    Latex, natural rubber Rash       Medications:  Current Outpatient Medications   Medication Sig Dispense Refill    EScitalopram oxalate (LEXAPRO) 20 MG tablet Take 1 tablet (20 mg total) by mouth every evening. 90 tablet 3    esomeprazole (NEXIUM) 40 MG capsule TAKE 1 CAPSULE BY MOUTH BEFORE BREAKFAST 30 capsule 3    methIMAzole (TAPAZOLE) 10 MG Tab Take 1 tablet (10 mg total) by mouth once daily. 30 tablet 11    naloxone (NARCAN) 4 mg/actuation Spry Narcan 4 mg/actuation nasal spray      oxyCODONE-acetaminophen (PERCOCET) 7.5-325 mg per tablet Take 1 tablet by mouth every 6 (six) hours as needed.      traZODone (DESYREL) 50 MG tablet Take 1.5 tablets (75 mg total) by mouth every evening. (Patient taking differently: Take 50 mg by mouth every evening.) 135 tablet 2    diclofenac sodium (VOLTAREN) 1 % Gel Apply 2 g topically 4 (four) times daily.       No current facility-administered medications for this visit.       Review of Systems    Constitutional:  Positive for fatigue. Negative for appetite change, chills, diaphoresis, fever and unexpected weight change.   HENT:  Negative for sore throat and trouble swallowing.         Painful and swollen tongue   Eyes:  Negative for photophobia, pain and visual disturbance.        Worsened with time   Respiratory:  Negative for cough, chest tightness and shortness of breath.    Cardiovascular:  Negative for chest pain, palpitations and leg swelling.   Gastrointestinal:  Positive for abdominal pain (RUQ). Negative for constipation, diarrhea, nausea and vomiting.   Endocrine: Positive for cold intolerance.   Genitourinary:  Negative for difficulty urinating and dysuria.   Musculoskeletal:  Positive for arthralgias (hips and knees). Negative for back pain.   Skin:  Positive for rash. Negative for color change.   Neurological:  Negative for dizziness, weakness, light-headedness, numbness and headaches.   Hematological:  Positive for adenopathy (neck). Does not bruise/bleed easily.       ECOG Performance Status: 1   Objective:      Vitals:   Vitals:    12/04/23 1316   BP: 118/78   BP Location: Left arm   Patient Position: Sitting   BP Method: Medium (Manual)   Pulse: 82   Resp: 14   Temp: 97.8 °F (36.6 °C)   TempSrc: Temporal   SpO2: 98%   Weight: 58.1 kg (128 lb 1.4 oz)   Height: 5' (1.524 m)       Physical Exam  Constitutional:       General: She is not in acute distress.     Appearance: She is well-developed. She is not diaphoretic.   HENT:      Head: Normocephalic and atraumatic.   Eyes:      General: No scleral icterus.        Right eye: No discharge.         Left eye: No discharge.   Cardiovascular:      Rate and Rhythm: Normal rate and regular rhythm.      Heart sounds: Normal heart sounds. No murmur heard.     No friction rub. No gallop.   Pulmonary:      Effort: Pulmonary effort is normal. No respiratory distress.      Breath sounds: Normal breath sounds. No wheezing or rales.   Chest:      Chest wall:  No tenderness.   Abdominal:      General: Bowel sounds are normal. There is no distension.      Palpations: Abdomen is soft. There is no mass.      Tenderness: There is no abdominal tenderness. There is no guarding or rebound.   Musculoskeletal:         General: No tenderness. Normal range of motion.   Lymphadenopathy:      Cervical: No cervical adenopathy.      Upper Body:      Right upper body: No supraclavicular or axillary adenopathy.      Left upper body: No supraclavicular or axillary adenopathy.   Skin:     Findings: Rash (Pt with multiple clean based blisters on face, upper back and upper chest) present. No erythema.   Neurological:      Mental Status: She is alert and oriented to person, place, and time.   Psychiatric:         Behavior: Behavior normal.         Laboratory Data:  Lab Visit on 12/04/2023   Component Date Value Ref Range Status    Protein, Serum 12/04/2023 6.8  6.0 - 8.4 g/dL Final    Comment: Serum protein electrophoresis and immunofixation results should be   interpreted in clinical context in that some therapeutic agents can   result   in false positive results (example, daratumumab). Correlation with   the   patient s therapeutic regimen is required.      LD 12/04/2023 166  110 - 260 U/L Final    Results are increased in hemolyzed samples.    Uric Acid 12/04/2023 2.8  2.4 - 5.7 mg/dL Final    Retic 12/04/2023 1.4  0.5 - 2.5 % Final    Uric Acid 12/04/2023 2.8  2.4 - 5.7 mg/dL Final    WBC 12/04/2023 7.31  3.90 - 12.70 K/uL Final    RBC 12/04/2023 3.86 (L)  4.00 - 5.40 M/uL Final    Hemoglobin 12/04/2023 11.4 (L)  12.0 - 16.0 g/dL Final    Hematocrit 12/04/2023 34.4 (L)  37.0 - 48.5 % Final    MCV 12/04/2023 89  82 - 98 fL Final    MCH 12/04/2023 29.5  27.0 - 31.0 pg Final    MCHC 12/04/2023 33.1  32.0 - 36.0 g/dL Final    RDW 12/04/2023 13.4  11.5 - 14.5 % Final    Platelets 12/04/2023 239  150 - 450 K/uL Final    MPV 12/04/2023 11.9  9.2 - 12.9 fL Final    Immature Granulocytes 12/04/2023  0.1  0.0 - 0.5 % Final    Gran # (ANC) 12/04/2023 3.6  1.8 - 7.7 K/uL Final    Immature Grans (Abs) 12/04/2023 0.01  0.00 - 0.04 K/uL Final    Comment: Mild elevation in immature granulocytes is non specific and   can be seen in a variety of conditions including stress response,   acute inflammation, trauma and pregnancy. Correlation with other   laboratory and clinical findings is essential.      Lymph # 12/04/2023 1.9  1.0 - 4.8 K/uL Final    Mono # 12/04/2023 0.5  0.3 - 1.0 K/uL Final    Eos # 12/04/2023 1.3 (H)  0.0 - 0.5 K/uL Final    Baso # 12/04/2023 0.03  0.00 - 0.20 K/uL Final    nRBC 12/04/2023 0  0 /100 WBC Final    Gran % 12/04/2023 48.9  38.0 - 73.0 % Final    Lymph % 12/04/2023 26.3  18.0 - 48.0 % Final    Mono % 12/04/2023 6.7  4.0 - 15.0 % Final    Eosinophil % 12/04/2023 17.6 (H)  0.0 - 8.0 % Final    Basophil % 12/04/2023 0.4  0.0 - 1.9 % Final    Differential Method 12/04/2023 Automated   Final    Haptoglobin 12/04/2023 133  30 - 250 mg/dL Final    Vit D, 25-Hydroxy 12/04/2023 30  30 - 96 ng/mL Final    Comment: Vitamin D deficiency.........<10 ng/mL                              Vitamin D insufficiency......10-29 ng/mL       Vitamin D sufficiency........> or equal to 30 ng/mL  Vitamin D toxicity............>100 ng/mL           Imaging:     Reviewed        Assessment:       1. Anemia, unspecified type    2. Nutritional anemia, unspecified    3. Eosinophilia, unspecified type    4. Skin rash    5. Glossitis    6. H/O gastric sleeve           Plan:     Normocytic Anemia -  PT has had a persistent normocytic anemia since at least 3/07/22 with absolute eosinophilia  -Will check b12, folate, SPEP, MISTY, FLC, LDH, uric acid, haptoglobin, pathology review, serum copper    Eosinophilia - unclear as to etiology  -Will consider BM biopsy and full body scans if lab work is not revealing as to the cause of the pt's anemia    Skin Rash - unclear etiology  -Pt encouraged to have biopsy done with  dermatologist    Glossitis - will check B6, B2, B12    Gastric Sleeve - Pt at risk for vitamin deficiencies  -Will check vitamin A and B vitamins as above    Route Chart for Scheduling    Med Onc Chart Routing      Follow up with physician 2 weeks. Pt needs labs today with appt with me in 2 weeks.   Follow up with [facility]    Infusion scheduling note    Injection scheduling note    Labs    Imaging    Pharmacy appointment    Other referrals                     Michael Parrish MD  [facility]  Hematology and Oncology  [facility]   [address] [place] [address]   O: [phone]  F: [phone]

## 2023-12-04 NOTE — TELEPHONE ENCOUNTER
Noted    ----- Message from Mary Gimenez sent at 12/4/2023  1:02 PM CST -----  Type: Need Medical Advice  Who Called:Patient  Best callback number: [phone]  Additional Information: Patient in traffic will arrive in about 10min  Please call to further assist, Thanks.

## 2023-12-05 LAB
ALBUMIN SERPL ELPH-MCNC: 4.14 G/DL (ref 3.35–5.55)
ALPHA1 GLOB SERPL ELPH-MCNC: 0.31 G/DL (ref 0.17–0.41)
ALPHA2 GLOB SERPL ELPH-MCNC: 0.74 G/DL (ref 0.43–0.99)
B-GLOBULIN SERPL ELPH-MCNC: 0.82 G/DL (ref 0.5–1.1)
GAMMA GLOB SERPL ELPH-MCNC: 0.79 G/DL (ref 0.67–1.58)
INTERPRETATION SERPL IFE-IMP: NORMAL
KAPPA LC SER QL IA: 2.11 MG/DL (ref 0.33–1.94)
KAPPA LC/LAMBDA SER IA: 1.16 (ref 0.26–1.65)
LAMBDA LC SER QL IA: 1.82 MG/DL (ref 0.57–2.63)
PATH REV BLD -IMP: NORMAL
PATH REV BLD -IMP: NORMAL
PATHOLOGIST INTERPRETATION IFE: NORMAL
PATHOLOGIST INTERPRETATION SPE: NORMAL
PROT SERPL-MCNC: 6.8 G/DL (ref 6–8.4)

## 2023-12-06 LAB — VIT B12 SERPL-MCNC: 422 NG/L (ref 180–914)

## 2023-12-08 LAB
COPPER SERPL-MCNC: 1019 UG/L (ref 810–1990)
NIACIN SERPL-MCNC: NORMAL NG/ML
NICOTINAMIDE SERPL-MCNC: NORMAL NG/ML
NICOTINURATE SERPL-MCNC: NORMAL NG/ML

## 2023-12-10 LAB — VIT B2 SERPL-MCNC: 5 MCG/L (ref 1–19)

## 2023-12-11 LAB — PYRIDOXAL SERPL-MCNC: 5 UG/L (ref 5–50)

## 2023-12-12 ENCOUNTER — TELEPHONE (OUTPATIENT)
Dept: HEMATOLOGY/ONCOLOGY | Facility: CLINIC | Age: 63
End: 2023-12-12
Payer: MEDICARE

## 2023-12-12 DIAGNOSIS — D53.9 NUTRITIONAL ANEMIA, UNSPECIFIED: Primary | ICD-10-CM

## 2023-12-12 LAB — VIT A SERPL-MCNC: 45 UG/DL (ref 38–106)

## 2023-12-12 NOTE — TELEPHONE ENCOUNTER
Called and spoke to patient and  scheduled for redraw on 12/19.    ----- Message from Misti Deleon sent at 12/12/2023 12:12 PM CST -----  There was an issue with the Vitamin B3  test ordered on this patient from 12/04/2023.  Unfortunately, the reference lab received a sample that was insufficient in volume (Quantity Not Sufficient;QNS) for testing. The order has been cancelled. You will need to reorder and contact the patient for recollection if clinically indicated.  If there are any questions, please call the Sendout lab at [phone]. [phone].  Anyone in the Sendout lab will be able to assist you.

## 2023-12-20 ENCOUNTER — PATIENT MESSAGE (OUTPATIENT)
Dept: ADMINISTRATIVE | Facility: HOSPITAL | Age: 63
End: 2023-12-20
Payer: MEDICARE

## 2023-12-20 ENCOUNTER — OFFICE VISIT (OUTPATIENT)
Dept: HEMATOLOGY/ONCOLOGY | Facility: CLINIC | Age: 63
End: 2023-12-20
Payer: MEDICARE

## 2023-12-20 ENCOUNTER — PATIENT MESSAGE (OUTPATIENT)
Dept: DERMATOLOGY | Facility: CLINIC | Age: 63
End: 2023-12-20
Payer: MEDICARE

## 2023-12-20 VITALS
SYSTOLIC BLOOD PRESSURE: 112 MMHG | HEIGHT: 60 IN | WEIGHT: 129.63 LBS | HEART RATE: 76 BPM | OXYGEN SATURATION: 98 % | BODY MASS INDEX: 25.45 KG/M2 | DIASTOLIC BLOOD PRESSURE: 66 MMHG | TEMPERATURE: 97 F

## 2023-12-20 DIAGNOSIS — D64.9 ANEMIA, UNSPECIFIED TYPE: Primary | ICD-10-CM

## 2023-12-20 DIAGNOSIS — R21 SKIN RASH: ICD-10-CM

## 2023-12-20 DIAGNOSIS — D72.10 EOSINOPHILIA, UNSPECIFIED TYPE: ICD-10-CM

## 2023-12-20 PROCEDURE — 3008F BODY MASS INDEX DOCD: CPT | Mod: CPTII,S$GLB,, | Performed by: INTERNAL MEDICINE

## 2023-12-20 PROCEDURE — 3074F SYST BP LT 130 MM HG: CPT | Mod: CPTII,S$GLB,, | Performed by: INTERNAL MEDICINE

## 2023-12-20 PROCEDURE — 1159F MED LIST DOCD IN RCRD: CPT | Mod: CPTII,S$GLB,, | Performed by: INTERNAL MEDICINE

## 2023-12-20 PROCEDURE — 99999 PR PBB SHADOW E&M-EST. PATIENT-LVL IV: CPT | Mod: PBBFAC,,, | Performed by: INTERNAL MEDICINE

## 2023-12-20 PROCEDURE — 99214 OFFICE O/P EST MOD 30 MIN: CPT | Mod: S$GLB,,, | Performed by: INTERNAL MEDICINE

## 2023-12-20 PROCEDURE — 99214 PR OFFICE/OUTPT VISIT, EST, LEVL IV, 30-39 MIN: ICD-10-PCS | Mod: S$GLB,,, | Performed by: INTERNAL MEDICINE

## 2023-12-20 PROCEDURE — 99999 PR PBB SHADOW E&M-EST. PATIENT-LVL IV: ICD-10-PCS | Mod: PBBFAC,,, | Performed by: INTERNAL MEDICINE

## 2023-12-20 PROCEDURE — 3078F PR MOST RECENT DIASTOLIC BLOOD PRESSURE < 80 MM HG: ICD-10-PCS | Mod: CPTII,S$GLB,, | Performed by: INTERNAL MEDICINE

## 2023-12-20 PROCEDURE — 3008F PR BODY MASS INDEX (BMI) DOCUMENTED: ICD-10-PCS | Mod: CPTII,S$GLB,, | Performed by: INTERNAL MEDICINE

## 2023-12-20 PROCEDURE — 3078F DIAST BP <80 MM HG: CPT | Mod: CPTII,S$GLB,, | Performed by: INTERNAL MEDICINE

## 2023-12-20 PROCEDURE — 1159F PR MEDICATION LIST DOCUMENTED IN MEDICAL RECORD: ICD-10-PCS | Mod: CPTII,S$GLB,, | Performed by: INTERNAL MEDICINE

## 2023-12-20 PROCEDURE — 3074F PR MOST RECENT SYSTOLIC BLOOD PRESSURE < 130 MM HG: ICD-10-PCS | Mod: CPTII,S$GLB,, | Performed by: INTERNAL MEDICINE

## 2023-12-20 NOTE — PROGRESS NOTES
PATIENT: Kathryn Wagner  MRN: [id number]  DATE: 12/20/2023      Diagnosis:   1. Anemia, unspecified type    2. Skin rash    3. Eosinophilia, unspecified type          Chief Complaint: Anemia (2 week follow up)      Oncologic History:      Oncologic History     Oncologic Treatment     Pathology           Subjective:    Interval History:  Ms. Wagner is a 63 y.o. female with Depression, GERD, HLD, HTN, Obesity, RLS who presents for anemia.  Since the last visit the patient underwent lab work on 12/04/2023 showing no evidence of monoclonal protein on SPEP or MISTY.  Free light chain assay showed mildly elevated kappa light chains, normal lambda light chains, and normal kappa to lambda ratio.  Patient was cream for deficiencies on vitamin-A, D, B2, B6, folate, and B12 with normal results.  Serum copper was normal.  Pathologist review of peripheral smear was unrevealing.      Currently the patient endorses sinus congestion and a cough.  She denies fever, chills, shortness of breath.  She endorses continued rash.  The patient denies CP, SOB, abdominal pain, nausea, vomiting, constipation, diarrhea.  The patient denies fever, chills, night sweats, weight loss, new lumps or bumps, easy bruising or bleeding.    Prior History:  PT has had a persistent normocytic anemia since at least 3/07/22 with absolute eosinophilia.  The patient states she had a gastric sleeve 3 years ago.  She endorses blisters on her face, chest and upper back for some time.  She states she discuss this with her dermatologist whom recommended the patient see a hematologist.    Past Medical History:   Past Medical History:   Diagnosis Date    Class 2 obesity due to excess calories in adult 3/4/2015    Colon polyp     Depression     Diverticulosis     Encounter for blood transfusion     GERD (gastroesophageal reflux disease)     High cholesterol     Hypertension     No longer taking medication since weight loss    Lumbar spondylosis 03/04/2015    L3-4 fusion; L5S1  fusion    Obesity     Restless leg syndrome        Past Surgical HIstory:   Past Surgical History:   Procedure Laterality Date    BACK SURGERY Bilateral     2015, 2015 fusion    BELT ABDOMINOPLASTY      BREAST SURGERY      breast reduction    CARPAL TUNNEL RELEASE Left      SECTION      x3    CHOLECYSTECTOMY      COLONOSCOPY  2012    in procedures: diverticulosis, 2 colon polyps removed, repeat in 5 years for surveillance; biopsy: sigmoid-hyperplastic polyps.    COLONOSCOPY N/A 2022    Procedure: COLONOSCOPY;  Surgeon: Ilia Bella Jr., MD;  Location: [facility];  Service: Endoscopy;  Laterality: N/A; Repeat colonoscopy in 5 years for surveillance    COSMETIC SURGERY      ESOPHAGOGASTRODUODENOSCOPY N/A 2020    Procedure: ESOPHAGOGASTRODUODENOSCOPY (EGD);  Surgeon: Gianni Mariscal MD;  Location: [facility] (4TH FLR);  Service: Endoscopy;  Laterality: N/A; small hiatal hernia, gastritis; biopsy: stomach- unremarkable    ESOPHAGOGASTRODUODENOSCOPY N/A 2022    Procedure: EGD (ESOPHAGOGASTRODUODENOSCOPY);  Surgeon: Ilia Bella Jr., MD;  Location: [facility];  Service: Endoscopy;  Laterality: N/A;    HYSTERECTOMY      partial    INCISION AND DRAINAGE OF ABSCESS N/A 2021    Procedure: INCISION AND DRAINAGE, ABSCESS;  Surgeon: Danielito Wong MD;  Location: [facility];  Service: General;  Laterality: N/A;    LAPAROSCOPIC CHOLECYSTECTOMY N/A 2021    Procedure: CHOLECYSTECTOMY, LAPAROSCOPIC;  Surgeon: Danielito Wong MD;  Location: [facility];  Service: General;  Laterality: N/A;    LAPAROSCOPIC SLEEVE GASTRECTOMY N/A 2020    Procedure: GASTRECTOMY, SLEEVE, LAPAROSCOPIC, with intra op EGD, needs to be first case in the AM;  Surgeon: Yuni Chaudhari MD;  Location: [address];  Service: General;  Laterality: N/A;    TOTAL REDUCTION MAMMOPLASTY      TUBAL LIGATION         Family History:   Family History   Problem Relation Age of Onset    No Known  Problems Mother     Hypertension Father     No Known Problems Sister     Skin cancer Brother     No Known Problems Maternal Aunt     No Known Problems Maternal Uncle     No Known Problems Paternal Aunt     No Known Problems Paternal Uncle     No Known Problems Maternal Grandmother     No Known Problems Maternal Grandfather     No Known Problems Paternal Grandmother     No Known Problems Paternal Grandfather     Amblyopia Neg Hx     Blindness Neg Hx     Cancer Neg Hx     Cataracts Neg Hx     Diabetes Neg Hx     Glaucoma Neg Hx     Macular degeneration Neg Hx     Retinal detachment Neg Hx     Strabismus Neg Hx     Stroke Neg Hx     Thyroid disease Neg Hx     Colon cancer Neg Hx     Crohn's disease Neg Hx     Esophageal cancer Neg Hx     Stomach cancer Neg Hx     Ulcerative colitis Neg Hx        Social History:  reports that she has been smoking vaping with nicotine and cigarettes. She has never used smokeless tobacco. She reports current alcohol use. She reports current drug use. Drug: Oxycodone.    Allergies:  Review of patient's allergies indicates:   Allergen Reactions    Contrast media Hives    Iodine and iodide containing products Hives and Itching    Latex, natural rubber Rash       Medications:  Current Outpatient Medications   Medication Sig Dispense Refill    diclofenac sodium (VOLTAREN) 1 % Gel Apply 2 g topically 4 (four) times daily.      EScitalopram oxalate (LEXAPRO) 20 MG tablet Take 1 tablet (20 mg total) by mouth every evening. 90 tablet 3    esomeprazole (NEXIUM) 40 MG capsule TAKE 1 CAPSULE BY MOUTH BEFORE BREAKFAST 30 capsule 3    methIMAzole (TAPAZOLE) 10 MG Tab Take 1 tablet (10 mg total) by mouth once daily. 30 tablet 11    naloxone (NARCAN) 4 mg/actuation Spry Narcan 4 mg/actuation nasal spray      oxyCODONE-acetaminophen (PERCOCET) 7.5-325 mg per tablet Take 1 tablet by mouth every 6 (six) hours as needed.      traZODone (DESYREL) 50 MG tablet Take 1.5 tablets (75 mg total) by mouth every  evening. (Patient taking differently: Take 50 mg by mouth every evening.) 135 tablet 2     No current facility-administered medications for this visit.       Review of Systems   Constitutional:  Negative for chills, fatigue, fever and unexpected weight change.   HENT:  Positive for congestion.    Respiratory:  Positive for cough. Negative for shortness of breath.    Cardiovascular:  Negative for chest pain and palpitations.   Gastrointestinal:  Negative for abdominal pain, constipation, diarrhea, nausea and vomiting.   Skin:  Positive for rash.   Neurological:  Negative for headaches.   Hematological:  Negative for adenopathy. Does not bruise/bleed easily.       ECOG Performance Status: 1   Objective:      Vitals:   Vitals:    12/20/23 1620   BP: 112/66   BP Location: Left arm   Patient Position: Sitting   BP Method: Medium (Manual)   Pulse: 76   Temp: 97.4 °F (36.3 °C)   TempSrc: Temporal   SpO2: 98%   Weight: 58.8 kg (129 lb 10.1 oz)   Height: 5' (1.524 m)         Physical Exam  Constitutional:       General: She is not in acute distress.     Appearance: She is well-developed. She is not diaphoretic.   HENT:      Head: Normocephalic and atraumatic.   Eyes:      General: No scleral icterus.        Right eye: No discharge.         Left eye: No discharge.   Cardiovascular:      Rate and Rhythm: Normal rate and regular rhythm.      Heart sounds: Normal heart sounds. No murmur heard.     No friction rub. No gallop.   Pulmonary:      Effort: Pulmonary effort is normal. No respiratory distress.      Breath sounds: Normal breath sounds. No wheezing or rales.   Chest:      Chest wall: No tenderness.   Abdominal:      General: Bowel sounds are normal. There is no distension.      Palpations: Abdomen is soft. There is no mass.      Tenderness: There is no abdominal tenderness. There is no guarding or rebound.   Musculoskeletal:         General: No tenderness. Normal range of motion.   Lymphadenopathy:      Cervical: No  cervical adenopathy.      Upper Body:      Right upper body: No supraclavicular or axillary adenopathy.      Left upper body: No supraclavicular or axillary adenopathy.   Skin:     Findings: Rash (Pt with multiple clean based blisters on face) present. No erythema.   Neurological:      Mental Status: She is alert and oriented to person, place, and time.   Psychiatric:         Behavior: Behavior normal.         Laboratory Data:  No visits with results within 1 Week(s) from this visit.   Latest known visit with results is:   Lab Visit on 12/04/2023   Component Date Value Ref Range Status    Niacin 12/04/2023 Test Not Performed   Final    Comment: Vitamin B3 and Metabolites, P was cancelled on 12/08/2023   at 08:09; Quantity was not sufficient for testing.    Test Performed by:  [place] [address]  : Delano Dent M.D. Ph.D.; CLIA# [id number]      Nicotinamide 12/04/2023 Test Not Performed   Final    Nicotinic Acid 12/04/2023 Test Not Performed   Final    Vitamin B6 12/04/2023 5  5 - 50 ug/L Final    Comment: This test was developed and the performance   characteristics determined by [facility].   It has not been cleared or approved by the FDA.   The laboratory is regulated under CLIA as qualified to   perform high-complexity testing. This test is used for   patient testing purposes. It should not be regarded   as investigational or for research.    Test performed at [facility],  [address] [place]  [address]     [phone]  Meredith Ellis MD, PhD - Medical Director      Vitamin B2 12/04/2023 5  1 - 19 mcg/L Final    Comment: -------------------ADDITIONAL INFORMATION-------------------  This test was developed and its performance characteristics   determined by [facility] in a manner consistent with CLIA   requirements. This test has not been cleared or approved by   the U.S. Food  and Drug Administration.    Test Performed by:  [facility] [address] [place] [address]  : Delano Dent M.D. Ph.D.; CLIA# [id number]      Vitamin B-12 12/04/2023 422  180 - 914 ng/L Final    Comment: Test Performed by:  [facility] [address] [place] [address]  : Delano Dent M.D. Ph.D.; CLIA# [id number]      Folate 12/04/2023 12.2  4.0 - 24.0 ng/mL Final    Protein, Serum 12/04/2023 6.8  6.0 - 8.4 g/dL Final    Comment: Serum protein electrophoresis and immunofixation results should be   interpreted in clinical context in that some therapeutic agents can   result   in false positive results (example, daratumumab). Correlation with   the   patient s therapeutic regimen is required.      Albumin 12/04/2023 4.14  3.35 - 5.55 g/dL Final    Alpha-1 12/04/2023 0.31  0.17 - 0.41 g/dL Final    Alpha-2 12/04/2023 0.74  0.43 - 0.99 g/dL Final    Beta 12/04/2023 0.82  0.50 - 1.10 g/dL Final    Gamma 12/04/2023 0.79  0.67 - 1.58 g/dL Final    Immunofix Interp. 12/04/2023 SEE COMMENT   Final    Comment: Serum protein electrophoresis and immunofixation results should be   interpreted in clinical context in that some therapeutic agents can   result   in false positive results (example, daratumumab). Correlation with   the   patient s therapeutic regimen is required.  See pathologist's interpretation.      [place] Free Light Chains 12/04/2023 2.11 (H)  0.33 - 1.94 mg/dL Final    Lambda Free Light Chains 12/04/2023 1.82  0.57 - 2.63 mg/dL Final    Kappa/Lambda FLC Ratio 12/04/2023 1.16  0.26 - 1.65 Final    Comment: Undetected antigen excess is a rare event but cannot   be excluded. If these free light chain results do not   agree with other clinical or laboratory findings or   if the sample is from a patient that has previously   demonstrated antigen excess, discuss with the testing    laboratory.   Results should always be interpreted in conjunction   with other laboratory tests and clinical evidence.      LD 12/04/2023 166  110 - 260 U/L Final    Results are increased in hemolyzed samples.    Uric Acid 12/04/2023 2.8  2.4 - 5.7 mg/dL Final    Retic 12/04/2023 1.4  0.5 - 2.5 % Final    Uric Acid 12/04/2023 2.8  2.4 - 5.7 mg/dL Final    WBC 12/04/2023 7.31  3.90 - 12.70 K/uL Final    RBC 12/04/2023 3.86 (L)  4.00 - 5.40 M/uL Final    Hemoglobin 12/04/2023 11.4 (L)  12.0 - 16.0 g/dL Final    Hematocrit 12/04/2023 34.4 (L)  37.0 - 48.5 % Final    MCV 12/04/2023 89  82 - 98 fL Final    MCH 12/04/2023 29.5  27.0 - 31.0 pg Final    MCHC 12/04/2023 33.1  32.0 - 36.0 g/dL Final    RDW 12/04/2023 13.4  11.5 - 14.5 % Final    Platelets 12/04/2023 239  150 - 450 K/uL Final    MPV 12/04/2023 11.9  9.2 - 12.9 fL Final    Immature Granulocytes 12/04/2023 0.1  0.0 - 0.5 % Final    Gran # (ANC) 12/04/2023 3.6  1.8 - 7.7 K/uL Final    Immature Grans (Abs) 12/04/2023 0.01  0.00 - 0.04 K/uL Final    Comment: Mild elevation in immature granulocytes is non specific and   can be seen in a variety of conditions including stress response,   acute inflammation, trauma and pregnancy. Correlation with other   laboratory and clinical findings is essential.      Lymph # 12/04/2023 1.9  1.0 - 4.8 K/uL Final    Mono # 12/04/2023 0.5  0.3 - 1.0 K/uL Final    Eos # 12/04/2023 1.3 (H)  0.0 - 0.5 K/uL Final    Baso # 12/04/2023 0.03  0.00 - 0.20 K/uL Final    nRBC 12/04/2023 0  0 /100 WBC Final    Gran % 12/04/2023 48.9  38.0 - 73.0 % Final    Lymph % 12/04/2023 26.3  18.0 - 48.0 % Final    Mono % 12/04/2023 6.7  4.0 - 15.0 % Final    Eosinophil % 12/04/2023 17.6 (H)  0.0 - 8.0 % Final    Basophil % 12/04/2023 0.4  0.0 - 1.9 % Final    Differential Method 12/04/2023 Automated   Final    Pathologist Review 12/04/2023 Review completed   Final    Haptoglobin 12/04/2023 133  30 - 250 mg/dL Final    Copper 12/04/2023 1019  810 - 1990  ug/L Final    Comment: Copper values may be elevated to twice the normal   levels in pregnancy.      Elevated results may be due to sample collected in a   non-certified trace element-free tube.     This test was developed and the performance   characteristics determined by [facility].   It has not been cleared or approved by the FDA.   The laboratory is regulated under CLIA as qualified to   perform high-complexity testing. This test is used for   patient testing purposes. It should not be regarded   as investigational or for research.    Test performed at [facility],  [address] [place]  [address]     [phone]  Meredith Ellis MD, PhD - Medical Director      Retinol, serum 12/04/2023 45  38 - 106 ug/dL Final    Comment: This test was developed and the performance   characteristics determined by [facility].   It has not been cleared or approved by the FDA.   The laboratory is regulated under CLIA as qualified to   perform high-complexity testing. This test is used for   patient testing purposes. It should not be regarded   as investigational or for research.    Test performed at [facility],  [address] [place]  [address]     [phone]  Meredith Ellis MD, PhD - Medical Director      Vit D, 25-Hydroxy 12/04/2023 30  30 - 96 ng/mL Final    Comment: Vitamin D deficiency.........<10 ng/mL                              Vitamin D insufficiency......10-29 ng/mL       Vitamin D sufficiency........> or equal to 30 ng/mL  Vitamin D toxicity............>100 ng/mL      Pathologist Review Peripheral Smear 12/04/2023 REVIEWED   Final    Comment:   Electronically reviewed and signed by:  Federico Mayorga MD  Signed on 12/05/23 at 14:08  PATHOLOGIST INTERPRETATION  RBC- Normocytic anemia with mild anisocytosis.  No significant   morphologic abnormalities.   WBC- Normal morphology .  No dysplasia or circulating blasts.   PLT- Normal in number and  morphology. No clumping.       Pathologist Interpretation MISTY 12/04/2023 REVIEWED   Final    Comment:   Electronically reviewed and signed by:  Hetal Velazquez MD  Signed on 12/05/23 at 14:52  No monoclonal peaks identified.      Pathologist Interpretation SPE 12/04/2023 REVIEWED   Final    Comment:   Electronically reviewed and signed by:  Hetal Velazquez MD  Signed on 12/05/23 at 14:52  Normal total protein, normal pattern.           Imaging:     Reviewed        Assessment:       1. Anemia, unspecified type    2. Skin rash    3. Eosinophilia, unspecified type             Plan:     Normocytic Anemia -  PT has had a persistent normocytic anemia since at least 3/07/22 with absolute eosinophilia  -B12, folate, SPEP, MISTY, FLC, LDH, uric acid, haptoglobin, pathology review, serum copper unrevealing  -Will monitor in 3 months    Eosinophilia - unclear as to etiology  -Mild  -Will monitor in 3 months  -BM biopsy and CT C/A/P to be considered if progressive    Skin Rash - unclear etiology  -PT to see dermatology for biopsy    Route Chart for Scheduling    Med Onc Chart Routing      Follow up with physician 3 months. Pt needs an appt with [facility] dermatology ASAP.  Pt needs a CBC, CMP, LDH, and uric acid in 3 months witha  return appt.   Follow up with [facility]    Infusion scheduling note    Injection scheduling note    Labs    Imaging    Pharmacy appointment    Other referrals                   Michael Parrish MD  [facility]  Hematology and Oncology  [facility]   [address] [place] [address]   O: [phone]  F: [phone]

## 2024-02-12 RX ORDER — METHIMAZOLE 10 MG/1
10 TABLET ORAL DAILY
Qty: 30 TABLET | Refills: 0 | Status: SHIPPED | OUTPATIENT
Start: 2024-02-12 | End: 2024-05-03

## 2024-02-19 ENCOUNTER — PATIENT MESSAGE (OUTPATIENT)
Dept: FAMILY MEDICINE | Facility: CLINIC | Age: 64
End: 2024-02-19
Payer: MEDICARE

## 2024-03-14 ENCOUNTER — LAB VISIT (OUTPATIENT)
Dept: PRIMARY CARE CLINIC | Facility: CLINIC | Age: 64
End: 2024-03-14
Payer: MEDICARE

## 2024-03-14 DIAGNOSIS — D64.9 ANEMIA, UNSPECIFIED TYPE: ICD-10-CM

## 2024-03-14 LAB
ALBUMIN SERPL BCP-MCNC: 3.9 G/DL (ref 3.5–5.2)
ALP SERPL-CCNC: 69 U/L (ref 55–135)
ALT SERPL W/O P-5'-P-CCNC: 15 U/L (ref 10–44)
ANION GAP SERPL CALC-SCNC: 11 MMOL/L (ref 8–16)
AST SERPL-CCNC: 21 U/L (ref 10–40)
BASOPHILS # BLD AUTO: 0.05 K/UL (ref 0–0.2)
BASOPHILS NFR BLD: 0.7 % (ref 0–1.9)
BILIRUB SERPL-MCNC: 0.3 MG/DL (ref 0.1–1)
BUN SERPL-MCNC: 15 MG/DL (ref 8–23)
CALCIUM SERPL-MCNC: 9.3 MG/DL (ref 8.7–10.5)
CHLORIDE SERPL-SCNC: 105 MMOL/L (ref 95–110)
CO2 SERPL-SCNC: 25 MMOL/L (ref 23–29)
CREAT SERPL-MCNC: 1 MG/DL (ref 0.5–1.4)
DIFFERENTIAL METHOD BLD: ABNORMAL
EOSINOPHIL # BLD AUTO: 0.7 K/UL (ref 0–0.5)
EOSINOPHIL NFR BLD: 10.1 % (ref 0–8)
ERYTHROCYTE [DISTWIDTH] IN BLOOD BY AUTOMATED COUNT: 12.9 % (ref 11.5–14.5)
EST. GFR  (NO RACE VARIABLE): >60 ML/MIN/1.73 M^2
GLUCOSE SERPL-MCNC: 113 MG/DL (ref 70–110)
HCT VFR BLD AUTO: 37.7 % (ref 37–48.5)
HGB BLD-MCNC: 12 G/DL (ref 12–16)
IMM GRANULOCYTES # BLD AUTO: 0.01 K/UL (ref 0–0.04)
IMM GRANULOCYTES NFR BLD AUTO: 0.1 % (ref 0–0.5)
LDH SERPL L TO P-CCNC: 172 U/L (ref 110–260)
LYMPHOCYTES # BLD AUTO: 1.9 K/UL (ref 1–4.8)
LYMPHOCYTES NFR BLD: 28.1 % (ref 18–48)
MCH RBC QN AUTO: 29.3 PG (ref 27–31)
MCHC RBC AUTO-ENTMCNC: 31.8 G/DL (ref 32–36)
MCV RBC AUTO: 92 FL (ref 82–98)
MONOCYTES # BLD AUTO: 0.3 K/UL (ref 0.3–1)
MONOCYTES NFR BLD: 4.5 % (ref 4–15)
NEUTROPHILS # BLD AUTO: 3.8 K/UL (ref 1.8–7.7)
NEUTROPHILS NFR BLD: 56.5 % (ref 38–73)
NRBC BLD-RTO: 0 /100 WBC
PLATELET # BLD AUTO: 276 K/UL (ref 150–450)
PMV BLD AUTO: 12.6 FL (ref 9.2–12.9)
POTASSIUM SERPL-SCNC: 4.1 MMOL/L (ref 3.5–5.1)
PROT SERPL-MCNC: 6.7 G/DL (ref 6–8.4)
RBC # BLD AUTO: 4.1 M/UL (ref 4–5.4)
SODIUM SERPL-SCNC: 141 MMOL/L (ref 136–145)
URATE SERPL-MCNC: 3.9 MG/DL (ref 2.4–5.7)
WBC # BLD AUTO: 6.7 K/UL (ref 3.9–12.7)

## 2024-03-14 PROCEDURE — 36415 COLL VENOUS BLD VENIPUNCTURE: CPT | Mod: S$GLB,,, | Performed by: INTERNAL MEDICINE

## 2024-03-14 PROCEDURE — 83615 LACTATE (LD) (LDH) ENZYME: CPT | Performed by: INTERNAL MEDICINE

## 2024-03-14 PROCEDURE — 84550 ASSAY OF BLOOD/URIC ACID: CPT | Performed by: INTERNAL MEDICINE

## 2024-03-14 PROCEDURE — 80053 COMPREHEN METABOLIC PANEL: CPT | Performed by: INTERNAL MEDICINE

## 2024-03-14 PROCEDURE — 85025 COMPLETE CBC W/AUTO DIFF WBC: CPT | Performed by: INTERNAL MEDICINE

## 2024-03-14 NOTE — PROGRESS NOTES
Venipuncture performed with 25 gauge butterfly, x's 1 attempt.  Successful venipuncture to R Basilic vein.  Specimens collected per orders.      Pressure dressing applied to site, instructed patient to remove dressing in 10-15 minutes, OK to re-adjust dressing if pressure causing any discomfort, to observe closely for numbness and/or discoloration to hand or fingers, and to notify provider if bleeding persists after applying constant pressure lasting 30 minutes.

## 2024-03-20 ENCOUNTER — TELEPHONE (OUTPATIENT)
Dept: HEMATOLOGY/ONCOLOGY | Facility: CLINIC | Age: 64
End: 2024-03-20
Payer: MEDICARE

## 2024-03-20 NOTE — TELEPHONE ENCOUNTER
Spoke with the pt and the pt stated that she got a phone already. Pt sated that she is scheduled on 03/21. Pt verbalized and understanding.

## 2024-03-21 ENCOUNTER — OFFICE VISIT (OUTPATIENT)
Dept: HEMATOLOGY/ONCOLOGY | Facility: CLINIC | Age: 64
End: 2024-03-21
Payer: MEDICARE

## 2024-03-21 VITALS
OXYGEN SATURATION: 99 % | WEIGHT: 133.63 LBS | BODY MASS INDEX: 26.23 KG/M2 | TEMPERATURE: 97 F | DIASTOLIC BLOOD PRESSURE: 60 MMHG | HEART RATE: 98 BPM | RESPIRATION RATE: 12 BRPM | SYSTOLIC BLOOD PRESSURE: 110 MMHG | HEIGHT: 60 IN

## 2024-03-21 DIAGNOSIS — D64.9 ANEMIA, UNSPECIFIED TYPE: Primary | ICD-10-CM

## 2024-03-21 DIAGNOSIS — R21 SKIN RASH: ICD-10-CM

## 2024-03-21 DIAGNOSIS — D72.10 EOSINOPHILIA, UNSPECIFIED TYPE: ICD-10-CM

## 2024-03-21 DIAGNOSIS — R10.11 RIGHT UPPER QUADRANT ABDOMINAL PAIN: ICD-10-CM

## 2024-03-21 PROCEDURE — 99214 OFFICE O/P EST MOD 30 MIN: CPT | Mod: S$GLB,,, | Performed by: INTERNAL MEDICINE

## 2024-03-21 PROCEDURE — 99999 PR PBB SHADOW E&M-EST. PATIENT-LVL IV: CPT | Mod: PBBFAC,,, | Performed by: INTERNAL MEDICINE

## 2024-03-21 NOTE — PROGRESS NOTES
PATIENT: Kathryn Wagner  MRN: [id number]  DATE: 3/21/2024      Diagnosis:   1. Anemia, unspecified type    2. Eosinophilia, unspecified type    3. Right upper quadrant abdominal pain    4. Skin rash            Chief Complaint: Anemia, unspecified type (3 month follow up )      Oncologic History:      Oncologic History     Oncologic Treatment     Pathology           Subjective:    Interval History:  Ms. Wagner is a 64 y.o. female with Depression, GERD, HLD, HTN, Obesity, RLS who presents for anemia.  Since the last visit the patient states she was not able to see Dermatology.  The patient continues to have a rash on her face.  The patient also endorses persistent right upper quadrant pain.  She denies any nausea, vomiting, diarrhea, or constipation.  The patient denies CP, cough, SOB.  The patient denies fever, chills, night sweats, weight loss, new lumps or bumps, easy bruising or bleeding.    Prior History:  PT has had a persistent normocytic anemia since at least 3/07/22 with absolute eosinophilia.  The patient states she had a gastric sleeve 3 years ago.  She endorses blisters on her face, chest and upper back for some time.  She states she discuss this with her dermatologist whom recommended the patient see a hematologist.   he patient underwent lab work on 12/04/2023 showing no evidence of monoclonal protein on SPEP or MISTY.  Free light chain assay showed mildly elevated kappa light chains, normal lambda light chains, and normal kappa to lambda ratio.  Patient was cream for deficiencies on vitamin-A, D, B2, B6, folate, and B12 with normal results.  Serum copper was normal.  Pathologist review of peripheral smear was unrevealing.      Past Medical History:   Past Medical History:   Diagnosis Date    Class 2 obesity due to excess calories in adult 3/4/2015    Colon polyp     Depression     Diverticulosis     Encounter for blood transfusion     GERD (gastroesophageal reflux disease)     High cholesterol     Hypertension      No longer taking medication since weight loss    Lumbar spondylosis 2015    L3-4 fusion; L5S1 fusion    Obesity     Restless leg syndrome        Past Surgical HIstory:   Past Surgical History:   Procedure Laterality Date    BACK SURGERY Bilateral     2015, 2015 fusion    BELT ABDOMINOPLASTY      BREAST SURGERY      breast reduction    CARPAL TUNNEL RELEASE Left      SECTION      x3    CHOLECYSTECTOMY      COLONOSCOPY  2012    in procedures: diverticulosis, 2 colon polyps removed, repeat in 5 years for surveillance; biopsy: sigmoid-hyperplastic polyps.    COLONOSCOPY N/A 2022    Procedure: COLONOSCOPY;  Surgeon: Ilia Bella Jr., MD;  Location: [facility];  Service: Endoscopy;  Laterality: N/A; Repeat colonoscopy in 5 years for surveillance    COSMETIC SURGERY      ESOPHAGOGASTRODUODENOSCOPY N/A 2020    Procedure: ESOPHAGOGASTRODUODENOSCOPY (EGD);  Surgeon: Gianni Mariscal MD;  Location: [facility] F[facility]);  Service: Endoscopy;  Laterality: N/A; small hiatal hernia, gastritis; biopsy: stomach- unremarkable    ESOPHAGOGASTRODUODENOSCOPY N/A 2022    Procedure: EGD (ESOPHAGOGASTRODUODENOSCOPY);  Surgeon: Ilia Bella Jr., MD;  Location: [facility];  Service: Endoscopy;  Laterality: N/A;    HYSTERECTOMY      partial    INCISION AND DRAINAGE OF ABSCESS N/A 2021    Procedure: INCISION AND DRAINAGE, ABSCESS;  Surgeon: Danielito Wong MD;  Location: [facility];  Service: General;  Laterality: N/A;    LAPAROSCOPIC CHOLECYSTECTOMY N/A 2021    Procedure: CHOLECYSTECTOMY, LAPAROSCOPIC;  Surgeon: Danielito Wong MD;  Location: [facility];  Service: General;  Laterality: N/A;    LAPAROSCOPIC SLEEVE GASTRECTOMY N/A 2020    Procedure: GASTRECTOMY, SLEEVE, LAPAROSCOPIC, with intra op EGD, needs to be first case in the AM;  Surgeon: Yuni Chaudhari MD;  Location: [address];  Service: General;  Laterality: N/A;    TOTAL REDUCTION MAMMOPLASTY       TUBAL LIGATION         Family History:   Family History   Problem Relation Age of Onset    No Known Problems Mother     Hypertension Father     No Known Problems Sister     Skin cancer Brother     No Known Problems Maternal Aunt     No Known Problems Maternal Uncle     No Known Problems Paternal Aunt     No Known Problems Paternal Uncle     No Known Problems Maternal Grandmother     No Known Problems Maternal Grandfather     No Known Problems Paternal Grandmother     No Known Problems Paternal Grandfather     Amblyopia Neg Hx     Blindness Neg Hx     Cancer Neg Hx     Cataracts Neg Hx     Diabetes Neg Hx     Glaucoma Neg Hx     Macular degeneration Neg Hx     Retinal detachment Neg Hx     Strabismus Neg Hx     Stroke Neg Hx     Thyroid disease Neg Hx     Colon cancer Neg Hx     Crohn's disease Neg Hx     Esophageal cancer Neg Hx     Stomach cancer Neg Hx     Ulcerative colitis Neg Hx        Social History:  reports that she has been smoking vaping with nicotine and cigarettes. She has never used smokeless tobacco. She reports current alcohol use. She reports current drug use. Drug: Oxycodone.    Allergies:  Review of patient's allergies indicates:   Allergen Reactions    Contrast media Hives    Iodine and iodide containing products Hives and Itching    Latex, natural rubber Rash       Medications:  Current Outpatient Medications   Medication Sig Dispense Refill    diclofenac sodium (VOLTAREN) 1 % Gel Apply 2 g topically 4 (four) times daily.      EScitalopram oxalate (LEXAPRO) 20 MG tablet Take 1 tablet (20 mg total) by mouth every evening. 90 tablet 3    esomeprazole (NEXIUM) 40 MG capsule TAKE 1 CAPSULE BY MOUTH BEFORE BREAKFAST 30 capsule 3    methIMAzole (TAPAZOLE) 10 MG Tab Take 1 tablet (10 mg total) by mouth once daily. 30 tablet 0    oxyCODONE-acetaminophen (PERCOCET) 7.5-325 mg per tablet Take 1 tablet by mouth every 6 (six) hours as needed.      traZODone (DESYREL) 50 MG tablet Take 1.5 tablets (75 mg  total) by mouth every evening. (Patient taking differently: Take 50 mg by mouth every evening.) 135 tablet 2    naloxone (NARCAN) 4 mg/actuation Spry Narcan 4 mg/actuation nasal spray       No current facility-administered medications for this visit.       Review of Systems   Constitutional:  Negative for chills, fatigue, fever and unexpected weight change.   Respiratory:  Negative for cough and shortness of breath.    Cardiovascular:  Negative for chest pain and palpitations.   Gastrointestinal:  Positive for abdominal pain. Negative for constipation, diarrhea, nausea and vomiting.   Skin:  Negative for rash.   Neurological:  Negative for headaches.   Hematological:  Negative for adenopathy. Does not bruise/bleed easily.       ECOG Performance Status: 1   Objective:      Vitals:   Vitals:    03/21/24 1557   BP: 110/60   BP Location: Left arm   Patient Position: Sitting   BP Method: Medium (Manual)   Pulse: 98   Resp: 12   Temp: 96.8 °F (36 °C)   TempSrc: Temporal   SpO2: 99%   Weight: 60.6 kg (133 lb 9.6 oz)   Height: 5' (1.524 m)           Physical Exam  Constitutional:       General: She is not in acute distress.     Appearance: She is well-developed. She is not diaphoretic.   HENT:      Head: Normocephalic and atraumatic.   Eyes:      General: No scleral icterus.        Right eye: No discharge.         Left eye: No discharge.   Cardiovascular:      Rate and Rhythm: Normal rate and regular rhythm.      Heart sounds: Normal heart sounds. No murmur heard.     No friction rub. No gallop.   Pulmonary:      Effort: Pulmonary effort is normal. No respiratory distress.      Breath sounds: Normal breath sounds. No wheezing or rales.   Chest:      Chest wall: No tenderness.   Abdominal:      General: Bowel sounds are normal. There is no distension.      Palpations: Abdomen is soft. There is no mass.      Tenderness: There is no abdominal tenderness. There is no guarding or rebound.   Musculoskeletal:         General: No  tenderness. Normal range of motion.   Lymphadenopathy:      Cervical: No cervical adenopathy.      Upper Body:      Right upper body: No supraclavicular or axillary adenopathy.      Left upper body: No supraclavicular or axillary adenopathy.   Skin:     Findings: Rash (Pt with multiple clean based blisters on face) present. No erythema.   Neurological:      Mental Status: She is alert and oriented to person, place, and time.   Psychiatric:         Behavior: Behavior normal.         Laboratory Data:  No visits with results within 1 Week(s) from this visit.   Latest known visit with results is:   Lab Visit on 03/14/2024   Component Date Value Ref Range Status    WBC 03/14/2024 6.70  3.90 - 12.70 K/uL Final    RBC 03/14/2024 4.10  4.00 - 5.40 M/uL Final    Hemoglobin 03/14/2024 12.0  12.0 - 16.0 g/dL Final    Hematocrit 03/14/2024 37.7  37.0 - 48.5 % Final    MCV 03/14/2024 92  82 - 98 fL Final    MCH 03/14/2024 29.3  27.0 - 31.0 pg Final    MCHC 03/14/2024 31.8 (L)  32.0 - 36.0 g/dL Final    RDW 03/14/2024 12.9  11.5 - 14.5 % Final    Platelets 03/14/2024 276  150 - 450 K/uL Final    MPV 03/14/2024 12.6  9.2 - 12.9 fL Final    Immature Granulocytes 03/14/2024 0.1  0.0 - 0.5 % Final    Gran # (ANC) 03/14/2024 3.8  1.8 - 7.7 K/uL Final    Immature Grans (Abs) 03/14/2024 0.01  0.00 - 0.04 K/uL Final    Comment: Mild elevation in immature granulocytes is non specific and   can be seen in a variety of conditions including stress response,   acute inflammation, trauma and pregnancy. Correlation with other   laboratory and clinical findings is essential.      Lymph # 03/14/2024 1.9  1.0 - 4.8 K/uL Final    Mono # 03/14/2024 0.3  0.3 - 1.0 K/uL Final    Eos # 03/14/2024 0.7 (H)  0.0 - 0.5 K/uL Final    Baso # 03/14/2024 0.05  0.00 - 0.20 K/uL Final    nRBC 03/14/2024 0  0 /100 WBC Final    Gran % 03/14/2024 56.5  38.0 - 73.0 % Final    Lymph % 03/14/2024 28.1  18.0 - 48.0 % Final    Mono % 03/14/2024 4.5  4.0 - 15.0 % Final     Eosinophil % 03/14/2024 10.1 (H)  0.0 - 8.0 % Final    Basophil % 03/14/2024 0.7  0.0 - 1.9 % Final    Differential Method 03/14/2024 Automated   Final    Sodium 03/14/2024 141  136 - 145 mmol/L Final    Potassium 03/14/2024 4.1  3.5 - 5.1 mmol/L Final    Chloride 03/14/2024 105  95 - 110 mmol/L Final    CO2 03/14/2024 25  23 - 29 mmol/L Final    Glucose 03/14/2024 113 (H)  70 - 110 mg/dL Final    BUN 03/14/2024 15  8 - 23 mg/dL Final    Creatinine 03/14/2024 1.0  0.5 - 1.4 mg/dL Final    Calcium 03/14/2024 9.3  8.7 - 10.5 mg/dL Final    Total Protein 03/14/2024 6.7  6.0 - 8.4 g/dL Final    Albumin 03/14/2024 3.9  3.5 - 5.2 g/dL Final    Total Bilirubin 03/14/2024 0.3  0.1 - 1.0 mg/dL Final    Comment: For infants and newborns, interpretation of results should be based  on gestational age, weight and in agreement with clinical  observations.    Premature Infant recommended reference ranges:  Up to 24 hours.............<8.0 mg/dL  Up to 48 hours............<12.0 mg/dL  3-5 days..................<15.0 mg/dL  6-29 days.................<15.0 mg/dL      Alkaline Phosphatase 03/14/2024 69  55 - 135 U/L Final    AST 03/14/2024 21  10 - 40 U/L Final    ALT 03/14/2024 15  10 - 44 U/L Final    eGFR 03/14/2024 >60.0  >60 mL/min/1.73 m^2 Final    Anion Gap 03/14/2024 11  8 - 16 mmol/L Final    LD 03/14/2024 172  110 - 260 U/L Final    Results are increased in hemolyzed samples.    Uric Acid 03/14/2024 3.9  2.4 - 5.7 mg/dL Final         Imaging:     Reviewed        Assessment:       1. Anemia, unspecified type    2. Eosinophilia, unspecified type    3. Right upper quadrant abdominal pain    4. Skin rash               Plan:     Normocytic Anemia -  PT has had normocytic anemia since at least 3/07/22 with absolute eosinophilia  -B12, folate, SPEP, MISTY, FLC, LDH, uric acid, haptoglobin, pathology review, serum copper unrevealing  -Hemoglobin at lower limit of normal 12g/dL on 3/14/24  -Will monitor    Eosinophilia - unclear as  to etiology  -Mild and stable  -Pt to undergo CT abdomen given abdominal pain    Abdominal Pain - in RUQ  -Unclear etiology but persistent  -Will obtain CT abdomen and consider GI referral if negative    Skin Rash - unclear etiology  -PT to see dermatology for biopsy     Route Chart for Scheduling    Med Onc Chart Routing      Follow up with physician Other. Pt needs a dermatology appt with Dr Knott for skin biopsy.  Pt needs a CT abdomen with return appt with me once completed.   Follow up with [facility]    Infusion scheduling note    Injection scheduling note    Labs    Imaging    Pharmacy appointment    Other referrals                   Michael Parrish MD  [facility]  Hematology and Oncology  [facility]   [address] [place] [address]   O: [phone]  F: [phone]

## 2024-03-22 ENCOUNTER — TELEPHONE (OUTPATIENT)
Dept: HEMATOLOGY/ONCOLOGY | Facility: CLINIC | Age: 64
End: 2024-03-22
Payer: MEDICARE

## 2024-03-25 ENCOUNTER — TELEPHONE (OUTPATIENT)
Dept: DERMATOLOGY | Facility: CLINIC | Age: 64
End: 2024-03-25
Payer: MEDICARE

## 2024-03-25 ENCOUNTER — PATIENT MESSAGE (OUTPATIENT)
Dept: ADMINISTRATIVE | Facility: HOSPITAL | Age: 64
End: 2024-03-25
Payer: MEDICARE

## 2024-03-25 NOTE — TELEPHONE ENCOUNTER
----- Message from Iva Romano LPN sent at 3/23/2024  3:30 AM CDT -----    ----- Message -----  From: Clary Gar MA  Sent: 3/21/2024   4:37 PM CDT  To: #    ther. Pt needs a dermatology appt with Dr Knott for skin biopsy.

## 2024-03-26 ENCOUNTER — TELEPHONE (OUTPATIENT)
Dept: DERMATOLOGY | Facility: CLINIC | Age: 64
End: 2024-03-26
Payer: MEDICARE

## 2024-03-26 ENCOUNTER — OFFICE VISIT (OUTPATIENT)
Dept: DERMATOLOGY | Facility: CLINIC | Age: 64
End: 2024-03-26
Payer: MEDICARE

## 2024-03-26 DIAGNOSIS — L21.9 SEBORRHEIC DERMATITIS: ICD-10-CM

## 2024-03-26 DIAGNOSIS — D48.5 NEOPLASM OF UNCERTAIN BEHAVIOR OF SKIN: ICD-10-CM

## 2024-03-26 DIAGNOSIS — L30.9 CHRONIC DERMATITIS: Primary | ICD-10-CM

## 2024-03-26 PROCEDURE — 88305 TISSUE EXAM BY PATHOLOGIST: CPT | Mod: PO | Performed by: DERMATOLOGY

## 2024-03-26 PROCEDURE — 88312 SPECIAL STAINS GROUP 1: CPT | Mod: PO | Performed by: DERMATOLOGY

## 2024-03-26 PROCEDURE — 11102 TANGNTL BX SKIN SINGLE LES: CPT | Mod: S$GLB,,, | Performed by: DERMATOLOGY

## 2024-03-26 PROCEDURE — 11103 TANGNTL BX SKIN EA SEP/ADDL: CPT | Mod: S$GLB,,, | Performed by: DERMATOLOGY

## 2024-03-26 PROCEDURE — 88305 TISSUE EXAM BY PATHOLOGIST: CPT | Mod: 26,,, | Performed by: DERMATOLOGY

## 2024-03-26 PROCEDURE — 88312 SPECIAL STAINS GROUP 1: CPT | Mod: 26,,, | Performed by: DERMATOLOGY

## 2024-03-26 PROCEDURE — 99202 OFFICE O/P NEW SF 15 MIN: CPT | Mod: 25,S$GLB,, | Performed by: DERMATOLOGY

## 2024-03-26 NOTE — TELEPHONE ENCOUNTER
----- Message from Kellen Mccarthy RN sent at 3/25/2024  3:59 PM CDT -----  Regarding: Referral  Iva sent to me, not sure why.  She would be a new patient.  We do not have any openings.  Perhaps you all can fit her in somewhere.  Hem/Onc Dr Parrish placed referral for Kayla (not sure if Kayla spoke with Dr Parrish?) Pt c/o fluid filled blisters at face.  Thank you!  ----- Message -----  From: Iva Romano LPN  Sent: 3/23/2024   3:30 AM CDT  To: Kellen Mccarthy RN      ----- Message -----  From: Clary Gar MA  Sent: 3/21/2024   4:37 PM CDT  To: #    ther. Pt needs a dermatology appt with Dr Knott for skin biopsy.

## 2024-03-26 NOTE — PROGRESS NOTES
Dermatology Outpatient Clinic Progress Note    Patient Name: Kathryn Wagner  Patient : 1960  MRN: [id number]  Date of Service: 3/26/2024    CC/HPI: Kathryn Wagner is a 64 y.o. year old female with no history of skin cancer.  Patient reports fluid filled blisters on face x6+ years started before taking methimazole, not related to sun exposure or oral medications as best she can tell. Patient reports no pruritus whatsoever, no lesions on the hands. Has tried topical steroids in the past without improvement. No surrounding inflammation or redness. Also reports occasional urticaria she associates with percocet use and is aware this is an adverse effect and not an allergy to the medication.      ROS:   Dermatological ROS: positive for rash    Physical Exam   Head   Head comments: Scattered eroded papules, left preauricular skin with tiny fluid filled vesicle with no surrounding erythema      EENT           Diagram Legend     Erythematous scaling macule/papule c/w actinic keratosis       Vascular papule c/w angioma      Pigmented verrucoid papule/plaque c/w seborrheic keratosis      Yellow umbilicated papule c/w sebaceous hyperplasia      Irregularly shaped tan macule c/w lentigo     1-2 mm smooth white papules consistent with Milia      Movable subcutaneous cyst with punctum c/w epidermal inclusion cyst      Subcutaneous movable cyst c/w pilar cyst      Firm pink to brown papule c/w dermatofibroma      Pedunculated fleshy papule(s) c/w skin tag(s)      Evenly pigmented macule c/w junctional nevus     Mildly variegated pigmented, slightly irregular-bordered macule c/w mildly atypical nevus      Flesh colored to evenly pigmented papule c/w intradermal nevus       Pink pearly papule/plaque c/w basal cell carcinoma      Erythematous hyperkeratotic cursted plaque c/w SCC      Surgical scar with no sign of skin cancer recurrence      Open and closed comedones      Inflammatory papules and pustules      Verrucoid papule  consistent consistent with wart     Erythematous eczematous patches and plaques     Dystrophic onycholytic nail with subungual debris c/w onychomycosis     Umbilicated papule    Erythematous-base heme-crusted tan verrucoid plaque consistent with inflamed seborrheic keratosis     Erythematous Silvery Scaling Plaque c/w Psoriasis     See annotation    Assessment/Plan:    Diagnoses and all orders for this visit:    Diagnoses and all orders for this visit:    Chronic dermatitis       -     Does not take NSAIDS, rash is never pruritic, does not endorse that rash is worsened by sun exposure though possible she hasn't noticed, no lesions on the posterior hands, face only.       -     Pseudoporphyria vs. PCT vs. other  -     Specimen to Pathology, Dermatology  -     One fresh lesion from the left preauricular and one excoriated papule from the right neck sent for histopathology  Shave Biopsy Procedure Note  Risks, benefits, limitations of shave biopsy discussed. Areas cleansed with alcohol, photographs of suspicious lesions taken in [place]. 2 lesion(s) numbed with 1% lidocaine with 1:200,000 epinephrine. Lesions removed with razor surgery and sent for pathology in formalin. Hemostasis achieved with alumuninum chloride. Polysporin and a band-aid applied.  Will call patient with results once available. Patient tolerated procedure well.     Seborrheic dermatitis      - is treating with nizoral shampoo PRN       - rec using 2x/week and using OTC hydrocortisone 1% PRN flare ups    Follow up in 2 weeks        Inderjit Garza MD FAAD

## 2024-03-26 NOTE — Clinical Note
Interesting case, had one unmolested vesicle on the left preauricular that I shaved off, most of the lesions were excoriated (I don't blame her it's hard not to pick at blisters) waiting for path will keep you updated if I figure anything out. Initial thought was maybe pseudoporphyria but usually that's on the back of the hands and sun-induced.

## 2024-04-02 LAB
FINAL PATHOLOGIC DIAGNOSIS: NORMAL
GROSS: NORMAL
Lab: NORMAL
MICROSCOPIC EXAM: NORMAL

## 2024-04-09 ENCOUNTER — HOSPITAL ENCOUNTER (OUTPATIENT)
Dept: RADIOLOGY | Facility: HOSPITAL | Age: 64
Discharge: HOME | End: 2024-04-09
Attending: INTERNAL MEDICINE
Payer: MEDICARE

## 2024-04-09 ENCOUNTER — HOSPITAL ENCOUNTER (OUTPATIENT)
Dept: RADIOLOGY | Facility: HOSPITAL | Age: 64
Discharge: HOME | End: 2024-04-09
Attending: PHYSICIAN ASSISTANT
Payer: MEDICARE

## 2024-04-09 ENCOUNTER — OFFICE VISIT (OUTPATIENT)
Dept: DERMATOLOGY | Facility: CLINIC | Age: 64
End: 2024-04-09
Payer: MEDICARE

## 2024-04-09 DIAGNOSIS — Z12.31 ENCOUNTER FOR SCREENING MAMMOGRAM FOR MALIGNANT NEOPLASM OF BREAST: ICD-10-CM

## 2024-04-09 DIAGNOSIS — L28.2 PRURIGO: ICD-10-CM

## 2024-04-09 DIAGNOSIS — L21.9 SEBORRHEIC DERMATITIS: Primary | ICD-10-CM

## 2024-04-09 DIAGNOSIS — R10.11 RIGHT UPPER QUADRANT ABDOMINAL PAIN: ICD-10-CM

## 2024-04-09 PROCEDURE — 74176 CT ABD & PELVIS W/O CONTRAST: CPT | Mod: 26,,, | Performed by: STUDENT IN AN ORGANIZED HEALTH CARE EDUCATION/TRAINING PROGRAM

## 2024-04-09 PROCEDURE — 74176 CT ABD & PELVIS W/O CONTRAST: CPT | Mod: TC,PO

## 2024-04-09 PROCEDURE — 77067 SCR MAMMO BI INCL CAD: CPT | Mod: TC,PO

## 2024-04-09 PROCEDURE — 77067 SCR MAMMO BI INCL CAD: CPT | Mod: 26,,, | Performed by: RADIOLOGY

## 2024-04-09 PROCEDURE — 25500020 PHARM REV CODE 255: Mod: PO | Performed by: INTERNAL MEDICINE

## 2024-04-09 PROCEDURE — 99213 OFFICE O/P EST LOW 20 MIN: CPT | Mod: S$GLB,,, | Performed by: DERMATOLOGY

## 2024-04-09 PROCEDURE — 77063 BREAST TOMOSYNTHESIS BI: CPT | Mod: 26,,, | Performed by: RADIOLOGY

## 2024-04-09 PROCEDURE — A9698 NON-RAD CONTRAST MATERIALNOC: HCPCS | Mod: PO | Performed by: INTERNAL MEDICINE

## 2024-04-09 RX ORDER — KETOCONAZOLE 20 MG/G
CREAM TOPICAL
Qty: 60 G | Refills: 11 | Status: SHIPPED | OUTPATIENT
Start: 2024-04-09

## 2024-04-09 RX ORDER — HYDROCORTISONE 25 MG/G
CREAM TOPICAL 2 TIMES DAILY
Qty: 45 G | Refills: 3 | Status: SHIPPED | OUTPATIENT
Start: 2024-04-09

## 2024-04-09 RX ORDER — KETOCONAZOLE 20 MG/ML
SHAMPOO, SUSPENSION TOPICAL
Qty: 120 ML | Refills: 11 | Status: SHIPPED | OUTPATIENT
Start: 2024-04-11

## 2024-04-09 RX ADMIN — BARIUM SULFATE 900 ML: 20 SUSPENSION ORAL at 01:04

## 2024-04-11 NOTE — PROGRESS NOTES
Dermatology Outpatient Clinic Progress Note    Patient Name: Kathryn Wagner  Patient : 1960  MRN: [id number]  Date of Service: 2024    CC/HPI: Kathryn Wagner is a 64 y.o. year old female with history of  clear vesicles on the face X 6 years  who presents today for follow up of same. Patient reports no new lesions but many of her pre-existing lesions are still healing. Does endorse picking at the vesicles once they are present. Not sun associated as best she can tell, had two biopsies at last visit (one from an ulcerated/picked at lesion, one from a fresh lesion of the left preauricular cheek that was barely visible clinically).     ROS:   Dermatological ROS: positive for rash    Physical Exam   Head   Head comments: Same as previous with scattered ulcerated papule of the head and neck with signs of excoriation      Diagram Legend     Erythematous scaling macule/papule c/w actinic keratosis       Vascular papule c/w angioma      Pigmented verrucoid papule/plaque c/w seborrheic keratosis      Yellow umbilicated papule c/w sebaceous hyperplasia      Irregularly shaped tan macule c/w lentigo     1-2 mm smooth white papules consistent with Milia      Movable subcutaneous cyst with punctum c/w epidermal inclusion cyst      Subcutaneous movable cyst c/w pilar cyst      Firm pink to brown papule c/w dermatofibroma      Pedunculated fleshy papule(s) c/w skin tag(s)      Evenly pigmented macule c/w junctional nevus     Mildly variegated pigmented, slightly irregular-bordered macule c/w mildly atypical nevus      Flesh colored to evenly pigmented papule c/w intradermal nevus       Pink pearly papule/plaque c/w basal cell carcinoma      Erythematous hyperkeratotic cursted plaque c/w SCC      Surgical scar with no sign of skin cancer recurrence      Open and closed comedones      Inflammatory papules and pustules      Verrucoid papule consistent consistent with wart     Erythematous eczematous patches and plaques      Dystrophic onycholytic nail with subungual debris c/w onychomycosis     Umbilicated papule    Erythematous-base heme-crusted tan verrucoid plaque consistent with inflamed seborrheic keratosis     Erythematous Silvery Scaling Plaque c/w Psoriasis     See annotation    Assessment/Plan:    Diagnoses and all orders for this visit:    Seborrheic dermatitis  -     ketoconazole (NIZORAL) 2 % shampoo; Apply topically twice a week.    Prurigo vs. 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 AAA BID PRN     Follow up in 2 months        Inderjit Garza MD FAAD

## 2024-05-03 ENCOUNTER — PATIENT MESSAGE (OUTPATIENT)
Dept: PRIMARY CARE CLINIC | Facility: CLINIC | Age: 64
End: 2024-05-03
Payer: MEDICARE

## 2024-05-03 DIAGNOSIS — F33.41 RECURRENT MAJOR DEPRESSION IN PARTIAL REMISSION: ICD-10-CM

## 2024-05-03 RX ORDER — ESCITALOPRAM OXALATE 20 MG/1
TABLET ORAL
Qty: 30 TABLET | Refills: 0 | Status: SHIPPED | OUTPATIENT
Start: 2024-05-03

## 2024-05-03 RX ORDER — METHIMAZOLE 10 MG/1
TABLET ORAL
Qty: 30 TABLET | Refills: 0 | Status: SHIPPED | OUTPATIENT
Start: 2024-05-03

## 2024-07-08 RX ORDER — METHIMAZOLE 10 MG/1
10 TABLET ORAL
Qty: 30 TABLET | Refills: 0 | Status: SHIPPED | OUTPATIENT
Start: 2024-07-08

## 2024-09-05 DIAGNOSIS — F51.01 PRIMARY INSOMNIA: ICD-10-CM

## 2024-09-06 RX ORDER — TRAZODONE HYDROCHLORIDE 50 MG/1
TABLET ORAL
Qty: 135 TABLET | Refills: 0 | OUTPATIENT
Start: 2024-09-06

## 2024-09-06 RX ORDER — METHIMAZOLE 10 MG/1
10 TABLET ORAL
Qty: 30 TABLET | Refills: 0 | OUTPATIENT
Start: 2024-09-06

## 2024-09-17 ENCOUNTER — OFFICE VISIT (OUTPATIENT)
Dept: PRIMARY CARE CLINIC | Facility: CLINIC | Age: 64
End: 2024-09-17
Payer: MEDICARE

## 2024-09-17 VITALS
HEART RATE: 83 BPM | OXYGEN SATURATION: 97 % | WEIGHT: 132.06 LBS | SYSTOLIC BLOOD PRESSURE: 117 MMHG | DIASTOLIC BLOOD PRESSURE: 69 MMHG | BODY MASS INDEX: 25.79 KG/M2 | RESPIRATION RATE: 16 BRPM

## 2024-09-17 DIAGNOSIS — D64.9 ANEMIA, UNSPECIFIED TYPE: ICD-10-CM

## 2024-09-17 DIAGNOSIS — K21.9 GASTROESOPHAGEAL REFLUX DISEASE WITHOUT ESOPHAGITIS: Chronic | ICD-10-CM

## 2024-09-17 DIAGNOSIS — E53.8 VITAMIN B 12 DEFICIENCY: ICD-10-CM

## 2024-09-17 DIAGNOSIS — R10.11 RUQ PAIN: ICD-10-CM

## 2024-09-17 DIAGNOSIS — F33.41 RECURRENT MAJOR DEPRESSIVE DISORDER, IN PARTIAL REMISSION: Primary | ICD-10-CM

## 2024-09-17 DIAGNOSIS — Z23 NEED FOR PNEUMOCOCCAL 20-VALENT CONJUGATE VACCINATION: ICD-10-CM

## 2024-09-17 DIAGNOSIS — F51.01 PRIMARY INSOMNIA: ICD-10-CM

## 2024-09-17 DIAGNOSIS — Z13.31 POSITIVE DEPRESSION SCREENING: ICD-10-CM

## 2024-09-17 DIAGNOSIS — Z23 FLU VACCINE NEED: ICD-10-CM

## 2024-09-17 DIAGNOSIS — E05.90 HYPERTHYROIDISM: ICD-10-CM

## 2024-09-17 DIAGNOSIS — E55.9 VITAMIN D DEFICIENCY: ICD-10-CM

## 2024-09-17 DIAGNOSIS — E78.2 MIXED HYPERLIPIDEMIA: ICD-10-CM

## 2024-09-17 DIAGNOSIS — E53.8 FOLATE DEFICIENCY: ICD-10-CM

## 2024-09-17 DIAGNOSIS — Z90.3 H/O GASTRIC SLEEVE: ICD-10-CM

## 2024-09-17 DIAGNOSIS — I70.0 AORTIC ATHEROSCLEROSIS: ICD-10-CM

## 2024-09-17 PROBLEM — L02.91 ABSCESS: Status: RESOLVED | Noted: 2021-07-29 | Resolved: 2024-09-17

## 2024-09-17 PROCEDURE — 3078F DIAST BP <80 MM HG: CPT | Mod: CPTII,S$GLB,, | Performed by: PHYSICIAN ASSISTANT

## 2024-09-17 PROCEDURE — G0009 ADMIN PNEUMOCOCCAL VACCINE: HCPCS | Mod: S$GLB,,, | Performed by: PHYSICIAN ASSISTANT

## 2024-09-17 PROCEDURE — 1159F MED LIST DOCD IN RCRD: CPT | Mod: CPTII,S$GLB,, | Performed by: PHYSICIAN ASSISTANT

## 2024-09-17 PROCEDURE — 99214 OFFICE O/P EST MOD 30 MIN: CPT | Mod: S$GLB,,, | Performed by: PHYSICIAN ASSISTANT

## 2024-09-17 PROCEDURE — 3074F SYST BP LT 130 MM HG: CPT | Mod: CPTII,S$GLB,, | Performed by: PHYSICIAN ASSISTANT

## 2024-09-17 PROCEDURE — 90677 PCV20 VACCINE IM: CPT | Mod: S$GLB,,, | Performed by: PHYSICIAN ASSISTANT

## 2024-09-17 PROCEDURE — 3008F BODY MASS INDEX DOCD: CPT | Mod: CPTII,S$GLB,, | Performed by: PHYSICIAN ASSISTANT

## 2024-09-17 RX ORDER — TRAZODONE HYDROCHLORIDE 50 MG/1
50 TABLET ORAL NIGHTLY
Qty: 90 TABLET | Refills: 1 | Status: SHIPPED | OUTPATIENT
Start: 2024-09-17

## 2024-09-17 RX ORDER — METHIMAZOLE 10 MG/1
10 TABLET ORAL DAILY
Qty: 90 TABLET | Refills: 1 | Status: SHIPPED | OUTPATIENT
Start: 2024-09-17

## 2024-09-17 RX ORDER — FLUOXETINE HYDROCHLORIDE 40 MG/1
40 CAPSULE ORAL DAILY
Qty: 90 CAPSULE | Refills: 1 | Status: SHIPPED | OUTPATIENT
Start: 2024-09-17 | End: 2025-03-16

## 2024-09-17 RX ORDER — ESOMEPRAZOLE MAGNESIUM 40 MG/1
40 CAPSULE, DELAYED RELEASE ORAL
Qty: 30 CAPSULE | Refills: 3 | Status: SHIPPED | OUTPATIENT
Start: 2024-09-17

## 2024-09-17 NOTE — PROGRESS NOTES
Subjective     Patient ID: Kathryn Wagner is a 64 y.o. female.    Chief Complaint: No chief complaint on file.    Depression  Visit Type: follow-up  Patient presents with the following symptoms: anhedonia, decreased concentration, depressed mood, fatigue, hypersomnia and psychomotor retardation.  Patient is not experiencing: confusion, nervousness/anxiety, palpitations, shortness of breath, suicidal ideas, suicidal planning, thoughts of death, weight gain and weight loss.  Frequency of symptoms: constantly   Severity: interfering with daily activities   Sleep quality: fair  Compliance with medications: Has been taking prozac 20 mg for 2 month. this was from her daughter. She was on Lexapro, but lost insurance and has not come in for a follow up.      Patient Active Problem List   Diagnosis    History of hypertension    Mixed hyperlipidemia simva SE myalgia    Recurrent major depressive disorder, in partial remission    Chronic left-sided low back pain with left-sided sciatica    Hip pain, left    Weakness of trunk musculature    Left carpal tunnel syndrome    Hyperplastic polyp of intestine 11/2012    GERD (gastroesophageal reflux disease) 6/18/20 EGD with metaplasia repeat 2023    Sacroiliac joint pain    Postlaminectomy syndrome of lumbar region    DDD (degenerative disc disease), lumbar    Primary insomnia weaned off ambien    Chronic narcotic use    Vitamin D deficiency    Abnormal glucose    Tobacco use disorder, moderate, in early remission    History of anxiety    H/O gastric sleeve    History of repair of hiatal hernia 8/17/20    Greater trochanteric bursitis of left hip    Gallstones    History of colon polyps    Aortic atherosclerosis    Hyperthyroidism    Anemia    Folate deficiency     Past Medical History:   Diagnosis Date    Class 2 obesity due to excess calories in adult 3/4/2015    Colon polyp     Depression     Diverticulosis     Encounter for blood transfusion     GERD (gastroesophageal reflux  disease)     High cholesterol     Hypertension     No longer taking medication since weight loss    Lumbar spondylosis 03/04/2015    L3-4 fusion; L5S1 fusion    Obesity     Restless leg syndrome        Review of Systems   Constitutional:  Positive for activity change and fatigue. Negative for weight gain and weight loss.   HENT: Negative.     Eyes: Negative.    Respiratory:  Negative for cough, chest tightness, shortness of breath and wheezing.    Cardiovascular:  Negative for chest pain, palpitations and leg swelling.   Gastrointestinal:  Positive for nausea and reflux. Negative for abdominal pain, blood in stool and vomiting.   Genitourinary: Negative.    Integumentary:  Negative.   Neurological:  Negative for dizziness, seizures, weakness and headaches.   Psychiatric/Behavioral:  Positive for decreased concentration, depression, depressed mood and dysphoric mood. Negative for agitation, behavioral problems, confusion, hallucinations, self-injury, sleep disturbance and suicidal ideas. The patient is not nervous/anxious and is not hyperactive.           Objective     Physical Exam  Vitals reviewed.   Constitutional:       General: She is not in acute distress.     Appearance: Normal appearance. She is not ill-appearing, toxic-appearing or diaphoretic.   HENT:      Head: Normocephalic and atraumatic.   Eyes:      Conjunctiva/sclera: Conjunctivae normal.   Neck:      Vascular: No carotid bruit.   Cardiovascular:      Rate and Rhythm: Normal rate.      Pulses: Normal pulses.      Heart sounds: Normal heart sounds. No murmur heard.     No friction rub. No gallop.   Pulmonary:      Effort: Pulmonary effort is normal. No respiratory distress.      Breath sounds: No stridor. No wheezing, rhonchi or rales.   Chest:      Chest wall: No tenderness.   Abdominal:      Palpations: Abdomen is soft.      Tenderness: There is no abdominal tenderness.   Musculoskeletal:      Cervical back: No rigidity or tenderness.      Right  lower leg: No edema.      Left lower leg: No edema.   Lymphadenopathy:      Cervical: No cervical adenopathy.   Skin:     General: Skin is warm and dry.      Coloration: Skin is not jaundiced.   Neurological:      General: No focal deficit present.      Mental Status: She is alert.   Psychiatric:         Mood and Affect: Mood normal.         Behavior: Behavior normal.         Thought Content: Thought content normal.         Judgment: Judgment normal.            Assessment and Plan     1. Recurrent major depressive disorder, in partial remission  Overview:  Fluoxetine x 2013  ativan    Orders:  -     FLUoxetine 40 MG capsule; Take 1 capsule (40 mg total) by mouth once daily.  Dispense: 90 capsule; Refill: 1    2. Positive depression screening  Comments:  I have reviewed the positive depression score which warrants active treatment with psychotherapy and/or medications.    3. Primary insomnia weaned off ambien  -     traZODone (DESYREL) 50 MG tablet; Take 1 tablet (50 mg total) by mouth every evening.  Dispense: 90 tablet; Refill: 1    4. Hyperthyroidism  -     T3, Free; Future; Expected date: 09/17/2024  -     T4, Free; Future; Expected date: 09/17/2024  -     TSH; Future; Expected date: 09/17/2024  -     Comprehensive Metabolic Panel; Future; Expected date: 09/17/2024    5. RUQ pain  -     esomeprazole (NEXIUM) 40 MG capsule; Take 1 capsule (40 mg total) by mouth before breakfast.  Dispense: 30 capsule; Refill: 3    6. Vitamin D deficiency  -     Calcitriol; Future; Expected date: 09/17/2024    7. Gastroesophageal reflux disease without esophagitis  Overview:  11/19/2012 EGD normal no bx's  6/18/20 EGD Small hiatal hernia. Normal examined duodenum. Erythematous mucosa in the prepyloric region of the stomach. Bx with intestinal metaplasia H pylori negative repeat 2023.       8. Mixed hyperlipidemia simva SE myalgia  Overview:  Simvastatin SE myalgias    Marian MPI 3/22/18: LVEF > 70%, normal myocardial perfusion.  Normal resting LV function  3/2/20 stress echo dobutamine: Normal left ventricular systolic function. LVEF 60%, normal diastolic function, normal CVP; negative for ischemia    Orders:  -     Lipid Panel; Future; Expected date: 09/17/2024    9. H/O gastric sleeve  -     Magnesium; Future; Expected date: 09/17/2024    10. Anemia, unspecified type  -     CBC Auto Differential; Future; Expected date: 09/17/2024  -     Ferritin; Future; Expected date: 09/17/2024  -     Iron and TIBC; Future; Expected date: 09/17/2024    11. Vitamin B 12 deficiency  -     Vitamin B12; Future; Expected date: 09/17/2024    12. Folate deficiency  -     Folate; Future; Expected date: 09/17/2024    13. Need for pneumococcal 20-valent conjugate vaccination  -     pneumoc 20-preston conj-dip cr(PF) (PREVNAR-20 (PF)) injection Syrg 0.5 mL    14. Aortic atherosclerosis  The patient is asked to make an attempt to improve diet and exercise patterns to aid in medical management of this problem.    Other orders  -     methIMAzole (TAPAZOLE) 10 MG Tab; Take 1 tablet (10 mg total) by mouth once daily.  Dispense: 90 tablet; Refill: 1    RECOMMEND DAILY EXERCISE, COUNSELING    FU 30 DAYS VIRTUAL FOR DEPRESSION FOLLOW UP        I spent 30 minutes on this encounter, time includes face-to-face, chart review, documentation, test review and orders.    No follow-ups on file.

## 2024-10-08 ENCOUNTER — TELEPHONE (OUTPATIENT)
Dept: PRIMARY CARE CLINIC | Facility: CLINIC | Age: 64
End: 2024-10-08
Payer: MEDICARE

## 2024-10-08 NOTE — TELEPHONE ENCOUNTER
----- Message from So sent at 10/8/2024 12:41 PM CDT -----  Type:  Needs Medical Advice    Who Called: pt    Symptoms (please be specific): congested and has pink eye     How long has patient had these symptoms:  2-3 days    Pharmacy name and phone #:    [facility] [place] [id number] [facility]  [address] [facility] [address]  Phone: [phone] Fax: [phone]    Would the patient rather a call back or a response via [facility]? Call back    Best Call Back Number: [phone]      Additional Information: Pt said she was just seen a couple of weeks ago and is asking for the office to send her something in for her.      Please call Back to advise. Thanks!

## 2024-11-19 ENCOUNTER — TELEPHONE (OUTPATIENT)
Dept: PRIMARY CARE CLINIC | Facility: CLINIC | Age: 64
End: 2024-11-19
Payer: MEDICARE

## 2024-11-19 ENCOUNTER — TELEPHONE (OUTPATIENT)
Dept: FAMILY MEDICINE | Facility: CLINIC | Age: 64
End: 2024-11-19
Payer: MEDICARE

## 2024-11-19 ENCOUNTER — LAB VISIT (OUTPATIENT)
Dept: PRIMARY CARE CLINIC | Facility: CLINIC | Age: 64
End: 2024-11-19
Payer: MEDICARE

## 2024-11-19 DIAGNOSIS — D64.9 ANEMIA, UNSPECIFIED TYPE: ICD-10-CM

## 2024-11-19 DIAGNOSIS — E78.2 MIXED HYPERLIPIDEMIA: ICD-10-CM

## 2024-11-19 DIAGNOSIS — Z90.3 H/O GASTRIC SLEEVE: ICD-10-CM

## 2024-11-19 DIAGNOSIS — E05.90 HYPERTHYROIDISM: ICD-10-CM

## 2024-11-19 DIAGNOSIS — E55.9 VITAMIN D DEFICIENCY: ICD-10-CM

## 2024-11-19 DIAGNOSIS — E53.8 FOLATE DEFICIENCY: ICD-10-CM

## 2024-11-19 DIAGNOSIS — E53.8 VITAMIN B 12 DEFICIENCY: ICD-10-CM

## 2024-11-19 LAB
ALBUMIN SERPL BCP-MCNC: 3.9 G/DL (ref 3.5–5.2)
ALP SERPL-CCNC: 67 U/L (ref 40–150)
ALT SERPL W/O P-5'-P-CCNC: 18 U/L (ref 10–44)
ANION GAP SERPL CALC-SCNC: 9 MMOL/L (ref 8–16)
AST SERPL-CCNC: 18 U/L (ref 10–40)
BASOPHILS # BLD AUTO: 0.05 K/UL (ref 0–0.2)
BASOPHILS NFR BLD: 0.8 % (ref 0–1.9)
BILIRUB SERPL-MCNC: 0.2 MG/DL (ref 0.1–1)
BUN SERPL-MCNC: 14 MG/DL (ref 8–23)
CALCIUM SERPL-MCNC: 9.5 MG/DL (ref 8.7–10.5)
CHLORIDE SERPL-SCNC: 103 MMOL/L (ref 95–110)
CHOLEST SERPL-MCNC: 269 MG/DL (ref 120–199)
CHOLEST/HDLC SERPL: 4.6 {RATIO} (ref 2–5)
CO2 SERPL-SCNC: 28 MMOL/L (ref 23–29)
CREAT SERPL-MCNC: 0.8 MG/DL (ref 0.5–1.4)
DIFFERENTIAL METHOD BLD: ABNORMAL
EOSINOPHIL # BLD AUTO: 0.5 K/UL (ref 0–0.5)
EOSINOPHIL NFR BLD: 8.2 % (ref 0–8)
ERYTHROCYTE [DISTWIDTH] IN BLOOD BY AUTOMATED COUNT: 13.2 % (ref 11.5–14.5)
EST. GFR  (NO RACE VARIABLE): >60 ML/MIN/1.73 M^2
FERRITIN SERPL-MCNC: 79 NG/ML (ref 20–300)
FOLATE SERPL-MCNC: 9.9 NG/ML (ref 4–24)
GLUCOSE SERPL-MCNC: 61 MG/DL (ref 70–110)
HCT VFR BLD AUTO: 37.2 % (ref 37–48.5)
HDLC SERPL-MCNC: 59 MG/DL (ref 40–75)
HDLC SERPL: 21.9 % (ref 20–50)
HGB BLD-MCNC: 12 G/DL (ref 12–16)
IMM GRANULOCYTES # BLD AUTO: 0.01 K/UL (ref 0–0.04)
IMM GRANULOCYTES NFR BLD AUTO: 0.2 % (ref 0–0.5)
IRON SERPL-MCNC: 85 UG/DL (ref 30–160)
LDLC SERPL CALC-MCNC: 159.4 MG/DL (ref 63–159)
LYMPHOCYTES # BLD AUTO: 2 K/UL (ref 1–4.8)
LYMPHOCYTES NFR BLD: 30.6 % (ref 18–48)
MAGNESIUM SERPL-MCNC: 2.1 MG/DL (ref 1.6–2.6)
MCH RBC QN AUTO: 29.9 PG (ref 27–31)
MCHC RBC AUTO-ENTMCNC: 32.3 G/DL (ref 32–36)
MCV RBC AUTO: 93 FL (ref 82–98)
MONOCYTES # BLD AUTO: 0.5 K/UL (ref 0.3–1)
MONOCYTES NFR BLD: 7.1 % (ref 4–15)
NEUTROPHILS # BLD AUTO: 3.5 K/UL (ref 1.8–7.7)
NEUTROPHILS NFR BLD: 53.1 % (ref 38–73)
NONHDLC SERPL-MCNC: 210 MG/DL
NRBC BLD-RTO: 0 /100 WBC
PLATELET # BLD AUTO: 243 K/UL (ref 150–450)
PMV BLD AUTO: 13 FL (ref 9.2–12.9)
POTASSIUM SERPL-SCNC: 4.7 MMOL/L (ref 3.5–5.1)
PROT SERPL-MCNC: 6.9 G/DL (ref 6–8.4)
RBC # BLD AUTO: 4.01 M/UL (ref 4–5.4)
SATURATED IRON: 23 % (ref 20–50)
SODIUM SERPL-SCNC: 140 MMOL/L (ref 136–145)
T3FREE SERPL-MCNC: 3.2 PG/ML (ref 2.3–4.2)
T4 FREE SERPL-MCNC: 0.91 NG/DL (ref 0.71–1.51)
TOTAL IRON BINDING CAPACITY: 363 UG/DL (ref 250–450)
TRANSFERRIN SERPL-MCNC: 245 MG/DL (ref 200–375)
TRIGL SERPL-MCNC: 253 MG/DL (ref 30–150)
TSH SERPL DL<=0.005 MIU/L-ACNC: 0.23 UIU/ML (ref 0.4–4)
VIT B12 SERPL-MCNC: 569 PG/ML (ref 210–950)
WBC # BLD AUTO: 6.48 K/UL (ref 3.9–12.7)

## 2024-11-19 PROCEDURE — 82607 VITAMIN B-12: CPT | Performed by: PHYSICIAN ASSISTANT

## 2024-11-19 PROCEDURE — 84443 ASSAY THYROID STIM HORMONE: CPT | Performed by: PHYSICIAN ASSISTANT

## 2024-11-19 PROCEDURE — 82746 ASSAY OF FOLIC ACID SERUM: CPT | Performed by: PHYSICIAN ASSISTANT

## 2024-11-19 PROCEDURE — 83735 ASSAY OF MAGNESIUM: CPT | Performed by: PHYSICIAN ASSISTANT

## 2024-11-19 PROCEDURE — 80061 LIPID PANEL: CPT | Performed by: PHYSICIAN ASSISTANT

## 2024-11-19 PROCEDURE — 84439 ASSAY OF FREE THYROXINE: CPT | Performed by: PHYSICIAN ASSISTANT

## 2024-11-19 PROCEDURE — 82728 ASSAY OF FERRITIN: CPT | Performed by: PHYSICIAN ASSISTANT

## 2024-11-19 PROCEDURE — 84481 FREE ASSAY (FT-3): CPT | Performed by: PHYSICIAN ASSISTANT

## 2024-11-19 PROCEDURE — 82652 VIT D 1 25-DIHYDROXY: CPT | Performed by: PHYSICIAN ASSISTANT

## 2024-11-19 PROCEDURE — 85025 COMPLETE CBC W/AUTO DIFF WBC: CPT | Performed by: PHYSICIAN ASSISTANT

## 2024-11-19 PROCEDURE — 80053 COMPREHEN METABOLIC PANEL: CPT | Performed by: PHYSICIAN ASSISTANT

## 2024-11-19 PROCEDURE — 84466 ASSAY OF TRANSFERRIN: CPT | Performed by: PHYSICIAN ASSISTANT

## 2024-11-19 NOTE — TELEPHONE ENCOUNTER
Attempted to call pt no answer left voicemail for pt to return call regarding lab orders and where to fax orders.

## 2024-11-19 NOTE — TELEPHONE ENCOUNTER
----- Message from Isi sent at 11/18/2024  3:41 PM CST -----  Contact: self  Type: Needs Medical Advice  Who Called:  pt  Best Call Back Number: [phone]    Additional Information: pt stating she need her labs sent in, she didn't get her labs done on 9/12/24, she's looking to get them done sometime this week.

## 2024-11-22 LAB — 1,25(OH)2D3 SERPL-MCNC: 33 PG/ML (ref 20–79)

## 2024-11-25 ENCOUNTER — TELEPHONE (OUTPATIENT)
Dept: PRIMARY CARE CLINIC | Facility: CLINIC | Age: 64
End: 2024-11-25
Payer: MEDICARE

## 2024-11-25 DIAGNOSIS — E05.90 HYPERTHYROIDISM: Primary | ICD-10-CM

## 2024-11-25 NOTE — TELEPHONE ENCOUNTER
----- Message from Cassius Antonio PA-C sent at 11/25/2024  7:00 AM CST -----  Kathryn Wagner    The blood work results are reviewed:    1) The TSH is a little low, but I would recommend repeating the TSH and thyroid levels in 6 weeks.   2) The Cholesterol is high. I recommend a low cholesterol diet and exercise.   3) The rest of the lab work are in a acceptable range.

## 2025-01-27 DIAGNOSIS — F33.41 RECURRENT MAJOR DEPRESSIVE DISORDER, IN PARTIAL REMISSION: ICD-10-CM

## 2025-01-27 RX ORDER — FLUOXETINE HYDROCHLORIDE 40 MG/1
CAPSULE ORAL
Qty: 90 CAPSULE | Refills: 2 | Status: SHIPPED | OUTPATIENT
Start: 2025-01-27

## 2025-02-03 ENCOUNTER — TELEPHONE (OUTPATIENT)
Dept: HEMATOLOGY/ONCOLOGY | Facility: CLINIC | Age: 65
End: 2025-02-03
Payer: MEDICARE

## 2025-02-03 NOTE — TELEPHONE ENCOUNTER
----- Message from Sheila sent at 2/3/2025  2:13 PM CST -----  Type: Needs Medical Advice  Who Called:  PT    Best Call Back Number: [phone]   Additional Information:requesting a call back for appt

## 2025-02-17 ENCOUNTER — TELEPHONE (OUTPATIENT)
Dept: PRIMARY CARE CLINIC | Facility: CLINIC | Age: 65
End: 2025-02-17
Payer: MEDICAID

## 2025-02-17 NOTE — TELEPHONE ENCOUNTER
----- Message from Misa sent at 2/14/2025 12:53 PM CST -----  Contact: Patient  Type:  Needs Medical Advice    Who Called:   Patient    Would the patient rather a call back or a response via [facility]?   Call back  Best Call Back Number:   [phone]    Additional Information:   States she is following up on lab orders being sent to [facility] in [place] - please call - thank you

## 2025-02-18 ENCOUNTER — TELEPHONE (OUTPATIENT)
Dept: PRIMARY CARE CLINIC | Facility: CLINIC | Age: 65
End: 2025-02-18
Payer: MEDICAID

## 2025-02-18 DIAGNOSIS — F17.200 TOBACCO DEPENDENCE: Primary | ICD-10-CM

## 2025-02-18 RX ORDER — VARENICLINE TARTRATE 0.5 (11)-1
KIT ORAL
Qty: 1 EACH | Refills: 0 | Status: SHIPPED | OUTPATIENT
Start: 2025-02-18

## 2025-02-20 ENCOUNTER — TELEPHONE (OUTPATIENT)
Dept: PRIMARY CARE CLINIC | Facility: CLINIC | Age: 65
End: 2025-02-20
Payer: MEDICAID

## 2025-02-25 DIAGNOSIS — F51.01 PRIMARY INSOMNIA: ICD-10-CM

## 2025-02-27 DIAGNOSIS — R10.11 RUQ PAIN: ICD-10-CM

## 2025-02-27 RX ORDER — ESOMEPRAZOLE MAGNESIUM 40 MG/1
CAPSULE, DELAYED RELEASE ORAL
Qty: 90 CAPSULE | Refills: 3 | Status: SHIPPED | OUTPATIENT
Start: 2025-02-27

## 2025-02-27 RX ORDER — TRAZODONE HYDROCHLORIDE 50 MG/1
TABLET ORAL
Qty: 90 TABLET | Refills: 3 | Status: SHIPPED | OUTPATIENT
Start: 2025-02-27

## 2025-08-18 ENCOUNTER — TELEPHONE (OUTPATIENT)
Dept: FAMILY MEDICINE | Facility: CLINIC | Age: 65
End: 2025-08-18
Payer: MEDICAID

## (undated) DEVICE — RELOAD ECHELON FLEX GRN 60MM

## (undated) DEVICE — KIT PROCEDURE STER INLET CLOSU

## (undated) DEVICE — ELECTRODE REM PLYHSV RETURN 9

## (undated) DEVICE — SYR 10CC LUER LOCK

## (undated) DEVICE — SUT 0 VICRYL / UR6 (J603)

## (undated) DEVICE — NDL HYPO REG 25G X 1 1/2

## (undated) DEVICE — SUT MCRYL PLUS 4-0 PS2 27IN

## (undated) DEVICE — RELOAD ECHELON FLEX BLU 60MM

## (undated) DEVICE — STAPLER ECHELON FLEX 60MM 44CM

## (undated) DEVICE — SEE MEDLINE ITEM 156902

## (undated) DEVICE — TROCAR ENDOPATH XCEL 5X100MM

## (undated) DEVICE — TUBING HF INSUFFLATION W/ FLTR

## (undated) DEVICE — SOL NS 1000CC

## (undated) DEVICE — SCALPEL #11 BLADE STRL DISP

## (undated) DEVICE — SHEARS HS LONG 5MM CURVED

## (undated) DEVICE — TROCAR ENDOPATH XCEL 11MM 10CM

## (undated) DEVICE — TROCAR ENDOPATH XCEL 12MM 10CM

## (undated) DEVICE — ADHESIVE DERMABOND ADVANCED

## (undated) DEVICE — CANNULA ENDOPATH XCEL 5X100MM

## (undated) DEVICE — DRAPE ABDOMINAL TIBURON 14X11